# Patient Record
Sex: MALE | Race: WHITE | Employment: OTHER | URBAN - METROPOLITAN AREA
[De-identification: names, ages, dates, MRNs, and addresses within clinical notes are randomized per-mention and may not be internally consistent; named-entity substitution may affect disease eponyms.]

---

## 2017-01-11 ENCOUNTER — TELEPHONE (OUTPATIENT)
Dept: INTERNAL MEDICINE CLINIC | Facility: CLINIC | Age: 72
End: 2017-01-11

## 2017-01-11 NOTE — TELEPHONE ENCOUNTER
ASTHMA CONTROL TEST /ASTHMA ACTION PLAN REVIEWED AND COMPLETED OVER THE PHONE. EXPRESSED UNDERSTANDING OF CARE.

## 2017-04-04 PROBLEM — F41.9 ANXIETY: Status: ACTIVE | Noted: 2017-04-04

## 2017-06-23 ENCOUNTER — LAB ENCOUNTER (OUTPATIENT)
Dept: LAB | Facility: HOSPITAL | Age: 72
End: 2017-06-23
Attending: ORTHOPAEDIC SURGERY
Payer: MEDICARE

## 2017-06-23 ENCOUNTER — HOSPITAL ENCOUNTER (OUTPATIENT)
Dept: GENERAL RADIOLOGY | Facility: HOSPITAL | Age: 72
Discharge: HOME | End: 2017-06-23
Attending: ORTHOPAEDIC SURGERY
Payer: MEDICARE

## 2017-06-23 DIAGNOSIS — Z96.612 HISTORY OF TOTAL REPLACEMENT OF LEFT SHOULDER JOINT: ICD-10-CM

## 2017-06-23 DIAGNOSIS — Z96.612 HISTORY OF LEFT SHOULDER REPLACEMENT: ICD-10-CM

## 2017-06-23 DIAGNOSIS — G89.29 CHRONIC LEFT SHOULDER PAIN: ICD-10-CM

## 2017-06-23 DIAGNOSIS — M25.512 CHRONIC LEFT SHOULDER PAIN: ICD-10-CM

## 2017-06-23 PROCEDURE — 85025 COMPLETE CBC W/AUTO DIFF WBC: CPT

## 2017-06-23 PROCEDURE — 85652 RBC SED RATE AUTOMATED: CPT

## 2017-06-23 PROCEDURE — 87070 CULTURE OTHR SPECIMN AEROBIC: CPT | Performed by: ORTHOPAEDIC SURGERY

## 2017-06-23 PROCEDURE — 77002 NEEDLE LOCALIZATION BY XRAY: CPT | Performed by: ORTHOPAEDIC SURGERY

## 2017-06-23 PROCEDURE — 86141 C-REACTIVE PROTEIN HS: CPT

## 2017-06-23 PROCEDURE — 87205 SMEAR GRAM STAIN: CPT | Performed by: ORTHOPAEDIC SURGERY

## 2017-06-23 PROCEDURE — 20610 DRAIN/INJ JOINT/BURSA W/O US: CPT | Performed by: ORTHOPAEDIC SURGERY

## 2017-06-23 PROCEDURE — 89050 BODY FLUID CELL COUNT: CPT | Performed by: ORTHOPAEDIC SURGERY

## 2017-06-23 PROCEDURE — 89060 EXAM SYNOVIAL FLUID CRYSTALS: CPT | Performed by: ORTHOPAEDIC SURGERY

## 2017-06-23 PROCEDURE — 89051 BODY FLUID CELL COUNT: CPT | Performed by: ORTHOPAEDIC SURGERY

## 2017-06-23 PROCEDURE — 87075 CULTR BACTERIA EXCEPT BLOOD: CPT | Performed by: ORTHOPAEDIC SURGERY

## 2017-06-23 PROCEDURE — 36415 COLL VENOUS BLD VENIPUNCTURE: CPT

## 2017-06-23 NOTE — PROCEDURES
PROCEDURE: XR ASPIR/INJ MAJOR JOINT W/FLUORO LT(ORF=40780/44251)    COMPARISON: ANKUR , HELEN SHOULDER, (1 VIEW), LEFT (CPT=73020), 7/21/2016, 13:43.     INDICATIONS: 76-year-old male presents with a history of rotator cuff disease and reverse shoulder arthro

## 2017-06-28 PROBLEM — T84.59XA INFECTION OF PROSTHETIC SHOULDER JOINT, INITIAL ENCOUNTER (HCC): Status: ACTIVE | Noted: 2017-06-28

## 2017-06-28 PROBLEM — Z96.619: Status: ACTIVE | Noted: 2017-06-28

## 2017-06-28 PROBLEM — Z96.619 INFECTION OF PROSTHETIC SHOULDER JOINT, INITIAL ENCOUNTER (HCC): Status: ACTIVE | Noted: 2017-06-28

## 2017-06-28 PROBLEM — T84.59XA: Status: ACTIVE | Noted: 2017-06-28

## 2017-06-28 PROBLEM — T84.019A: Status: ACTIVE | Noted: 2017-06-28

## 2017-07-03 DIAGNOSIS — F41.9 ANXIETY: ICD-10-CM

## 2017-07-03 RX ORDER — ALPRAZOLAM 0.25 MG/1
TABLET ORAL
Qty: 90 TABLET | Refills: 0 | Status: SHIPPED
Start: 2017-07-03 | End: 2017-10-02

## 2017-07-17 RX ORDER — GARLIC EXTRACT 500 MG
1 CAPSULE ORAL DAILY
COMMUNITY
End: 2018-01-29

## 2017-07-21 ENCOUNTER — APPOINTMENT (OUTPATIENT)
Dept: GENERAL RADIOLOGY | Facility: HOSPITAL | Age: 72
DRG: 496 | End: 2017-07-21
Attending: ORTHOPAEDIC SURGERY
Payer: MEDICARE

## 2017-07-21 ENCOUNTER — ANESTHESIA (OUTPATIENT)
Dept: SURGERY | Facility: HOSPITAL | Age: 72
DRG: 496 | End: 2017-07-21
Payer: MEDICARE

## 2017-07-21 ENCOUNTER — SURGERY (OUTPATIENT)
Age: 72
End: 2017-07-21

## 2017-07-21 ENCOUNTER — ANESTHESIA EVENT (OUTPATIENT)
Dept: SURGERY | Facility: HOSPITAL | Age: 72
DRG: 496 | End: 2017-07-21
Payer: MEDICARE

## 2017-07-21 ENCOUNTER — HOSPITAL ENCOUNTER (INPATIENT)
Facility: HOSPITAL | Age: 72
LOS: 3 days | Discharge: HOME | DRG: 496 | End: 2017-07-24
Attending: ORTHOPAEDIC SURGERY | Admitting: ORTHOPAEDIC SURGERY
Payer: MEDICARE

## 2017-07-21 PROBLEM — T84.7XXA INFECTED ORTHOPEDIC IMPLANT (HCC): Status: ACTIVE | Noted: 2017-07-21

## 2017-07-21 PROBLEM — T84.7XXA INFECTED ORTHOPEDIC IMPLANT: Status: ACTIVE | Noted: 2017-07-21

## 2017-07-21 LAB
ANION GAP SERPL CALC-SCNC: 7 MMOL/L (ref 0–18)
BUN SERPL-MCNC: 17 MG/DL (ref 8–20)
BUN/CREAT SERPL: 15.7 (ref 10–20)
CALCIUM SERPL-MCNC: 8.6 MG/DL (ref 8.5–10.5)
CHLORIDE SERPL-SCNC: 104 MMOL/L (ref 95–110)
CO2 SERPL-SCNC: 25 MMOL/L (ref 22–32)
CREAT SERPL-MCNC: 1.08 MG/DL (ref 0.5–1.5)
GLUCOSE SERPL-MCNC: 199 MG/DL (ref 70–99)
MRSA DNA SPEC QL NAA+PROBE: NEGATIVE
OSMOLALITY UR CALC.SUM OF ELEC: 289 MOSM/KG (ref 275–295)
POTASSIUM SERPL-SCNC: 4 MMOL/L (ref 3.3–5.1)
SODIUM SERPL-SCNC: 136 MMOL/L (ref 136–144)

## 2017-07-21 PROCEDURE — 87176 TISSUE HOMOGENIZATION CULTR: CPT | Performed by: ORTHOPAEDIC SURGERY

## 2017-07-21 PROCEDURE — 87206 SMEAR FLUORESCENT/ACID STAI: CPT | Performed by: ORTHOPAEDIC SURGERY

## 2017-07-21 PROCEDURE — 0RHK08Z INSERTION OF SPACER INTO LEFT SHOULDER JOINT, OPEN APPROACH: ICD-10-PCS | Performed by: ORTHOPAEDIC SURGERY

## 2017-07-21 PROCEDURE — 99152 MOD SED SAME PHYS/QHP 5/>YRS: CPT | Performed by: ORTHOPAEDIC SURGERY

## 2017-07-21 PROCEDURE — 88300 SURGICAL PATH GROSS: CPT | Performed by: ORTHOPAEDIC SURGERY

## 2017-07-21 PROCEDURE — 87102 FUNGUS ISOLATION CULTURE: CPT | Performed by: ORTHOPAEDIC SURGERY

## 2017-07-21 PROCEDURE — 64415 NJX AA&/STRD BRCH PLXS IMG: CPT | Performed by: ORTHOPAEDIC SURGERY

## 2017-07-21 PROCEDURE — 0X930ZZ DRAINAGE OF LEFT SHOULDER REGION, OPEN APPROACH: ICD-10-PCS | Performed by: ORTHOPAEDIC SURGERY

## 2017-07-21 PROCEDURE — 73030 X-RAY EXAM OF SHOULDER: CPT | Performed by: ORTHOPAEDIC SURGERY

## 2017-07-21 PROCEDURE — 80048 BASIC METABOLIC PNL TOTAL CA: CPT | Performed by: ORTHOPAEDIC SURGERY

## 2017-07-21 PROCEDURE — 3E0T3BZ INTRODUCTION OF ANESTHETIC AGENT INTO PERIPHERAL NERVES AND PLEXI, PERCUTANEOUS APPROACH: ICD-10-PCS | Performed by: ANESTHESIOLOGY

## 2017-07-21 PROCEDURE — BP191ZZ FLUOROSCOPY OF LEFT SHOULDER USING LOW OSMOLAR CONTRAST: ICD-10-PCS | Performed by: ORTHOPAEDIC SURGERY

## 2017-07-21 PROCEDURE — 87070 CULTURE OTHR SPECIMN AEROBIC: CPT | Performed by: ORTHOPAEDIC SURGERY

## 2017-07-21 PROCEDURE — 36415 COLL VENOUS BLD VENIPUNCTURE: CPT | Performed by: ORTHOPAEDIC SURGERY

## 2017-07-21 PROCEDURE — 87116 MYCOBACTERIA CULTURE: CPT | Performed by: ORTHOPAEDIC SURGERY

## 2017-07-21 PROCEDURE — 76942 ECHO GUIDE FOR BIOPSY: CPT | Performed by: ORTHOPAEDIC SURGERY

## 2017-07-21 PROCEDURE — 87641 MR-STAPH DNA AMP PROBE: CPT | Performed by: ORTHOPAEDIC SURGERY

## 2017-07-21 PROCEDURE — 87075 CULTR BACTERIA EXCEPT BLOOD: CPT | Performed by: ORTHOPAEDIC SURGERY

## 2017-07-21 PROCEDURE — 87076 CULTURE ANAEROBE IDENT EACH: CPT | Performed by: ORTHOPAEDIC SURGERY

## 2017-07-21 PROCEDURE — 76001 XR C-ARM FLUORO >1 HOUR  (CPT=76001): CPT | Performed by: ORTHOPAEDIC SURGERY

## 2017-07-21 PROCEDURE — 0RPJ0JZ REMOVAL OF SYNTHETIC SUBSTITUTE FROM RIGHT SHOULDER JOINT, OPEN APPROACH: ICD-10-PCS | Performed by: ORTHOPAEDIC SURGERY

## 2017-07-21 PROCEDURE — 87205 SMEAR GRAM STAIN: CPT | Performed by: ORTHOPAEDIC SURGERY

## 2017-07-21 PROCEDURE — 73020 X-RAY EXAM OF SHOULDER: CPT | Performed by: ORTHOPAEDIC SURGERY

## 2017-07-21 DEVICE — CEMENT BONE ZIM PALICOS R +G: Type: IMPLANTABLE DEVICE | Status: FUNCTIONAL

## 2017-07-21 RX ORDER — HYDROMORPHONE HYDROCHLORIDE 1 MG/ML
0.2 INJECTION, SOLUTION INTRAMUSCULAR; INTRAVENOUS; SUBCUTANEOUS EVERY 5 MIN PRN
Status: DISCONTINUED | OUTPATIENT
Start: 2017-07-21 | End: 2017-07-21 | Stop reason: HOSPADM

## 2017-07-21 RX ORDER — HYDROMORPHONE HYDROCHLORIDE 1 MG/ML
0.4 INJECTION, SOLUTION INTRAMUSCULAR; INTRAVENOUS; SUBCUTANEOUS EVERY 30 MIN PRN
Status: DISCONTINUED | OUTPATIENT
Start: 2017-07-21 | End: 2017-07-24

## 2017-07-21 RX ORDER — HYDROCODONE BITARTRATE AND ACETAMINOPHEN 5; 325 MG/1; MG/1
1 TABLET ORAL AS NEEDED
Status: DISCONTINUED | OUTPATIENT
Start: 2017-07-21 | End: 2017-07-21 | Stop reason: HOSPADM

## 2017-07-21 RX ORDER — ONDANSETRON 2 MG/ML
4 INJECTION INTRAMUSCULAR; INTRAVENOUS EVERY 6 HOURS PRN
Status: DISCONTINUED | OUTPATIENT
Start: 2017-07-21 | End: 2017-07-24

## 2017-07-21 RX ORDER — HYDROCODONE BITARTRATE AND ACETAMINOPHEN 10; 325 MG/1; MG/1
1-2 TABLET ORAL EVERY 6 HOURS PRN
Qty: 60 TABLET | Refills: 0 | Status: SHIPPED | OUTPATIENT
Start: 2017-07-21 | End: 2017-08-02 | Stop reason: ALTCHOICE

## 2017-07-21 RX ORDER — ONDANSETRON 2 MG/ML
4 INJECTION INTRAMUSCULAR; INTRAVENOUS ONCE AS NEEDED
Status: DISCONTINUED | OUTPATIENT
Start: 2017-07-21 | End: 2017-07-21 | Stop reason: HOSPADM

## 2017-07-21 RX ORDER — DIPHENHYDRAMINE HYDROCHLORIDE 50 MG/ML
12.5 INJECTION INTRAMUSCULAR; INTRAVENOUS EVERY 4 HOURS PRN
Status: DISCONTINUED | OUTPATIENT
Start: 2017-07-21 | End: 2017-07-24

## 2017-07-21 RX ORDER — SODIUM CHLORIDE, SODIUM LACTATE, POTASSIUM CHLORIDE, CALCIUM CHLORIDE 600; 310; 30; 20 MG/100ML; MG/100ML; MG/100ML; MG/100ML
INJECTION, SOLUTION INTRAVENOUS CONTINUOUS
Status: DISCONTINUED | OUTPATIENT
Start: 2017-07-21 | End: 2017-07-22 | Stop reason: HOSPADM

## 2017-07-21 RX ORDER — SODIUM CHLORIDE, SODIUM LACTATE, POTASSIUM CHLORIDE, CALCIUM CHLORIDE 600; 310; 30; 20 MG/100ML; MG/100ML; MG/100ML; MG/100ML
INJECTION, SOLUTION INTRAVENOUS CONTINUOUS
Status: DISCONTINUED | OUTPATIENT
Start: 2017-07-21 | End: 2017-07-24

## 2017-07-21 RX ORDER — ENOXAPARIN SODIUM 100 MG/ML
40 INJECTION SUBCUTANEOUS DAILY
Status: DISCONTINUED | OUTPATIENT
Start: 2017-07-22 | End: 2017-07-24

## 2017-07-21 RX ORDER — CEFAZOLIN SODIUM 1 G/3ML
INJECTION, POWDER, FOR SOLUTION INTRAMUSCULAR; INTRAVENOUS AS NEEDED
Status: DISCONTINUED | OUTPATIENT
Start: 2017-07-21 | End: 2017-07-21 | Stop reason: SURG

## 2017-07-21 RX ORDER — OXYCODONE HYDROCHLORIDE 5 MG/1
10 TABLET ORAL EVERY 4 HOURS PRN
Status: DISPENSED | OUTPATIENT
Start: 2017-07-21 | End: 2017-07-22

## 2017-07-21 RX ORDER — ROCURONIUM BROMIDE 10 MG/ML
INJECTION, SOLUTION INTRAVENOUS AS NEEDED
Status: DISCONTINUED | OUTPATIENT
Start: 2017-07-21 | End: 2017-07-21 | Stop reason: SURG

## 2017-07-21 RX ORDER — POLYETHYLENE GLYCOL 3350 17 G/17G
POWDER, FOR SOLUTION ORAL
Status: DISCONTINUED | OUTPATIENT
Start: 2017-07-21 | End: 2017-07-21

## 2017-07-21 RX ORDER — BISACODYL 10 MG
10 SUPPOSITORY, RECTAL RECTAL
Status: DISCONTINUED | OUTPATIENT
Start: 2017-07-21 | End: 2017-07-24

## 2017-07-21 RX ORDER — LIDOCAINE HYDROCHLORIDE 10 MG/ML
INJECTION, SOLUTION EPIDURAL; INFILTRATION; INTRACAUDAL; PERINEURAL AS NEEDED
Status: DISCONTINUED | OUTPATIENT
Start: 2017-07-21 | End: 2017-07-21 | Stop reason: SURG

## 2017-07-21 RX ORDER — MORPHINE SULFATE 30 MG/1
15 TABLET ORAL EVERY 4 HOURS PRN
Status: ACTIVE | OUTPATIENT
Start: 2017-07-21 | End: 2017-07-22

## 2017-07-21 RX ORDER — METOCLOPRAMIDE HYDROCHLORIDE 5 MG/ML
10 INJECTION INTRAMUSCULAR; INTRAVENOUS EVERY 6 HOURS PRN
Status: ACTIVE | OUTPATIENT
Start: 2017-07-21 | End: 2017-07-23

## 2017-07-21 RX ORDER — DOCUSATE SODIUM 100 MG/1
100 CAPSULE, LIQUID FILLED ORAL 2 TIMES DAILY
Status: DISCONTINUED | OUTPATIENT
Start: 2017-07-21 | End: 2017-07-21

## 2017-07-21 RX ORDER — OXYCODONE HYDROCHLORIDE 5 MG/1
5 TABLET ORAL EVERY 4 HOURS PRN
Status: DISPENSED | OUTPATIENT
Start: 2017-07-21 | End: 2017-07-22

## 2017-07-21 RX ORDER — ACETAMINOPHEN 500 MG
1000 TABLET ORAL EVERY 8 HOURS
Status: COMPLETED | OUTPATIENT
Start: 2017-07-21 | End: 2017-07-22

## 2017-07-21 RX ORDER — HYDROCODONE BITARTRATE AND ACETAMINOPHEN 5; 325 MG/1; MG/1
2 TABLET ORAL AS NEEDED
Status: DISCONTINUED | OUTPATIENT
Start: 2017-07-21 | End: 2017-07-21 | Stop reason: HOSPADM

## 2017-07-21 RX ORDER — HYDROMORPHONE HYDROCHLORIDE 1 MG/ML
0.4 INJECTION, SOLUTION INTRAMUSCULAR; INTRAVENOUS; SUBCUTANEOUS EVERY 5 MIN PRN
Status: DISCONTINUED | OUTPATIENT
Start: 2017-07-21 | End: 2017-07-21 | Stop reason: HOSPADM

## 2017-07-21 RX ORDER — POLYETHYLENE GLYCOL 3350 17 G/17G
17 POWDER, FOR SOLUTION ORAL DAILY PRN
Status: DISCONTINUED | OUTPATIENT
Start: 2017-07-21 | End: 2017-07-23

## 2017-07-21 RX ORDER — MORPHINE SULFATE 4 MG/ML
4 INJECTION, SOLUTION INTRAMUSCULAR; INTRAVENOUS EVERY 10 MIN PRN
Status: DISCONTINUED | OUTPATIENT
Start: 2017-07-21 | End: 2017-07-21 | Stop reason: HOSPADM

## 2017-07-21 RX ORDER — ONDANSETRON 2 MG/ML
4 INJECTION INTRAMUSCULAR; INTRAVENOUS EVERY 4 HOURS PRN
Status: DISCONTINUED | OUTPATIENT
Start: 2017-07-21 | End: 2017-07-24

## 2017-07-21 RX ORDER — HYDROMORPHONE HYDROCHLORIDE 1 MG/ML
0.2 INJECTION, SOLUTION INTRAMUSCULAR; INTRAVENOUS; SUBCUTANEOUS EVERY 5 MIN PRN
Status: DISCONTINUED | OUTPATIENT
Start: 2017-07-21 | End: 2017-07-21

## 2017-07-21 RX ORDER — HYDROCODONE BITARTRATE AND ACETAMINOPHEN 10; 325 MG/1; MG/1
2 TABLET ORAL EVERY 4 HOURS PRN
Status: DISCONTINUED | OUTPATIENT
Start: 2017-07-21 | End: 2017-07-24

## 2017-07-21 RX ORDER — NALBUPHINE HCL 10 MG/ML
2.5 AMPUL (ML) INJECTION EVERY 4 HOURS PRN
Status: DISCONTINUED | OUTPATIENT
Start: 2017-07-21 | End: 2017-07-24

## 2017-07-21 RX ORDER — ACETAMINOPHEN 325 MG/1
650 TABLET ORAL ONCE
Status: COMPLETED | OUTPATIENT
Start: 2017-07-21 | End: 2017-07-21

## 2017-07-21 RX ORDER — HYDROMORPHONE HYDROCHLORIDE 1 MG/ML
0.6 INJECTION, SOLUTION INTRAMUSCULAR; INTRAVENOUS; SUBCUTANEOUS EVERY 5 MIN PRN
Status: DISCONTINUED | OUTPATIENT
Start: 2017-07-21 | End: 2017-07-21 | Stop reason: HOSPADM

## 2017-07-21 RX ORDER — MIDAZOLAM HYDROCHLORIDE 1 MG/ML
INJECTION INTRAMUSCULAR; INTRAVENOUS AS NEEDED
Status: DISCONTINUED | OUTPATIENT
Start: 2017-07-21 | End: 2017-07-21 | Stop reason: SURG

## 2017-07-21 RX ORDER — MORPHINE SULFATE 2 MG/ML
2 INJECTION, SOLUTION INTRAMUSCULAR; INTRAVENOUS EVERY 2 HOUR PRN
Status: ACTIVE | OUTPATIENT
Start: 2017-07-21 | End: 2017-07-22

## 2017-07-21 RX ORDER — HYDROMORPHONE HYDROCHLORIDE 1 MG/ML
0.4 INJECTION, SOLUTION INTRAMUSCULAR; INTRAVENOUS; SUBCUTANEOUS EVERY 5 MIN PRN
Status: DISCONTINUED | OUTPATIENT
Start: 2017-07-21 | End: 2017-07-21

## 2017-07-21 RX ORDER — ONDANSETRON 2 MG/ML
INJECTION INTRAMUSCULAR; INTRAVENOUS AS NEEDED
Status: DISCONTINUED | OUTPATIENT
Start: 2017-07-21 | End: 2017-07-21 | Stop reason: SURG

## 2017-07-21 RX ORDER — NALOXONE HYDROCHLORIDE 0.4 MG/ML
0.08 INJECTION, SOLUTION INTRAMUSCULAR; INTRAVENOUS; SUBCUTANEOUS
Status: DISCONTINUED | OUTPATIENT
Start: 2017-07-21 | End: 2017-07-24

## 2017-07-21 RX ORDER — METOCLOPRAMIDE 10 MG/1
10 TABLET ORAL ONCE
Status: DISCONTINUED | OUTPATIENT
Start: 2017-07-21 | End: 2017-07-21 | Stop reason: HOSPADM

## 2017-07-21 RX ORDER — HALOPERIDOL 5 MG/ML
0.25 INJECTION INTRAMUSCULAR ONCE AS NEEDED
Status: DISCONTINUED | OUTPATIENT
Start: 2017-07-21 | End: 2017-07-21 | Stop reason: HOSPADM

## 2017-07-21 RX ORDER — ALFUZOSIN HYDROCHLORIDE 10 MG/1
10 TABLET, EXTENDED RELEASE ORAL NIGHTLY
Status: DISCONTINUED | OUTPATIENT
Start: 2017-07-21 | End: 2017-07-24

## 2017-07-21 RX ORDER — DIPHENHYDRAMINE HYDROCHLORIDE 50 MG/ML
25 INJECTION INTRAMUSCULAR; INTRAVENOUS ONCE AS NEEDED
Status: ACTIVE | OUTPATIENT
Start: 2017-07-21 | End: 2017-07-21

## 2017-07-21 RX ORDER — SODIUM CHLORIDE 0.9 % (FLUSH) 0.9 %
10 SYRINGE (ML) INJECTION AS NEEDED
Status: DISCONTINUED | OUTPATIENT
Start: 2017-07-21 | End: 2017-07-24

## 2017-07-21 RX ORDER — SODIUM PHOSPHATE, DIBASIC AND SODIUM PHOSPHATE, MONOBASIC 7; 19 G/133ML; G/133ML
1 ENEMA RECTAL ONCE AS NEEDED
Status: DISCONTINUED | OUTPATIENT
Start: 2017-07-21 | End: 2017-07-24

## 2017-07-21 RX ORDER — PHENYLEPHRINE HCL 10 MG/ML
VIAL (ML) INJECTION AS NEEDED
Status: DISCONTINUED | OUTPATIENT
Start: 2017-07-21 | End: 2017-07-21 | Stop reason: SURG

## 2017-07-21 RX ORDER — MORPHINE SULFATE 4 MG/ML
6 INJECTION, SOLUTION INTRAMUSCULAR; INTRAVENOUS EVERY 10 MIN PRN
Status: DISCONTINUED | OUTPATIENT
Start: 2017-07-21 | End: 2017-07-21 | Stop reason: HOSPADM

## 2017-07-21 RX ORDER — FAMOTIDINE 20 MG/1
20 TABLET ORAL ONCE
Status: DISCONTINUED | OUTPATIENT
Start: 2017-07-21 | End: 2017-07-21 | Stop reason: HOSPADM

## 2017-07-21 RX ORDER — MORPHINE SULFATE 4 MG/ML
6 INJECTION, SOLUTION INTRAMUSCULAR; INTRAVENOUS EVERY 2 HOUR PRN
Status: ACTIVE | OUTPATIENT
Start: 2017-07-21 | End: 2017-07-22

## 2017-07-21 RX ORDER — ROPIVACAINE HYDROCHLORIDE 5 MG/ML
INJECTION, SOLUTION EPIDURAL; INFILTRATION; PERINEURAL AS NEEDED
Status: DISCONTINUED | OUTPATIENT
Start: 2017-07-21 | End: 2017-07-21 | Stop reason: SURG

## 2017-07-21 RX ORDER — NALOXONE HYDROCHLORIDE 0.4 MG/ML
80 INJECTION, SOLUTION INTRAMUSCULAR; INTRAVENOUS; SUBCUTANEOUS AS NEEDED
Status: DISCONTINUED | OUTPATIENT
Start: 2017-07-21 | End: 2017-07-21 | Stop reason: HOSPADM

## 2017-07-21 RX ORDER — LEVOTHYROXINE SODIUM 0.07 MG/1
75 TABLET ORAL
Status: DISCONTINUED | OUTPATIENT
Start: 2017-07-22 | End: 2017-07-24

## 2017-07-21 RX ORDER — ONDANSETRON 2 MG/ML
4 INJECTION INTRAMUSCULAR; INTRAVENOUS ONCE AS NEEDED
Status: DISCONTINUED | OUTPATIENT
Start: 2017-07-21 | End: 2017-07-21

## 2017-07-21 RX ORDER — HYDROCODONE BITARTRATE AND ACETAMINOPHEN 10; 325 MG/1; MG/1
1 TABLET ORAL EVERY 4 HOURS PRN
Status: DISCONTINUED | OUTPATIENT
Start: 2017-07-21 | End: 2017-07-24

## 2017-07-21 RX ORDER — SENNOSIDES 8.6 MG
17.2 TABLET ORAL NIGHTLY
Status: DISCONTINUED | OUTPATIENT
Start: 2017-07-21 | End: 2017-07-24

## 2017-07-21 RX ORDER — SENNA PLUS 8.6 MG/1
2 TABLET ORAL DAILY
Qty: 60 TABLET | Refills: 0 | Status: SHIPPED | OUTPATIENT
Start: 2017-07-21 | End: 2017-08-02 | Stop reason: ALTCHOICE

## 2017-07-21 RX ORDER — MORPHINE SULFATE 2 MG/ML
2 INJECTION, SOLUTION INTRAMUSCULAR; INTRAVENOUS EVERY 10 MIN PRN
Status: DISCONTINUED | OUTPATIENT
Start: 2017-07-21 | End: 2017-07-21 | Stop reason: HOSPADM

## 2017-07-21 RX ORDER — SODIUM CHLORIDE, SODIUM LACTATE, POTASSIUM CHLORIDE, CALCIUM CHLORIDE 600; 310; 30; 20 MG/100ML; MG/100ML; MG/100ML; MG/100ML
INJECTION, SOLUTION INTRAVENOUS CONTINUOUS
Status: DISCONTINUED | OUTPATIENT
Start: 2017-07-21 | End: 2017-07-21

## 2017-07-21 RX ORDER — EPHEDRINE SULFATE 50 MG/ML
INJECTION, SOLUTION INTRAVENOUS AS NEEDED
Status: DISCONTINUED | OUTPATIENT
Start: 2017-07-21 | End: 2017-07-21 | Stop reason: SURG

## 2017-07-21 RX ORDER — DEXAMETHASONE SODIUM PHOSPHATE 4 MG/ML
VIAL (ML) INJECTION AS NEEDED
Status: DISCONTINUED | OUTPATIENT
Start: 2017-07-21 | End: 2017-07-21 | Stop reason: SURG

## 2017-07-21 RX ORDER — HALOPERIDOL 5 MG/ML
0.25 INJECTION INTRAMUSCULAR ONCE AS NEEDED
Status: DISCONTINUED | OUTPATIENT
Start: 2017-07-21 | End: 2017-07-21

## 2017-07-21 RX ORDER — MORPHINE SULFATE 4 MG/ML
4 INJECTION, SOLUTION INTRAMUSCULAR; INTRAVENOUS EVERY 2 HOUR PRN
Status: ACTIVE | OUTPATIENT
Start: 2017-07-21 | End: 2017-07-22

## 2017-07-21 RX ORDER — HYDROMORPHONE HYDROCHLORIDE 1 MG/ML
0.6 INJECTION, SOLUTION INTRAMUSCULAR; INTRAVENOUS; SUBCUTANEOUS EVERY 5 MIN PRN
Status: DISCONTINUED | OUTPATIENT
Start: 2017-07-21 | End: 2017-07-21

## 2017-07-21 RX ORDER — DOCUSATE SODIUM 100 MG/1
100 CAPSULE, LIQUID FILLED ORAL 2 TIMES DAILY
Status: DISCONTINUED | OUTPATIENT
Start: 2017-07-21 | End: 2017-07-24

## 2017-07-21 RX ORDER — MORPHINE SULFATE 4 MG/ML
6 INJECTION, SOLUTION INTRAMUSCULAR; INTRAVENOUS EVERY 10 MIN PRN
Status: DISCONTINUED | OUTPATIENT
Start: 2017-07-21 | End: 2017-07-21

## 2017-07-21 RX ORDER — MORPHINE SULFATE 4 MG/ML
4 INJECTION, SOLUTION INTRAMUSCULAR; INTRAVENOUS EVERY 10 MIN PRN
Status: DISCONTINUED | OUTPATIENT
Start: 2017-07-21 | End: 2017-07-21

## 2017-07-21 RX ORDER — MORPHINE SULFATE 2 MG/ML
2 INJECTION, SOLUTION INTRAMUSCULAR; INTRAVENOUS EVERY 10 MIN PRN
Status: DISCONTINUED | OUTPATIENT
Start: 2017-07-21 | End: 2017-07-21

## 2017-07-21 RX ORDER — FLUTICASONE PROPIONATE 50 MCG
2 SPRAY, SUSPENSION (ML) NASAL 2 TIMES DAILY
Status: DISCONTINUED | OUTPATIENT
Start: 2017-07-21 | End: 2017-07-24

## 2017-07-21 RX ORDER — NALOXONE HYDROCHLORIDE 0.4 MG/ML
80 INJECTION, SOLUTION INTRAMUSCULAR; INTRAVENOUS; SUBCUTANEOUS AS NEEDED
Status: DISCONTINUED | OUTPATIENT
Start: 2017-07-21 | End: 2017-07-21

## 2017-07-21 RX ADMIN — MIDAZOLAM HYDROCHLORIDE 2 MG: 1 INJECTION INTRAMUSCULAR; INTRAVENOUS at 14:41:00

## 2017-07-21 RX ADMIN — ONDANSETRON 4 MG: 2 INJECTION INTRAMUSCULAR; INTRAVENOUS at 18:05:00

## 2017-07-21 RX ADMIN — SODIUM CHLORIDE, SODIUM LACTATE, POTASSIUM CHLORIDE, CALCIUM CHLORIDE: 600; 310; 30; 20 INJECTION, SOLUTION INTRAVENOUS at 17:50:00

## 2017-07-21 RX ADMIN — EPHEDRINE SULFATE 10 MG: 50 INJECTION, SOLUTION INTRAVENOUS at 18:20:00

## 2017-07-21 RX ADMIN — ROPIVACAINE HYDROCHLORIDE 30 ML: 5 INJECTION, SOLUTION EPIDURAL; INFILTRATION; PERINEURAL at 14:46:00

## 2017-07-21 RX ADMIN — CEFAZOLIN SODIUM 1 G: 1 INJECTION, POWDER, FOR SOLUTION INTRAMUSCULAR; INTRAVENOUS at 18:36:00

## 2017-07-21 RX ADMIN — PHENYLEPHRINE HCL 50 MCG: 10 MG/ML VIAL (ML) INJECTION at 18:35:00

## 2017-07-21 RX ADMIN — SODIUM CHLORIDE, SODIUM LACTATE, POTASSIUM CHLORIDE, CALCIUM CHLORIDE: 600; 310; 30; 20 INJECTION, SOLUTION INTRAVENOUS at 18:35:00

## 2017-07-21 RX ADMIN — LIDOCAINE HYDROCHLORIDE 2 ML: 10 INJECTION, SOLUTION EPIDURAL; INFILTRATION; INTRACAUDAL; PERINEURAL at 14:42:00

## 2017-07-21 RX ADMIN — ROCURONIUM BROMIDE 40 MG: 10 INJECTION, SOLUTION INTRAVENOUS at 17:48:00

## 2017-07-21 RX ADMIN — DEXAMETHASONE SODIUM PHOSPHATE 4 MG: 4 MG/ML VIAL (ML) INJECTION at 18:05:00

## 2017-07-21 RX ADMIN — SODIUM CHLORIDE, SODIUM LACTATE, POTASSIUM CHLORIDE, CALCIUM CHLORIDE: 600; 310; 30; 20 INJECTION, SOLUTION INTRAVENOUS at 19:55:00

## 2017-07-21 NOTE — H&P
Sixto Chou  [birth year]  66year old   male  Bernard Jauregui MD     HPI:   Patient presents with:  Shoulder Pain: left shoulder        The patient is right-handed.   Date of injury/ onset of symptoms: He fell 12/2015 and sustained an irreparable rota daily.   Disp:  Rfl:        HISTORY:        Past Medical History:   Diagnosis Date   • Arthritis 2010     Not real bad yet.    • Asthma     • Benign prostatic hypertrophy 10/22/2010   • Hemorrhoids, internal 2/29/2012   • Hypothyroidism       Levothyroxine Packs/day: 1.50      Years: 30.00        Types: Cigars     Quit date: 3/1/1998  Smokeless tobacco: Never Used                      Comment: CIGAR, PIPE  OCCAS FOR MANY YEARS  Alcohol use: Yes           8.4 oz/week     Gla diagnosis)  Infection of prosthetic shoulder joint, initial encounter (hcc)     The risks, indications, benefits, and procedures of both operative and non-operative treatment were discussed with the patient.     The patient desired surgery.   Surgery recomm

## 2017-07-21 NOTE — ANESTHESIA PREPROCEDURE EVALUATION
Anesthesia PreOp Note    HPI:     Zhang Gonzalez is a 66year old male who presents for preoperative consultation requested by: Jace Valdez MD    Date of Surgery: 7/21/2017    Procedure(s):  SHOULDER TOTAL  Indication: Failed prosthesis left shoul UNLISTED Right      Comment: knee  9/12/16: BACK SURGERY      Comment: L3-L5 TLIF   No date: CARPAL TUNNEL RELEASE Bilateral  August 2015: CATARACT Bilateral      Comment: IOL'S  Feb. '12: COLONOSCOPY  2/29/2012: COLONOSCOPY WITH BIOPSY      Comment: isabel Inhale 2 puffs into the lungs every 6 (six) hours as needed for Wheezing.  Disp:  Rfl:  More than a month at Unknown time       Current Facility-Administered Medications Ordered in Epic:  lactated ringers infusion  Intravenous Continuous ISRAEL Liu Signs: Body mass index is 27.79 kg/m². height is 1.854 m (6' 1\") and weight is 95.5 kg (210 lb 10 oz). His oral temperature is 97.9 °F (36.6 °C). His blood pressure is 136/74 and his pulse is 80. His respiration is 18 and oxygen saturation is 99%.     0

## 2017-07-21 NOTE — ANESTHESIA PROCEDURE NOTES
Peripheral Block    Anesthesiologist:  Dyana Drummond  Patient Location:  PACU  Start Time:  7/21/2017 2:40 PM  End Time:  7/21/2017 2:48 PM  Site Identification: ultrasound guided, real time ultrasound guided, nerve stimulator, surface landmarks and IMAG

## 2017-07-22 LAB
ERYTHROCYTE [DISTWIDTH] IN BLOOD BY AUTOMATED COUNT: 15.7 % (ref 11–15)
HCT VFR BLD AUTO: 32.8 % (ref 41–52)
HGB BLD-MCNC: 11 G/DL (ref 13.5–17.5)
MCH RBC QN AUTO: 30.4 PG (ref 27–32)
MCHC RBC AUTO-ENTMCNC: 33.4 G/DL (ref 32–37)
MCV RBC AUTO: 91.1 FL (ref 80–100)
PLATELET # BLD AUTO: 260 K/UL (ref 140–400)
PMV BLD AUTO: 7.8 FL (ref 7.4–10.3)
RBC # BLD AUTO: 3.6 M/UL (ref 4.5–5.9)
WBC # BLD AUTO: 8.9 K/UL (ref 4–11)

## 2017-07-22 PROCEDURE — 97165 OT EVAL LOW COMPLEX 30 MIN: CPT

## 2017-07-22 PROCEDURE — 97535 SELF CARE MNGMENT TRAINING: CPT

## 2017-07-22 PROCEDURE — 85027 COMPLETE CBC AUTOMATED: CPT | Performed by: ORTHOPAEDIC SURGERY

## 2017-07-22 PROCEDURE — 97162 PT EVAL MOD COMPLEX 30 MIN: CPT

## 2017-07-22 PROCEDURE — 83036 HEMOGLOBIN GLYCOSYLATED A1C: CPT | Performed by: HOSPITALIST

## 2017-07-22 NOTE — PLAN OF CARE
DISCHARGE PLANNING    • Discharge to home or other facility with appropriate resources Progressing    Unsure about dc plan. Pt will probably need long term abx and picc line. Awaiting ID consult.      GASTROINTESTINAL - ADULT    • Maintains or returns to ba

## 2017-07-22 NOTE — PHYSICAL THERAPY NOTE
PHYSICAL THERAPY QUICK EVALUATION - INPATIENT    Room Number: 433/433-A  Evaluation Date: 7/22/2017  Presenting Problem: L shoulder I&D w/ removal of L shoulder prosthesis & placement of antibiotic spacer  Physician Order: PT Eval and Treat    Problem List 7/21/16, Sinus                surgery Feb. '14,  2/14/2014: SINUS SURGERY    No date: TONSILLECTOMY    HOME SITUATION  Type of Home: House   Home Layout: Two level; Able to live on main level  Stairs to Enter : 2     Stairs to Bedroom:  (7 + 7 = 14 if he ch transfers, gait, patient education    Patient End of Session: Up in chair;Needs met;Call light within reach;RN aware of session/findings; All patient questions and concerns addressed    ASSESSMENT   Consulted w/ RN prior to seeing pt for PT/OT co-eval. Pt i

## 2017-07-22 NOTE — PROGRESS NOTES
Mometasone Furoate AERO 1 puff bid  is Non-Formulary Medication &  Auto-Substituted to Fluticasone furoate 100mcg 1 puff daily Per P&T PROTOCOL

## 2017-07-22 NOTE — OPERATIVE REPORT
[facility]    PATIENT'S NAME: Syed Ela   ATTENDING PHYSICIAN: Richard Crowley MD   OPERATING PHYSICIAN: Richard Crowley MD   PATIENT ACCOUNT#:   [id number]    LOCATION:  [address] [place] [address] #:   [id number]       [facility] were answered. He is in agreement treatment plan. PROCEDURE:  On 07/21/2017 the patient was seen and evaluated preoperatively. His left shoulder was identified as the correct operative site. My initials were placed.   He was transferred to the operati a rongeur curette and then thoroughly irrigated. There was gross purulence found under the prosthesis which was also cultured as well as in the glenoid which was sent for deep cultures. The surrounding soft tissues were debrided with a rongeur.   The woun

## 2017-07-22 NOTE — ANESTHESIA POSTPROCEDURE EVALUATION
Patient: Owen Hinson    Procedure Summary     Date:  07/21/17 Room / Location:  [address] [place] [address] MAIN OR 05 / 300 [facility] MAIN OR    Anesthesia Start:  6703 Anesthesia Stop:      Procedure:  SHOULDER TOTAL (Left Shoulder) Diagnosis:  (Failed prosthesis left shoulder)

## 2017-07-22 NOTE — BRIEF OP NOTE
Pre-Operative Diagnosis: Failed prosthesis left shoulder     Post-Operative Diagnosis: Failed prosthesis left shoulder     Procedure Performed:   Procedure(s):  Left shoulder removal of prosthesis and placement of antibiotic spacer    Surgeon(s) and Rol

## 2017-07-22 NOTE — DISCHARGE PLANNING
SELVIN met with the pt. At bedside. The pt. Lives with his wife in a 2 story home with the bedrooms on the 2nd floor. The pt. Reports being independent prior to admission with adls, ambulation and driving. The pt. Has 2 children in the area. The pt.  Is jonnathan

## 2017-07-22 NOTE — OCCUPATIONAL THERAPY NOTE
OCCUPATIONAL THERAPY EVALUATION - INPATIENT      Room Number: 433/433-A  Evaluation Date: 7/22/2017  Type of Evaluation: Initial       Physician Order: IP Consult to Occupational Therapy  Reason for Therapy: ADL/IADL Dysfunction and Discharge Planning    O OT to address the above deficits, maximizing patient's ability to return to prior level of function.       OT Discharge Recommendations: Home with home health PT/OT (Home assessment for safety and maintaining LUE NWB)       PLAN  OT Treatment Plan: ADL froilan Comment: LT ELBOW - ulnar nerve release  No date: OTHER Left      Comment: shoulder replacement  No date: OTHER SURGICAL HISTORY      Comment: Shoulder Joint Replacement 7/21/16, Sinus                surgery Feb. '14,  2/14/2014: SINUS SURGERY    No date: bedpan or urinal? : None  -   Putting on and taking off regular upper body clothing?: A Little  -   Taking care of personal grooming such as brushing teeth?: None  -   Eating meals?: None    AM-PAC Score:  Score: 21  Approx Degree of Impairment: 32.79%  St

## 2017-07-22 NOTE — PROGRESS NOTES
Ortho Note     S: complains of some mild L shoulder pain     O: AVSS  LUE- dressing clean/dry/intact; compartments soft; NVI distally     A/P: s/p L shoulder implant removal and antibiotic spacer placement POD#1  1. Patient doing well.  NWB L upper extremit

## 2017-07-22 NOTE — ANESTHESIA POST-OP FOLLOW-UP NOTE
[facility]   Acute Pain Rounds Note  2017    Patient name: Zhang Gonzalez 66year old male  : 10/10/1945  MRN: [id number]    Diagnosis: No diagnosis found.     S/P: TOTAL SHOULDER REVSION /SPACER    Pain modality: Interscalene blo

## 2017-07-22 NOTE — ADDENDUM NOTE
Addendum  created 07/21/17 2030 by Jennifer Elias MD    Anesthesia Review and Sign - Ready for Procedure

## 2017-07-22 NOTE — PROGRESS NOTES
tamsulosin HCl (FLOMAX) cap 0.4 mg daily is Non-Formulary Medication &  Auto-Substituted to Alfuzosin 10mg nightly Per P&T PROTOCOL

## 2017-07-22 NOTE — H&P
[facility] Hospitalist H&P     CC: infected left shoulder      PCP: Faraz Schofield MD      Assessment and Plan     Monalisa Flores is a 66year old male with PMH sig for asthma, hypothyroidism, and prior rotator cuff tear (left) due to a fall s/p TSA by Dr Rivas [employer]  12 point systems reviewed, please see HPI for pertinent positives, otherwise negative    History     PMH  Past Medical History:   Diagnosis Date   • Arthritis 2010    Not real bad yet.    • Asthma    • Back problem    • Benign prostatic hypertrophy sennosides (SENOKOT) 8.6 MG Oral Tab Take 2 tablets by mouth daily. Disp: 60 tablet Rfl: 0   Acidophilus/Pectin Oral Cap Take 1 capsule by mouth daily. Disp:  Rfl:    Polyethylene Glycol 3350 (MIRALAX OR) Take by mouth.  Disp:  Rfl:    ALPRAZolam 0.25 MG or crackles  CV: Heart with regular rate and rhythm, No murmurs, rubs, gallops  Abd: Abdomen soft, nontender, nondistended, no organomegaly, bowel sounds present  MSK: no clubbing, no cyanosis.   No Lower extremity edema  Skin: no rashes or lesions, well pe appearance of the articular surfaces of the acromion process and distal clavicle. Prominent marginal spurring is also noted at this level. The findings indicate moderate-severe degenerative changes of the acromioclavicular joint.  There are no acute fractu SHOULDER, (1 VIEW), LEFT (CPT=73020), 7/21/2016, 13:43. INDICATIONS:  50-year-old male presents with a history of rotator cuff disease and reverse shoulder arthroplasty.  Patient currently complains of left shoulder pain with clinical concern for hardware

## 2017-07-22 NOTE — PLAN OF CARE
GASTROINTESTINAL - ADULT    • Maintains or returns to baseline bowel function Progressing    She Sanchez is taking stool softeners. GENITOURINARY - ADULT    • Absence of urinary retention Progressing    Voiding well.     Impaired Functional Mobility    • Achie

## 2017-07-23 LAB
ERYTHROCYTE [DISTWIDTH] IN BLOOD BY AUTOMATED COUNT: 16.6 % (ref 11–15)
HBA1C MFR BLD: 6.1 % (ref 4–6)
HCT VFR BLD AUTO: 29.8 % (ref 41–52)
HGB BLD-MCNC: 10 G/DL (ref 13.5–17.5)
MCH RBC QN AUTO: 30.3 PG (ref 27–32)
MCHC RBC AUTO-ENTMCNC: 33.5 G/DL (ref 32–37)
MCV RBC AUTO: 90.4 FL (ref 80–100)
PLATELET # BLD AUTO: 254 K/UL (ref 140–400)
PMV BLD AUTO: 7.1 FL (ref 7.4–10.3)
RBC # BLD AUTO: 3.3 M/UL (ref 4.5–5.9)
VANCOMYCIN TROUGH SERPL-MCNC: 11.2 MCG/ML (ref 10–20)
WBC # BLD AUTO: 8.1 K/UL (ref 4–11)

## 2017-07-23 PROCEDURE — 02HV33Z INSERTION OF INFUSION DEVICE INTO SUPERIOR VENA CAVA, PERCUTANEOUS APPROACH: ICD-10-PCS | Performed by: INTERNAL MEDICINE

## 2017-07-23 PROCEDURE — 76937 US GUIDE VASCULAR ACCESS: CPT

## 2017-07-23 PROCEDURE — 80202 ASSAY OF VANCOMYCIN: CPT | Performed by: ORTHOPAEDIC SURGERY

## 2017-07-23 PROCEDURE — 85027 COMPLETE CBC AUTOMATED: CPT | Performed by: ORTHOPAEDIC SURGERY

## 2017-07-23 PROCEDURE — 36569 INSJ PICC 5 YR+ W/O IMAGING: CPT

## 2017-07-23 RX ORDER — POLYETHYLENE GLYCOL 3350 17 G/17G
34 POWDER, FOR SOLUTION ORAL DAILY PRN
Status: DISCONTINUED | OUTPATIENT
Start: 2017-07-23 | End: 2017-07-24

## 2017-07-23 RX ORDER — SODIUM CHLORIDE 0.9 % (FLUSH) 0.9 %
10 SYRINGE (ML) INJECTION AS NEEDED
Status: DISCONTINUED | OUTPATIENT
Start: 2017-07-23 | End: 2017-07-24

## 2017-07-23 RX ORDER — 0.9 % SODIUM CHLORIDE 0.9 %
VIAL (ML) INJECTION
Status: COMPLETED
Start: 2017-07-23 | End: 2017-07-23

## 2017-07-23 RX ORDER — LIDOCAINE HYDROCHLORIDE 10 MG/ML
0.5 INJECTION, SOLUTION INFILTRATION; PERINEURAL ONCE AS NEEDED
Status: ACTIVE | OUTPATIENT
Start: 2017-07-23 | End: 2017-07-23

## 2017-07-23 NOTE — PROGRESS NOTES
[facility]    Progress Note    Debbie Lujan Patient Status:  Inpatient    10/10/1945 MRN [id number]   Location [facility] 4W/SW/SE Attending Radha Bone MD   Hosp Day # 2 PCP Joselyn Osborn MD     SUBJECTIVE:  Interval H

## 2017-07-23 NOTE — PROGRESS NOTES
[facility] Hospitalist Progress Note     PCP: Molly Cheadle, MD    CC: Follow up       Assessment/Plan:     Lauren Schafer is a 66year old male with PMH sig for asthma, hypothyroidism, and prior rotator cuff tear (left) due to a fall s/p TSA by Dr Tan Zhang in kg)  03/14/17 1244 : 212 lb (96.2 kg)  02/07/17 1126 : 212 lb (96.2 kg)      Exam  Gen: No acute distress, alert and oriented x3  Neck Supple, no JVD  Left shoulder sling  Pulm: Lungs clear, normal respiratory effort, No wheezing or crackles  CV: Heart wit Complete (min 2 Views), Left (cpt=73030)    Result Date: 7/22/2017  CONCLUSION:  1. Successful placement of antibiotic spacer into the left shoulder joint. Xr Shoulder, (1 View), Left (cpt=73020)    Result Date: 7/22/2017  CONCLUSION:  1.  Removal o

## 2017-07-23 NOTE — PROGRESS NOTES
[facility] dosing service    Follow-up Pharmacokinetic Consult for Vancomycin Dosing     Nathan Goins is a 66year old male admitted on 7/21/17 who is being treated for osteomyelitis of shoulder.    Patient is on day 3 of Vancomycin 1.5 gm IV Q 12 ho changes, toxicity and efficacy.     Jacquelyn Thrasher, PharmD  7/23/2017  3:30 PM  [address] Extension: [phone]

## 2017-07-23 NOTE — PLAN OF CARE
DISCHARGE PLANNING    • Discharge to home or other facility with appropriate resources Progressing        GASTROINTESTINAL - ADULT    • Maintains or returns to baseline bowel function Progressing        Impaired Functional Mobility    • Achieve highest/saf

## 2017-07-23 NOTE — CONSULTS
[facility] AND [facility] Infectious Disease Consult    Radha Rodriguezreasto Patient Status:  Inpatient    10/10/1945 MRN [id number]   Location [facility] 4W/SW/SE Attending Anastacio Elizabeth MD   Hosp Day # 1 PCP Caridad Greenwood MD     Reason for [facility] for other and unspecified genitourinary condition 1/17/2012   • Seborrheic keratosis 10/22/2010   • Shoulder joint pain 10/22/2010   • Sinusitis    • Special screening for malignant neoplasm of prostate 1/17/2012   • Unspecified disorder of thyroid      Pa PRN  •  morphINE sulfate (PF) 2 MG/ML injection 2 mg, 2 mg, Intravenous, Q2H PRN **OR** morphINE sulfate (PF) 4 MG/ML injection 4 mg, 4 mg, Intravenous, Q2H PRN **OR** morphINE sulfate (PF) 4 MG/ML injection 6 mg, 6 mg, Intravenous, Q2H PRN  •  Normal Maksim Kearns 500 mL IVPB, 15 mg/kg, Intravenous, Q12H  •  Fluticasone Propionate (FLONASE) 50 MCG/ACT nasal spray 2 spray, 2 spray, Each Nare, BID  •  Levothyroxine Sodium (SYNTHROID, LEVOTHROID) tab 75 mcg, 75 mcg, Oral, Before breakfast  •  Fluticasone Furoate (ARNUI 07/22/17 0500 - - - - 15 -   07/22/17 0300 - - - - 12 -   07/22/17 0100 - - - - 14 -   07/21/17 2201 123/66 (!) 97.5 °F (36.4 °C) Oral 67 17 97 %   07/21/17 2134 142/64 - - 76 17 97 %   07/21/17 2124 132/65 - - 72 14 97 %   07/21/17 2114 (!) 114/102 97. 6 Anxiety     Prosthetic joint implant failure, initial encounter ([address]     Infection of prosthetic shoulder joint, initial encounter ([address]     Infected orthopedic implant ([address]    1.   Left shoulder Eileen prosthetic Joint infection: with loosening and failure

## 2017-07-23 NOTE — PROGRESS NOTES
[facility] AND [facility] Infectious Disease Progress Note    Radha Huynh Patient Status:  Inpatient    10/10/1945 MRN [id number]   Location [facility] 4W/SW/SE Attending Anastacio Elizabeth MD   Hosp Day # 2 PCP Caridad Greenwood MD     Subjective: Oral, Q4H PRN **OR** HYDROcodone-acetaminophen (NORCO)  MG per tab 2 tablet, 2 tablet, Oral, Q4H PRN  •  HYDROmorphone HCl PF (DILAUDID) 1 MG/ML injection 0.4 mg, 0.4 mg, Intravenous, Q30 Min PRN  •  HYDROmorphone 0.2mg/mL in NS 30 mL (DILAUDID) PCA deficit. Labs:    Lab Results  Component Value Date   WBC 8.1 07/23/2017   HGB 10.0 07/23/2017   HCT 29.8 07/23/2017    07/23/2017       Radiology:  Reviewed     Assessment/Plan:    1.    Left shoulder Eileen prosthetic Joint infection: with looseni

## 2017-07-24 VITALS
SYSTOLIC BLOOD PRESSURE: 133 MMHG | TEMPERATURE: 98 F | OXYGEN SATURATION: 100 % | BODY MASS INDEX: 27.92 KG/M2 | WEIGHT: 210.63 LBS | HEIGHT: 73 IN | RESPIRATION RATE: 16 BRPM | HEART RATE: 86 BPM | DIASTOLIC BLOOD PRESSURE: 66 MMHG

## 2017-07-24 LAB
ANION GAP SERPL CALC-SCNC: 4 MMOL/L (ref 0–18)
BUN SERPL-MCNC: 13 MG/DL (ref 8–20)
BUN/CREAT SERPL: 13.3 (ref 10–20)
CALCIUM SERPL-MCNC: 8.4 MG/DL (ref 8.5–10.5)
CHLORIDE SERPL-SCNC: 106 MMOL/L (ref 95–110)
CO2 SERPL-SCNC: 28 MMOL/L (ref 22–32)
CREAT SERPL-MCNC: 0.98 MG/DL (ref 0.5–1.5)
ERYTHROCYTE [DISTWIDTH] IN BLOOD BY AUTOMATED COUNT: 16.3 % (ref 11–15)
GLUCOSE SERPL-MCNC: 140 MG/DL (ref 70–99)
HCT VFR BLD AUTO: 27.9 % (ref 41–52)
HGB BLD-MCNC: 9.3 G/DL (ref 13.5–17.5)
MCH RBC QN AUTO: 29.9 PG (ref 27–32)
MCHC RBC AUTO-ENTMCNC: 33.4 G/DL (ref 32–37)
MCV RBC AUTO: 89.6 FL (ref 80–100)
OSMOLALITY UR CALC.SUM OF ELEC: 288 MOSM/KG (ref 275–295)
PLATELET # BLD AUTO: 253 K/UL (ref 140–400)
PMV BLD AUTO: 7.6 FL (ref 7.4–10.3)
POTASSIUM SERPL-SCNC: 3.8 MMOL/L (ref 3.3–5.1)
RBC # BLD AUTO: 3.11 M/UL (ref 4.5–5.9)
SODIUM SERPL-SCNC: 138 MMOL/L (ref 136–144)
WBC # BLD AUTO: 7.3 K/UL (ref 4–11)

## 2017-07-24 PROCEDURE — 36592 COLLECT BLOOD FROM PICC: CPT

## 2017-07-24 PROCEDURE — 80048 BASIC METABOLIC PNL TOTAL CA: CPT | Performed by: ORTHOPAEDIC SURGERY

## 2017-07-24 PROCEDURE — 85027 COMPLETE CBC AUTOMATED: CPT | Performed by: ORTHOPAEDIC SURGERY

## 2017-07-24 PROCEDURE — A4216 STERILE WATER/SALINE, 10 ML: HCPCS | Performed by: INTERNAL MEDICINE

## 2017-07-24 NOTE — PROGRESS NOTES
[facility] AND [facility] Infectious Disease  Progress Note    Owen Hinson Patient Status:  Inpatient    10/10/1945 MRN [id number]   Location [facility] 4W/SW/SE Attending Rebecca Anderson MD   Hosp Day # 3 PCP Citlali Murphy MD     Subjective his infection    3. Disposition - stable for d/c from ID. Rx on chart for 6 weeks of IV cubicin 6mg/kg through 9/1/17 - will check weekly labs well. D/w patient at bedside - he will need to f/u without ID APNs in 2-3 weeks or sooner if needed.   All ques

## 2017-07-24 NOTE — PLAN OF CARE
GASTROINTESTINAL - ADULT    • Maintains or returns to baseline bowel function Progressing        Joel Ramirez is passing gas, has not had a BM post op. Miralax given. Ambulation and fluids encouraged.     HEMATOLOGIC - ADULT    • Maintains hematologic stability Pr

## 2017-07-24 NOTE — DISCHARGE SUMMARY
[facility] has scheduled the Patient for tomorrow (7/25) at 10:00 a.m. This Writer met with the Patient at bedside in regards to his appointment. The Patient requested to go to [facility] because it is closer.  This Writer informed the Patient that [facility] ID does

## 2017-07-24 NOTE — PROGRESS NOTES
6/30/2017  Angel Luis Drop  10/10/1945  66year old   male      HPI:       The patient complains of pain located left shoulder. The pain is decreased. The patient denies numbness and tingling.   The patient has his PICC line      EXAM:   /61 (BP L American >60 >=60   GFR, -American >60 >=60       ASSESSMENT AND PLAN:   S/p removal reverse TSA, placement Abx space left shoulder  NWB LUE, sling, PT/OT  DVT prophylaxis -- lovenox in house  Abx per ID -- cubicin, PICC, follow Cx NTD  Pain control

## 2017-07-25 NOTE — DISCHARGE SUMMARY
[facility] Hospitalist Discharge Summary   Patient ID:  Todd Mario  [id number]  46 year old  10/10/1945    Admit date: 7/21/2017  Discharge date: 7/24/2017  Primary Care Physician: Carlos Aden MD   Attending Physician: No att. providers found   Consults: CTAB/L  CARDIO: Regular, no murmur  ABD: soft, NT, ND  : no gupta  EXTREMITIES: no edema, no calf tenderness    Operative Procedures: Procedure(s) (LRB):  SHOULDER TOTAL (Left)  Radiology:   Xr Shoulder, Complete (min 2 Views), Left (cpt=73030)    Result Information     Karla Bell MD. Schedule an appointment as soon as possible for a visit in 2 weeks.     Specialty:  SURGERY, ORTHOPEDIC  Why:  For suture removal, For x-rays  Contact information:  [address]  [phone]

## 2017-07-26 ENCOUNTER — PATIENT OUTREACH (OUTPATIENT)
Dept: CASE MANAGEMENT | Age: 72
End: 2017-07-26

## 2017-07-26 ENCOUNTER — TELEPHONE (OUTPATIENT)
Dept: INTERNAL MEDICINE CLINIC | Facility: CLINIC | Age: 72
End: 2017-07-26

## 2017-07-26 DIAGNOSIS — T84.59XA INFECTION OF PROSTHETIC SHOULDER JOINT, INITIAL ENCOUNTER (HCC): ICD-10-CM

## 2017-07-26 DIAGNOSIS — Z96.619 INFECTION OF PROSTHETIC SHOULDER JOINT, INITIAL ENCOUNTER (HCC): ICD-10-CM

## 2017-07-26 NOTE — TELEPHONE ENCOUNTER
Patient was discharged from [facility] on 7/24/17 and was recommended to have a TCM HFU appointment within 14 days of discharge.  [facility] attempted to schedule a TCM HFU appointment, patient does not feel that it is necessary to follow up with Dr. Alexi jeffery

## 2017-07-26 NOTE — PROGRESS NOTES
Initial Post Discharge Follow Up   Discharge Date: 7/24/17  Contact Date: 7/26/2017    Consent Verification:  Assessment Completed With: Patient  HIPAA Verified? Yes    Discharge Dx:    Infected left shoulder ortho implant, S/P removal of prothesis and daily. Disp:  Rfl:    Polyethylene Glycol 3350 (MIRALAX OR) Take by mouth.  Disp:  Rfl:    ALPRAZolam 0.25 MG Oral Tab TAKE ONE TABLET BY MOUTH EVERY DAY AS NEEDED Disp: 90 tablet Rfl: 0   tamsulosin HCl 0.4 MG Oral Cap Take 1 capsule (0.4 mg total) by mout services? yes    If no, do you need help with this?    no    If yes, have you started these services?  yes        Needs post D/C:   Now that you are home, are there any needs or concerns you need addressed before your next visit with your PCP?  (DME, med 2017 10:00 AM CDT NON PHYSICIAN with [facility]AIR 5 [facility] ([place] [facility]    Aug 07, 2017  9:30 AM CDT NON PHYSICIAN with [facility] INF[facility]ION CHAIR [address] ([place] at [address] [place] [address] N.[address] )    Aug 08, 2017  9 INFUSION CHAIR [address] ([place] [facility] [facility] [address] )    Aug 22, 2017  9:00 AM CDT NON PHYSICIAN with [facility] INFUSION CHAIR 7 [facility] INFUSION ([place] at [address] [facility] [address] )    Aug 23, 2017  9:00 AM CDT NON PHYSICIAN wit [address] [facility]  [address] [facility] [address]  [facility], [address] at 1

## 2017-07-27 NOTE — TELEPHONE ENCOUNTER
Spoke with wife pt is currently in the shower and she will have him call office back. Pt declines appt at this time. He is following up surgeon.

## 2017-08-30 PROBLEM — T84.59XD: Status: ACTIVE | Noted: 2017-08-30

## 2017-08-30 PROBLEM — Z96.619: Status: ACTIVE | Noted: 2017-08-30

## 2017-09-25 PROBLEM — Z47.89 AFTERCARE FOLLOWING SURGERY OF THE MUSCULOSKELETAL SYSTEM: Status: ACTIVE | Noted: 2017-09-25

## 2017-09-30 DIAGNOSIS — F41.9 ANXIETY: ICD-10-CM

## 2017-10-02 PROCEDURE — 87206 SMEAR FLUORESCENT/ACID STAI: CPT | Performed by: ORTHOPAEDIC SURGERY

## 2017-10-02 PROCEDURE — 87116 MYCOBACTERIA CULTURE: CPT | Performed by: ORTHOPAEDIC SURGERY

## 2017-10-02 PROCEDURE — 87075 CULTR BACTERIA EXCEPT BLOOD: CPT | Performed by: ORTHOPAEDIC SURGERY

## 2017-10-02 PROCEDURE — 87205 SMEAR GRAM STAIN: CPT | Performed by: ORTHOPAEDIC SURGERY

## 2017-10-02 PROCEDURE — 87102 FUNGUS ISOLATION CULTURE: CPT | Performed by: ORTHOPAEDIC SURGERY

## 2017-10-02 PROCEDURE — 87070 CULTURE OTHR SPECIMN AEROBIC: CPT | Performed by: ORTHOPAEDIC SURGERY

## 2017-10-02 PROCEDURE — 87176 TISSUE HOMOGENIZATION CULTR: CPT | Performed by: ORTHOPAEDIC SURGERY

## 2017-10-02 RX ORDER — ALPRAZOLAM 0.25 MG/1
TABLET ORAL
Qty: 90 TABLET | Refills: 0 | Status: SHIPPED
Start: 2017-10-02 | End: 2018-01-02

## 2017-10-06 PROBLEM — M25.512 LEFT SHOULDER PAIN: Status: ACTIVE | Noted: 2017-10-06

## 2017-10-19 RX ORDER — ACETAMINOPHEN 325 MG/1
650 TABLET ORAL ONCE
Status: CANCELLED | OUTPATIENT
Start: 2017-10-19 | End: 2017-10-19

## 2017-10-20 ENCOUNTER — APPOINTMENT (OUTPATIENT)
Dept: LAB | Facility: HOSPITAL | Age: 72
End: 2017-10-20
Attending: ORTHOPAEDIC SURGERY
Payer: MEDICARE

## 2017-10-20 DIAGNOSIS — Z01.818 PREOP TESTING: ICD-10-CM

## 2017-10-20 PROCEDURE — 87641 MR-STAPH DNA AMP PROBE: CPT

## 2017-10-25 ENCOUNTER — TELEPHONE (OUTPATIENT)
Dept: INTERNAL MEDICINE CLINIC | Facility: CLINIC | Age: 72
End: 2017-10-25

## 2017-10-25 DIAGNOSIS — R97.20 ELEVATED PSA: ICD-10-CM

## 2017-10-25 DIAGNOSIS — E03.4 HYPOTHYROIDISM DUE TO ACQUIRED ATROPHY OF THYROID: ICD-10-CM

## 2017-10-25 DIAGNOSIS — Z12.5 SCREENING FOR MALIGNANT NEOPLASM OF PROSTATE: Primary | ICD-10-CM

## 2017-10-25 DIAGNOSIS — Z79.899 ENCOUNTER FOR LONG-TERM (CURRENT) USE OF MEDICATIONS: ICD-10-CM

## 2017-10-25 NOTE — TELEPHONE ENCOUNTER
Pt has had various lab tests done over the past few months and he wants to know what other tests Dr Ron Boyd will want done before his physical on 11/20? He wants to have a PSA test added. Please call to let him know.

## 2017-11-02 DIAGNOSIS — E03.4 HYPOTHYROIDISM DUE TO ACQUIRED ATROPHY OF THYROID: ICD-10-CM

## 2017-11-02 RX ORDER — LEVOTHYROXINE SODIUM 0.07 MG/1
75 TABLET ORAL
Qty: 90 TABLET | Refills: 0 | Status: SHIPPED | OUTPATIENT
Start: 2017-11-02 | End: 2017-11-10

## 2017-11-02 NOTE — TELEPHONE ENCOUNTER
Rx sent to retail electronically. Pt does have labs pending to complete before appointment this month.

## 2017-11-09 ENCOUNTER — APPOINTMENT (OUTPATIENT)
Dept: LAB | Age: 72
End: 2017-11-09
Attending: INTERNAL MEDICINE
Payer: MEDICARE

## 2017-11-09 DIAGNOSIS — E03.4 HYPOTHYROIDISM DUE TO ACQUIRED ATROPHY OF THYROID: ICD-10-CM

## 2017-11-09 DIAGNOSIS — R97.20 ELEVATED PSA: ICD-10-CM

## 2017-11-09 DIAGNOSIS — Z79.899 ENCOUNTER FOR LONG-TERM (CURRENT) USE OF MEDICATIONS: ICD-10-CM

## 2017-11-09 DIAGNOSIS — Z12.5 SCREENING FOR MALIGNANT NEOPLASM OF PROSTATE: ICD-10-CM

## 2017-11-09 PROCEDURE — 84443 ASSAY THYROID STIM HORMONE: CPT

## 2017-11-09 PROCEDURE — 80061 LIPID PANEL: CPT

## 2017-11-09 PROCEDURE — 36415 COLL VENOUS BLD VENIPUNCTURE: CPT

## 2017-11-10 ENCOUNTER — TELEPHONE (OUTPATIENT)
Dept: INTERNAL MEDICINE CLINIC | Facility: CLINIC | Age: 72
End: 2017-11-10

## 2017-11-10 DIAGNOSIS — E03.4 HYPOTHYROIDISM DUE TO ACQUIRED ATROPHY OF THYROID: Primary | ICD-10-CM

## 2017-11-10 RX ORDER — LEVOTHYROXINE SODIUM 0.1 MG/1
100 TABLET ORAL DAILY
Qty: 30 TABLET | Refills: 1 | Status: SHIPPED | OUTPATIENT
Start: 2017-11-10 | End: 2018-01-02

## 2017-11-17 ENCOUNTER — ANESTHESIA EVENT (OUTPATIENT)
Dept: SURGERY | Facility: HOSPITAL | Age: 72
DRG: 483 | End: 2017-11-17
Payer: MEDICARE

## 2017-11-17 ENCOUNTER — ANESTHESIA (OUTPATIENT)
Dept: SURGERY | Facility: HOSPITAL | Age: 72
DRG: 483 | End: 2017-11-17
Payer: MEDICARE

## 2017-11-17 ENCOUNTER — APPOINTMENT (OUTPATIENT)
Dept: GENERAL RADIOLOGY | Facility: HOSPITAL | Age: 72
DRG: 483 | End: 2017-11-17
Attending: ORTHOPAEDIC SURGERY
Payer: MEDICARE

## 2017-11-17 ENCOUNTER — HOSPITAL ENCOUNTER (INPATIENT)
Facility: HOSPITAL | Age: 72
LOS: 1 days | Discharge: HOME | DRG: 483 | End: 2017-11-18
Attending: ORTHOPAEDIC SURGERY | Admitting: ORTHOPAEDIC SURGERY
Payer: MEDICARE

## 2017-11-17 ENCOUNTER — SURGERY (OUTPATIENT)
Age: 72
End: 2017-11-17

## 2017-11-17 DIAGNOSIS — Z01.818 PREOP TESTING: Primary | ICD-10-CM

## 2017-11-17 DIAGNOSIS — T84.59XD INFECTION OF PROSTHETIC SHOULDER JOINT, SUBSEQUENT ENCOUNTER: ICD-10-CM

## 2017-11-17 DIAGNOSIS — Z96.619 INFECTION OF PROSTHETIC SHOULDER JOINT, SUBSEQUENT ENCOUNTER: ICD-10-CM

## 2017-11-17 PROCEDURE — 64415 NJX AA&/STRD BRCH PLXS IMG: CPT | Performed by: ORTHOPAEDIC SURGERY

## 2017-11-17 PROCEDURE — 97116 GAIT TRAINING THERAPY: CPT

## 2017-11-17 PROCEDURE — 97161 PT EVAL LOW COMPLEX 20 MIN: CPT

## 2017-11-17 PROCEDURE — 76942 ECHO GUIDE FOR BIOPSY: CPT | Performed by: ORTHOPAEDIC SURGERY

## 2017-11-17 PROCEDURE — 0RPK08Z REMOVAL OF SPACER FROM LEFT SHOULDER JOINT, OPEN APPROACH: ICD-10-PCS | Performed by: ORTHOPAEDIC SURGERY

## 2017-11-17 PROCEDURE — 0RRK00Z REPLACEMENT OF LEFT SHOULDER JOINT WITH REVERSE BALL AND SOCKET SYNTHETIC SUBSTITUTE, OPEN APPROACH: ICD-10-PCS | Performed by: ORTHOPAEDIC SURGERY

## 2017-11-17 PROCEDURE — 94640 AIRWAY INHALATION TREATMENT: CPT

## 2017-11-17 PROCEDURE — 88300 SURGICAL PATH GROSS: CPT | Performed by: ORTHOPAEDIC SURGERY

## 2017-11-17 PROCEDURE — 73030 X-RAY EXAM OF SHOULDER: CPT | Performed by: ORTHOPAEDIC SURGERY

## 2017-11-17 PROCEDURE — 3E0T3BZ INTRODUCTION OF ANESTHETIC AGENT INTO PERIPHERAL NERVES AND PLEXI, PERCUTANEOUS APPROACH: ICD-10-PCS | Performed by: ANESTHESIOLOGY

## 2017-11-17 PROCEDURE — 99152 MOD SED SAME PHYS/QHP 5/>YRS: CPT | Performed by: ORTHOPAEDIC SURGERY

## 2017-11-17 DEVICE — IMPLANTABLE DEVICE
Type: IMPLANTABLE DEVICE | Site: SHOULDER | Status: FUNCTIONAL
Brand: FLEX SHOULDER SYSTEM

## 2017-11-17 RX ORDER — HALOPERIDOL 5 MG/ML
0.25 INJECTION INTRAMUSCULAR ONCE AS NEEDED
Status: DISCONTINUED | OUTPATIENT
Start: 2017-11-17 | End: 2017-11-17 | Stop reason: HOSPADM

## 2017-11-17 RX ORDER — OXYCODONE HYDROCHLORIDE 5 MG/1
10 TABLET ORAL EVERY 4 HOURS PRN
Status: ACTIVE | OUTPATIENT
Start: 2017-11-17 | End: 2017-11-18

## 2017-11-17 RX ORDER — LIDOCAINE HYDROCHLORIDE 10 MG/ML
INJECTION, SOLUTION EPIDURAL; INFILTRATION; INTRACAUDAL; PERINEURAL AS NEEDED
Status: DISCONTINUED | OUTPATIENT
Start: 2017-11-17 | End: 2017-11-17 | Stop reason: SURG

## 2017-11-17 RX ORDER — HYDROCODONE BITARTRATE AND ACETAMINOPHEN 10; 325 MG/1; MG/1
2 TABLET ORAL EVERY 6 HOURS PRN
Status: DISCONTINUED | OUTPATIENT
Start: 2017-11-17 | End: 2017-11-18

## 2017-11-17 RX ORDER — SENNA PLUS 8.6 MG/1
2 TABLET ORAL DAILY
Qty: 60 TABLET | Refills: 0 | Status: SHIPPED | OUTPATIENT
Start: 2017-11-17 | End: 2017-11-30 | Stop reason: ALTCHOICE

## 2017-11-17 RX ORDER — OXYCODONE HCL 10 MG/1
10 TABLET, FILM COATED, EXTENDED RELEASE ORAL EVERY 12 HOURS
Status: DISCONTINUED | OUTPATIENT
Start: 2017-11-17 | End: 2017-11-18

## 2017-11-17 RX ORDER — FLUTICASONE PROPIONATE 50 MCG
2 SPRAY, SUSPENSION (ML) NASAL 2 TIMES DAILY
Status: DISCONTINUED | OUTPATIENT
Start: 2017-11-18 | End: 2017-11-18

## 2017-11-17 RX ORDER — METOPROLOL TARTRATE 5 MG/5ML
2.5 INJECTION INTRAVENOUS ONCE
Status: DISCONTINUED | OUTPATIENT
Start: 2017-11-17 | End: 2017-11-17 | Stop reason: ALTCHOICE

## 2017-11-17 RX ORDER — ONDANSETRON 2 MG/ML
INJECTION INTRAMUSCULAR; INTRAVENOUS AS NEEDED
Status: DISCONTINUED | OUTPATIENT
Start: 2017-11-17 | End: 2017-11-17 | Stop reason: SURG

## 2017-11-17 RX ORDER — GLYCOPYRROLATE 0.2 MG/ML
INJECTION INTRAMUSCULAR; INTRAVENOUS AS NEEDED
Status: DISCONTINUED | OUTPATIENT
Start: 2017-11-17 | End: 2017-11-17 | Stop reason: SURG

## 2017-11-17 RX ORDER — ALFUZOSIN HYDROCHLORIDE 10 MG/1
10 TABLET, EXTENDED RELEASE ORAL NIGHTLY
Status: DISCONTINUED | OUTPATIENT
Start: 2017-11-17 | End: 2017-11-18

## 2017-11-17 RX ORDER — ROPIVACAINE HYDROCHLORIDE 5 MG/ML
INJECTION, SOLUTION EPIDURAL; INFILTRATION; PERINEURAL AS NEEDED
Status: DISCONTINUED | OUTPATIENT
Start: 2017-11-17 | End: 2017-11-17 | Stop reason: SURG

## 2017-11-17 RX ORDER — SODIUM CHLORIDE, SODIUM LACTATE, POTASSIUM CHLORIDE, CALCIUM CHLORIDE 600; 310; 30; 20 MG/100ML; MG/100ML; MG/100ML; MG/100ML
INJECTION, SOLUTION INTRAVENOUS CONTINUOUS
Status: DISCONTINUED | OUTPATIENT
Start: 2017-11-17 | End: 2017-11-17 | Stop reason: ALTCHOICE

## 2017-11-17 RX ORDER — ONDANSETRON 2 MG/ML
4 INJECTION INTRAMUSCULAR; INTRAVENOUS AS NEEDED
Status: DISCONTINUED | OUTPATIENT
Start: 2017-11-17 | End: 2017-11-17 | Stop reason: ALTCHOICE

## 2017-11-17 RX ORDER — MORPHINE SULFATE 2 MG/ML
2 INJECTION, SOLUTION INTRAMUSCULAR; INTRAVENOUS EVERY 10 MIN PRN
Status: DISCONTINUED | OUTPATIENT
Start: 2017-11-17 | End: 2017-11-17 | Stop reason: HOSPADM

## 2017-11-17 RX ORDER — NEOSTIGMINE METHYLSULFATE 0.5 MG/ML
INJECTION INTRAVENOUS AS NEEDED
Status: DISCONTINUED | OUTPATIENT
Start: 2017-11-17 | End: 2017-11-17 | Stop reason: SURG

## 2017-11-17 RX ORDER — ONDANSETRON 2 MG/ML
4 INJECTION INTRAMUSCULAR; INTRAVENOUS ONCE AS NEEDED
Status: COMPLETED | OUTPATIENT
Start: 2017-11-17 | End: 2017-11-17

## 2017-11-17 RX ORDER — HYDROMORPHONE HYDROCHLORIDE 1 MG/ML
0.6 INJECTION, SOLUTION INTRAMUSCULAR; INTRAVENOUS; SUBCUTANEOUS EVERY 5 MIN PRN
Status: DISCONTINUED | OUTPATIENT
Start: 2017-11-17 | End: 2017-11-17 | Stop reason: HOSPADM

## 2017-11-17 RX ORDER — SODIUM CHLORIDE 0.9 % (FLUSH) 0.9 %
10 SYRINGE (ML) INJECTION AS NEEDED
Status: DISCONTINUED | OUTPATIENT
Start: 2017-11-17 | End: 2017-11-18

## 2017-11-17 RX ORDER — SENNOSIDES 8.6 MG
17.2 TABLET ORAL NIGHTLY
Status: DISCONTINUED | OUTPATIENT
Start: 2017-11-17 | End: 2017-11-18

## 2017-11-17 RX ORDER — ONDANSETRON 2 MG/ML
4 INJECTION INTRAMUSCULAR; INTRAVENOUS EVERY 4 HOURS PRN
Status: DISCONTINUED | OUTPATIENT
Start: 2017-11-17 | End: 2017-11-18

## 2017-11-17 RX ORDER — HYDROMORPHONE HYDROCHLORIDE 1 MG/ML
0.2 INJECTION, SOLUTION INTRAMUSCULAR; INTRAVENOUS; SUBCUTANEOUS EVERY 5 MIN PRN
Status: DISCONTINUED | OUTPATIENT
Start: 2017-11-17 | End: 2017-11-17 | Stop reason: HOSPADM

## 2017-11-17 RX ORDER — DOCUSATE SODIUM 100 MG/1
100 CAPSULE, LIQUID FILLED ORAL DAILY
Status: DISCONTINUED | OUTPATIENT
Start: 2017-11-18 | End: 2017-11-17

## 2017-11-17 RX ORDER — HYDROCODONE BITARTRATE AND ACETAMINOPHEN 10; 325 MG/1; MG/1
1-2 TABLET ORAL EVERY 6 HOURS PRN
Qty: 60 TABLET | Refills: 0 | Status: SHIPPED | OUTPATIENT
Start: 2017-11-17 | End: 2017-11-30 | Stop reason: ALTCHOICE

## 2017-11-17 RX ORDER — ACETAMINOPHEN 500 MG
1000 TABLET ORAL ONCE
Status: COMPLETED | OUTPATIENT
Start: 2017-11-17 | End: 2017-11-17

## 2017-11-17 RX ORDER — ACETAMINOPHEN 500 MG
1000 TABLET ORAL EVERY 8 HOURS
Status: DISPENSED | OUTPATIENT
Start: 2017-11-17 | End: 2017-11-18

## 2017-11-17 RX ORDER — DOCUSATE SODIUM 100 MG/1
100 CAPSULE, LIQUID FILLED ORAL 2 TIMES DAILY
Status: DISCONTINUED | OUTPATIENT
Start: 2017-11-17 | End: 2017-11-18

## 2017-11-17 RX ORDER — PHENYLEPHRINE HCL 10 MG/ML
VIAL (ML) INJECTION AS NEEDED
Status: DISCONTINUED | OUTPATIENT
Start: 2017-11-17 | End: 2017-11-17 | Stop reason: SURG

## 2017-11-17 RX ORDER — OXYCODONE HYDROCHLORIDE 5 MG/1
5 TABLET ORAL EVERY 4 HOURS PRN
Status: DISPENSED | OUTPATIENT
Start: 2017-11-17 | End: 2017-11-18

## 2017-11-17 RX ORDER — METOCLOPRAMIDE HYDROCHLORIDE 5 MG/ML
10 INJECTION INTRAMUSCULAR; INTRAVENOUS AS NEEDED
Status: DISCONTINUED | OUTPATIENT
Start: 2017-11-17 | End: 2017-11-17 | Stop reason: ALTCHOICE

## 2017-11-17 RX ORDER — BISACODYL 10 MG
10 SUPPOSITORY, RECTAL RECTAL
Status: DISCONTINUED | OUTPATIENT
Start: 2017-11-17 | End: 2017-11-18

## 2017-11-17 RX ORDER — CLINDAMYCIN PHOSPHATE 900 MG/50ML
900 INJECTION INTRAVENOUS EVERY 8 HOURS
Status: COMPLETED | OUTPATIENT
Start: 2017-11-17 | End: 2017-11-18

## 2017-11-17 RX ORDER — HYDROCODONE BITARTRATE AND ACETAMINOPHEN 10; 325 MG/1; MG/1
1 TABLET ORAL EVERY 6 HOURS PRN
Status: DISCONTINUED | OUTPATIENT
Start: 2017-11-17 | End: 2017-11-18

## 2017-11-17 RX ORDER — HYDROMORPHONE HYDROCHLORIDE 1 MG/ML
0.5 INJECTION, SOLUTION INTRAMUSCULAR; INTRAVENOUS; SUBCUTANEOUS EVERY 2 HOUR PRN
Status: DISCONTINUED | OUTPATIENT
Start: 2017-11-17 | End: 2017-11-18

## 2017-11-17 RX ORDER — HYDROCODONE BITARTRATE AND ACETAMINOPHEN 5; 325 MG/1; MG/1
2 TABLET ORAL AS NEEDED
Status: DISCONTINUED | OUTPATIENT
Start: 2017-11-17 | End: 2017-11-17 | Stop reason: ALTCHOICE

## 2017-11-17 RX ORDER — DEXAMETHASONE SODIUM PHOSPHATE 10 MG/ML
INJECTION, SOLUTION INTRAMUSCULAR; INTRAVENOUS AS NEEDED
Status: DISCONTINUED | OUTPATIENT
Start: 2017-11-17 | End: 2017-11-17 | Stop reason: SURG

## 2017-11-17 RX ORDER — MORPHINE SULFATE 10 MG/ML
6 INJECTION, SOLUTION INTRAMUSCULAR; INTRAVENOUS EVERY 10 MIN PRN
Status: DISCONTINUED | OUTPATIENT
Start: 2017-11-17 | End: 2017-11-17 | Stop reason: HOSPADM

## 2017-11-17 RX ORDER — METOCLOPRAMIDE HYDROCHLORIDE 5 MG/ML
10 INJECTION INTRAMUSCULAR; INTRAVENOUS EVERY 6 HOURS PRN
Status: DISCONTINUED | OUTPATIENT
Start: 2017-11-17 | End: 2017-11-18

## 2017-11-17 RX ORDER — METOCLOPRAMIDE 10 MG/1
10 TABLET ORAL ONCE
Status: DISCONTINUED | OUTPATIENT
Start: 2017-11-17 | End: 2017-11-17 | Stop reason: HOSPADM

## 2017-11-17 RX ORDER — ROCURONIUM BROMIDE 10 MG/ML
INJECTION, SOLUTION INTRAVENOUS AS NEEDED
Status: DISCONTINUED | OUTPATIENT
Start: 2017-11-17 | End: 2017-11-17 | Stop reason: SURG

## 2017-11-17 RX ORDER — MORPHINE SULFATE 2 MG/ML
2 INJECTION, SOLUTION INTRAMUSCULAR; INTRAVENOUS EVERY 2 HOUR PRN
Status: ACTIVE | OUTPATIENT
Start: 2017-11-17 | End: 2017-11-18

## 2017-11-17 RX ORDER — HYDROMORPHONE HYDROCHLORIDE 1 MG/ML
0.4 INJECTION, SOLUTION INTRAMUSCULAR; INTRAVENOUS; SUBCUTANEOUS EVERY 5 MIN PRN
Status: DISCONTINUED | OUTPATIENT
Start: 2017-11-17 | End: 2017-11-17 | Stop reason: HOSPADM

## 2017-11-17 RX ORDER — SODIUM PHOSPHATE, DIBASIC AND SODIUM PHOSPHATE, MONOBASIC 7; 19 G/133ML; G/133ML
1 ENEMA RECTAL ONCE AS NEEDED
Status: DISCONTINUED | OUTPATIENT
Start: 2017-11-17 | End: 2017-11-18

## 2017-11-17 RX ORDER — POLYETHYLENE GLYCOL 3350 17 G/17G
17 POWDER, FOR SOLUTION ORAL DAILY PRN
Status: DISCONTINUED | OUTPATIENT
Start: 2017-11-17 | End: 2017-11-17

## 2017-11-17 RX ORDER — HYDROCODONE BITARTRATE AND ACETAMINOPHEN 5; 325 MG/1; MG/1
1 TABLET ORAL AS NEEDED
Status: DISCONTINUED | OUTPATIENT
Start: 2017-11-17 | End: 2017-11-17 | Stop reason: ALTCHOICE

## 2017-11-17 RX ORDER — HYDROMORPHONE HYDROCHLORIDE 1 MG/ML
0.4 INJECTION, SOLUTION INTRAMUSCULAR; INTRAVENOUS; SUBCUTANEOUS EVERY 5 MIN PRN
Status: DISCONTINUED | OUTPATIENT
Start: 2017-11-17 | End: 2017-11-17 | Stop reason: ALTCHOICE

## 2017-11-17 RX ORDER — LEVOTHYROXINE SODIUM 0.1 MG/1
100 TABLET ORAL
Status: DISCONTINUED | OUTPATIENT
Start: 2017-11-18 | End: 2017-11-18

## 2017-11-17 RX ORDER — MORPHINE SULFATE 4 MG/ML
4 INJECTION, SOLUTION INTRAMUSCULAR; INTRAVENOUS EVERY 10 MIN PRN
Status: DISCONTINUED | OUTPATIENT
Start: 2017-11-17 | End: 2017-11-17 | Stop reason: HOSPADM

## 2017-11-17 RX ORDER — HYDROCODONE BITARTRATE AND ACETAMINOPHEN 5; 325 MG/1; MG/1
2 TABLET ORAL AS NEEDED
Status: DISCONTINUED | OUTPATIENT
Start: 2017-11-17 | End: 2017-11-17 | Stop reason: HOSPADM

## 2017-11-17 RX ORDER — FINASTERIDE 5 MG/1
5 TABLET, FILM COATED ORAL NIGHTLY
Status: DISCONTINUED | OUTPATIENT
Start: 2017-11-17 | End: 2017-11-18

## 2017-11-17 RX ORDER — HYDROCODONE BITARTRATE AND ACETAMINOPHEN 5; 325 MG/1; MG/1
1 TABLET ORAL AS NEEDED
Status: DISCONTINUED | OUTPATIENT
Start: 2017-11-17 | End: 2017-11-17 | Stop reason: HOSPADM

## 2017-11-17 RX ORDER — POLYETHYLENE GLYCOL 3350 17 G/17G
17 POWDER, FOR SOLUTION ORAL 2 TIMES DAILY PRN
Status: DISCONTINUED | OUTPATIENT
Start: 2017-11-17 | End: 2017-11-18

## 2017-11-17 RX ORDER — ALBUTEROL SULFATE 90 UG/1
2 AEROSOL, METERED RESPIRATORY (INHALATION) EVERY 6 HOURS PRN
Status: DISCONTINUED | OUTPATIENT
Start: 2017-11-17 | End: 2017-11-18

## 2017-11-17 RX ORDER — NALOXONE HYDROCHLORIDE 0.4 MG/ML
80 INJECTION, SOLUTION INTRAMUSCULAR; INTRAVENOUS; SUBCUTANEOUS AS NEEDED
Status: DISCONTINUED | OUTPATIENT
Start: 2017-11-17 | End: 2017-11-17 | Stop reason: ALTCHOICE

## 2017-11-17 RX ORDER — DIPHENHYDRAMINE HYDROCHLORIDE 50 MG/ML
25 INJECTION INTRAMUSCULAR; INTRAVENOUS ONCE AS NEEDED
Status: ACTIVE | OUTPATIENT
Start: 2017-11-17 | End: 2017-11-17

## 2017-11-17 RX ORDER — MORPHINE SULFATE 15 MG/1
15 TABLET ORAL EVERY 4 HOURS PRN
Status: ACTIVE | OUTPATIENT
Start: 2017-11-17 | End: 2017-11-18

## 2017-11-17 RX ORDER — MIDAZOLAM HYDROCHLORIDE 1 MG/ML
INJECTION INTRAMUSCULAR; INTRAVENOUS AS NEEDED
Status: DISCONTINUED | OUTPATIENT
Start: 2017-11-17 | End: 2017-11-17 | Stop reason: SURG

## 2017-11-17 RX ORDER — NALOXONE HYDROCHLORIDE 0.4 MG/ML
80 INJECTION, SOLUTION INTRAMUSCULAR; INTRAVENOUS; SUBCUTANEOUS AS NEEDED
Status: DISCONTINUED | OUTPATIENT
Start: 2017-11-17 | End: 2017-11-17 | Stop reason: HOSPADM

## 2017-11-17 RX ORDER — ENOXAPARIN SODIUM 100 MG/ML
40 INJECTION SUBCUTANEOUS DAILY
Status: DISCONTINUED | OUTPATIENT
Start: 2017-11-17 | End: 2017-11-18

## 2017-11-17 RX ORDER — MORPHINE SULFATE 4 MG/ML
4 INJECTION, SOLUTION INTRAMUSCULAR; INTRAVENOUS EVERY 2 HOUR PRN
Status: ACTIVE | OUTPATIENT
Start: 2017-11-17 | End: 2017-11-18

## 2017-11-17 RX ORDER — SODIUM CHLORIDE, SODIUM LACTATE, POTASSIUM CHLORIDE, CALCIUM CHLORIDE 600; 310; 30; 20 MG/100ML; MG/100ML; MG/100ML; MG/100ML
INJECTION, SOLUTION INTRAVENOUS CONTINUOUS
Status: DISCONTINUED | OUTPATIENT
Start: 2017-11-17 | End: 2017-11-18

## 2017-11-17 RX ORDER — MORPHINE SULFATE 4 MG/ML
6 INJECTION, SOLUTION INTRAMUSCULAR; INTRAVENOUS EVERY 2 HOUR PRN
Status: ACTIVE | OUTPATIENT
Start: 2017-11-17 | End: 2017-11-18

## 2017-11-17 RX ORDER — FAMOTIDINE 20 MG/1
20 TABLET ORAL ONCE
Status: DISCONTINUED | OUTPATIENT
Start: 2017-11-17 | End: 2017-11-17 | Stop reason: HOSPADM

## 2017-11-17 RX ADMIN — PHENYLEPHRINE HCL 100 MCG: 10 MG/ML VIAL (ML) INJECTION at 10:10:00

## 2017-11-17 RX ADMIN — PHENYLEPHRINE HCL 100 MCG: 10 MG/ML VIAL (ML) INJECTION at 09:48:00

## 2017-11-17 RX ADMIN — PHENYLEPHRINE HCL 100 MCG: 10 MG/ML VIAL (ML) INJECTION at 10:00:00

## 2017-11-17 RX ADMIN — PHENYLEPHRINE HCL 100 MCG: 10 MG/ML VIAL (ML) INJECTION at 09:52:00

## 2017-11-17 RX ADMIN — ROCURONIUM BROMIDE 10 MG: 10 INJECTION, SOLUTION INTRAVENOUS at 09:07:00

## 2017-11-17 RX ADMIN — PHENYLEPHRINE HCL 100 MCG: 10 MG/ML VIAL (ML) INJECTION at 09:44:00

## 2017-11-17 RX ADMIN — MIDAZOLAM HYDROCHLORIDE 2 MG: 1 INJECTION INTRAMUSCULAR; INTRAVENOUS at 07:55:00

## 2017-11-17 RX ADMIN — SODIUM CHLORIDE, SODIUM LACTATE, POTASSIUM CHLORIDE, CALCIUM CHLORIDE: 600; 310; 30; 20 INJECTION, SOLUTION INTRAVENOUS at 12:06:00

## 2017-11-17 RX ADMIN — GLYCOPYRROLATE 0.4 MG: 0.2 INJECTION INTRAMUSCULAR; INTRAVENOUS at 11:38:00

## 2017-11-17 RX ADMIN — ROPIVACAINE HYDROCHLORIDE 30 ML: 5 INJECTION, SOLUTION EPIDURAL; INFILTRATION; PERINEURAL at 07:59:00

## 2017-11-17 RX ADMIN — LIDOCAINE HYDROCHLORIDE 50 MG: 10 INJECTION, SOLUTION EPIDURAL; INFILTRATION; INTRACAUDAL; PERINEURAL at 09:07:00

## 2017-11-17 RX ADMIN — GLYCOPYRROLATE 0.2 MG: 0.2 INJECTION INTRAMUSCULAR; INTRAVENOUS at 09:04:00

## 2017-11-17 RX ADMIN — NEOSTIGMINE METHYLSULFATE 3 MG: 0.5 INJECTION INTRAVENOUS at 11:38:00

## 2017-11-17 RX ADMIN — DEXAMETHASONE SODIUM PHOSPHATE 4 MG: 10 INJECTION, SOLUTION INTRAMUSCULAR; INTRAVENOUS at 07:59:00

## 2017-11-17 RX ADMIN — ONDANSETRON 4 MG: 2 INJECTION INTRAMUSCULAR; INTRAVENOUS at 11:29:00

## 2017-11-17 RX ADMIN — PHENYLEPHRINE HCL 100 MCG: 10 MG/ML VIAL (ML) INJECTION at 09:39:00

## 2017-11-17 RX ADMIN — ROCURONIUM BROMIDE 10 MG: 10 INJECTION, SOLUTION INTRAVENOUS at 11:06:00

## 2017-11-17 RX ADMIN — SODIUM CHLORIDE, SODIUM LACTATE, POTASSIUM CHLORIDE, CALCIUM CHLORIDE: 600; 310; 30; 20 INJECTION, SOLUTION INTRAVENOUS at 09:04:00

## 2017-11-17 RX ADMIN — ROCURONIUM BROMIDE 20 MG: 10 INJECTION, SOLUTION INTRAVENOUS at 10:59:00

## 2017-11-17 RX ADMIN — PHENYLEPHRINE HCL 100 MCG: 10 MG/ML VIAL (ML) INJECTION at 09:55:00

## 2017-11-17 RX ADMIN — PHENYLEPHRINE HCL 100 MCG: 10 MG/ML VIAL (ML) INJECTION at 10:15:00

## 2017-11-17 RX ADMIN — LIDOCAINE HYDROCHLORIDE 3 ML: 10 INJECTION, SOLUTION EPIDURAL; INFILTRATION; INTRACAUDAL; PERINEURAL at 07:55:00

## 2017-11-17 RX ADMIN — PHENYLEPHRINE HCL 100 MCG: 10 MG/ML VIAL (ML) INJECTION at 10:05:00

## 2017-11-17 NOTE — H&P
Liu Corpus  [birth year]  67year old   male  Ana Townsend MD     HPI:   Patient presents with:  Shoulder Pain: left shoulder        The patient complains of pain located left shoulder. The pain is mild. The patient denies numbness and tingling. mouth daily. Disp:  Rfl:        HISTORY:        Past Medical History:   Diagnosis Date   • Arthritis 2010     Not real bad yet.    • Asthma     • Back problem     • Benign prostatic hypertrophy 10/22/2010   • BPH (benign prostatic hyperplasia)     • Hemor Cancer Mother     • Cancer Mother         breast cancer   • Heart Disorder Mother         cardiac issues related to aging   • Heart Disorder Father         partially blocked carotid arteries   • Hypertension Father     • Cancer Father     • Lipids Father   discussed with the patient.     The patient desired surgery. Surgery recommended was left shoulder removal of the antibiotic spacer with revision to reverse total shoulder arthroplasty with autograft or allograft.   Risks include bleeding, infection, neuro

## 2017-11-17 NOTE — BRIEF OP NOTE
Pre-Operative Diagnosis: Infected shoulder prosthesis      Post-Operative Diagnosis: Infected shoulder prosthesis      Procedure Performed:   Procedure(s):  Left shoulder revision to reverse total shoulder arthroplasty with autograft and removal of ceme

## 2017-11-17 NOTE — H&P
[facility] Hospitalist Team  History and Physical     ASSESSMENT / PLAN:   66 yo male with asthma, hypothyroidism, BPH, insomnia, Left shoulder infection of prosthesis s/p removal and abx spacer on 7/21 with completion cubicin on 9/1/17 who presents for shoulder S/P Left Shoulder Revision to Reverse Total Shoulder Arthroplasty with removal of cement spacer    Limited hs as pt minhggy in PACU. Complaints of pain all over. He states his fingers hurt, he is starting to be able to move his fingers.  Complaints of dry mo prostate 1/17/2012   • Unspecified disorder of thyroid         PSH  Past Surgical History:  10/22/2010: APPENDECTOMY  No date: APPENDECTOMY  No date: ARTHROSCOPY OF JOINT UNLISTED Right      Comment: knee  9/12/16: BACK SURGERY      Comment: L3-L5 TLIF   N Disp: 90 tablet Rfl: 3   Acidophilus/Pectin Oral Cap Take 1 capsule by mouth daily. Disp:  Rfl:    Polyethylene Glycol 3350 (MIRALAX OR) Take by mouth daily. Disp:  Rfl:    tamsulosin HCl 0.4 MG Oral Cap Take 1 capsule (0.4 mg total) by mouth daily.  Denney Primrose the the HPI. DIAGNOSTIC DATA:   CBC/Chem  No results for input(s): WBC, HGB, MCV, PLT, BAND, INR in the last 72 hours.     Invalid input(s): LYM#, MONO#, BASOS#, EOSIN#    No results for input(s): NA, K, CL, CO2, BUN, CREATSERUM, GLU, CA, CAION, MG, PH negative. completed cubicin on 9/1  -IV/PO prn pain meds  -monitor for acute blood loss anemia, preop hemoglobin per outpatient chart review 13.6 on 9/2017  -drain  -Bowel reg, antiemetics  -DVT Prophy- Lovenox  -PT/OT/SW  -as per surgery      Hypothyroidis

## 2017-11-17 NOTE — ANESTHESIA PROCEDURE NOTES
Peripheral Block    Anesthesiologist:  Macie Bain  Performed by:   Anesthesiologist  Patient Location:  PACU  Start Time:  11/17/2017 7:55 AM  End Time:  11/17/2017 8:05 AM  Site Identification: ultrasound guided, real time ultrasound guided, nerve

## 2017-11-17 NOTE — PHYSICAL THERAPY NOTE
PHYSICAL THERAPY EVALUATION - INPATIENT     Room Number: 428/428-A  Evaluation Date: 11/17/2017  Type of Evaluation: Initial  Physician Order: PT Eval and Treat    Presenting Problem: Prosthetic implant failure s/p left revision reverse total shoulder implant failure, initial encounter [facility])  Active Problems:    Infection of prosthetic shoulder joint, subsequent encounter    Preop testing      Past Medical History  Past Medical History:   Diagnosis Date   • Arthritis 2010    Not real bad yet.    • Asthma Left      Comment: Shoulder Revision to Reverse Total Shoulder                Arthroplasty with Removal of Cement Spacer  2/14/2014: SINUS SURGERY    No date: SPINE SURGERY PROCEDURE UNLISTED  No date: TONSILLECTOMY    HOME SITUATION  Type of Home: House from a chair with arms (e.g., wheelchair, bedside commode, etc.): A Little   -   Moving from lying on back to sitting on the side of the bed?: None   How much help from another person does the patient currently need. ..   -   Moving to and from a bed to a c

## 2017-11-17 NOTE — ANESTHESIA POSTPROCEDURE EVALUATION
Patient: Sixto Chou    Procedure Summary     Date:  11/17/17 Room / Location:  [facility] OR 05 / [facility] OR    Anesthesia Start:  6885 Anesthesia Stop:  2364    Procedure:  SHOULDER TOTAL REVISION (Left Shoulder) Diagnosis:  (Infected shoulder prost

## 2017-11-17 NOTE — ANESTHESIA PREPROCEDURE EVALUATION
Anesthesia PreOp Note    HPI:     Amee Pichardo is a 67year old male who presents for preoperative consultation requested by: Boom Peter MD    Date of Surgery: 11/17/2017    Procedure(s):  SHOULDER TOTAL REVISION  Indication: Infected shoulder Screening for endocrine/metabolic/immunity disorders 1/78/5030    metabolic disorders   • Screening for lipoid disorders 1/17/2012   • Screening for other and unspecified genitourinary condition 1/17/2012   • Seborrheic keratosis 10/22/2010   • Shoulder adela Unknown time   Polyethylene Glycol 3350 (MIRALAX OR) Take by mouth daily. Disp:  Rfl:  11/16/2017 at 0900   tamsulosin HCl 0.4 MG Oral Cap Take 1 capsule (0.4 mg total) by mouth daily.  (Patient taking differently: Take 0.4 mg by mouth nightly.  ) Disp: 9 partially blocked carotid arteries   • Hypertension Father    • Cancer Father    • Lipids Father    • Hypertension Brother        Social History  Social History   Marital status:   Spouse name: N/A    Years of education: N/A  Number of children: N/A distance: >3 FB  Neck ROM: full  Dental - normal exam     Pulmonary - negative ROS and normal exam   (+) asthma,   Cardiovascular - negative ROS and normal exam  Exercise tolerance: good    Neuro/Psych - negative ROS     GI/Hepatic/Renal - negative ROS

## 2017-11-17 NOTE — CM/SW NOTE
SELVIN met with the pt. And his wife At bedside. The pt. Lives with his wife in a 2 story home with the bedrooms on the 2nd floor. The pt. Reports being independent prior to admission with adls, ambulation and driving. The pt.  Has children living in the are

## 2017-11-18 VITALS
HEIGHT: 73 IN | TEMPERATURE: 99 F | DIASTOLIC BLOOD PRESSURE: 67 MMHG | OXYGEN SATURATION: 95 % | WEIGHT: 213 LBS | RESPIRATION RATE: 18 BRPM | HEART RATE: 97 BPM | BODY MASS INDEX: 28.23 KG/M2 | SYSTOLIC BLOOD PRESSURE: 129 MMHG

## 2017-11-18 PROCEDURE — 80048 BASIC METABOLIC PNL TOTAL CA: CPT | Performed by: ORTHOPAEDIC SURGERY

## 2017-11-18 PROCEDURE — 97110 THERAPEUTIC EXERCISES: CPT

## 2017-11-18 PROCEDURE — 85027 COMPLETE CBC AUTOMATED: CPT | Performed by: ORTHOPAEDIC SURGERY

## 2017-11-18 PROCEDURE — 97116 GAIT TRAINING THERAPY: CPT

## 2017-11-18 PROCEDURE — 97166 OT EVAL MOD COMPLEX 45 MIN: CPT

## 2017-11-18 NOTE — OCCUPATIONAL THERAPY NOTE
OCCUPATIONAL THERAPY QUICK EVALUATION - INPATIENT    Room Number: 428/428-A  Evaluation Date: 11/18/2017     Type of Evaluation: Initial  Presenting Problem: L reverse TSA    Physician Order: IP Consult to Occupational Therapy  Reason for Therapy:  ADL/IAD release  No date: OTHER Left      Comment: shoulder replacement  07/2017: OTHER Left      Comment: left shoulder reverse removal due to infection               and placement of Antibiotic spacer  No date: OTHER SURGICAL HISTORY      Comment: Shoulder Joint body clothing?: A Little   -   Bathing (including washing, rinsing, drying)?: A Little  -   Toileting, which includes using toilet, bedpan or urinal? : None  -   Putting on and taking off regular upper body clothing?: A Lot  -   Taking care of personal minh Occupational Therapy services. Please re-order if a new functional limitation presents during this admission.     Patient was able to achieve the following goals:  Patient able to toilet transfer: safely and independently  Patient able to dress lower extre

## 2017-11-18 NOTE — OPERATIVE REPORT
[facility]    PATIENT'S NAME: Hiral Babra   ATTENDING PHYSICIAN: Johnny Mcgee MD   OPERATING PHYSICIAN: Johnny Mcgee MD   PATIENT ACCOUNT#:   [id number]    LOCATION:  [address] [place] [address] Court #:   [id number]       [address] neurovascular injury, chronic pain, chronic disability, failure of the procedure, stiffness, instability, potential need for additional surgery, as well as anesthetic complications including death.   The patient consented to the procedure, all of his questi offset posterior wedge revision Perform reverse base plate, which was placed by advancing a central 6.5 mm screw.   Once the base plate was implanted, additional locking screws were placed superiorly and inferiorly and compression screws anteriorly and post mL, complications were none, and condition of the patient was admitted for postoperative medical management, postoperative pain control, postoperative DVT prophylaxis, and postoperative antibiotic therapy. All of his questions were answered.   He is in agr

## 2017-11-18 NOTE — CHRONIC PAIN
[facility]  Anesthesiology Pain Management Progress Note      Patient name: Jet Arciniega 67year old male  : 10/10/1945  MRN: [id number]    Diagnosis: [unfilled]    Reason for Consult: s/p total left shoulder    Current hospital day: [facility]

## 2017-11-18 NOTE — DISCHARGE SUMMARY
General Medicine Discharge Summary     Patient ID:  Lauren Schafer  67year old  10/10/1945    Admit date: 11/17/2017    Discharge date and time: 11/18/17    Attending Physician: Renee Alvarez MD     Primary Care Physician: Lonnie Vides MD     Reas continue uraxatrol, finasteride  - s/p straight cath overnight  - high PVR this AM, now 0 postvoid residual. Stable for d/c home without gupta     Hypothyroidism  -recently had synthroid increased to 100 daily on 11/9 for TSH 7, plans for repeat TSH 6 week NEEDED  What changed:  · how much to take  · how to take this  · when to take this  · reasons to take this  · additional instructions     finasteride 5 MG Tabs  Commonly known as:  PROSCAR  Take 1 tablet (5 mg total) by mouth daily.   What changed:  when to MD [place] In 1 week. Specialty:  UROLOGY  Contact information:  Uriel Gold [address] [place] [address] instructions:       Other Discharge Instructions:       Rommel Holt MD      Reverse Total Shoulder A ? BATHING:  It is safe to take a shower 2 days after surgery after you remove the large bandage. To bathe, remove the sling and support your arm by your side. To wash under your armpit, lean over and let the arm fall away from your body.   DO NOT rais

## 2017-11-18 NOTE — PHYSICAL THERAPY NOTE
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 ..   -   Moving to and from a bed to a chair (including a wheelchair)?: None   -   Need to walk in hospital room?: None   -   Climbing 3-5 steps with a railing?: None    AM-PAC

## 2017-11-18 NOTE — PLAN OF CARE
DISCHARGE PLANNING    • Discharge to home or other facility with appropriate resources Progressing    Plan to d/c home when cleared.      PAIN - ADULT    • Verbalizes/displays adequate comfort level or patient's stated pain goal Progressing    Pain well man

## 2017-11-20 ENCOUNTER — TELEPHONE (OUTPATIENT)
Dept: INTERNAL MEDICINE CLINIC | Facility: CLINIC | Age: 72
End: 2017-11-20

## 2017-11-20 ENCOUNTER — PATIENT OUTREACH (OUTPATIENT)
Dept: CASE MANAGEMENT | Age: 72
End: 2017-11-20

## 2017-11-20 DIAGNOSIS — Z96.619 INFECTION OF PROSTHETIC SHOULDER JOINT, SUBSEQUENT ENCOUNTER: ICD-10-CM

## 2017-11-20 DIAGNOSIS — T84.59XD INFECTION OF PROSTHETIC SHOULDER JOINT, SUBSEQUENT ENCOUNTER: ICD-10-CM

## 2017-11-20 NOTE — PROGRESS NOTES
Initial Post Discharge Follow Up   Discharge Date: 11/18/17  Contact Date: 11/20/2017    Consent Verification:  Assessment Completed With: Patient  HIPAA Verified? Yes    Discharge Dx:    Infected left shoulder prothesis, S/P left shoulder revision to r 30 tablet Rfl: 1   ALPRAZolam 0.25 MG Oral Tab TAKE ONE TABLET BY MOUTH EVERY DAY AS NEEDED (Patient taking differently: Take 0.25 mg by mouth nightly as needed for Sleep.  TAKE ONE TABLET BY MOUTH EVERY DAY AS NEEDED ) Disp: 90 tablet Rfl: 0   CALCIUM OR T anything? yes  • Are there any reasons that keep you from taking your medication as prescribed? No  Are you having any concerns with constipation? No, Patient reports he is taking colace and Mirilax    Referrals/orders at D/C:  Home Health ordered at D/C? VELIA Choudhury at [address], [address] [facility] [phone]          PCP TCM/HFU appointment: scheduled at D/C within 7-14 days yes     NCM Reviewed/scheduled/rescheduled PCP TCM/HFU appointment with pt:  JESSICA con

## 2017-11-20 NOTE — TELEPHONE ENCOUNTER
Per Dr. Evita Ramírez, the pt can be seen in the office for the hospital FU on 11/30/2017 but an annual well visit will need to be rescheduled. This was explained to the pt. The AWV appointment was kept on 11/30/17.      [facility] FU was scheduled for 11/27/17

## 2017-11-20 NOTE — TELEPHONE ENCOUNTER
Patient discharged from [facility] on 11/18/17 and recommended to have a TCM HFU appointment by 12/2/17.  NCM confirmed patient has a AWV scheduled on 11/30/17, NCM attempted to schedule TCM HFU appointment, patient states he will see Dr. Mounika Carrero on 11/

## 2017-11-27 ENCOUNTER — OFFICE VISIT (OUTPATIENT)
Dept: INTERNAL MEDICINE CLINIC | Facility: CLINIC | Age: 72
End: 2017-11-27

## 2017-11-27 VITALS
DIASTOLIC BLOOD PRESSURE: 72 MMHG | RESPIRATION RATE: 16 BRPM | WEIGHT: 213 LBS | BODY MASS INDEX: 28.23 KG/M2 | HEART RATE: 76 BPM | OXYGEN SATURATION: 98 % | TEMPERATURE: 98 F | SYSTOLIC BLOOD PRESSURE: 116 MMHG | HEIGHT: 73 IN

## 2017-11-27 DIAGNOSIS — Z98.890 S/P SHOULDER SURGERY: Primary | ICD-10-CM

## 2017-11-27 PROCEDURE — 99495 TRANSJ CARE MGMT MOD F2F 14D: CPT | Performed by: STUDENT IN AN ORGANIZED HEALTH CARE EDUCATION/TRAINING PROGRAM

## 2017-11-27 NOTE — PROGRESS NOTES
HPI:    Angel Luis Enciso is a 67year old male here today for hospital follow up.    He was discharged from Inpatient hospital, [facility]  to Home   Admission Date: 11/17/17   Discharge Date: 11/18/17  Hospital Discharge Diagnosis: No Value exist Visit:  sennosides (SENOKOT) 8.6 MG Oral Tab Take 2 tablets (17.2 mg total) by mouth daily. Levothyroxine Sodium 100 MCG Oral Tab Take 1 tablet (100 mcg total) by mouth daily.    ALPRAZolam 0.25 MG Oral Tab TAKE ONE TABLET BY MOUTH EVERY DAY AS NEEDED (Pa endocrine/metabolic/immunity disorders (1/17/2012); Screening for lipoid disorders (1/17/2012); Screening for other and unspecified genitourinary condition (1/17/2012); Seborrheic keratosis (10/22/2010); Shoulder joint pain (10/22/2010);  Sinusitis; Special denies thyroid history  ALL/ASTHMA: denies hx of allergy or asthma    PHYSICAL EXAM:   No LMP for male patient. Estimated body mass index is 28.1 kg/m² as calculated from the following:    Height as of this encounter: 73\".     Weight as of this encounter: Complexity of Data to Be Reviewed: moderate  · Risk of Significant Complications, Morbidity, and/or Mortality: moderate    Overall Risk:   moderate    Patient seen within 14 days from date of discharge.      Jenaro Navarro MD, 11/27/2017

## 2017-11-29 PROBLEM — Z47.1 AFTERCARE FOLLOWING LEFT SHOULDER JOINT REPLACEMENT SURGERY: Status: ACTIVE | Noted: 2017-11-29

## 2017-11-29 PROBLEM — Z96.612 AFTERCARE FOLLOWING LEFT SHOULDER JOINT REPLACEMENT SURGERY: Status: ACTIVE | Noted: 2017-11-29

## 2017-11-30 ENCOUNTER — OFFICE VISIT (OUTPATIENT)
Dept: INTERNAL MEDICINE CLINIC | Facility: CLINIC | Age: 72
End: 2017-11-30

## 2017-11-30 VITALS
OXYGEN SATURATION: 99 % | WEIGHT: 218.5 LBS | BODY MASS INDEX: 28.96 KG/M2 | SYSTOLIC BLOOD PRESSURE: 110 MMHG | DIASTOLIC BLOOD PRESSURE: 74 MMHG | HEIGHT: 73 IN | HEART RATE: 63 BPM | TEMPERATURE: 98 F | RESPIRATION RATE: 16 BRPM

## 2017-11-30 DIAGNOSIS — J45.30 MILD PERSISTENT ASTHMA WITHOUT COMPLICATION: ICD-10-CM

## 2017-11-30 DIAGNOSIS — R97.20 ELEVATED PSA: ICD-10-CM

## 2017-11-30 DIAGNOSIS — F41.9 ANXIETY: ICD-10-CM

## 2017-11-30 DIAGNOSIS — Z13.31 DEPRESSION SCREENING: ICD-10-CM

## 2017-11-30 DIAGNOSIS — Z23 NEED FOR INFLUENZA VACCINATION: ICD-10-CM

## 2017-11-30 DIAGNOSIS — J32.0 CHRONIC MAXILLARY SINUSITIS: ICD-10-CM

## 2017-11-30 DIAGNOSIS — Z00.00 ENCOUNTER FOR ANNUAL HEALTH EXAMINATION: ICD-10-CM

## 2017-11-30 DIAGNOSIS — Z13.6 SCREENING FOR CARDIOVASCULAR CONDITION: ICD-10-CM

## 2017-11-30 DIAGNOSIS — N40.0 BENIGN PROSTATIC HYPERPLASIA WITHOUT LOWER URINARY TRACT SYMPTOMS: ICD-10-CM

## 2017-11-30 DIAGNOSIS — E03.4 HYPOTHYROIDISM DUE TO ACQUIRED ATROPHY OF THYROID: ICD-10-CM

## 2017-11-30 DIAGNOSIS — Z00.00 PHYSICAL EXAM, ANNUAL: Primary | ICD-10-CM

## 2017-11-30 DIAGNOSIS — Z96.619 INFECTION OF PROSTHETIC SHOULDER JOINT, INITIAL ENCOUNTER (HCC): ICD-10-CM

## 2017-11-30 DIAGNOSIS — Z13.39 SCREENING FOR ALCOHOL PROBLEM: ICD-10-CM

## 2017-11-30 DIAGNOSIS — T84.59XA INFECTION OF PROSTHETIC SHOULDER JOINT, INITIAL ENCOUNTER (HCC): ICD-10-CM

## 2017-11-30 PROBLEM — T84.019A: Status: RESOLVED | Noted: 2017-06-28 | Resolved: 2017-11-30

## 2017-11-30 PROBLEM — Z96.612 AFTERCARE FOLLOWING LEFT SHOULDER JOINT REPLACEMENT SURGERY: Status: RESOLVED | Noted: 2017-11-29 | Resolved: 2017-11-30

## 2017-11-30 PROBLEM — Z47.89 AFTERCARE FOLLOWING SURGERY OF THE MUSCULOSKELETAL SYSTEM: Status: RESOLVED | Noted: 2017-09-25 | Resolved: 2017-11-30

## 2017-11-30 PROBLEM — T84.59XD: Status: RESOLVED | Noted: 2017-08-30 | Resolved: 2017-11-30

## 2017-11-30 PROBLEM — Z01.818 PREOP TESTING: Status: RESOLVED | Noted: 2017-11-17 | Resolved: 2017-11-30

## 2017-11-30 PROBLEM — T84.7XXA INFECTED ORTHOPEDIC IMPLANT (HCC): Status: RESOLVED | Noted: 2017-07-21 | Resolved: 2017-11-30

## 2017-11-30 PROBLEM — T84.7XXA INFECTED ORTHOPEDIC IMPLANT: Status: RESOLVED | Noted: 2017-07-21 | Resolved: 2017-11-30

## 2017-11-30 PROBLEM — M25.512 LEFT SHOULDER PAIN: Status: RESOLVED | Noted: 2017-10-06 | Resolved: 2017-11-30

## 2017-11-30 PROBLEM — Z47.1 AFTERCARE FOLLOWING LEFT SHOULDER JOINT REPLACEMENT SURGERY: Status: RESOLVED | Noted: 2017-11-29 | Resolved: 2017-11-30

## 2017-11-30 PROCEDURE — G0444 DEPRESSION SCREEN ANNUAL: HCPCS | Performed by: INTERNAL MEDICINE

## 2017-11-30 PROCEDURE — G0439 PPPS, SUBSEQ VISIT: HCPCS | Performed by: INTERNAL MEDICINE

## 2017-11-30 PROCEDURE — 90653 IIV ADJUVANT VACCINE IM: CPT | Performed by: INTERNAL MEDICINE

## 2017-11-30 PROCEDURE — G0008 ADMIN INFLUENZA VIRUS VAC: HCPCS | Performed by: INTERNAL MEDICINE

## 2017-11-30 PROCEDURE — G0446 INTENS BEHAVE THER CARDIO DX: HCPCS | Performed by: INTERNAL MEDICINE

## 2017-11-30 RX ORDER — CLINDAMYCIN HYDROCHLORIDE 300 MG/1
CAPSULE ORAL
Refills: 0 | COMMUNITY
Start: 2017-11-29 | End: 2018-01-16

## 2017-11-30 NOTE — PATIENT INSTRUCTIONS
[facility] SCREENING SCHEDULE   Tests on this list are recommended by your physician but may not be covered, or covered at this frequency, by your insurer. Please check with your insurance carrier before scheduling to verify coverage.     PREVENT Limited to patients who meet one of the following criteria:   • Men who are 73-68 years old and have smoked more than 100 cigarettes in their lifetime   • Anyone with a family history    Colorectal Cancer Screening Covered up to Age 76     Colonoscopy Scre Medium/high risk factors:   End-stage renal disease   Hemophiliacs who received Factor VIII or IX concentrates   Clients of institutions for the mentally retarded   Persons who live in the same house as a HepB virus carrier   Homosexual men   Illicit injec

## 2017-11-30 NOTE — PROGRESS NOTES
HPI:   Nika Coleman is a 67year old male who presents for a Medicare Subsequent Annual Wellness visit (Pt already had Initial Annual Wellness). HPI:  Doing well  Recovering from left shoulder surgery which was complicated with prothesis infection. [facility])     Anxiety     Infection of prosthetic shoulder joint, initial encounter (HCC)/SX DATE 7-21-17, CPT [id number], W/FLORI, GLOBAL EXP 10-19-17     Benign prostatic hyperplasia without lower urinary tract symptoms    Wt Readings from Last 3 Encounters: Take 0.4 mg by mouth nightly.  )   Fluticasone Propionate 50 MCG/ACT Nasal Suspension 2 sprays by Each Nare route 2 (two) times daily. docusate sodium 100 MG Oral Cap Take 100 mg by mouth daily.    Albuterol Sulfate  (90 BASE) MCG/ACT Inhalation Ae brother and father; Lipids in his father. SOCIAL HISTORY:   He  reports that he quit smoking about 19 years ago. His smoking use included Cigars. He has a 45.00 pack-year smoking history.  He has never used smokeless tobacco. He reports that he drinks abo no organomegaly   Genitalia: deferred   Rectal: deferred   Extremities: Extremities normal, atraumatic, no cyanosis or edema   Pulses: 2+ and symmetric   Skin: Skin color, texture, turgor normal, no rashes or lesions   Lymph nodes: Cervical, supraclavicula responded well to IV abx. Defer to ortho     Benign prostatic hyperplasia without lower urinary tract symptoms- stable. Cont current med therapy    The patient indicates understanding of these issues and agrees to the plan.   Reinforced healthy diet, lifest Diabetics, FHx Glaucoma, AA>50, > 65 No flowsheet data found.     Prostate Cancer Screening      PSA  Annually PSA due on 11/09/2019  Update Health Maintenance if applicable     Immunizations (Update Immunization Activity if applicable)     Influenz

## 2017-12-26 ENCOUNTER — APPOINTMENT (OUTPATIENT)
Dept: LAB | Age: 72
End: 2017-12-26
Attending: INTERNAL MEDICINE
Payer: MEDICARE

## 2017-12-26 DIAGNOSIS — E03.4 HYPOTHYROIDISM DUE TO ACQUIRED ATROPHY OF THYROID: ICD-10-CM

## 2017-12-26 PROCEDURE — 36415 COLL VENOUS BLD VENIPUNCTURE: CPT

## 2017-12-26 PROCEDURE — 84443 ASSAY THYROID STIM HORMONE: CPT

## 2018-01-02 DIAGNOSIS — F41.9 ANXIETY: ICD-10-CM

## 2018-01-02 DIAGNOSIS — E03.4 HYPOTHYROIDISM DUE TO ACQUIRED ATROPHY OF THYROID: ICD-10-CM

## 2018-01-02 RX ORDER — ALPRAZOLAM 0.25 MG/1
TABLET ORAL
Qty: 90 TABLET | Refills: 3 | Status: SHIPPED
Start: 2018-01-02 | End: 2018-09-26

## 2018-01-02 RX ORDER — LEVOTHYROXINE SODIUM 0.1 MG/1
100 TABLET ORAL DAILY
Qty: 90 TABLET | Refills: 3 | Status: SHIPPED
Start: 2018-01-02 | End: 2018-11-30 | Stop reason: ALTCHOICE

## 2018-01-02 NOTE — TELEPHONE ENCOUNTER
From: Lauren Schafer  Sent: 2018 10:21 AM CST  Subject: Medication Renewal Request    Nils FRYESneha Hernadezon Alesha would like a refill of the following medications:     ALPRAZolam 0.25 MG Oral Tab Molly Cheadle, MD]   Patient Comment: Rx has also  on t

## 2018-02-14 PROCEDURE — 36415 COLL VENOUS BLD VENIPUNCTURE: CPT | Performed by: OTHER

## 2018-02-14 PROCEDURE — 86334 IMMUNOFIX E-PHORESIS SERUM: CPT | Performed by: OTHER

## 2018-02-14 PROCEDURE — 83883 ASSAY NEPHELOMETRY NOT SPEC: CPT | Performed by: OTHER

## 2018-02-14 PROCEDURE — 82607 VITAMIN B-12: CPT | Performed by: OTHER

## 2018-02-14 PROCEDURE — 84165 PROTEIN E-PHORESIS SERUM: CPT | Performed by: OTHER

## 2018-03-05 PROCEDURE — 82784 ASSAY IGA/IGD/IGG/IGM EACH: CPT | Performed by: INTERNAL MEDICINE

## 2018-03-05 PROCEDURE — 85810 BLOOD VISCOSITY EXAMINATION: CPT | Performed by: INTERNAL MEDICINE

## 2018-05-14 PROBLEM — G60.9 IDIOPATHIC PERIPHERAL NEUROPATHY: Status: ACTIVE | Noted: 2018-05-14

## 2018-05-14 PROCEDURE — 86334 IMMUNOFIX E-PHORESIS SERUM: CPT | Performed by: INTERNAL MEDICINE

## 2018-05-14 PROCEDURE — 83883 ASSAY NEPHELOMETRY NOT SPEC: CPT | Performed by: INTERNAL MEDICINE

## 2018-05-14 PROCEDURE — 82784 ASSAY IGA/IGD/IGG/IGM EACH: CPT | Performed by: INTERNAL MEDICINE

## 2018-05-14 PROCEDURE — 84165 PROTEIN E-PHORESIS SERUM: CPT | Performed by: INTERNAL MEDICINE

## 2018-05-26 ENCOUNTER — APPOINTMENT (OUTPATIENT)
Dept: GENERAL RADIOLOGY | Age: 73
End: 2018-05-26
Attending: PHYSICIAN ASSISTANT
Payer: MEDICARE

## 2018-05-26 ENCOUNTER — HOSPITAL ENCOUNTER (OUTPATIENT)
Age: 73
Discharge: HOME | End: 2018-05-26
Payer: MEDICARE

## 2018-05-26 VITALS
OXYGEN SATURATION: 98 % | HEART RATE: 60 BPM | DIASTOLIC BLOOD PRESSURE: 70 MMHG | RESPIRATION RATE: 20 BRPM | TEMPERATURE: 99 F | SYSTOLIC BLOOD PRESSURE: 136 MMHG

## 2018-05-26 DIAGNOSIS — J01.90 ACUTE SINUSITIS, RECURRENCE NOT SPECIFIED, UNSPECIFIED LOCATION: Primary | ICD-10-CM

## 2018-05-26 DIAGNOSIS — J40 WHEEZY BRONCHITIS: ICD-10-CM

## 2018-05-26 PROCEDURE — 71046 X-RAY EXAM CHEST 2 VIEWS: CPT | Performed by: PHYSICIAN ASSISTANT

## 2018-05-26 PROCEDURE — 94640 AIRWAY INHALATION TREATMENT: CPT

## 2018-05-26 PROCEDURE — 99204 OFFICE O/P NEW MOD 45 MIN: CPT

## 2018-05-26 PROCEDURE — 99214 OFFICE O/P EST MOD 30 MIN: CPT

## 2018-05-26 RX ORDER — PREDNISONE 20 MG/1
40 TABLET ORAL DAILY
Qty: 8 TABLET | Refills: 0 | Status: SHIPPED | OUTPATIENT
Start: 2018-05-26 | End: 2018-05-30

## 2018-05-26 RX ORDER — DOXYCYCLINE HYCLATE 100 MG/1
100 CAPSULE ORAL 2 TIMES DAILY
Qty: 20 CAPSULE | Refills: 0 | Status: SHIPPED | OUTPATIENT
Start: 2018-05-26 | End: 2018-06-05

## 2018-05-26 RX ORDER — IPRATROPIUM BROMIDE AND ALBUTEROL SULFATE 2.5; .5 MG/3ML; MG/3ML
3 SOLUTION RESPIRATORY (INHALATION) ONCE
Status: COMPLETED | OUTPATIENT
Start: 2018-05-26 | End: 2018-05-26

## 2018-05-26 RX ORDER — ALBUTEROL SULFATE 90 UG/1
2 AEROSOL, METERED RESPIRATORY (INHALATION) EVERY 4 HOURS PRN
Qty: 1 INHALER | Refills: 0 | Status: SHIPPED | OUTPATIENT
Start: 2018-05-26 | End: 2018-06-25

## 2018-05-26 RX ORDER — PREDNISONE 20 MG/1
60 TABLET ORAL ONCE
Status: COMPLETED | OUTPATIENT
Start: 2018-05-26 | End: 2018-05-26

## 2018-05-26 NOTE — ED NOTES
Pt educated regarding proper use of MDI with spacer. Spacer provided with MDI information. Pt verbalized understanding of instruction.

## 2018-05-26 NOTE — ED PROVIDER NOTES
Patient Seen in: [facility]    History   Patient presents with:  Cough/URI    Stated Complaint: COUGH/COLD X 4-5 DAYS    HPI    16-year-old male here with complaint of a wheezy cough in tandem tandem with sinus pain and pressure removal due to infection               and placement of Antibiotic spacer  No date: OTHER SURGICAL HISTORY      Comment: Shoulder Joint Replacement 7/21/16, Sinus                surgery Feb. '14,  11/17/2017: OTHER SURGICAL HISTORY Left      Comment: Any membrane, external ear and ear canal normal.   Nose: Mucosal edema and rhinorrhea present. Right sinus exhibits maxillary sinus tenderness. Left sinus exhibits maxillary sinus tenderness.    Mouth/Throat: Uvula is midline, oropharynx is clear and moist and course of prednisone as prescribed. Using inhaler as needed. Take the antibiotics as prescribed. Push fluids.   Follow-up with her primary care physician and/or ENT      The patient is in good condition thru out treatment today and remains so upon discha

## 2018-05-26 NOTE — ED INITIAL ASSESSMENT (HPI)
Pt c/o 5 days of nasal congestion and bronchial cough. States sees ENT and Pulmonologist for chronic sinus and bronchitis.

## 2018-06-04 PROBLEM — D47.2 MGUS (MONOCLONAL GAMMOPATHY OF UNKNOWN SIGNIFICANCE): Status: ACTIVE | Noted: 2018-06-04

## 2018-09-26 DIAGNOSIS — F41.9 ANXIETY: ICD-10-CM

## 2018-09-26 RX ORDER — ALPRAZOLAM 0.25 MG/1
TABLET ORAL
Qty: 90 TABLET | Refills: 0 | Status: SHIPPED
Start: 2018-09-26 | End: 2018-11-30 | Stop reason: ALTCHOICE

## 2018-10-10 ENCOUNTER — NURSE ONLY (OUTPATIENT)
Dept: INTERNAL MEDICINE CLINIC | Facility: CLINIC | Age: 73
End: 2018-10-10
Payer: MEDICARE

## 2018-10-10 DIAGNOSIS — Z23 FLU VACCINE NEED: Primary | ICD-10-CM

## 2018-10-10 PROCEDURE — 90653 IIV ADJUVANT VACCINE IM: CPT | Performed by: INTERNAL MEDICINE

## 2018-10-10 PROCEDURE — G0008 ADMIN INFLUENZA VIRUS VAC: HCPCS | Performed by: INTERNAL MEDICINE

## 2018-11-07 ENCOUNTER — TELEPHONE (OUTPATIENT)
Dept: INTERNAL MEDICINE CLINIC | Facility: CLINIC | Age: 73
End: 2018-11-07

## 2018-11-07 DIAGNOSIS — R97.20 ELEVATED PSA: ICD-10-CM

## 2018-11-07 DIAGNOSIS — Z12.5 ENCOUNTER FOR SCREENING FOR MALIGNANT NEOPLASM OF PROSTATE: ICD-10-CM

## 2018-11-07 DIAGNOSIS — E03.4 HYPOTHYROIDISM DUE TO ACQUIRED ATROPHY OF THYROID: ICD-10-CM

## 2018-11-07 DIAGNOSIS — Z79.899 OTHER LONG TERM (CURRENT) DRUG THERAPY: ICD-10-CM

## 2018-11-07 DIAGNOSIS — N40.0 BENIGN PROSTATIC HYPERPLASIA WITHOUT LOWER URINARY TRACT SYMPTOMS: Primary | ICD-10-CM

## 2018-11-07 DIAGNOSIS — D47.2 MGUS (MONOCLONAL GAMMOPATHY OF UNKNOWN SIGNIFICANCE): ICD-10-CM

## 2018-11-07 NOTE — TELEPHONE ENCOUNTER
Patient has AWV on 11/30/18 - please enter yearly labs, thyroid lab, and PSA to [address] [facility] lab at the [facility] location, DT#[phone].

## 2018-11-30 ENCOUNTER — LAB ENCOUNTER (OUTPATIENT)
Dept: LAB | Age: 73
End: 2018-11-30
Attending: INTERNAL MEDICINE
Payer: MEDICARE

## 2018-11-30 ENCOUNTER — OFFICE VISIT (OUTPATIENT)
Dept: INTERNAL MEDICINE CLINIC | Facility: CLINIC | Age: 73
End: 2018-11-30
Payer: MEDICARE

## 2018-11-30 VITALS
DIASTOLIC BLOOD PRESSURE: 78 MMHG | WEIGHT: 222 LBS | TEMPERATURE: 98 F | HEART RATE: 84 BPM | BODY MASS INDEX: 29.42 KG/M2 | OXYGEN SATURATION: 97 % | HEIGHT: 73 IN | RESPIRATION RATE: 18 BRPM | SYSTOLIC BLOOD PRESSURE: 128 MMHG

## 2018-11-30 DIAGNOSIS — F41.9 ANXIETY: ICD-10-CM

## 2018-11-30 DIAGNOSIS — N40.0 BENIGN PROSTATIC HYPERPLASIA WITHOUT LOWER URINARY TRACT SYMPTOMS: ICD-10-CM

## 2018-11-30 DIAGNOSIS — R79.89 ABNORMAL CBC: ICD-10-CM

## 2018-11-30 DIAGNOSIS — J45.30 MILD PERSISTENT ASTHMA WITHOUT COMPLICATION: ICD-10-CM

## 2018-11-30 DIAGNOSIS — G60.9 IDIOPATHIC PERIPHERAL NEUROPATHY: ICD-10-CM

## 2018-11-30 DIAGNOSIS — Z00.00 ENCOUNTER FOR ANNUAL HEALTH EXAMINATION: ICD-10-CM

## 2018-11-30 DIAGNOSIS — E03.4 HYPOTHYROIDISM DUE TO ACQUIRED ATROPHY OF THYROID: ICD-10-CM

## 2018-11-30 DIAGNOSIS — J32.4 CHRONIC PANSINUSITIS: ICD-10-CM

## 2018-11-30 DIAGNOSIS — D47.2 MGUS (MONOCLONAL GAMMOPATHY OF UNKNOWN SIGNIFICANCE): ICD-10-CM

## 2018-11-30 DIAGNOSIS — R97.20 ELEVATED PSA: ICD-10-CM

## 2018-11-30 DIAGNOSIS — Z00.00 ANNUAL PHYSICAL EXAM: Primary | ICD-10-CM

## 2018-11-30 PROBLEM — T84.59XA: Status: RESOLVED | Noted: 2017-06-28 | Resolved: 2018-11-30

## 2018-11-30 PROBLEM — Z96.619: Status: RESOLVED | Noted: 2017-06-28 | Resolved: 2018-11-30

## 2018-11-30 PROBLEM — Z96.619 INFECTION OF PROSTHETIC SHOULDER JOINT, INITIAL ENCOUNTER (HCC): Status: RESOLVED | Noted: 2017-06-28 | Resolved: 2018-11-30

## 2018-11-30 PROBLEM — T84.59XA INFECTION OF PROSTHETIC SHOULDER JOINT, INITIAL ENCOUNTER (HCC): Status: RESOLVED | Noted: 2017-06-28 | Resolved: 2018-11-30

## 2018-11-30 PROCEDURE — 86334 IMMUNOFIX E-PHORESIS SERUM: CPT

## 2018-11-30 PROCEDURE — 84165 PROTEIN E-PHORESIS SERUM: CPT

## 2018-11-30 PROCEDURE — 83883 ASSAY NEPHELOMETRY NOT SPEC: CPT

## 2018-11-30 PROCEDURE — 80053 COMPREHEN METABOLIC PANEL: CPT

## 2018-11-30 PROCEDURE — 36415 COLL VENOUS BLD VENIPUNCTURE: CPT

## 2018-11-30 PROCEDURE — G0439 PPPS, SUBSEQ VISIT: HCPCS | Performed by: INTERNAL MEDICINE

## 2018-11-30 PROCEDURE — 82784 ASSAY IGA/IGD/IGG/IGM EACH: CPT

## 2018-11-30 PROCEDURE — 85025 COMPLETE CBC W/AUTO DIFF WBC: CPT

## 2018-11-30 RX ORDER — LEVOTHYROXINE SODIUM 0.1 MG/1
100 TABLET ORAL DAILY
Qty: 90 TABLET | Refills: 3 | Status: SHIPPED | OUTPATIENT
Start: 2018-11-30 | End: 2019-11-25

## 2018-11-30 RX ORDER — TRAZODONE HYDROCHLORIDE 50 MG/1
50 TABLET ORAL NIGHTLY
Qty: 90 TABLET | Refills: 1 | Status: SHIPPED | OUTPATIENT
Start: 2018-11-30 | End: 2019-06-04

## 2018-11-30 NOTE — PROGRESS NOTES
HPI:   Abdi Marshall is a 75year old male who presents for a Medicare Subsequent Annual Wellness visit (Pt already had Initial Annual Wellness).     HPI:  Here for AWV  Normal state of health  Denies cp or sob    PAST MEDICAL, SOCIAL, FAMILY HISTORIES Cigars        Quit date: 3/1/1998        Years since quittin.7      Smokeless tobacco: Never Used         CAGE Alcohol screening   Jet Arciniega was screened for Alcohol abuse and had a score of 0 so is at low risk.      Patient Care Team: Patient TAKE ONE TABLET BY MOUTH DAILY AS NEEDED   tamsulosin HCl 0.4 MG Oral Cap Take 1 capsule (0.4 mg total) by mouth daily. finasteride 5 MG Oral Tab Take 1 tablet (5 mg total) by mouth daily.    magnesium oxide 250 MG Oral Tab Take 200 mg by mouth 2 (two) ti '12); hemorrhoidectomy (~ 2005); other surgical history; spine surgery procedure unlisted; other (Left, 07/2017); other surgical history (Left, 11/17/2017); back surgery (9/12/16); SHOULDER TOTAL REVISION (Left, 11/17/2017);  ARTHROSCOPY SHOULDER (Left, 10/ Screening Method:  Finger Rub  Finger Rub Result:  Pass               Visual Acuity  Right Eye Visual Acuity: Corrected Right Eye Chart Acuity: 20/20   Left Eye Visual Acuity: Corrected Left Eye Chart Acuity: 20/20   Both Eyes Visual Acuity: Corrected Conner 03/09/2015, 11/05/2015   • Pneumovax 03/09/2005   • Pneumovax 23 10/04/2016   • Td, Preserv Free 12/07/2015   • Zoster Vaccine Live (Zostavax) 03/09/2014        ASSESSMENT AND OTHER RELEVANT CHRONIC CONDITIONS:   Angel Luis Enciso is a 75year old male who describe your current health state?: (P) Good  How do you maintain positive mental well-being?: (P) Social Interaction;Puzzles;Games; Visiting Friends; Visiting Family    This section provided for quick review of chart, separate sheet to patient  PREVENTATIV who received Factor VIII or IX concentrates   Clients of institutions for the mentally retarded   Persons who live in the same house as a HepB virus carrier   Homosexual men   Illicit injectable drug abusers     Tetanus Toxoid  Only covered with a cut with magnesium oxide 250 MG Oral Tab Take 200 mg by mouth 2 (two) times daily. Disp:  Rfl:    Levothyroxine Sodium 100 MCG Oral Tab Take 1 tablet (100 mcg total) by mouth daily. Disp: 90 tablet Rfl: 3   CALCIUM OR Take by mouth.  Disp:  Rfl:    Omega-3 Fatty A

## 2018-11-30 NOTE — PATIENT INSTRUCTIONS
Prevention Guidelines, Men Ages 72 and Older  Screening tests and vaccines are an important part of managing your health. A screening test is done to find possible disorders or diseases in people who don't have any symptoms.  The goal is to find a disease Hepatitis C Men at increased risk for infection – talk with your healthcare provider At routine exams   High cholesterol or triglycerides All men in this age group At least every 5 years   HIV Men at increased risk for infection – talk with your healthcare Pneumococcal conjugate vaccine (PCV13) and pneumococcal polysaccharide vaccine (PPSV23) All men in this age group 1 dose of each vaccine   Tetanus/diphtheria/  pertussis (Td/Tdap) booster All men in this age group Td every 10 years, or Tdap if you will hav • Overweight (BMI ³25 but <30)   • Family history of diabetes   • Age 72 years or older   • History of gestational diabetes or birth of baby weighing more than 9 pounds     Covered at least every 3 years,         Glucose (mg/dL)   Date Value   11/16/2018 1 Covered annually for Diabetics, people with Glaucoma family history,   Americans over age 48   Americans over age 72 No flowsheet data found.  OK to schedule if you are in this risk group, make sure you have a referral   Prostate Cancer Scree Recommended Websites for Advanced Directives    [employer]no. org/publications/Documents/personal_dec. pdf  An information packet, including necessary form from the [employer] website. http://www. idph.state. [place]/public/books/advin

## 2019-01-14 ENCOUNTER — OFFICE VISIT (OUTPATIENT)
Dept: NEUROLOGY | Facility: CLINIC | Age: 74
End: 2019-01-14
Payer: MEDICARE

## 2019-01-14 VITALS
BODY MASS INDEX: 30.22 KG/M2 | WEIGHT: 228 LBS | SYSTOLIC BLOOD PRESSURE: 124 MMHG | RESPIRATION RATE: 16 BRPM | HEIGHT: 73 IN | HEART RATE: 74 BPM | DIASTOLIC BLOOD PRESSURE: 70 MMHG

## 2019-01-14 DIAGNOSIS — M21.371 FOOT DROP, BILATERAL: ICD-10-CM

## 2019-01-14 DIAGNOSIS — G60.9 IDIOPATHIC PERIPHERAL NEUROPATHY: Primary | ICD-10-CM

## 2019-01-14 DIAGNOSIS — D47.2 MGUS (MONOCLONAL GAMMOPATHY OF UNKNOWN SIGNIFICANCE): ICD-10-CM

## 2019-01-14 DIAGNOSIS — M21.372 FOOT DROP, BILATERAL: ICD-10-CM

## 2019-01-14 PROCEDURE — 99204 OFFICE O/P NEW MOD 45 MIN: CPT | Performed by: OTHER

## 2019-01-14 RX ORDER — GABAPENTIN 300 MG/1
300 CAPSULE ORAL 3 TIMES DAILY
COMMUNITY
End: 2019-03-06

## 2019-01-14 NOTE — PATIENT INSTRUCTIONS
Refill policies:    • Allow 2-3 business days for refills; controlled substances may take longer.   • Contact your pharmacy at least 5 days prior to running out of medication and have them send an electronic request or submit request through the “request re entire amount billed. Precertification and Prior Authorizations: If your physician has recommended that you have a procedure or additional testing performed.   [facility]) will contact your insurance carrier to obtain pre-certi

## 2019-01-14 NOTE — H&P
[facility] New Patient / Consult Visit    Marychuy Chowdary is a 75year old male. Referring MD: Guillaume Odell    Patient presents with:  Neuropathy:  Second opinion.  C/O of pain in the legs, balance issues,drop f • Asthma    • Back problem    • Benign prostatic hypertrophy 10/22/2010   • BPH (benign prostatic hyperplasia)    • Hemorrhoids, internal 2/29/2012   • Hypothyroidism     Levothyroxine   • Insomnia 10/22/2010   • Laboratory examination ordered as part of 7/21/16, Sinus surgery Feb. '14,   • OTHER SURGICAL HISTORY Left 11/17/2017    Shoulder Revision to Reverse Total Shoulder Arthroplasty with Removal of Cement Spacer   • SHOULDER TOTAL Left 7/21/2017    Performed by Ashanti Curry MD at [address] [place] [address]   • mouth daily. Disp: 90 capsule Rfl: 3   finasteride 5 MG Oral Tab Take 1 tablet (5 mg total) by mouth daily. Disp: 90 tablet Rfl: 3   magnesium oxide 250 MG Oral Tab Take 200 mg by mouth 2 (two) times daily. Disp:  Rfl:    CALCIUM OR Take by mouth.  Disp:  R equally round and reactive to light with direct and consensual responses, normal accomodation   Visual acuity: Normal              Visual fields: Normal  Oculomotor/Trochlear/Abducens:    Eye Movements: EOMI without nystagmus  Trigeminal:   Facial sensatio impaired proprioception bilaterally, and positive Romberg sign, as well as markedly unsteady gait, with bilateral steppage gait noted, right worse than left. Overall, this is likely related to a progression of his underlying chronic neuropathy.   In niraj

## 2019-01-29 ENCOUNTER — PROCEDURE VISIT (OUTPATIENT)
Dept: NEUROLOGY | Facility: CLINIC | Age: 74
End: 2019-01-29
Payer: MEDICARE

## 2019-01-29 DIAGNOSIS — G60.9 IDIOPATHIC PERIPHERAL NEUROPATHY: Primary | ICD-10-CM

## 2019-01-29 PROCEDURE — 95886 MUSC TEST DONE W/N TEST COMP: CPT | Performed by: OTHER

## 2019-01-29 PROCEDURE — 95910 NRV CNDJ TEST 7-8 STUDIES: CPT | Performed by: OTHER

## 2019-02-05 NOTE — PROCEDURES
[facility]  [address] [facility] [place], [address] [facility]  Ph: [phone]  FAX: [phone]        Full Name: Mitchel Rosario Gender: Female  Patient ID: [id number] YOB: 1945      Visit Date: 1/29/2019 11:05 ms mV % ms mVms  cm ms m/s   L Median - APB      Wrist APB 2.97 8.9 100 6.41 28.9 Wrist - APB 7        Elbow APB 9.32 6.2 70.1 7.55 26.4 Elbow - Wrist 26 6.35 41   L Ulnar - ADM      Wrist ADM 2.71 9.2 100 5.63 25.3 Wrist - ADM 7        B. Elbow ADM 7.66 2. Left median sensory response was abnormal due to milldy prolonged peak latency, with severely reduced amplitude, and mildly slowed conduction velocity.    3. Left ulnar sensory response was abnormal due to mildly prolonged peak latency, with reduced ampl 2. Motor unit potentials demonstrated increased amplitude and duration in all muscle tested in the left lower extremity with reduced recruitment.   This was demonstrated to a greater extent in distal musculature with nearly discrete recruitment in the left

## 2019-02-20 ENCOUNTER — TELEPHONE (OUTPATIENT)
Dept: NEUROLOGY | Facility: CLINIC | Age: 74
End: 2019-02-20

## 2019-02-20 DIAGNOSIS — M21.371 FOOT DROP, BILATERAL: ICD-10-CM

## 2019-02-20 DIAGNOSIS — D47.2 MGUS (MONOCLONAL GAMMOPATHY OF UNKNOWN SIGNIFICANCE): ICD-10-CM

## 2019-02-20 DIAGNOSIS — M21.372 FOOT DROP, BILATERAL: ICD-10-CM

## 2019-02-20 DIAGNOSIS — G60.9 IDIOPATHIC PERIPHERAL NEUROPATHY: Primary | ICD-10-CM

## 2019-02-20 NOTE — TELEPHONE ENCOUNTER
Called patient and discussed results of testing - EMG with some atypical findings - possible conduction block at non-entrapment sites, some demyelinating features - ? releated to MGUS vs variant of CIDP - recommend CSF studies and serologic studies      Pl

## 2019-02-20 NOTE — TELEPHONE ENCOUNTER
Called patient and informed him how to schedule XR Lumbar puncture. Patient gave verbal read back of phone number for central scheduling. Also informed patient he could have requested labs drawn at any [facility] lab facility.     Patient denies any further ne

## 2019-02-22 ENCOUNTER — APPOINTMENT (OUTPATIENT)
Dept: LAB | Facility: HOSPITAL | Age: 74
End: 2019-02-22
Attending: Other
Payer: MEDICARE

## 2019-02-22 ENCOUNTER — HOSPITAL ENCOUNTER (OUTPATIENT)
Dept: GENERAL RADIOLOGY | Facility: HOSPITAL | Age: 74
Discharge: HOME | End: 2019-02-22
Attending: Other
Payer: MEDICARE

## 2019-02-22 VITALS
DIASTOLIC BLOOD PRESSURE: 73 MMHG | OXYGEN SATURATION: 100 % | WEIGHT: 220 LBS | TEMPERATURE: 99 F | HEART RATE: 60 BPM | SYSTOLIC BLOOD PRESSURE: 131 MMHG | HEIGHT: 73 IN | RESPIRATION RATE: 16 BRPM | BODY MASS INDEX: 29.16 KG/M2

## 2019-02-22 DIAGNOSIS — G60.9 IDIOPATHIC PERIPHERAL NEUROPATHY: ICD-10-CM

## 2019-02-22 DIAGNOSIS — M21.372 FOOT DROP, BILATERAL: ICD-10-CM

## 2019-02-22 DIAGNOSIS — D47.2 MGUS (MONOCLONAL GAMMOPATHY OF UNKNOWN SIGNIFICANCE): ICD-10-CM

## 2019-02-22 DIAGNOSIS — G60.9 IDIOPATHIC PERIPHERAL NEUROPATHY: Primary | ICD-10-CM

## 2019-02-22 DIAGNOSIS — M21.371 FOOT DROP, BILATERAL: ICD-10-CM

## 2019-02-22 LAB
CLARITY CSF: CLEAR
COLOR CSF: COLORLESS
COUNT PERFORMED ON TUBE: 3
GLUCOSE CSF-MCNC: 56 MG/DL (ref 40–70)
INR BLD: 0.96 (ref 0.9–1.1)
PLATELET # BLD AUTO: 231 10(3)UL (ref 150–450)
PROT PATTERN CSF ELPH-IMP: 34.9 MG/DL (ref 15–45)
PSA SERPL DL<=0.01 NG/ML-MCNC: 13.2 SECONDS (ref 12.4–14.7)
RBC CSF: 2 /MM3
TOTAL VOLUME CSF: 12 ML
WBC # FLD MANUAL: 0 /MM3 (ref 0–5)

## 2019-02-22 PROCEDURE — 36415 COLL VENOUS BLD VENIPUNCTURE: CPT

## 2019-02-22 PROCEDURE — 88108 CYTOPATH CONCENTRATE TECH: CPT

## 2019-02-22 PROCEDURE — 89050 BODY FLUID CELL COUNT: CPT

## 2019-02-22 PROCEDURE — 88185 FLOWCYTOMETRY/TC ADD-ON: CPT | Performed by: OTHER

## 2019-02-22 PROCEDURE — 82042 OTHER SOURCE ALBUMIN QUAN EA: CPT

## 2019-02-22 PROCEDURE — 77003 FLUOROGUIDE FOR SPINE INJECT: CPT | Performed by: OTHER

## 2019-02-22 PROCEDURE — 88184 FLOWCYTOMETRY/ TC 1 MARKER: CPT | Performed by: OTHER

## 2019-02-22 PROCEDURE — 83916 OLIGOCLONAL BANDS: CPT

## 2019-02-22 PROCEDURE — 85049 AUTOMATED PLATELET COUNT: CPT | Performed by: RADIOLOGY

## 2019-02-22 PROCEDURE — 84165 PROTEIN E-PHORESIS SERUM: CPT

## 2019-02-22 PROCEDURE — 82784 ASSAY IGA/IGD/IGG/IGM EACH: CPT

## 2019-02-22 PROCEDURE — 86334 IMMUNOFIX E-PHORESIS SERUM: CPT

## 2019-02-22 PROCEDURE — 83883 ASSAY NEPHELOMETRY NOT SPEC: CPT

## 2019-02-22 PROCEDURE — 84157 ASSAY OF PROTEIN OTHER: CPT

## 2019-02-22 PROCEDURE — 85610 PROTHROMBIN TIME: CPT

## 2019-02-22 PROCEDURE — 82945 GLUCOSE OTHER FLUID: CPT

## 2019-02-22 PROCEDURE — 62270 DX LMBR SPI PNXR: CPT | Performed by: OTHER

## 2019-02-22 NOTE — IMAGING NOTE
Pre-procedure report given to [facility] in [facility]. Vitals provided and informed her that pt drove himself. Dr. Bhavna Woodward made aware that pt drove himself but has a neighbor that can pick him up if not feeling well afterwards.

## 2019-02-22 NOTE — PROCEDURES
[facility]  Procedure Note    Raysa Samirachrissie Patient Status:  Outpatient    10/10/1945 MRN [id number]   Location  [address] Attending Michelle Jerez MD   Hosp Day # 0 PCP Karmen Ulrich MD     FLUOROSCOPY IMAGES OBTAINED: 2

## 2019-02-23 LAB
ALBUMIN, CSF: 18 MG/DL
CSF IGG/ALBUMIN RATIO: 0.12 RATIO
IMMUNOGLOBULIN G CSF: 2.1 MG/DL

## 2019-02-24 LAB
CSF BAND OLIGOCLONAL: NEGATIVE
CSF OLIGOCLONAL BANDS NUMBER: 0 BANDS

## 2019-02-27 LAB
CD19 CELLS NFR SPEC: 2 %
CD20 CELLS NFR SPEC: <1 %
CD45 CELLS NFR SPEC: 100 %
CELL SURF KAPPA/LAMBDA RATIO: 1
CELL SURF LAMBDA LIGHT CHAIN: 2 %
CELL SURFACE KAPPA LIGHT CHAIN: 2 %
NON GYNE INTERPRETATION: NEGATIVE

## 2019-02-28 LAB
ALBUMIN SERPL-MCNC: 4.25 G/DL (ref 3.1–4.5)
ALBUMIN/GLOB SERPL: 1.54 {RATIO}
ALPHA1 GLOB SERPL ELPH-MCNC: 0.2 G/DL (ref 0.1–0.3)
ALPHA2 GLOB SERPL ELPH-MCNC: 0.81 G/DL (ref 0.6–1)
B-GLOBULIN SERPL ELPH-MCNC: 0.62 G/DL (ref 0.7–1.2)
GAMMA GLOB SERPL ELPH-MCNC: 1.12 G/DL (ref 0.6–1.6)
KAPPA FREE LIGHT CHAIN: 2.5 MG/DL (ref 0.33–1.94)
KAPPA/LAMBDA FLC RATIO: 1.72 (ref 0.26–1.65)
LAMBDA FREE LIGHT CHAIN: 1.45 MG/DL (ref 0.57–2.63)
M-SPIKE 1: 0.39 G/DL (ref ?–0)
MAI PROTEIN SERPL-MCNC: 7 G/DL (ref 6.4–8.2)

## 2019-03-04 DIAGNOSIS — J33.9 NASAL POLYPOSIS: ICD-10-CM

## 2019-03-04 DIAGNOSIS — J32.4 CHRONIC PANSINUSITIS: ICD-10-CM

## 2019-03-04 RX ORDER — FLUTICASONE PROPIONATE 50 MCG
2 SPRAY, SUSPENSION (ML) NASAL 2 TIMES DAILY
Qty: 3 BOTTLE | Refills: 1 | Status: SHIPPED | OUTPATIENT
Start: 2019-03-04 | End: 2019-08-14

## 2019-03-06 ENCOUNTER — OFFICE VISIT (OUTPATIENT)
Dept: NEUROLOGY | Facility: CLINIC | Age: 74
End: 2019-03-06
Payer: MEDICARE

## 2019-03-06 ENCOUNTER — APPOINTMENT (OUTPATIENT)
Dept: LAB | Age: 74
End: 2019-03-06
Attending: Other
Payer: MEDICARE

## 2019-03-06 VITALS
HEIGHT: 73 IN | RESPIRATION RATE: 16 BRPM | HEART RATE: 84 BPM | WEIGHT: 228 LBS | BODY MASS INDEX: 30.22 KG/M2 | DIASTOLIC BLOOD PRESSURE: 74 MMHG | SYSTOLIC BLOOD PRESSURE: 126 MMHG

## 2019-03-06 DIAGNOSIS — M21.372 FOOT DROP, BILATERAL: ICD-10-CM

## 2019-03-06 DIAGNOSIS — M21.371 FOOT DROP, BILATERAL: ICD-10-CM

## 2019-03-06 DIAGNOSIS — D47.2 MGUS (MONOCLONAL GAMMOPATHY OF UNKNOWN SIGNIFICANCE): ICD-10-CM

## 2019-03-06 DIAGNOSIS — G60.9 IDIOPATHIC PERIPHERAL NEUROPATHY: Primary | ICD-10-CM

## 2019-03-06 DIAGNOSIS — G60.9 IDIOPATHIC PERIPHERAL NEUROPATHY: ICD-10-CM

## 2019-03-06 PROCEDURE — 99215 OFFICE O/P EST HI 40 MIN: CPT | Performed by: OTHER

## 2019-03-06 PROCEDURE — 83520 IMMUNOASSAY QUANT NOS NONAB: CPT

## 2019-03-06 PROCEDURE — 36415 COLL VENOUS BLD VENIPUNCTURE: CPT

## 2019-03-06 RX ORDER — GABAPENTIN 300 MG/1
300 CAPSULE ORAL 3 TIMES DAILY
Qty: 270 CAPSULE | Refills: 1 | Status: SHIPPED | OUTPATIENT
Start: 2019-03-06 | End: 2019-08-14

## 2019-03-06 NOTE — PROGRESS NOTES
[facility] Progress Note    HPI  Patient presents with:   Other: patient is here to review XR lumbar puncture results      As per my initial H&P from 1/14/19:   \" Alexa Boyd is a 75year old, who presents for evaluation of drop f Patient states he has not had any new issues since last visit, but has noted some twitching in left eye - no speaking /swallowing issues. Patient is here to discuss results of testing. He follows with Dr. Sridhar Conde (hematology) for MGUS.           Past Me • NASAL SEPTOPLASTY BILAT SINUS ENDOSCOPY ETHM MAX Bilateral 1/28/2014    Performed by Pamella Swanson MD at [facility] MAIN OR   • ORTHOPEDIC SURG (PBP)      LT ELBOW - ulnar nerve release   • OTHER Left     shoulder replacement   • OTHER Left 07/2017    left should Comment: moderate social [id number] UP UNTIL 3 WEEKS AGO      Drug use: No    Other Topics      Concerns:        Caffeine Concern: Yes          1 cup a day        Exercise: Yes          2 x week with       No Known Allergies      Current Out Estimated body mass index is 30.08 kg/m² as calculated from the following:    Height as of this encounter: 73\". Weight as of this encounter: 228 lb.     Gen: well developed, well nourished, no acute distress  HEENT: normocephalic  Heart; normal M1/C8, r 0.26 - 1.65 1.72 (H)   Case Report       Medical Cytology Report                           Case: [id number] . . . FINAL DIAGNOSIS       This result contains rich text formatting which cannot be displayed here.    Final Diagnosis Comment       This resu Elbow Dig II 2.86 3.75 3.5 8.9 Elbow - Wrist  0.05    L Ulnar - Digit V (Antidromic)      Wrist Dig V 2.60 3.28 5.0 4.7 Wrist - Dig V 12  46   L Radial - Anatomical snuff box (Forearm)      Forearm Wrist 1.77 2.50 6.9 5.0 Forearm - Wrist 10  56      2 W L. Tibialis anterior Deep peroneal (Fibular) L4-L5 3+ 3+ 3+ None None 2+ 2+ 1+ Reduced   L. Peroneus longus Perineal L5-S1 N None None None None 2+ 2+ N Reduced   L. Gastrocnemius (Medial head) Tibial S1-S2 N None None None None 2+ 2+ N Discrete   L.  Exten 1. Increased insertional activity, consisting of fibrillation potentials and positive sharp waves, was noted in the left TA muscle.   2. Motor unit potentials demonstrated increased amplitude and duration in all muscle tested in the left lower extremity wit Yariel Gallagher is a 75year old year old male with past medical history significant for thyroid disorder, as well as neuropathy, as well as lumbar radiculopathy, lumbar spinal stenosis status post lumbar spinal surgery, who originally presented 1/2019, At this time, will await additional workup and discuss with hematology regarding utility of further investigation from hematology standpoint and await workup for ganglioside Ab panel; if there is evidence for elevated ganglioside Ab, may benefit from tr

## 2019-03-06 NOTE — PATIENT INSTRUCTIONS
Refill policies:    • Allow 2-3 business days for refills; controlled substances may take longer.   • Contact your pharmacy at least 5 days prior to running out of medication and have them send an electronic request or submit request through the [facility] been approved by your insurer. Depending on your insurance carrier, approval may take 3-10 days. It is highly recommended patients contact their insurance carrier directly to determine coverage.   If test is done without insurance authorization, patient ma

## 2019-03-08 LAB
ASIALO-GM1 ANTIBODIES, IGG/IGM: 7 IV
GD1A ANTIBODIES, IGG/IGM: 10 IV
GD1B ANTIBODIES, IGG/IGM: 8 IV
GM1 ANTIBODIES, IGG/IGM: 8 IV
GM2 ANTIBODIES, IGG/IGM: 8 IV
GQ1B ANTIBODIES, IGG/IGM: 7 IV

## 2019-04-08 ENCOUNTER — PATIENT OUTREACH (OUTPATIENT)
Dept: INTERNAL MEDICINE CLINIC | Facility: CLINIC | Age: 74
End: 2019-04-08

## 2019-04-11 ENCOUNTER — TELEPHONE (OUTPATIENT)
Dept: NEUROLOGY | Facility: CLINIC | Age: 74
End: 2019-04-11

## 2019-04-11 NOTE — TELEPHONE ENCOUNTER
----- Message from Dajuan Zuniga MD sent at 4/10/2019  1:56 PM CDT -----  Results noted, ganglioside Ab panel negative ; let patient know

## 2019-05-08 NOTE — TELEPHONE ENCOUNTER
Pt has questions about the nerve test.  What is the steps after the test?  What are the treatment alternatives down the road. Call pt. Pt will be home until 4:00 today.

## 2019-05-14 ENCOUNTER — PATIENT OUTREACH (OUTPATIENT)
Dept: INTERNAL MEDICINE CLINIC | Facility: CLINIC | Age: 74
End: 2019-05-14

## 2019-05-14 NOTE — PROGRESS NOTES
Spoke with patient about need for Asthma follow up, pt states he has other health concerns and he is seeing specialist for that.  Pt states he does not want to nor feel the need to come in for asthma because he feels its controlled and would like to focus o

## 2019-05-20 NOTE — TELEPHONE ENCOUNTER
Called patient; discussed concerns; has known MGUS and abnormal EMG but workup so far negative - recommend nerve biopsy to evaluate for inflammatory / demyelinating changes - agrees to do with neurosurgery and then will discuss when this is done    Informe

## 2019-06-04 DIAGNOSIS — F41.9 ANXIETY: ICD-10-CM

## 2019-06-05 RX ORDER — TRAZODONE HYDROCHLORIDE 50 MG/1
50 TABLET ORAL NIGHTLY PRN
Qty: 30 TABLET | Refills: 0 | Status: SHIPPED | OUTPATIENT
Start: 2019-06-05 | End: 2019-06-14

## 2019-06-10 PROCEDURE — 86334 IMMUNOFIX E-PHORESIS SERUM: CPT | Performed by: PHYSICIAN ASSISTANT

## 2019-06-10 PROCEDURE — 83883 ASSAY NEPHELOMETRY NOT SPEC: CPT | Performed by: PHYSICIAN ASSISTANT

## 2019-06-10 PROCEDURE — 82784 ASSAY IGA/IGD/IGG/IGM EACH: CPT | Performed by: PHYSICIAN ASSISTANT

## 2019-06-10 PROCEDURE — 84165 PROTEIN E-PHORESIS SERUM: CPT | Performed by: PHYSICIAN ASSISTANT

## 2019-06-10 PROCEDURE — 85810 BLOOD VISCOSITY EXAMINATION: CPT | Performed by: PHYSICIAN ASSISTANT

## 2019-06-12 ENCOUNTER — TELEPHONE (OUTPATIENT)
Dept: SURGERY | Facility: CLINIC | Age: 74
End: 2019-06-12

## 2019-06-12 ENCOUNTER — OFFICE VISIT (OUTPATIENT)
Dept: SURGERY | Facility: CLINIC | Age: 74
End: 2019-06-12
Payer: MEDICARE

## 2019-06-12 VITALS
DIASTOLIC BLOOD PRESSURE: 70 MMHG | WEIGHT: 216 LBS | HEIGHT: 73 IN | BODY MASS INDEX: 28.63 KG/M2 | HEART RATE: 78 BPM | SYSTOLIC BLOOD PRESSURE: 122 MMHG

## 2019-06-12 DIAGNOSIS — Z01.818 PRE-OP TESTING: Primary | ICD-10-CM

## 2019-06-12 DIAGNOSIS — G60.9 IDIOPATHIC PERIPHERAL NEUROPATHY: ICD-10-CM

## 2019-06-12 DIAGNOSIS — M21.372 FOOT DROP, BILATERAL: Primary | ICD-10-CM

## 2019-06-12 DIAGNOSIS — M21.371 FOOT DROP, BILATERAL: Primary | ICD-10-CM

## 2019-06-12 DIAGNOSIS — M79.604 PAIN IN BOTH LOWER EXTREMITIES: ICD-10-CM

## 2019-06-12 DIAGNOSIS — M79.605 PAIN IN BOTH LOWER EXTREMITIES: ICD-10-CM

## 2019-06-12 DIAGNOSIS — M21.371 FOOT DROP, BILATERAL: ICD-10-CM

## 2019-06-12 DIAGNOSIS — M21.372 FOOT DROP, BILATERAL: ICD-10-CM

## 2019-06-12 PROCEDURE — 99203 OFFICE O/P NEW LOW 30 MIN: CPT | Performed by: NEUROLOGICAL SURGERY

## 2019-06-12 RX ORDER — MAGNESIUM GLUCONATE 30 MG(550)
1 TABLET ORAL DAILY
COMMUNITY
Start: 2019-05-01 | End: 2020-07-16

## 2019-06-12 NOTE — H&P
Neurosurgery Clinic Visit  2019    Monalisa Flores PCP:  Faraz Schofield MD    10/10/1945 MRN [id number]       CC:  Neuropathy, Foot Drop    HPI:    Caryn Murillo is a very pleasant 75year old male who presents with chronic foot issues over the years.   He status:       Spouse name: Not on file      Number of children: Not on file      Years of education: Not on file      Highest education level: Not on file    Tobacco Use      Smoking status: Former Smoker        Packs/day: 1.50        Years: 30.00 N   Thoracic Spinous   N   Lumbar Spinous   N   Cervical Paraspinals N N    Upper Trapezius N N    Middle Trapezius N N    Thoracic Paraspinals N N    Lumbar Paraspinals N N    PSIS N N    SIJ N N    Trochanteric Bursa N N    Upper extremity strength:

## 2019-06-12 NOTE — TELEPHONE ENCOUNTER
You are scheduled for a Right sural nerve biopsy on 6/25/19 with Dr. Anny Mckenzie.     · Stop all over the counter non-steroidal anti-inflammatories,  Vitamin E, fish/krill oil at least 7-10 days prior to surgery  · You will need to have pre-operative lab work

## 2019-06-12 NOTE — PATIENT INSTRUCTIONS
Refill policies:    • Allow 2-3 business days for refills; controlled substances may take longer.   • Contact your pharmacy at least 5 days prior to running out of medication and have them send an electronic request or submit request through the “request re been approved by your insurer. Depending on your insurance carrier, approval may take 3-10 days. It is highly recommended patients contact their insurance carrier directly to determine coverage.   If test is done without insurance authorization, patient ma Rema Shelton.     · Stop all over the counter non-steroidal anti-inflammatories,  Vitamin E, fish/krill oil at least 7-10 days prior to surgery  · You will need to have pre-operative lab work , orders have been placed  · Nothing to eat or drink after 11:00pm the

## 2019-06-14 ENCOUNTER — OFFICE VISIT (OUTPATIENT)
Dept: INTERNAL MEDICINE CLINIC | Facility: CLINIC | Age: 74
End: 2019-06-14
Payer: MEDICARE

## 2019-06-14 ENCOUNTER — APPOINTMENT (OUTPATIENT)
Dept: LAB | Age: 74
End: 2019-06-14
Attending: NEUROLOGICAL SURGERY
Payer: MEDICARE

## 2019-06-14 VITALS
TEMPERATURE: 98 F | WEIGHT: 219.5 LBS | BODY MASS INDEX: 29.09 KG/M2 | HEIGHT: 73 IN | RESPIRATION RATE: 16 BRPM | HEART RATE: 57 BPM | DIASTOLIC BLOOD PRESSURE: 78 MMHG | SYSTOLIC BLOOD PRESSURE: 116 MMHG

## 2019-06-14 DIAGNOSIS — M21.371 FOOT DROP, BILATERAL: ICD-10-CM

## 2019-06-14 DIAGNOSIS — M79.604 PAIN IN BOTH LOWER EXTREMITIES: ICD-10-CM

## 2019-06-14 DIAGNOSIS — Z01.818 PRE-OP TESTING: ICD-10-CM

## 2019-06-14 DIAGNOSIS — Z01.810 PREOP CARDIOVASCULAR EXAM: Primary | ICD-10-CM

## 2019-06-14 DIAGNOSIS — G60.9 IDIOPATHIC PERIPHERAL NEUROPATHY: ICD-10-CM

## 2019-06-14 DIAGNOSIS — M21.371 BILATERAL FOOT-DROP: ICD-10-CM

## 2019-06-14 DIAGNOSIS — M79.605 PAIN IN BOTH LOWER EXTREMITIES: ICD-10-CM

## 2019-06-14 DIAGNOSIS — M21.372 BILATERAL FOOT-DROP: ICD-10-CM

## 2019-06-14 DIAGNOSIS — M21.372 FOOT DROP, BILATERAL: ICD-10-CM

## 2019-06-14 DIAGNOSIS — F41.9 ANXIETY: ICD-10-CM

## 2019-06-14 PROCEDURE — 36415 COLL VENOUS BLD VENIPUNCTURE: CPT

## 2019-06-14 PROCEDURE — 99214 OFFICE O/P EST MOD 30 MIN: CPT | Performed by: INTERNAL MEDICINE

## 2019-06-14 PROCEDURE — 85610 PROTHROMBIN TIME: CPT

## 2019-06-14 PROCEDURE — 85730 THROMBOPLASTIN TIME PARTIAL: CPT

## 2019-06-14 RX ORDER — POLYETHYLENE GLYCOL 3350 17 G/17G
17 POWDER, FOR SOLUTION ORAL DAILY PRN
COMMUNITY

## 2019-06-14 RX ORDER — TRAZODONE HYDROCHLORIDE 50 MG/1
50 TABLET ORAL NIGHTLY PRN
Qty: 90 TABLET | Refills: 3 | Status: SHIPPED | OUTPATIENT
Start: 2019-06-14 | End: 2020-09-08

## 2019-06-14 NOTE — PATIENT INSTRUCTIONS
Foot Drop  Foot drop (drop foot) is a disorder that makes it hard to impossible to lift the foot at the ankle. There may also be pain, weakness and numbness in the foot.  Because it is hard to lift the foot, you may tend to drag your foot and toes when wa · Weakness or loss of feeling in the foot or leg may interfere with your ability to safely drive a car. Discuss this concern with your healthcare provider before you resume driving.   Follow-up care  Follow up with your healthcare provider or as advised by

## 2019-06-17 NOTE — TELEPHONE ENCOUNTER
Per PCP- on 6/14/19: \"Nils Padilla is a 75year old malewith a past medical history of MGUS, anxiety, hypothyroidism BPH,  neuropathy, and asthma who presents for cardiac risk assessment and a pre-operative physical exam. Patient is to have ri

## 2019-06-17 NOTE — H&P
ED [facility], [address] [facility]    History and Physical    Charito Mabry Patient Status:  No patient class for patient encounter    10/10/1945 MRN [id number]   Location [address] [facility], [address] [facility] Attending No att.  pr total) by mouth daily. Disp: 90 tablet Rfl: 3   tamsulosin HCl 0.4 MG Oral Cap Take 1 capsule (0.4 mg total) by mouth daily. Disp: 90 capsule Rfl: 3   finasteride 5 MG Oral Tab Take 1 tablet (5 mg total) by mouth daily.  Disp: 90 tablet Rfl: 3   magnesium o OF JOINT UNLISTED Right     knee   • ARTHROSCOPY SHOULDER Left 10/2/2017    Performed by Edward Griffith MD at [address] [facility]  Bilateral    • CATARACT Bilateral August 2015    IOL'S   • COLONOSCOPY  Feb. '12   • COLONOS 1.50        Years: 30.00        Pack years: 39        Types: Cigars        Quit date: 3/1/1998        Years since quittin.3      Smokeless tobacco: Never Used    Alcohol use: Yes      Comment: 1-2 drinks/day    Drug use: No     Occ: yes. : yes. patient does not have a history of acute coronary syndrome.  HIs estimated perioperative risk for major adverse cardiac event is low based on combined clinical and surgical risks.  He is therefore cleared with low cardiac risk.  He will not require further BILAT SINUS ENDOSCOPY ETHM MAX Bilateral 1/28/2014    Performed by Bjorn Boss MD at [facility] MAIN OR   • ORTHOPEDIC SURG (PBP)      LT ELBOW - ulnar nerve release   • OTHER Left     shoulder replacement   • OTHER Left 07/2017    left shoulder reverse removal d Results   Component Value Date    WBC 7.44 06/10/2019    HGB 12.8 (L) 06/10/2019    HCT 37.9 06/10/2019     06/10/2019    CREATSERUM 1.14 06/10/2019    BUN 19.0 06/10/2019     06/10/2019    K 4.47 06/10/2019     06/10/2019    CO2 31.1 (H

## 2019-06-21 ENCOUNTER — ANESTHESIA EVENT (OUTPATIENT)
Dept: SURGERY | Facility: HOSPITAL | Age: 74
End: 2019-06-21
Payer: MEDICARE

## 2019-06-25 ENCOUNTER — ANESTHESIA (OUTPATIENT)
Dept: SURGERY | Facility: HOSPITAL | Age: 74
End: 2019-06-25
Payer: MEDICARE

## 2019-06-25 ENCOUNTER — HOSPITAL ENCOUNTER (OUTPATIENT)
Facility: HOSPITAL | Age: 74
Setting detail: HOSPITAL OUTPATIENT SURGERY
Discharge: HOME | End: 2019-06-25
Attending: NEUROLOGICAL SURGERY | Admitting: NEUROLOGICAL SURGERY
Payer: MEDICARE

## 2019-06-25 VITALS
OXYGEN SATURATION: 98 % | WEIGHT: 214.94 LBS | RESPIRATION RATE: 16 BRPM | TEMPERATURE: 97 F | HEIGHT: 73 IN | HEART RATE: 51 BPM | DIASTOLIC BLOOD PRESSURE: 62 MMHG | SYSTOLIC BLOOD PRESSURE: 117 MMHG | BODY MASS INDEX: 28.49 KG/M2

## 2019-06-25 DIAGNOSIS — G60.9 IDIOPATHIC PERIPHERAL NEUROPATHY: ICD-10-CM

## 2019-06-25 PROCEDURE — 01BH0ZX EXCISION OF PERONEAL NERVE, OPEN APPROACH, DIAGNOSTIC: ICD-10-PCS | Performed by: NEUROLOGICAL SURGERY

## 2019-06-25 PROCEDURE — 88313 SPECIAL STAINS GROUP 2: CPT | Performed by: NEUROLOGICAL SURGERY

## 2019-06-25 PROCEDURE — 88305 TISSUE EXAM BY PATHOLOGIST: CPT | Performed by: NEUROLOGICAL SURGERY

## 2019-06-25 RX ORDER — CEFAZOLIN SODIUM/WATER 2 G/20 ML
2 SYRINGE (ML) INTRAVENOUS ONCE
Status: COMPLETED | OUTPATIENT
Start: 2019-06-25 | End: 2019-06-25

## 2019-06-25 RX ORDER — BUPIVACAINE HYDROCHLORIDE AND EPINEPHRINE 2.5; 5 MG/ML; UG/ML
INJECTION, SOLUTION EPIDURAL; INFILTRATION; INTRACAUDAL; PERINEURAL AS NEEDED
Status: DISCONTINUED | OUTPATIENT
Start: 2019-06-25 | End: 2019-06-25 | Stop reason: HOSPADM

## 2019-06-25 RX ORDER — HYDROCODONE BITARTRATE AND ACETAMINOPHEN 5; 325 MG/1; MG/1
1 TABLET ORAL EVERY 8 HOURS PRN
Qty: 10 TABLET | Refills: 0 | Status: SHIPPED | OUTPATIENT
Start: 2019-06-25 | End: 2019-07-26

## 2019-06-25 RX ORDER — MEPERIDINE HYDROCHLORIDE 25 MG/ML
12.5 INJECTION INTRAMUSCULAR; INTRAVENOUS; SUBCUTANEOUS AS NEEDED
Status: DISCONTINUED | OUTPATIENT
Start: 2019-06-25 | End: 2019-06-25

## 2019-06-25 RX ORDER — HYDROCODONE BITARTRATE AND ACETAMINOPHEN 5; 325 MG/1; MG/1
1 TABLET ORAL AS NEEDED
Status: DISCONTINUED | OUTPATIENT
Start: 2019-06-25 | End: 2019-06-25

## 2019-06-25 RX ORDER — ONDANSETRON 2 MG/ML
4 INJECTION INTRAMUSCULAR; INTRAVENOUS AS NEEDED
Status: DISCONTINUED | OUTPATIENT
Start: 2019-06-25 | End: 2019-06-25

## 2019-06-25 RX ORDER — HYDROMORPHONE HYDROCHLORIDE 1 MG/ML
0.4 INJECTION, SOLUTION INTRAMUSCULAR; INTRAVENOUS; SUBCUTANEOUS EVERY 5 MIN PRN
Status: DISCONTINUED | OUTPATIENT
Start: 2019-06-25 | End: 2019-06-25

## 2019-06-25 RX ORDER — SODIUM CHLORIDE, SODIUM LACTATE, POTASSIUM CHLORIDE, CALCIUM CHLORIDE 600; 310; 30; 20 MG/100ML; MG/100ML; MG/100ML; MG/100ML
INJECTION, SOLUTION INTRAVENOUS CONTINUOUS
Status: DISCONTINUED | OUTPATIENT
Start: 2019-06-25 | End: 2019-06-25

## 2019-06-25 RX ORDER — ACETAMINOPHEN 500 MG
1000 TABLET ORAL ONCE
COMMUNITY
End: 2019-07-26

## 2019-06-25 RX ORDER — DIPHENHYDRAMINE HYDROCHLORIDE 50 MG/ML
12.5 INJECTION INTRAMUSCULAR; INTRAVENOUS AS NEEDED
Status: DISCONTINUED | OUTPATIENT
Start: 2019-06-25 | End: 2019-06-25

## 2019-06-25 RX ORDER — HYDROCODONE BITARTRATE AND ACETAMINOPHEN 5; 325 MG/1; MG/1
2 TABLET ORAL AS NEEDED
Status: DISCONTINUED | OUTPATIENT
Start: 2019-06-25 | End: 2019-06-25

## 2019-06-25 RX ORDER — ACETAMINOPHEN 500 MG
1000 TABLET ORAL ONCE
Status: DISCONTINUED | OUTPATIENT
Start: 2019-06-25 | End: 2019-06-25 | Stop reason: HOSPADM

## 2019-06-25 RX ORDER — BACITRACIN 50000 [USP'U]/1
INJECTION, POWDER, LYOPHILIZED, FOR SOLUTION INTRAMUSCULAR AS NEEDED
Status: DISCONTINUED | OUTPATIENT
Start: 2019-06-25 | End: 2019-06-25 | Stop reason: HOSPADM

## 2019-06-25 RX ORDER — MIDAZOLAM HYDROCHLORIDE 1 MG/ML
1 INJECTION INTRAMUSCULAR; INTRAVENOUS EVERY 5 MIN PRN
Status: DISCONTINUED | OUTPATIENT
Start: 2019-06-25 | End: 2019-06-25

## 2019-06-25 RX ORDER — NALOXONE HYDROCHLORIDE 0.4 MG/ML
80 INJECTION, SOLUTION INTRAMUSCULAR; INTRAVENOUS; SUBCUTANEOUS AS NEEDED
Status: DISCONTINUED | OUTPATIENT
Start: 2019-06-25 | End: 2019-06-25

## 2019-06-25 NOTE — ANESTHESIA PREPROCEDURE EVALUATION
PRE-OP EVALUATION    Patient Name: Nathan Goins    Pre-op Diagnosis: Idiopathic peripheral neuropathy [G60.9]    Procedure(s):  right sural nerve biopsy    Surgeon(s) and Role:     Levy Rg MD - Primary    Pre-op vitals reviewed.   Temp: 98.3 ° tablet Rfl: 3   magnesium oxide 250 MG Oral Tab Take 200 mg by mouth daily. Disp:  Rfl:    CALCIUM OR Take by mouth daily. Disp:  Rfl:    Omega-3 Fatty Acids (FISH OIL OR) Take by mouth daily.    Disp:  Rfl:    Albuterol Sulfate  (90 BASE) MCG/AC placement of Antibiotic spacer   • OTHER SURGICAL HISTORY      Shoulder Joint Replacement 7/21/16, Sinus surgery Feb. '14,   • OTHER SURGICAL HISTORY Left 11/17/2017    Shoulder Revision to Reverse Total Shoulder Arthroplasty with Removal of Cement Spacer

## 2019-06-25 NOTE — BRIEF OP NOTE
Pre-Operative Diagnosis: Idiopathic peripheral neuropathy [G60.9]     Post-Operative Diagnosis: Idiopathic peripheral neuropathy [G60.9]      Procedure Performed:   Procedure(s):  right sural nerve biopsy    Surgeon(s) and Role:     MD Kristina Sanchez P

## 2019-06-25 NOTE — ANESTHESIA POSTPROCEDURE EVALUATION
[address] [facility] Patient Status:  Hospital Outpatient Surgery   Age/Gender 75year old male MRN [id number]   [facility] SURGERY Attending Agus Dominguez MD   [address] Day # 0 PCP Guillaume Odell MD       Anesthesia Post-op Note

## 2019-06-25 NOTE — OPERATIVE REPORT
[facility]    OPERATIVE REPORT    Patient:  Zhang Gonzalez;  YOB: 1945     [facility]:  [id number]; Medical Record Number:  [id number]    Admission Date:  6/25/2019 Operation Date:  6/25/2019    . ..........................     Operating Phys strength and sensation were noted to be stable, the patient was moved back to the transportation cart and then to the recovery room.     Sakshi Holly MD  Neurological Surgeon  [facility]  [address] [facility], [address] [place] [address] Fran Britton[address]

## 2019-06-25 NOTE — H&P
Neurosurgery Pre-OP H&P  2019    Radha Huynh PCP:  Caridad Greenwood MD    10/10/1945 MRN [id number]       CC:  Neuropathy, Foot Drop     HPI:     Selina Day is a very pleasant 75year old male who presents with chronic foot issues over the years.   He History      Marital status:       Spouse name: Not on file      Number of children: Not on file      Years of education: Not on file      Highest education level: Not on file    Tobacco Use      Smoking status: Former Smoker        Packs/day: 1.50 extremity strength:        Deltoid Biceps Triceps Wrist Extension  Finger Abduction Finger Extension Thumb Opposition   Right 5 5 5 5 5 5 4+ 5   Left 5 5 5 5 5 5 4+ 5   Lower extremity strength:     Iliopsoas Quad Hamstring D-Flexion EHL P-Flexion Ever

## 2019-07-05 ENCOUNTER — OFFICE VISIT (OUTPATIENT)
Dept: SURGERY | Facility: CLINIC | Age: 74
End: 2019-07-05
Payer: MEDICARE

## 2019-07-05 VITALS — SYSTOLIC BLOOD PRESSURE: 120 MMHG | DIASTOLIC BLOOD PRESSURE: 62 MMHG | HEART RATE: 64 BPM

## 2019-07-05 DIAGNOSIS — G60.9 IDIOPATHIC PERIPHERAL NEUROPATHY: Primary | ICD-10-CM

## 2019-07-05 PROCEDURE — 99024 POSTOP FOLLOW-UP VISIT: CPT | Performed by: PHYSICIAN ASSISTANT

## 2019-07-05 NOTE — PROGRESS NOTES
Pt is here for follow up  right sural nerve biopsy 6/25/19     Pt states that he has been doing good since post op.

## 2019-07-05 NOTE — PROGRESS NOTES
Neurosurgery Clinic Visit  2019    Luis Zepeda PCP:  Tamar Yoo MD    [birth year] MRN [id number]       CC:  S/P R Sural nerve bx 19    HPI:    Alexandra Rolon is here for 2 week post op. He is doing well.   He has some swelling around the incisio Screening for other and unspecified genitourinary condition 1/17/2012   • Seborrheic keratosis 10/22/2010   • Shoulder joint pain 10/22/2010   • Sinusitis     • Special screening for malignant neoplasm of prostate 1/17/2012   • Unspecified disorder of thyr well-healed, mild erythema around the running suture. No edema, or discharge. Mild-moderate tenderness superior aspect of incision. A/P:     1. Idiopathic peripheral neuropathy    2.       S/P R Sural Nerve Bx    Sutures prepped with alcohol and remov

## 2019-07-10 ENCOUNTER — TELEPHONE (OUTPATIENT)
Dept: NEUROLOGY | Facility: CLINIC | Age: 74
End: 2019-07-10

## 2019-07-12 NOTE — TELEPHONE ENCOUNTER
She Sanchez and I had discussed a possible bone marrow biopsy pending nerve biopsy. Can you please ask him to schedule a bone marrow biopsy or at least a follow up with me to discuss?

## 2019-07-12 NOTE — TELEPHONE ENCOUNTER
Called patient and discussed results of pathology from sural nerve biopsy - no specific inflammatory pathology noted and consistent with axonal loss - unclear if this would respond to IVIG and recommend discuss further with hematology regarding MGUS - will

## 2019-07-15 NOTE — TELEPHONE ENCOUNTER
Contacted pt on preferred line, advised of MD's note. Pt requested to discuss with MD further. Pt is scheduled with MD to discuss further.    Future Appointments   Date Time Provider Monet Johnson   7/26/2019  1:45 PM MD RADHA Palmer ONC Daxa Ware

## 2019-07-26 ENCOUNTER — APPOINTMENT (OUTPATIENT)
Dept: LAB | Age: 74
End: 2019-07-26
Attending: INTERNAL MEDICINE
Payer: MEDICARE

## 2019-07-26 DIAGNOSIS — D47.2 MGUS (MONOCLONAL GAMMOPATHY OF UNKNOWN SIGNIFICANCE): ICD-10-CM

## 2019-07-26 PROCEDURE — 88185 FLOWCYTOMETRY/TC ADD-ON: CPT | Performed by: INTERNAL MEDICINE

## 2019-07-26 PROCEDURE — 88341 IMHCHEM/IMCYTCHM EA ADD ANTB: CPT

## 2019-07-26 PROCEDURE — 83883 ASSAY NEPHELOMETRY NOT SPEC: CPT | Performed by: INTERNAL MEDICINE

## 2019-07-26 PROCEDURE — 88237 TISSUE CULTURE BONE MARROW: CPT

## 2019-07-26 PROCEDURE — 84165 PROTEIN E-PHORESIS SERUM: CPT | Performed by: INTERNAL MEDICINE

## 2019-07-26 PROCEDURE — 88313 SPECIAL STAINS GROUP 2: CPT

## 2019-07-26 PROCEDURE — 88305 TISSUE EXAM BY PATHOLOGIST: CPT

## 2019-07-26 PROCEDURE — 88184 FLOWCYTOMETRY/ TC 1 MARKER: CPT | Performed by: INTERNAL MEDICINE

## 2019-07-26 PROCEDURE — 86334 IMMUNOFIX E-PHORESIS SERUM: CPT | Performed by: INTERNAL MEDICINE

## 2019-07-26 PROCEDURE — 88365 INSITU HYBRIDIZATION (FISH): CPT

## 2019-07-26 PROCEDURE — 85097 BONE MARROW INTERPRETATION: CPT

## 2019-07-26 PROCEDURE — 82784 ASSAY IGA/IGD/IGG/IGM EACH: CPT | Performed by: INTERNAL MEDICINE

## 2019-07-26 PROCEDURE — 88264 CHROMOSOME ANALYSIS 20-25: CPT

## 2019-07-26 PROCEDURE — 88342 IMHCHEM/IMCYTCHM 1ST ANTB: CPT

## 2019-08-14 DIAGNOSIS — G60.9 IDIOPATHIC PERIPHERAL NEUROPATHY: ICD-10-CM

## 2019-08-14 DIAGNOSIS — J32.4 CHRONIC PANSINUSITIS: ICD-10-CM

## 2019-08-14 DIAGNOSIS — J33.9 NASAL POLYPOSIS: ICD-10-CM

## 2019-08-14 RX ORDER — FLUTICASONE PROPIONATE 50 MCG
2 SPRAY, SUSPENSION (ML) NASAL 2 TIMES DAILY PRN
Qty: 3 BOTTLE | Refills: 1 | Status: SHIPPED | OUTPATIENT
Start: 2019-08-14 | End: 2020-03-09

## 2019-08-14 RX ORDER — GABAPENTIN 300 MG/1
300 CAPSULE ORAL 3 TIMES DAILY
Qty: 270 CAPSULE | Refills: 0 | Status: SHIPPED | OUTPATIENT
Start: 2019-08-14 | End: 2019-09-23

## 2019-08-14 NOTE — TELEPHONE ENCOUNTER
Medication: Gabapentin 300 mg    Date of last refill: 03/06/2019 with 1addt refill  [place]     Last office visit: 3/6/2019  Due back to clinic per last office note:  No appts noted  Date next office visit scheduled: mediated pathology - patient was advised to have lumbar puncture which did not demonstrate any albuminocytologic dissociation as might be expected in CIDP or other immune mediated neuropathy but ganglioside Ab panel has not been done.   Patient was recommen

## 2019-08-14 NOTE — TELEPHONE ENCOUNTER
Fax received from [address] [place] [address] for a refill on Flonase Allergy relief qty 32. Please see fax in triage.

## 2019-09-13 PROBLEM — D47.2 NEUROPATHY ASSOCIATED WITH MGUS (HCC): Status: ACTIVE | Noted: 2019-09-13

## 2019-09-13 PROBLEM — G63 NEUROPATHY ASSOCIATED WITH MGUS (HCC): Status: ACTIVE | Noted: 2019-09-13

## 2019-09-23 ENCOUNTER — OFFICE VISIT (OUTPATIENT)
Dept: NEUROLOGY | Facility: CLINIC | Age: 74
End: 2019-09-23
Payer: MEDICARE

## 2019-09-23 VITALS
HEIGHT: 73 IN | HEART RATE: 74 BPM | BODY MASS INDEX: 28.89 KG/M2 | DIASTOLIC BLOOD PRESSURE: 72 MMHG | RESPIRATION RATE: 16 BRPM | WEIGHT: 218 LBS | SYSTOLIC BLOOD PRESSURE: 130 MMHG

## 2019-09-23 DIAGNOSIS — M21.372 FOOT DROP, BILATERAL: ICD-10-CM

## 2019-09-23 DIAGNOSIS — G60.9 IDIOPATHIC PERIPHERAL NEUROPATHY: ICD-10-CM

## 2019-09-23 DIAGNOSIS — M21.371 FOOT DROP, BILATERAL: ICD-10-CM

## 2019-09-23 DIAGNOSIS — D47.2 MGUS (MONOCLONAL GAMMOPATHY OF UNKNOWN SIGNIFICANCE): Primary | ICD-10-CM

## 2019-09-23 PROCEDURE — 83516 IMMUNOASSAY NONANTIBODY: CPT | Performed by: OTHER

## 2019-09-23 PROCEDURE — 99214 OFFICE O/P EST MOD 30 MIN: CPT | Performed by: OTHER

## 2019-09-23 RX ORDER — GABAPENTIN 300 MG/1
300 CAPSULE ORAL 3 TIMES DAILY
Qty: 270 CAPSULE | Refills: 3 | Status: SHIPPED | OUTPATIENT
Start: 2019-09-23 | End: 2020-12-28

## 2019-09-23 RX ORDER — MAGNESIUM 200 MG
TABLET ORAL
COMMUNITY
End: 2020-07-16

## 2019-09-23 NOTE — PROGRESS NOTES
[facility] Progress Note    HPI  Patient presents with:  Neuropathy: Follow up      As per my initial H&P from 1/14/19:   \" Lauren Gilmar is a 75year old, who presents for evaluation of drop foot and neuropathy.        He has been In terms of neuropathy, he has continued to have foot drop in R foot more than L foot. He states he trips frequently but does not fall.         He notes sometimes his hands in 2nd digit have contraction / flexion in his finger - denies neck pain or sly • INTRAOPERATIVE NEURO MONITORING N/A 9/12/2016    Performed by Linus Luke MD at [facility] MAIN OR   • LUMBAR LAMINECTOMY POST LAT INTERBODY FUS 2 LEVEL N/A 9/12/2016    Performed by Linus Luke MD at [facility] MAIN OR   • NASAL SEPTOPLASTY BILAT SINUS ENDOSCOP Smoking status: Former Smoker        Packs/day: 1.50        Years: 30.00        Pack years: 45        Types: Cigars        Quit date: 3/1/1998        Years since quittin.5      Smokeless tobacco: Never Used    Substance and Sexual Activity      Al •  Albuterol Sulfate  (90 BASE) MCG/ACT Inhalation Aero Soln, Inhale 2 puffs into the lungs every 6 (six) hours as needed for Wheezing., Disp: , Rfl:   •  Multiple Vitamin (MULTI-VITAMIN OR), Take 1 tablet by mouth daily.   , Disp: , Rfl:     Review Latest Ref Rng & Units 2/22/2019   Total Protein      6.4 - 8.2 g/dL 7.0   ALBUMIN, SERUM      3.10 - 4.50 g/dL 4.25   ALPHA-1 GLOBULIN      0.10 - 0.30 g/dL 0.20   ALPHA-2 GLOBULIN      0.60 - 1.00 g/dL 0.81   Beta Globulin      0.70 - 1.20 g/dL 0.62 INDICATION FOR FLOW       Flow cytometry performed at [facility] is interpreted at [facility] by: Joey Clark MD. . . . FLOW CYTOMETRY RESULTS       INTERPRETATION: .  . .   PT      12.4 - 14.7 seconds 13.2   INR      0.90 - 1.10 0.96   Plate Nerve / Sites Muscle Latency Amplitude Amp % Duration Area Segments Distance Lat Diff Velocity     ms mV % ms mVms  cm ms m/s   L Median - APB      Wrist APB 2.97 8.9 100 6.41 28.9 Wrist - APB 7        Elbow APB 9.32 6.2 70.1 7.55 26.4 Elbow - Wrist 26 6. 3 Summary:    Nerve conduction studies:   1. Left sural sensory response was absent. 2. Left median sensory response was abnormal due to milldy prolonged peak latency, with severely reduced amplitude, and mildly slowed conduction velocity.    3. Left ulnar s 2. Motor unit potentials demonstrated increased amplitude and duration in all muscle tested in the left lower extremity with reduced recruitment.   This was demonstrated to a greater extent in distal musculature with nearly discrete recruitment in the left Overall, neurologic examination is relatively stable. He continues to have evidence for bilateral foot drop, right worse than left. EMG was done and demonstrated findings suggestive of an underlying primary demyelinating neuropathy.   Thus far, all testin 30 total minutes spent with patient >50% of visit was spent in counseling and coordination of care, including discussion of prognosis, natural history of disease, diagnostic and therapeutic options as noted above; patient allowed to ask questions and all q

## 2019-10-04 ENCOUNTER — TELEPHONE (OUTPATIENT)
Dept: NEUROLOGY | Facility: CLINIC | Age: 74
End: 2019-10-04

## 2019-10-04 NOTE — TELEPHONE ENCOUNTER
LM for pt that the blood work was negative and he can move forward with the IVIG with hemotology. Pt advised to call with any questions.   Informed the patient we close at 1:00pm today and will return Monday morning at 8:00am.

## 2019-10-04 NOTE — TELEPHONE ENCOUNTER
----- Message from Jason Lloyd MD sent at 10/4/2019  9:24 AM CDT -----  Results noted, anti Mag Ab was negative - proceed with IVIG from hematology

## 2019-10-23 PROBLEM — G60.3 IDIOPATHIC PROGRESSIVE POLYNEUROPATHY: Status: ACTIVE | Noted: 2019-10-23

## 2019-10-30 ENCOUNTER — TELEPHONE (OUTPATIENT)
Dept: INTERNAL MEDICINE CLINIC | Facility: CLINIC | Age: 74
End: 2019-10-30

## 2019-10-30 DIAGNOSIS — R97.20 ELEVATED PSA: ICD-10-CM

## 2019-10-30 DIAGNOSIS — E03.4 HYPOTHYROIDISM DUE TO ACQUIRED ATROPHY OF THYROID: ICD-10-CM

## 2019-10-30 DIAGNOSIS — N40.0 BENIGN PROSTATIC HYPERPLASIA WITHOUT LOWER URINARY TRACT SYMPTOMS: ICD-10-CM

## 2019-10-30 DIAGNOSIS — Z79.899 ENCOUNTER FOR LONG-TERM (CURRENT) USE OF MEDICATIONS: Primary | ICD-10-CM

## 2019-10-30 DIAGNOSIS — D47.2 MGUS (MONOCLONAL GAMMOPATHY OF UNKNOWN SIGNIFICANCE): ICD-10-CM

## 2019-10-30 DIAGNOSIS — Z12.5 ENCOUNTER FOR SCREENING FOR MALIGNANT NEOPLASM OF PROSTATE: ICD-10-CM

## 2019-10-30 DIAGNOSIS — R31.9 HEMATURIA, UNSPECIFIED TYPE: ICD-10-CM

## 2019-10-30 NOTE — TELEPHONE ENCOUNTER
Patient has AWV on 12/23/19 - please place orders for annual labs, plus PSA and anything else needed per his medical history, per pt request.

## 2019-11-25 DIAGNOSIS — E03.4 HYPOTHYROIDISM DUE TO ACQUIRED ATROPHY OF THYROID: ICD-10-CM

## 2019-11-25 RX ORDER — LEVOTHYROXINE SODIUM 0.1 MG/1
TABLET ORAL
Qty: 90 TABLET | Refills: 0 | Status: SHIPPED | OUTPATIENT
Start: 2019-11-25 | End: 2019-12-23

## 2019-12-03 ENCOUNTER — APPOINTMENT (OUTPATIENT)
Dept: LAB | Age: 74
End: 2019-12-03
Attending: INTERNAL MEDICINE
Payer: MEDICARE

## 2019-12-03 ENCOUNTER — NURSE ONLY (OUTPATIENT)
Dept: INTERNAL MEDICINE CLINIC | Facility: CLINIC | Age: 74
End: 2019-12-03
Payer: MEDICARE

## 2019-12-03 DIAGNOSIS — R97.20 ELEVATED PSA: ICD-10-CM

## 2019-12-03 DIAGNOSIS — E03.4 HYPOTHYROIDISM DUE TO ACQUIRED ATROPHY OF THYROID: ICD-10-CM

## 2019-12-03 DIAGNOSIS — D47.2 MGUS (MONOCLONAL GAMMOPATHY OF UNKNOWN SIGNIFICANCE): ICD-10-CM

## 2019-12-03 DIAGNOSIS — Z12.5 ENCOUNTER FOR SCREENING FOR MALIGNANT NEOPLASM OF PROSTATE: ICD-10-CM

## 2019-12-03 DIAGNOSIS — R31.9 HEMATURIA, UNSPECIFIED TYPE: ICD-10-CM

## 2019-12-03 DIAGNOSIS — Z23 NEED FOR INFLUENZA VACCINATION: Primary | ICD-10-CM

## 2019-12-03 DIAGNOSIS — Z79.899 ENCOUNTER FOR LONG-TERM (CURRENT) USE OF MEDICATIONS: ICD-10-CM

## 2019-12-03 PROCEDURE — 80061 LIPID PANEL: CPT

## 2019-12-03 PROCEDURE — 90662 IIV NO PRSV INCREASED AG IM: CPT | Performed by: INTERNAL MEDICINE

## 2019-12-03 PROCEDURE — 84443 ASSAY THYROID STIM HORMONE: CPT

## 2019-12-03 PROCEDURE — 80053 COMPREHEN METABOLIC PANEL: CPT

## 2019-12-03 PROCEDURE — 36415 COLL VENOUS BLD VENIPUNCTURE: CPT

## 2019-12-03 PROCEDURE — G0008 ADMIN INFLUENZA VIRUS VAC: HCPCS | Performed by: INTERNAL MEDICINE

## 2019-12-23 ENCOUNTER — OFFICE VISIT (OUTPATIENT)
Dept: INTERNAL MEDICINE CLINIC | Facility: CLINIC | Age: 74
End: 2019-12-23
Payer: MEDICARE

## 2019-12-23 VITALS
HEIGHT: 73 IN | HEART RATE: 100 BPM | RESPIRATION RATE: 16 BRPM | OXYGEN SATURATION: 97 % | TEMPERATURE: 98 F | WEIGHT: 222 LBS | SYSTOLIC BLOOD PRESSURE: 144 MMHG | BODY MASS INDEX: 29.42 KG/M2 | DIASTOLIC BLOOD PRESSURE: 82 MMHG

## 2019-12-23 DIAGNOSIS — E03.4 HYPOTHYROIDISM DUE TO ACQUIRED ATROPHY OF THYROID: ICD-10-CM

## 2019-12-23 DIAGNOSIS — F41.9 ANXIETY: ICD-10-CM

## 2019-12-23 DIAGNOSIS — D47.2 MGUS (MONOCLONAL GAMMOPATHY OF UNKNOWN SIGNIFICANCE): ICD-10-CM

## 2019-12-23 DIAGNOSIS — L27.0 ALLERGIC DRUG RASH: ICD-10-CM

## 2019-12-23 DIAGNOSIS — D47.2 NEUROPATHY ASSOCIATED WITH MGUS (HCC): ICD-10-CM

## 2019-12-23 DIAGNOSIS — G63 NEUROPATHY ASSOCIATED WITH MGUS (HCC): ICD-10-CM

## 2019-12-23 DIAGNOSIS — G60.9 IDIOPATHIC PERIPHERAL NEUROPATHY: ICD-10-CM

## 2019-12-23 DIAGNOSIS — G60.3 IDIOPATHIC PROGRESSIVE POLYNEUROPATHY: ICD-10-CM

## 2019-12-23 DIAGNOSIS — Z00.00 ANNUAL PHYSICAL EXAM: Primary | ICD-10-CM

## 2019-12-23 DIAGNOSIS — J45.30 MILD PERSISTENT ASTHMA WITHOUT COMPLICATION: ICD-10-CM

## 2019-12-23 DIAGNOSIS — Z00.00 ENCOUNTER FOR ANNUAL HEALTH EXAMINATION: ICD-10-CM

## 2019-12-23 DIAGNOSIS — R97.20 ELEVATED PSA: ICD-10-CM

## 2019-12-23 DIAGNOSIS — N40.0 BENIGN PROSTATIC HYPERPLASIA WITHOUT LOWER URINARY TRACT SYMPTOMS: ICD-10-CM

## 2019-12-23 PROCEDURE — G0439 PPPS, SUBSEQ VISIT: HCPCS | Performed by: INTERNAL MEDICINE

## 2019-12-23 RX ORDER — ALPRAZOLAM 0.25 MG/1
0.25 TABLET ORAL NIGHTLY PRN
Qty: 90 TABLET | Refills: 0 | Status: SHIPPED | OUTPATIENT
Start: 2019-12-23 | End: 2021-04-05

## 2019-12-23 RX ORDER — LEVOTHYROXINE SODIUM 0.1 MG/1
100 TABLET ORAL
Qty: 90 TABLET | Refills: 3 | Status: SHIPPED | OUTPATIENT
Start: 2019-12-23 | End: 2021-03-01

## 2019-12-23 NOTE — PROGRESS NOTES
HPI:   Radha Huynh is a 75year old male who presents for a Medicare Subsequent Annual Wellness visit (Pt already had Initial Annual Wellness). HPI:  Here for AWV  Has been getting IV IG infusions with oncology in hopes to help neuropathy.  No much Health Care on file in [facility] [address]. He smoked tobacco in the past but quit greater than 12 months ago.   Social History    Tobacco Use      Smoking status: Former Smoker        Packs/day: 1.50        Years: 30.00        Pack years: 45        Types: Cigars Allergies. CURRENT MEDICATIONS:   NON FORMULARY, IV Immunoglobulin  ALPRAZolam 0.25 MG Oral Tab, Take 1 tablet (0.25 mg total) by mouth nightly as needed. Mometasone Furoate 100 MCG/ACT Inhalation Aerosol, Inhale 1 puff into the lungs daily.   Levothyro (1/17/2012), Screening for other and unspecified genitourinary condition (1/17/2012), Seborrheic keratosis (10/22/2010), Shoulder joint pain (10/22/2010), Sinusitis, Special screening for malignant neoplasm of prostate (1/17/2012), and Unspecified disorder BMI 29.29 kg/m²   Estimated body mass index is 29.29 kg/m² as calculated from the following:    Height as of this encounter: 73\". Weight as of this encounter: 222 lb (100.7 kg).     Medicare Hearing Assessment  (Required for AWV/SWV)    Hearing Screenin Older PRSV Free (72243) 09/26/2014, 12/03/2019   • FLUAD High Dose 65 yr and older (03938) 11/30/2017, 10/10/2018   • FLUZONE 3 Yrs+ Quad Prsv Free 0.5 ml (72792) 10/04/2016   • Fluzone Vaccine Medicare () 11/04/2015   • Influenza 12/10/2009, 09/13/20 associated with MGUS ([facility] [address]- stable. Defer to neuro and oncology     Idiopathic progressive polyneuropathy-stable.  Defer to neuro and oncology     Drug reaction rash- defer treatment options to oncology  The patient indicates understanding of these issues a Screening      Ophthalmology Visit Annually: Diabetics, FHx Glaucoma, AA>50, > 65 No flowsheet data found.     Prostate Cancer Screening      PSA  Annually PSA due on 12/03/2021  Update Health Maintenance if applicable     Immunizations (Update Immu male.    HPI    Review of Systems         Current Outpatient Medications   Medication Sig Dispense Refill   • NON FORMULARY IV Immunoglobulin     • ALPRAZolam 0.25 MG Oral Tab Take 1 tablet (0.25 mg total) by mouth nightly as needed.  90 tablet 0   • Mometa symptoms  Idiopathic peripheral neuropathy  Mgus (monoclonal gammopathy of unknown significance)  Neuropathy associated with mgus (hcc)  Idiopathic progressive polyneuropathy  Encounter for annual health examination  Anxiety  Allergic drug rash    No order

## 2019-12-23 NOTE — PATIENT INSTRUCTIONS
Nils Joseph's SCREENING SCHEDULE   Tests on this list are recommended by your physician but may not be covered, or covered at this frequency, by your insurer. Please check with your insurance carrier before scheduling to verify coverage.     PREVENT screening (once between ages 73-68)  No results found for this or any previous visit.  Limited to patients who meet one of the following criteria:   • Men who are 73-68 years old and have smoked more than 100 cigarettes in their lifetime   • Anyone with a f PNEUMOCOCCAL IMM (PNEUMOVAX)    Please get once after your 65th birthday    Hepatitis B for Moderate/High Risk No orders found for this or any previous visit.  Medium/high risk factors:   End-stage renal disease   Hemophiliacs who received Factor VIII or IX

## 2020-03-09 DIAGNOSIS — J32.4 CHRONIC PANSINUSITIS: ICD-10-CM

## 2020-03-09 DIAGNOSIS — J33.9 NASAL POLYPOSIS: ICD-10-CM

## 2020-03-09 RX ORDER — FLUTICASONE PROPIONATE 50 MCG
SPRAY, SUSPENSION (ML) NASAL
Qty: 48 G | Refills: 1 | Status: SHIPPED | OUTPATIENT
Start: 2020-03-09 | End: 2020-12-26

## 2020-06-24 ENCOUNTER — TELEPHONE (OUTPATIENT)
Dept: NEUROLOGY | Facility: CLINIC | Age: 75
End: 2020-06-24

## 2020-07-16 ENCOUNTER — OFFICE VISIT (OUTPATIENT)
Dept: NEUROLOGY | Facility: CLINIC | Age: 75
End: 2020-07-16
Payer: MEDICARE

## 2020-07-16 VITALS
HEART RATE: 72 BPM | HEIGHT: 73 IN | RESPIRATION RATE: 16 BRPM | DIASTOLIC BLOOD PRESSURE: 60 MMHG | TEMPERATURE: 99 F | WEIGHT: 224 LBS | BODY MASS INDEX: 29.69 KG/M2 | SYSTOLIC BLOOD PRESSURE: 138 MMHG

## 2020-07-16 DIAGNOSIS — M21.372 FOOT DROP, BILATERAL: ICD-10-CM

## 2020-07-16 DIAGNOSIS — M21.371 FOOT DROP, BILATERAL: ICD-10-CM

## 2020-07-16 DIAGNOSIS — G60.9 IDIOPATHIC PERIPHERAL NEUROPATHY: ICD-10-CM

## 2020-07-16 DIAGNOSIS — D47.2 MGUS (MONOCLONAL GAMMOPATHY OF UNKNOWN SIGNIFICANCE): Primary | ICD-10-CM

## 2020-07-16 PROCEDURE — 99214 OFFICE O/P EST MOD 30 MIN: CPT | Performed by: OTHER

## 2020-07-16 NOTE — PROGRESS NOTES
[facility] Progress Note    HPI  Patient presents with:  Neuropathy: Follow up      As per my initial H&P from 1/14/19:   \" Rajat Segura is a 75year old, who presents for evaluation of drop foot and neuropathy.        He has been He remains on gabapentin 300 mg tid - takes in morning and in the evening and night time and notes he has some spasms at night, cramps, shooting pains or \"adan pain,\" in feet and lower leg.   He also has some lower back pain and in the past 2 sanjay knee   • ARTHROSCOPY SHOULDER Left 10/2/2017    Performed by Amee Simmons MD at [facility]   • BIOPSY  07/26/2019    Bone Marrow   • BIOPSY  06/25/2019    sural nerve    • CARPAL TUNNEL RELEASE Bilateral    • CATARACT Bilateral August 20 arteries   • Hypertension Father    • Cancer Father         ?  cancer?    • Lipids Father    • Hypertension Brother      Social History    Socioeconomic History      Marital status:       Spouse name: Not on file      Number of children: Not on file Powd Pack, Take 17 g by mouth daily as needed. , Disp: , Rfl:   •  CALCIUM OR, Take by mouth daily.   , Disp: , Rfl:   •  Albuterol Sulfate  (90 BASE) MCG/ACT Inhalation Aero Soln, Inhale 2 puffs into the lungs every 6 (six) hours as needed for [employer] R worse than L; unable to walk on heels, toes or tandem    Labs:    New since last visit:    Component      Latest Ref Rng & Units 6/22/2020 12/27/2019   Total Protein      6.0 - 8.5 g/dL 6.2 8.0   Albumin      2.9 - 4.4 g/dL 3.4 3.1   Alpha-1-Globulin Case: [id number] . . . FINAL DIAGNOSIS       This result contains rich text formatting which cannot be displayed here. Final Diagnosis Comment       This result contains rich text formatting which cannot be displayed here.    Gross Description       This in axonal density within or between fascicles. The remaining myelin sheaths are appropriate in thickness and the diameter of the accompanying axons. No distinct populations of thinly myelinated axons are seen. \"    Comment: The peripheral nerve biopsy sh Ulnar - ADM 34.2 2.5 31.7       EMG         EMG Summary Table     Spontaneous MUAP Recruitment   Muscle Nerve Roots IA Fib PSW Fasc H.F. Amp Dur. PPP Pattern   L. Deltoid Axillary C5-C6 N None None None None N N N N   L.  Biceps brachii Musculocutaneous C5- amplitude and mildly slowed conduction velocity; F wave was markedly prolonged.   7. Left median motor response was mildly abnormal; on distal stimulation, amplitude and distal motor latency were normal; however, conduction velocity was markedly reduced and significantly prolonged distal motor latencies and partial conduction block at a non-traditional entrapment sites in the left upper extremity may suggest an acquired primary demyelinating neuropathy; clinical correlation, serologic and CSF studies are buddy bilateral  Plan:  As noted above     (G60.9) Idiopathic peripheral neuropathy  Plan: as noted above    Return in about 1 year (around 7/16/2021).       30 total minutes spent with patient >50% of visit was spent in counseling and coordination of care, inclu

## 2020-09-10 ENCOUNTER — TELEPHONE (OUTPATIENT)
Dept: INTERNAL MEDICINE CLINIC | Facility: CLINIC | Age: 75
End: 2020-09-10

## 2020-09-10 ENCOUNTER — NURSE ONLY (OUTPATIENT)
Dept: INTERNAL MEDICINE CLINIC | Facility: CLINIC | Age: 75
End: 2020-09-10
Payer: MEDICARE

## 2020-09-10 DIAGNOSIS — M25.511 RIGHT SHOULDER PAIN, UNSPECIFIED CHRONICITY: Primary | ICD-10-CM

## 2020-09-10 DIAGNOSIS — Z23 NEED FOR INFLUENZA VACCINATION: Primary | ICD-10-CM

## 2020-09-10 RX ORDER — METHYLPREDNISOLONE 4 MG/1
TABLET ORAL
Qty: 1 KIT | Refills: 0 | Status: SHIPPED | OUTPATIENT
Start: 2020-09-10 | End: 2020-11-19

## 2020-09-10 NOTE — TELEPHONE ENCOUNTER
Pt. Was in office for Flu shot and informed me of right shoulder pain radiating to hands x 3 months. No relief with OTC pain medications. Patient has been to massage therapy 3x.      Per Dr. Deandre Holley:  Physical Therapy  Medrol dose nilay       Spoke with patient

## 2020-09-11 ENCOUNTER — TELEPHONE (OUTPATIENT)
Dept: INTERNAL MEDICINE CLINIC | Facility: CLINIC | Age: 75
End: 2020-09-11

## 2020-09-11 NOTE — TELEPHONE ENCOUNTER
Dr Bibi Heredia, chiropractor, called to say he had a consult with the patient for his R shoulder and neck pain, and he is recommending x-rays be ordered and that we refer him for a Neuro consult.   Can call him back if any further questions at #[phone]

## 2020-09-14 ENCOUNTER — HOSPITAL ENCOUNTER (OUTPATIENT)
Dept: GENERAL RADIOLOGY | Age: 75
Discharge: HOME | End: 2020-09-14
Attending: INTERNAL MEDICINE
Payer: MEDICARE

## 2020-09-14 DIAGNOSIS — M25.511 RIGHT SHOULDER PAIN, UNSPECIFIED CHRONICITY: ICD-10-CM

## 2020-09-14 PROCEDURE — 70360 X-RAY EXAM OF NECK: CPT | Performed by: INTERNAL MEDICINE

## 2020-09-14 PROCEDURE — 73030 X-RAY EXAM OF SHOULDER: CPT | Performed by: INTERNAL MEDICINE

## 2020-09-15 ENCOUNTER — TELEPHONE (OUTPATIENT)
Dept: INTERNAL MEDICINE CLINIC | Facility: CLINIC | Age: 75
End: 2020-09-15

## 2020-09-15 DIAGNOSIS — M25.511 RIGHT SHOULDER PAIN, UNSPECIFIED CHRONICITY: Primary | ICD-10-CM

## 2020-09-15 NOTE — TELEPHONE ENCOUNTER
LMOVM awaiting call back     Dr Jatinder Shelby [address], [address] [place] [address]  [place], [address]  [phone]

## 2020-09-15 NOTE — TELEPHONE ENCOUNTER
----- Message from Sonia Sneed MD sent at 9/14/2020  5:14 PM CDT -----  No soft tissue abnormalities.   Degenerative changes in the spine

## 2020-09-15 NOTE — TELEPHONE ENCOUNTER
----- Message from Amie Avila MD sent at 9/14/2020  5:14 PM CDT -----  No fracture or dislocation. No aggressive process. Moderate AC joint and mild glenohumeral DJD.    Refer to Dr. Marilin Dowd (ortho) for eval and possible cortisone injection if sxs are not

## 2020-09-16 NOTE — TELEPHONE ENCOUNTER
Results and recommendations d/w pt he expressed understanding. He has an appt schedule with Dr. Gosia Grant next week. He will contact his office to see if he can also address the shoulder pain and cortisone injection. If not he will f/u with Dr. Melquiades Burdick.   Madhuri Murphy

## 2020-10-06 PROBLEM — M54.12 CERVICAL RADICULOPATHY: Status: ACTIVE | Noted: 2020-10-06

## 2020-10-06 PROBLEM — M48.02 CERVICAL STENOSIS OF SPINAL CANAL: Status: ACTIVE | Noted: 2020-10-06

## 2020-11-19 ENCOUNTER — TELEMEDICINE (OUTPATIENT)
Dept: INTERNAL MEDICINE CLINIC | Facility: CLINIC | Age: 75
End: 2020-11-19
Payer: MEDICARE

## 2020-11-19 DIAGNOSIS — B02.9 HERPES ZOSTER WITHOUT COMPLICATION: Primary | ICD-10-CM

## 2020-11-19 PROCEDURE — 99442 PHONE E/M BY PHYS 11-20 MIN: CPT | Performed by: INTERNAL MEDICINE

## 2020-11-19 RX ORDER — FAMCICLOVIR 500 MG/1
500 TABLET, FILM COATED ORAL 3 TIMES DAILY
Qty: 21 TABLET | Refills: 0 | Status: SHIPPED | OUTPATIENT
Start: 2020-11-19 | End: 2020-11-30 | Stop reason: ALTCHOICE

## 2020-11-19 NOTE — PROGRESS NOTES
HPI:    Patient ID: Debbie Lujan is a 67year old male.   Telehealth outside of [facility] [address] Verbal Consent   I conducted a telehealth visit with [facility] today, 11/19/20, which was completed using two-way, real-time interactive audio a review of systems was completed.     Pertinent positives and negatives noted in the HPI.          Current Outpatient Medications   Medication Sig Dispense Refill   • Famciclovir (FAMVIR) 500 MG Oral Tab Take 1 tablet (500 mg total) by mouth 3 (three) times Referrals:  None       #[id number]

## 2020-12-26 DIAGNOSIS — F51.04 PSYCHOPHYSIOLOGICAL INSOMNIA: ICD-10-CM

## 2020-12-26 DIAGNOSIS — G60.9 IDIOPATHIC PERIPHERAL NEUROPATHY: ICD-10-CM

## 2020-12-26 DIAGNOSIS — J33.9 NASAL POLYPOSIS: ICD-10-CM

## 2020-12-26 DIAGNOSIS — J32.4 CHRONIC PANSINUSITIS: ICD-10-CM

## 2020-12-26 RX ORDER — FLUTICASONE PROPIONATE 50 MCG
SPRAY, SUSPENSION (ML) NASAL
Qty: 48 G | Refills: 0 | Status: SHIPPED | OUTPATIENT
Start: 2020-12-26 | End: 2021-07-26

## 2020-12-26 RX ORDER — TRAZODONE HYDROCHLORIDE 100 MG/1
100 TABLET ORAL NIGHTLY
Qty: 90 TABLET | Refills: 0 | Status: SHIPPED | OUTPATIENT
Start: 2020-12-26 | End: 2021-04-05

## 2020-12-28 NOTE — TELEPHONE ENCOUNTER
Medication: Gabapentin 300 mg    Date of last refill: 09/23/2019 with 3 addt refills # 270  [place]     Last office visit: 07/16/2020  Due back to clinic per last office note:  RTN in 1 year  Date next office visit sched well as walking up and down stairs, or going to the bathroom at night.     (D47.2) MGUS (monoclonal gammopathy of unknown significance)  (primary encounter diagnosis)  Plan: as noted above      (M21.371,  M21.372) Foot drop, bilateral  Plan:  As noted abov

## 2020-12-30 RX ORDER — GABAPENTIN 300 MG/1
300 CAPSULE ORAL 3 TIMES DAILY
Qty: 270 CAPSULE | Refills: 0 | Status: SHIPPED | OUTPATIENT
Start: 2020-12-30 | End: 2021-03-30

## 2021-01-09 ENCOUNTER — LAB ENCOUNTER (OUTPATIENT)
Dept: LAB | Age: 76
End: 2021-01-09
Attending: INTERNAL MEDICINE
Payer: MEDICARE

## 2021-01-09 DIAGNOSIS — J45.30 MILD PERSISTENT ASTHMA WITHOUT COMPLICATION: ICD-10-CM

## 2021-01-09 DIAGNOSIS — Z12.5 ENCOUNTER FOR SCREENING FOR MALIGNANT NEOPLASM OF PROSTATE: ICD-10-CM

## 2021-01-09 DIAGNOSIS — R97.20 ELEVATED PSA: ICD-10-CM

## 2021-01-09 DIAGNOSIS — D47.2 MGUS (MONOCLONAL GAMMOPATHY OF UNKNOWN SIGNIFICANCE): ICD-10-CM

## 2021-01-09 DIAGNOSIS — Z79.899 ENCOUNTER FOR LONG-TERM (CURRENT) USE OF MEDICATIONS: ICD-10-CM

## 2021-01-09 DIAGNOSIS — N40.0 BENIGN PROSTATIC HYPERPLASIA WITHOUT LOWER URINARY TRACT SYMPTOMS: ICD-10-CM

## 2021-01-09 DIAGNOSIS — E03.4 HYPOTHYROIDISM DUE TO ACQUIRED ATROPHY OF THYROID: ICD-10-CM

## 2021-01-09 LAB
ALBUMIN SERPL-MCNC: 3.4 G/DL (ref 3.4–5)
ALBUMIN/GLOB SERPL: 1 {RATIO} (ref 1–2)
ALP LIVER SERPL-CCNC: 88 U/L
ALT SERPL-CCNC: 20 U/L
ANION GAP SERPL CALC-SCNC: 4 MMOL/L (ref 0–18)
AST SERPL-CCNC: 22 U/L (ref 15–37)
BASOPHILS # BLD AUTO: 0.04 X10(3) UL (ref 0–0.2)
BASOPHILS NFR BLD AUTO: 0.8 %
BILIRUB SERPL-MCNC: 0.3 MG/DL (ref 0.1–2)
BILIRUB UR QL STRIP.AUTO: NEGATIVE
BUN BLD-MCNC: 18 MG/DL (ref 7–18)
BUN/CREAT SERPL: 13.4 (ref 10–20)
CALCIUM BLD-MCNC: 9.4 MG/DL (ref 8.5–10.1)
CHLORIDE SERPL-SCNC: 107 MMOL/L (ref 98–112)
CHOLEST SMN-MCNC: 205 MG/DL (ref ?–200)
CLARITY UR REFRACT.AUTO: CLEAR
CO2 SERPL-SCNC: 27 MMOL/L (ref 21–32)
COLOR UR AUTO: YELLOW
COMPLEXED PSA SERPL-MCNC: 1.35 NG/ML (ref ?–4)
CREAT BLD-MCNC: 1.34 MG/DL
DEPRECATED RDW RBC AUTO: 48.6 FL (ref 35.1–46.3)
EOSINOPHIL # BLD AUTO: 0.16 X10(3) UL (ref 0–0.7)
EOSINOPHIL NFR BLD AUTO: 3.1 %
ERYTHROCYTE [DISTWIDTH] IN BLOOD BY AUTOMATED COUNT: 13.8 % (ref 11–15)
GLOBULIN PLAS-MCNC: 3.3 G/DL (ref 2.8–4.4)
GLUCOSE BLD-MCNC: 97 MG/DL (ref 70–99)
GLUCOSE UR STRIP.AUTO-MCNC: NEGATIVE MG/DL
HCT VFR BLD AUTO: 41.7 %
HDLC SERPL-MCNC: 101 MG/DL (ref 40–59)
HGB BLD-MCNC: 14 G/DL
IMM GRANULOCYTES # BLD AUTO: 0.01 X10(3) UL (ref 0–1)
IMM GRANULOCYTES NFR BLD: 0.2 %
KETONES UR STRIP.AUTO-MCNC: NEGATIVE MG/DL
LDLC SERPL CALC-MCNC: 93 MG/DL (ref ?–100)
LEUKOCYTE ESTERASE UR QL STRIP.AUTO: NEGATIVE
LYMPHOCYTES # BLD AUTO: 1.32 X10(3) UL (ref 1–4)
LYMPHOCYTES NFR BLD AUTO: 26 %
M PROTEIN MFR SERPL ELPH: 6.7 G/DL (ref 6.4–8.2)
MCH RBC QN AUTO: 32 PG (ref 26–34)
MCHC RBC AUTO-ENTMCNC: 33.6 G/DL (ref 31–37)
MCV RBC AUTO: 95.2 FL
MONOCYTES # BLD AUTO: 0.56 X10(3) UL (ref 0.1–1)
MONOCYTES NFR BLD AUTO: 11 %
NEUTROPHILS # BLD AUTO: 2.99 X10 (3) UL (ref 1.5–7.7)
NEUTROPHILS # BLD AUTO: 2.99 X10(3) UL (ref 1.5–7.7)
NEUTROPHILS NFR BLD AUTO: 58.9 %
NITRITE UR QL STRIP.AUTO: NEGATIVE
NONHDLC SERPL-MCNC: 104 MG/DL (ref ?–130)
OSMOLALITY SERPL CALC.SUM OF ELEC: 288 MOSM/KG (ref 275–295)
PATIENT FASTING Y/N/NP: YES
PATIENT FASTING Y/N/NP: YES
PH UR STRIP.AUTO: 6 [PH] (ref 4.5–8)
PLATELET # BLD AUTO: 232 10(3)UL (ref 150–450)
POTASSIUM SERPL-SCNC: 4.2 MMOL/L (ref 3.5–5.1)
PROT UR STRIP.AUTO-MCNC: NEGATIVE MG/DL
RBC # BLD AUTO: 4.38 X10(6)UL
RBC UR QL AUTO: NEGATIVE
SODIUM SERPL-SCNC: 138 MMOL/L (ref 136–145)
SP GR UR STRIP.AUTO: 1.02 (ref 1–1.03)
TRIGL SERPL-MCNC: 55 MG/DL (ref 30–149)
TSI SER-ACNC: 1.84 MIU/ML (ref 0.36–3.74)
UROBILINOGEN UR STRIP.AUTO-MCNC: <2 MG/DL
VLDLC SERPL CALC-MCNC: 11 MG/DL (ref 0–30)
WBC # BLD AUTO: 5.1 X10(3) UL (ref 4–11)

## 2021-01-09 PROCEDURE — 80061 LIPID PANEL: CPT

## 2021-01-09 PROCEDURE — 86334 IMMUNOFIX E-PHORESIS SERUM: CPT

## 2021-01-09 PROCEDURE — 81003 URINALYSIS AUTO W/O SCOPE: CPT

## 2021-01-09 PROCEDURE — 84165 PROTEIN E-PHORESIS SERUM: CPT

## 2021-01-09 PROCEDURE — 36415 COLL VENOUS BLD VENIPUNCTURE: CPT

## 2021-01-09 PROCEDURE — 84443 ASSAY THYROID STIM HORMONE: CPT

## 2021-01-09 PROCEDURE — 80053 COMPREHEN METABOLIC PANEL: CPT

## 2021-01-09 PROCEDURE — 83883 ASSAY NEPHELOMETRY NOT SPEC: CPT

## 2021-01-09 PROCEDURE — 85025 COMPLETE CBC W/AUTO DIFF WBC: CPT

## 2021-01-13 LAB
ALBUMIN SERPL ELPH-MCNC: 4.17 G/DL (ref 3.75–5.21)
ALBUMIN/GLOB SERPL: 1.38 {RATIO} (ref 1–2)
ALPHA1 GLOB SERPL ELPH-MCNC: 0.35 G/DL (ref 0.19–0.46)
ALPHA2 GLOB SERPL ELPH-MCNC: 0.75 G/DL (ref 0.48–1.05)
B-GLOBULIN SERPL ELPH-MCNC: 0.74 G/DL (ref 0.68–1.23)
GAMMA GLOB SERPL ELPH-MCNC: 1.19 G/DL (ref 0.62–1.7)
KAPPA LC FREE SER-MCNC: 2.45 MG/DL (ref 0.33–1.94)
KAPPA LC FREE/LAMBDA FREE SER NEPH: 1.53 {RATIO} (ref 0.26–1.65)
LAMBDA LC FREE SERPL-MCNC: 1.6 MG/DL (ref 0.57–2.63)
M PROTEIN MFR SERPL ELPH: 7.2 G/DL (ref 6.4–8.2)
M-SPIKE 1: 0.36 G/DL (ref ?–0)

## 2021-01-19 ENCOUNTER — OFFICE VISIT (OUTPATIENT)
Dept: INTERNAL MEDICINE CLINIC | Facility: CLINIC | Age: 76
End: 2021-01-19
Payer: MEDICARE

## 2021-01-19 VITALS
DIASTOLIC BLOOD PRESSURE: 70 MMHG | BODY MASS INDEX: 29.8 KG/M2 | TEMPERATURE: 97 F | HEIGHT: 72 IN | RESPIRATION RATE: 16 BRPM | OXYGEN SATURATION: 97 % | HEART RATE: 82 BPM | WEIGHT: 220 LBS | SYSTOLIC BLOOD PRESSURE: 138 MMHG

## 2021-01-19 DIAGNOSIS — M48.02 CERVICAL STENOSIS OF SPINAL CANAL: ICD-10-CM

## 2021-01-19 DIAGNOSIS — N40.0 BENIGN PROSTATIC HYPERPLASIA WITHOUT LOWER URINARY TRACT SYMPTOMS: ICD-10-CM

## 2021-01-19 DIAGNOSIS — I77.9 VERTEBRAL ARTERY DISEASE (HCC): ICD-10-CM

## 2021-01-19 DIAGNOSIS — D47.2 MGUS (MONOCLONAL GAMMOPATHY OF UNKNOWN SIGNIFICANCE): ICD-10-CM

## 2021-01-19 DIAGNOSIS — G60.9 IDIOPATHIC PERIPHERAL NEUROPATHY: ICD-10-CM

## 2021-01-19 DIAGNOSIS — E03.4 HYPOTHYROIDISM DUE TO ACQUIRED ATROPHY OF THYROID: ICD-10-CM

## 2021-01-19 DIAGNOSIS — D47.2 NEUROPATHY ASSOCIATED WITH MGUS (HCC): ICD-10-CM

## 2021-01-19 DIAGNOSIS — G63 NEUROPATHY ASSOCIATED WITH MGUS (HCC): ICD-10-CM

## 2021-01-19 DIAGNOSIS — Z00.00 ENCOUNTER FOR ANNUAL HEALTH EXAMINATION: ICD-10-CM

## 2021-01-19 DIAGNOSIS — Z00.00 ANNUAL PHYSICAL EXAM: Primary | ICD-10-CM

## 2021-01-19 DIAGNOSIS — J45.30 MILD PERSISTENT ASTHMA WITHOUT COMPLICATION: ICD-10-CM

## 2021-01-19 PROBLEM — G60.3 IDIOPATHIC PROGRESSIVE POLYNEUROPATHY: Status: RESOLVED | Noted: 2019-10-23 | Resolved: 2021-01-19

## 2021-01-19 PROBLEM — M54.12 CERVICAL RADICULOPATHY: Status: RESOLVED | Noted: 2020-10-06 | Resolved: 2021-01-19

## 2021-01-19 PROCEDURE — G0439 PPPS, SUBSEQ VISIT: HCPCS | Performed by: INTERNAL MEDICINE

## 2021-01-19 NOTE — PROGRESS NOTES
HPI:   Abdi [facility] is a 67year old male who presents for a Medicare Subsequent Annual Wellness visit (Pt already had Initial Annual Wellness). HPI:  Here for AWV  Neuropathy sxs are stable. Sees neuro.  Somewhat controlled with gabapentin  Has had has a Power of  for [employer] on file [facility] He smoked tobacco in the past but quit greater than 12 months ago.   Social History    Tobacco Use      Smoking status: Former Smoker        Packs/day: 1.50        Years: 30.00        Pack years: 4 allergic to immune globulin. CURRENT MEDICATIONS:     •  GABAPENTIN 300 MG Oral Cap, Take 1 capsule (300 mg total) by mouth 3 (three) times daily.     •  FLUTICASONE PROPIONATE 50 MCG/ACT Nasal Suspension, use 2 sprayS in each nostril two times a day AS past surgical history that includes colonoscopy with biopsy (2/29/2012); appendectomy; orthopedic surg (pbp); tonsillectomy; cataract (Bilateral, August 2015); arthroscopy of joint unlisted (Right); carpal tunnel release (Bilateral); other (Left); sinus tijerina for AWV/SWV)    Hearing Screening    Screening Method: Finger Rub  Finger Rub Result: Pass                Visual Acuity                           General Appearance:  Alert, cooperative, no distress, appears stated age   Head:  Normocephalic, without obvio • Zoster Vaccine Live (Zostavax) 03/09/2014   Pended Date(s) Pended   • FLU VAC High Dose 65 YRS & Older PRSV Free (08234) 09/10/2020        ASSESSMENT AND OTHER RELEVANT CHRONIC CONDITIONS:   Nika Coleman is a 67year old male who presents for a Me you describe your current health state?: Fair  How do you maintain positive mental well-being?: Social Interaction;Puzzles;Games; Visiting Friends; Visiting Family    This section provided for quick review of chart, separate sheet to patient  PREVENTATIVE SE birthday    Hepatitis B for Moderate/High Risk No vaccine history found Medium/high risk factors:   End-stage renal disease   Hemophiliacs who received Factor VIII or IX concentrates   Clients of institutions for the mentally retarded   Persons who live in

## 2021-01-19 NOTE — PATIENT INSTRUCTIONS
BPH (Enlarged Prostate)   The prostate is a gland at the base of the bladder. As some men get older, the prostate may get bigger. This problem is called benign prostatic hyperplasia (BPH). BPH puts pressure on the urethra.  This is the tube that carries u BPH does not increase the risk of prostate cancer. But because prostate cancer is a common cancer in men, screening is sometimes advised. This may help find the cancer in its early stages when treatment is most effective.  Factors that can increase the risk No flowsheet data found.     Fasting Blood Sugar (FSB)   Patient must be diagnosed with one of the following:   • Hypertension   • Dyslipidemia   • Obesity (BMI ³30 kg/m2)   • Previous elevated impaired FBS or GTT   … or any two of the following:   • Overw Covered every 10 years- more often if abnormal There are no preventive care reminders to display for this patient. Update Health Maintenance if applicable    Flex Sigmoidoscopy Screen  Covered every 5 years No results found for this or any previous visit. Hepatitis B for Moderate/High Risk No orders found for this or any previous visit.  Medium/high risk factors:   End-stage renal disease   Hemophiliacs who received Factor VIII or IX concentrates   Clients of institutions for the mentally retarded   Persons

## 2021-01-28 RX ORDER — GABAPENTIN 300 MG/1
300 CAPSULE ORAL 3 TIMES DAILY
COMMUNITY

## 2021-01-28 RX ORDER — TRAZODONE HYDROCHLORIDE 50 MG/1
50 TABLET ORAL NIGHTLY
COMMUNITY

## 2021-01-28 RX ORDER — ALPRAZOLAM 0.25 MG/1
0.25 TABLET ORAL NIGHTLY PRN
COMMUNITY

## 2021-01-28 RX ORDER — LEVOTHYROXINE SODIUM 0.1 MG/1
100 TABLET ORAL DAILY
COMMUNITY

## 2021-01-28 RX ORDER — FLUTICASONE PROPIONATE 50 MCG
SPRAY, SUSPENSION (ML) NASAL
COMMUNITY

## 2021-01-28 RX ORDER — TAMSULOSIN HYDROCHLORIDE 0.4 MG/1
CAPSULE ORAL
COMMUNITY

## 2021-01-29 ENCOUNTER — OFFICE VISIT (OUTPATIENT)
Dept: CARDIOLOGY | Age: 76
End: 2021-01-29

## 2021-01-29 VITALS
WEIGHT: 219 LBS | HEART RATE: 83 BPM | DIASTOLIC BLOOD PRESSURE: 70 MMHG | HEIGHT: 73 IN | BODY MASS INDEX: 29.03 KG/M2 | SYSTOLIC BLOOD PRESSURE: 130 MMHG

## 2021-01-29 DIAGNOSIS — R94.31 ABNORMAL ECG: ICD-10-CM

## 2021-01-29 DIAGNOSIS — R94.31 ABNORMAL ELECTROCARDIOGRAM (ECG) (EKG): ICD-10-CM

## 2021-01-29 DIAGNOSIS — I65.29 STENOSIS OF CAROTID ARTERY, UNSPECIFIED LATERALITY: ICD-10-CM

## 2021-01-29 DIAGNOSIS — I65.01 VERTEBRAL ARTERY OCCLUSION, RIGHT: Primary | ICD-10-CM

## 2021-01-29 PROCEDURE — 99203 OFFICE O/P NEW LOW 30 MIN: CPT | Performed by: INTERNAL MEDICINE

## 2021-01-29 PROCEDURE — 93000 ELECTROCARDIOGRAM COMPLETE: CPT | Performed by: INTERNAL MEDICINE

## 2021-01-29 RX ORDER — ATORVASTATIN CALCIUM 10 MG/1
10 TABLET, FILM COATED ORAL DAILY
Qty: 90 TABLET | Refills: 3 | Status: SHIPPED | OUTPATIENT
Start: 2021-01-29

## 2021-01-29 RX ORDER — FINASTERIDE 5 MG/1
TABLET, FILM COATED ORAL
COMMUNITY
Start: 2020-11-12

## 2021-01-29 ASSESSMENT — ENCOUNTER SYMPTOMS
LIGHT-HEADEDNESS: 1
SUSPICIOUS LESIONS: 0
HEMATOCHEZIA: 0
WEIGHT LOSS: 0
COUGH: 0
BRUISES/BLEEDS EASILY: 0
HEMOPTYSIS: 0
CHILLS: 0
ALLERGIC/IMMUNOLOGIC COMMENTS: NO NEW FOOD ALLERGIES
FEVER: 0
WEIGHT GAIN: 0

## 2021-01-29 ASSESSMENT — PATIENT HEALTH QUESTIONNAIRE - PHQ9
CLINICAL INTERPRETATION OF PHQ2 SCORE: NO FURTHER SCREENING NEEDED
SUM OF ALL RESPONSES TO PHQ9 QUESTIONS 1 AND 2: 0
2. FEELING DOWN, DEPRESSED OR HOPELESS: NOT AT ALL
1. LITTLE INTEREST OR PLEASURE IN DOING THINGS: NOT AT ALL
CLINICAL INTERPRETATION OF PHQ9 SCORE: NO FURTHER SCREENING NEEDED
SUM OF ALL RESPONSES TO PHQ9 QUESTIONS 1 AND 2: 0

## 2021-02-03 ENCOUNTER — HOSPITAL ENCOUNTER (OUTPATIENT)
Dept: CARDIOLOGY CLINIC | Facility: HOSPITAL | Age: 76
Discharge: HOME | End: 2021-02-03
Attending: INTERNAL MEDICINE
Payer: MEDICARE

## 2021-02-03 ENCOUNTER — HOSPITAL ENCOUNTER (OUTPATIENT)
Dept: CARDIOLOGY CLINIC | Facility: HOSPITAL | Age: 76
End: 2021-02-03
Attending: INTERNAL MEDICINE
Payer: MEDICARE

## 2021-02-03 ENCOUNTER — HOSPITAL ENCOUNTER (OUTPATIENT)
Dept: CV DIAGNOSTICS | Facility: HOSPITAL | Age: 76
Discharge: HOME | End: 2021-02-03
Attending: INTERNAL MEDICINE
Payer: MEDICARE

## 2021-02-03 DIAGNOSIS — I65.29 STENOSIS OF CAROTID ARTERY, UNSPECIFIED LATERALITY: ICD-10-CM

## 2021-02-03 DIAGNOSIS — R94.31 ABNORMAL ELECTROCARDIOGRAM: ICD-10-CM

## 2021-02-03 PROCEDURE — 93880 EXTRACRANIAL BILAT STUDY: CPT | Performed by: INTERNAL MEDICINE

## 2021-02-03 PROCEDURE — 93306 TTE W/DOPPLER COMPLETE: CPT | Performed by: INTERNAL MEDICINE

## 2021-02-08 ENCOUNTER — MED REC SCAN ONLY (OUTPATIENT)
Dept: INTERNAL MEDICINE CLINIC | Facility: CLINIC | Age: 76
End: 2021-02-08

## 2021-02-12 ENCOUNTER — TELEPHONE (OUTPATIENT)
Dept: CARDIOLOGY | Age: 76
End: 2021-02-12

## 2021-03-01 DIAGNOSIS — E03.4 HYPOTHYROIDISM DUE TO ACQUIRED ATROPHY OF THYROID: ICD-10-CM

## 2021-03-01 RX ORDER — LEVOTHYROXINE SODIUM 0.1 MG/1
100 TABLET ORAL
Qty: 90 TABLET | Refills: 3 | Status: SHIPPED | OUTPATIENT
Start: 2021-03-01 | End: 2022-03-23

## 2021-03-02 ENCOUNTER — OFFICE VISIT (OUTPATIENT)
Dept: CARDIOLOGY | Age: 76
End: 2021-03-02

## 2021-03-02 VITALS
DIASTOLIC BLOOD PRESSURE: 60 MMHG | WEIGHT: 220 LBS | BODY MASS INDEX: 29.16 KG/M2 | HEART RATE: 53 BPM | HEIGHT: 73 IN | SYSTOLIC BLOOD PRESSURE: 118 MMHG

## 2021-03-02 DIAGNOSIS — I65.01 VERTEBRAL ARTERY OCCLUSION, RIGHT: ICD-10-CM

## 2021-03-02 DIAGNOSIS — I65.29 STENOSIS OF CAROTID ARTERY, UNSPECIFIED LATERALITY: Primary | ICD-10-CM

## 2021-03-02 PROCEDURE — 99213 OFFICE O/P EST LOW 20 MIN: CPT | Performed by: NURSE PRACTITIONER

## 2021-03-02 ASSESSMENT — PATIENT HEALTH QUESTIONNAIRE - PHQ9
SUM OF ALL RESPONSES TO PHQ9 QUESTIONS 1 AND 2: 0
CLINICAL INTERPRETATION OF PHQ9 SCORE: NO FURTHER SCREENING NEEDED
SUM OF ALL RESPONSES TO PHQ9 QUESTIONS 1 AND 2: 0
CLINICAL INTERPRETATION OF PHQ2 SCORE: NO FURTHER SCREENING NEEDED
1. LITTLE INTEREST OR PLEASURE IN DOING THINGS: NOT AT ALL
2. FEELING DOWN, DEPRESSED OR HOPELESS: NOT AT ALL

## 2021-03-02 ASSESSMENT — ENCOUNTER SYMPTOMS
NIGHT SWEATS: 0
NEAR-SYNCOPE: 0
BLOATING: 0
ABDOMINAL PAIN: 0
ORTHOPNEA: 0
FEVER: 0
SYNCOPE: 0
SHORTNESS OF BREATH: 0
COUGH: 0

## 2021-03-05 DIAGNOSIS — Z23 NEED FOR VACCINATION: ICD-10-CM

## 2021-03-30 DIAGNOSIS — G60.9 IDIOPATHIC PERIPHERAL NEUROPATHY: ICD-10-CM

## 2021-03-30 RX ORDER — GABAPENTIN 300 MG/1
300 CAPSULE ORAL 3 TIMES DAILY
Qty: 270 CAPSULE | Refills: 0 | OUTPATIENT
Start: 2021-03-30

## 2021-03-30 NOTE — TELEPHONE ENCOUNTER
Medication: Gabapentin    Date of last refill: 12/30/2020 (#270/0)  [place]    Last office visit: 7/16/2020  Due back to clinic per last office note:  RTC in one year  Date next office visit schedu

## 2021-03-31 RX ORDER — GABAPENTIN 300 MG/1
300 CAPSULE ORAL 3 TIMES DAILY
Qty: 270 CAPSULE | Refills: 1 | Status: SHIPPED | OUTPATIENT
Start: 2021-03-31 | End: 2021-09-20

## 2021-04-05 DIAGNOSIS — F41.9 ANXIETY: ICD-10-CM

## 2021-04-05 DIAGNOSIS — F51.04 PSYCHOPHYSIOLOGICAL INSOMNIA: ICD-10-CM

## 2021-04-05 RX ORDER — ALPRAZOLAM 0.25 MG/1
0.25 TABLET ORAL NIGHTLY PRN
Qty: 90 TABLET | Refills: 0 | Status: SHIPPED | OUTPATIENT
Start: 2021-04-05

## 2021-04-05 RX ORDER — TRAZODONE HYDROCHLORIDE 100 MG/1
TABLET ORAL
Qty: 90 TABLET | Refills: 0 | Status: SHIPPED | OUTPATIENT
Start: 2021-04-05 | End: 2021-07-26

## 2021-04-05 NOTE — TELEPHONE ENCOUNTER
Trazodone  Last time medication was refilled 12/26/20  Quantity and # of refills 90 tab w/ 0 refill    Alprazolam  Last time medication was refilled 12/23/19  Quantity and # of refills 90 tab w/ 0 refills    Last OV 1/19/21  Next OV physical due 1/19/2022

## 2021-06-17 ENCOUNTER — LAB ENCOUNTER (OUTPATIENT)
Dept: LAB | Age: 76
End: 2021-06-17
Attending: INTERNAL MEDICINE
Payer: MEDICARE

## 2021-06-17 DIAGNOSIS — I65.29 CAROTID ARTERY STENOSIS: Primary | ICD-10-CM

## 2021-06-17 DIAGNOSIS — I65.01 OCCLUSION AND STENOSIS OF RIGHT VERTEBRAL ARTERY: ICD-10-CM

## 2021-06-17 PROCEDURE — 80061 LIPID PANEL: CPT

## 2021-06-17 PROCEDURE — 36415 COLL VENOUS BLD VENIPUNCTURE: CPT

## 2021-06-18 ENCOUNTER — E-ADVICE (OUTPATIENT)
Dept: CARDIOLOGY | Age: 76
End: 2021-06-18

## 2021-07-23 ENCOUNTER — LAB ENCOUNTER (OUTPATIENT)
Dept: LAB | Age: 76
End: 2021-07-23
Attending: INTERNAL MEDICINE
Payer: MEDICARE

## 2021-07-23 DIAGNOSIS — D47.2 MONOCLONAL PARAPROTEINEMIA: Primary | ICD-10-CM

## 2021-07-23 DIAGNOSIS — D47.2 MGUS (MONOCLONAL GAMMOPATHY OF UNKNOWN SIGNIFICANCE): ICD-10-CM

## 2021-07-23 LAB
ALBUMIN SERPL-MCNC: 3.4 G/DL (ref 3.4–5)
ALBUMIN/GLOB SERPL: 0.9 {RATIO} (ref 1–2)
ALP LIVER SERPL-CCNC: 75 U/L
ALT SERPL-CCNC: 26 U/L
ANION GAP SERPL CALC-SCNC: 3 MMOL/L (ref 0–18)
AST SERPL-CCNC: 18 U/L (ref 15–37)
BASOPHILS # BLD AUTO: 0.04 X10(3) UL (ref 0–0.2)
BASOPHILS NFR BLD AUTO: 0.8 %
BILIRUB SERPL-MCNC: 0.2 MG/DL (ref 0.1–2)
BUN BLD-MCNC: 21 MG/DL (ref 7–18)
BUN/CREAT SERPL: 17.9 (ref 10–20)
CALCIUM BLD-MCNC: 9.1 MG/DL (ref 8.5–10.1)
CHLORIDE SERPL-SCNC: 108 MMOL/L (ref 98–112)
CO2 SERPL-SCNC: 28 MMOL/L (ref 21–32)
CREAT BLD-MCNC: 1.17 MG/DL
DEPRECATED RDW RBC AUTO: 45.9 FL (ref 35.1–46.3)
EOSINOPHIL # BLD AUTO: 0.23 X10(3) UL (ref 0–0.7)
EOSINOPHIL NFR BLD AUTO: 4.7 %
ERYTHROCYTE [DISTWIDTH] IN BLOOD BY AUTOMATED COUNT: 13.5 % (ref 11–15)
GLOBULIN PLAS-MCNC: 3.7 G/DL (ref 2.8–4.4)
GLUCOSE BLD-MCNC: 95 MG/DL (ref 70–99)
HCT VFR BLD AUTO: 36.9 %
HGB BLD-MCNC: 12.3 G/DL
IMM GRANULOCYTES # BLD AUTO: 0.01 X10(3) UL (ref 0–1)
IMM GRANULOCYTES NFR BLD: 0.2 %
LYMPHOCYTES # BLD AUTO: 1.29 X10(3) UL (ref 1–4)
LYMPHOCYTES NFR BLD AUTO: 26.1 %
M PROTEIN MFR SERPL ELPH: 7.1 G/DL (ref 6.4–8.2)
MCH RBC QN AUTO: 31.4 PG (ref 26–34)
MCHC RBC AUTO-ENTMCNC: 33.3 G/DL (ref 31–37)
MCV RBC AUTO: 94.1 FL
MONOCYTES # BLD AUTO: 0.48 X10(3) UL (ref 0.1–1)
MONOCYTES NFR BLD AUTO: 9.7 %
NEUTROPHILS # BLD AUTO: 2.89 X10 (3) UL (ref 1.5–7.7)
NEUTROPHILS # BLD AUTO: 2.89 X10(3) UL (ref 1.5–7.7)
NEUTROPHILS NFR BLD AUTO: 58.5 %
OSMOLALITY SERPL CALC.SUM OF ELEC: 291 MOSM/KG (ref 275–295)
PATIENT FASTING Y/N/NP: YES
PLATELET # BLD AUTO: 243 10(3)UL (ref 150–450)
POTASSIUM SERPL-SCNC: 4.2 MMOL/L (ref 3.5–5.1)
RBC # BLD AUTO: 3.92 X10(6)UL
SODIUM SERPL-SCNC: 139 MMOL/L (ref 136–145)
WBC # BLD AUTO: 4.9 X10(3) UL (ref 4–11)

## 2021-07-23 PROCEDURE — 80053 COMPREHEN METABOLIC PANEL: CPT

## 2021-07-23 PROCEDURE — 86334 IMMUNOFIX E-PHORESIS SERUM: CPT

## 2021-07-23 PROCEDURE — 83883 ASSAY NEPHELOMETRY NOT SPEC: CPT

## 2021-07-23 PROCEDURE — 85025 COMPLETE CBC W/AUTO DIFF WBC: CPT

## 2021-07-23 PROCEDURE — 36415 COLL VENOUS BLD VENIPUNCTURE: CPT

## 2021-07-23 PROCEDURE — 84165 PROTEIN E-PHORESIS SERUM: CPT

## 2021-07-26 DIAGNOSIS — J32.4 CHRONIC PANSINUSITIS: ICD-10-CM

## 2021-07-26 DIAGNOSIS — J33.9 NASAL POLYPOSIS: ICD-10-CM

## 2021-07-26 DIAGNOSIS — F51.04 PSYCHOPHYSIOLOGICAL INSOMNIA: ICD-10-CM

## 2021-07-26 LAB
ALBUMIN SERPL ELPH-MCNC: 3.74 G/DL (ref 3.75–5.21)
ALBUMIN/GLOB SERPL: 1.26 {RATIO} (ref 1–2)
ALPHA1 GLOB SERPL ELPH-MCNC: 0.36 G/DL (ref 0.19–0.46)
ALPHA2 GLOB SERPL ELPH-MCNC: 0.73 G/DL (ref 0.48–1.05)
B-GLOBULIN SERPL ELPH-MCNC: 0.67 G/DL (ref 0.68–1.23)
GAMMA GLOB SERPL ELPH-MCNC: 1.2 G/DL (ref 0.62–1.7)
KAPPA LC FREE SER-MCNC: 2.54 MG/DL (ref 0.33–1.94)
KAPPA LC FREE/LAMBDA FREE SER NEPH: 2.03 {RATIO} (ref 0.26–1.65)
LAMBDA LC FREE SERPL-MCNC: 1.25 MG/DL (ref 0.57–2.63)
M PROTEIN MFR SERPL ELPH: 6.7 G/DL (ref 6.4–8.2)
M-SPIKE 1: 0.36 G/DL (ref ?–0)

## 2021-07-26 RX ORDER — FLUTICASONE PROPIONATE 50 MCG
SPRAY, SUSPENSION (ML) NASAL
Qty: 48 G | Refills: 0 | Status: SHIPPED | OUTPATIENT
Start: 2021-07-26 | End: 2022-01-17

## 2021-07-26 RX ORDER — TRAZODONE HYDROCHLORIDE 100 MG/1
TABLET ORAL
Qty: 90 TABLET | Refills: 0 | Status: SHIPPED | OUTPATIENT
Start: 2021-07-26 | End: 2021-11-09

## 2021-07-27 ENCOUNTER — TELEPHONE (OUTPATIENT)
Dept: INTERNAL MEDICINE CLINIC | Facility: CLINIC | Age: 76
End: 2021-07-27

## 2021-07-27 DIAGNOSIS — D47.2 MGUS (MONOCLONAL GAMMOPATHY OF UNKNOWN SIGNIFICANCE): Primary | ICD-10-CM

## 2021-07-27 NOTE — TELEPHONE ENCOUNTER
----- Message from Sonia Sneed MD sent at 7/26/2021  4:57 PM CDT -----  Refer PT to DR Lee (oncology) for eval of monoclonal spike

## 2021-07-27 NOTE — TELEPHONE ENCOUNTER
Pt has know MGUS. Per Dr. Robert Memory continue f/u with Dr. Reynaldo Madison onc/hem. LMOVM TCOB. Spoke with pt this test was ordered by Dr. Reynaldo Madison and pt is unsure how it got ordered under Dr. Maria Del Rosario Alexandre name. Pt has appt with Dr. Reynaldo Madison on Friday.

## 2021-09-08 ENCOUNTER — APPOINTMENT (OUTPATIENT)
Dept: CARDIOLOGY | Age: 76
End: 2021-09-08

## 2021-09-15 DIAGNOSIS — G60.9 IDIOPATHIC PERIPHERAL NEUROPATHY: ICD-10-CM

## 2021-09-16 NOTE — TELEPHONE ENCOUNTER
This is Dr. Dayo Spears patient. Patient hasn't been seen in over 1 year. Spoke with patient and he is agreeable to scheduling f/u, but needs to call back later this morning when he has his calendar available.   Upon return call, please schedule follow up continue gabapentin at 300 mg 3 times daily.       Patient was additionally advised of fall precautions, and the need to avoid throw rugs, loose cords, and to exercise caution when getting in and out of the shower, as well as walking up and down stairs, or

## 2021-09-20 RX ORDER — GABAPENTIN 300 MG/1
300 CAPSULE ORAL 3 TIMES DAILY
Qty: 270 CAPSULE | Refills: 0 | Status: SHIPPED | OUTPATIENT
Start: 2021-09-20 | End: 2021-12-27

## 2021-09-20 NOTE — TELEPHONE ENCOUNTER
Pt frustrated he did not get gabapentin refill since received request 9/15/21. Pt made appt on 10/13/21. Pt needs refill completed today.     Gabapentin refill 90 day    Send to: [facility]

## 2021-10-13 ENCOUNTER — OFFICE VISIT (OUTPATIENT)
Dept: NEUROLOGY | Facility: CLINIC | Age: 76
End: 2021-10-13
Payer: MEDICARE

## 2021-10-13 VITALS
HEART RATE: 73 BPM | DIASTOLIC BLOOD PRESSURE: 62 MMHG | WEIGHT: 212 LBS | BODY MASS INDEX: 28.1 KG/M2 | SYSTOLIC BLOOD PRESSURE: 117 MMHG | HEIGHT: 73 IN | RESPIRATION RATE: 18 BRPM

## 2021-10-13 DIAGNOSIS — M21.372 FOOT DROP, BILATERAL: ICD-10-CM

## 2021-10-13 DIAGNOSIS — M21.371 FOOT DROP, BILATERAL: ICD-10-CM

## 2021-10-13 DIAGNOSIS — D47.2 MGUS (MONOCLONAL GAMMOPATHY OF UNKNOWN SIGNIFICANCE): ICD-10-CM

## 2021-10-13 DIAGNOSIS — G60.9 IDIOPATHIC PERIPHERAL NEUROPATHY: Primary | ICD-10-CM

## 2021-10-13 PROCEDURE — 99213 OFFICE O/P EST LOW 20 MIN: CPT | Performed by: OTHER

## 2021-10-13 NOTE — PROGRESS NOTES
[facility] Progress Note    HPI  Patient presents with:  Neuropathy: f/u      As per my initial H&P from 1/14/19:   \" Lauren Gilmar is a 75year old, who presents for evaluation of drop foot and neuropathy.        He has been seen b MGUS, which is stable. He remains on gabapentin 300 mg tid and notes only occasionally has shooting pains in night in both feet but more often the right side. He continues to have foot drop in R foot more than L foot.   He states he trips frequently b • HEMORRHOIDECTOMY  ~ 2005   • ORTHOPEDIC SURG (PBP)      LT ELBOW - ulnar nerve release   • OTHER Left     shoulder replacement   • OTHER Left 07/2017    left shoulder reverse removal due to infection and placement of Antibiotic spacer   • OTHER  06/25/ by mouth daily. , Disp: 90 tablet, Rfl: 3  •  atorvastatin 10 MG Oral Tab, Take 10 mg by mouth daily. , Disp: , Rfl:   •  Ganciclovir 0.15 % Ophthalmic Gel, 1 drop 5 (five) times daily. , Disp: , Rfl:   •  FLUTICASONE PROPIONATE 50 MCG/ACT Nasal Suspension, U conversational    CN: PERRL, EOMI with no nystagmus, VFF, smile symmetric sensation intact, tongue and palate midline, SCM intact, otherwise, CN 2-12 intact   Motor: Strength: 2/5 R foot DF, 4+/5 R foot PF; 3/5 L foot DF, 5/5 left foot PF; otherwise, 5/5 t ANION GAP      0 - 18 mmol/L    BUN      7 - 18 mg/dL    CREATININE      0.70 - 1.30 mg/dL    BUN/CREAT Ratio      10.0 - 20.0    CALCIUM      8.5 - 10.1 mg/dL    CALCULATED OSMOLALITY      275 - 295 mOsm/kg    eGFR NON-AFR.  AMERICAN      >=60    eGFR AF 0.20 x10(3) uL 0.04   Immature Granulocyte Absolute      0.00 - 1.00 x10(3) uL 0.01   Neutrophils %      % 58.5   Lymphocytes %      % 26.1   Monocytes %      % 9.7   Eosinophils %      % 4.7   Basophils %      % 0.8   Immature Granulocyte %      % 0.2   G Neutrophils Absolute      1.50 - 7.70 x10(3) uL    Lymphocytes Absolute      1.00 - 4.00 x10(3) uL    Monocytes Absolute      0.10 - 1.00 x10(3) uL    Eosinophils Absolute      0.00 - 0.70 x10(3) uL    Basophils Absolute      0.00 - 0.20 x10(3) uL    Im 8.5 g/dL 6.2 8.0   Albumin      2.9 - 4.4 g/dL 3.4 3.1   Alpha-1-Globulin      0.0 - 0.4 g/dL 0.2 0.3   Alpha-2-Globulin      0.4 - 1.0 g/dL 0.6 0.8   Beta Globulin      0.7 - 1.3 g/dL 0.7 0.9   Gamma Globulin      0.4 - 1.8 g/dL 1.2 2.9 (H)   M-Jaden formatting which cannot be displayed here. Gross Description       This result contains rich text formatting which cannot be displayed here. CLINICAL INFORMATION       This result contains rich text formatting which cannot be displayed here.    Non Gyne myelinated axons are seen. \"    Comment: The peripheral nerve biopsy shows axonal loss as described in detail above. Beyond that there are not changes that would suggest a specific underlying etiology.   In particular there is no evidence of any inflammato None None None None N N N N   L. Biceps brachii Musculocutaneous C5-C6 N None None None None 1+ 1+ N Reduced   L. Triceps brachii Radial C6-C8 N None None None None N N N N   L.  Extensor digitorum communis Radial C7-C8 N None None None None N N N N   L. Fl were normal; however, conduction velocity was markedly reduced and proximal stimulation demonstrated significant reduction in amplitude, consistent with partial conduction block; F wave was prolonged.   8. Left ulnar motor response was normal; F wave was pr neuropathy; clinical correlation, serologic and CSF studies are recommended.               Impression/ Plan:  Sixto Chou is a 67year old year old male with past medical history significant for thyroid disorder, as well as neuropathy, as well as lumb drop, bilateral  Plan: as noted above    No follow-ups on file.     Nhi Umana MD, Neurology  [facility] [place] [address]  Pager [phone]  10/13/2021

## 2021-11-09 DIAGNOSIS — F51.04 PSYCHOPHYSIOLOGICAL INSOMNIA: ICD-10-CM

## 2021-11-09 RX ORDER — TRAZODONE HYDROCHLORIDE 100 MG/1
TABLET ORAL
Qty: 90 TABLET | Refills: 0 | Status: SHIPPED | OUTPATIENT
Start: 2021-11-09

## 2021-11-09 NOTE — TELEPHONE ENCOUNTER
Last time medication was refilled 7/26/21  Quantity and number of refills 90 w/ 0  Last OV 1/19/21  Next OV 1/2022

## 2021-11-22 DIAGNOSIS — J45.30 MILD PERSISTENT ASTHMA WITHOUT COMPLICATION: Primary | ICD-10-CM

## 2021-11-22 NOTE — TELEPHONE ENCOUNTER
Refill Req    [facility], [place]  Ph: [phone]  Fx: [phone]    Mometasone Furoate Inhalation Aerosol 100 MCG/Act    Qty: 13 No change

## 2021-12-10 ENCOUNTER — TELEPHONE (OUTPATIENT)
Dept: INTERNAL MEDICINE CLINIC | Facility: CLINIC | Age: 76
End: 2021-12-10

## 2021-12-10 DIAGNOSIS — N40.0 BENIGN PROSTATIC HYPERPLASIA WITHOUT LOWER URINARY TRACT SYMPTOMS: ICD-10-CM

## 2021-12-10 DIAGNOSIS — Z12.5 ENCOUNTER FOR PROSTATE CANCER SCREENING: Primary | ICD-10-CM

## 2021-12-10 DIAGNOSIS — D47.2 MGUS (MONOCLONAL GAMMOPATHY OF UNKNOWN SIGNIFICANCE): ICD-10-CM

## 2021-12-10 DIAGNOSIS — E03.4 HYPOTHYROIDISM DUE TO ACQUIRED ATROPHY OF THYROID: ICD-10-CM

## 2021-12-10 NOTE — TELEPHONE ENCOUNTER
Patient requesting annual lab orders be placed at [facility] for [address] in January.

## 2021-12-15 ENCOUNTER — NURSE ONLY (OUTPATIENT)
Dept: INTERNAL MEDICINE CLINIC | Facility: CLINIC | Age: 76
End: 2021-12-15
Payer: MEDICARE

## 2021-12-15 ENCOUNTER — TELEPHONE (OUTPATIENT)
Dept: INTERNAL MEDICINE CLINIC | Facility: CLINIC | Age: 76
End: 2021-12-15

## 2021-12-15 DIAGNOSIS — J45.30 MILD PERSISTENT ASTHMA WITHOUT COMPLICATION: Primary | ICD-10-CM

## 2021-12-15 DIAGNOSIS — Z23 NEEDS FLU SHOT: Primary | ICD-10-CM

## 2021-12-15 PROCEDURE — 90662 IIV NO PRSV INCREASED AG IM: CPT | Performed by: INTERNAL MEDICINE

## 2021-12-15 PROCEDURE — G0008 ADMIN INFLUENZA VIRUS VAC: HCPCS | Performed by: INTERNAL MEDICINE

## 2021-12-15 NOTE — TELEPHONE ENCOUNTER
During nurse visit today, pt stated he is out of the Mometasone Furoate inhalation aerosol and they are currently out of stock everywhere and not being shipped. Pt wants to know if  recommends an alternative or if he can stop using it.  Pt does not u

## 2021-12-16 NOTE — TELEPHONE ENCOUNTER
Per Dr. Jaqueline Polanco contact pharmacy regarding placing patient on an equivalent. Per [facility] recommend Beclotmethasone/QVAR 40mcg BID. Notified patient of change in medication. Verbalized understanding. Med rx sent to preferred pharmacy.

## 2021-12-26 DIAGNOSIS — G60.9 IDIOPATHIC PERIPHERAL NEUROPATHY: ICD-10-CM

## 2021-12-27 RX ORDER — GABAPENTIN 300 MG/1
CAPSULE ORAL
Qty: 270 CAPSULE | Refills: 1 | Status: SHIPPED | OUTPATIENT
Start: 2021-12-27

## 2021-12-27 NOTE — TELEPHONE ENCOUNTER
Medication: Gabapentin 300 mg     Date of last refill:09/20/21  [place]      Last office visit:10/13/21  Due back to clinic per last office note:   Date next office visit scheduled:    Future Appointments   Date Time Pro will discuss case with colleagues who specialize in neuromuscular medicine as to utility of repeat EMG and /or other therapeutic options.      (G60.9) Idiopathic peripheral neuropathy  (primary encounter diagnosis)  Plan: as noted above      (D47.2) MGUS (

## 2022-01-08 ENCOUNTER — MED REC SCAN ONLY (OUTPATIENT)
Dept: INTERNAL MEDICINE CLINIC | Facility: CLINIC | Age: 77
End: 2022-01-08

## 2022-01-17 DIAGNOSIS — J33.9 NASAL POLYPOSIS: ICD-10-CM

## 2022-01-17 DIAGNOSIS — J32.4 CHRONIC PANSINUSITIS: ICD-10-CM

## 2022-01-17 RX ORDER — FLUTICASONE PROPIONATE 50 MCG
2 SPRAY, SUSPENSION (ML) NASAL 2 TIMES DAILY PRN
Qty: 48 G | Refills: 0 | Status: SHIPPED | OUTPATIENT
Start: 2022-01-17

## 2022-01-20 ENCOUNTER — OFFICE VISIT (OUTPATIENT)
Dept: INTERNAL MEDICINE CLINIC | Facility: CLINIC | Age: 77
End: 2022-01-20
Payer: MEDICARE

## 2022-01-20 VITALS
RESPIRATION RATE: 12 BRPM | OXYGEN SATURATION: 98 % | DIASTOLIC BLOOD PRESSURE: 76 MMHG | BODY MASS INDEX: 28.8 KG/M2 | TEMPERATURE: 98 F | HEART RATE: 78 BPM | SYSTOLIC BLOOD PRESSURE: 136 MMHG | WEIGHT: 215 LBS | HEIGHT: 72.5 IN

## 2022-01-20 DIAGNOSIS — Z00.00 ENCOUNTER FOR ANNUAL HEALTH EXAMINATION: ICD-10-CM

## 2022-01-20 DIAGNOSIS — D47.2 NEUROPATHY ASSOCIATED WITH MGUS (HCC): ICD-10-CM

## 2022-01-20 DIAGNOSIS — J45.30 MILD PERSISTENT ASTHMA WITHOUT COMPLICATION: ICD-10-CM

## 2022-01-20 DIAGNOSIS — N40.0 BENIGN PROSTATIC HYPERPLASIA WITHOUT LOWER URINARY TRACT SYMPTOMS: ICD-10-CM

## 2022-01-20 DIAGNOSIS — G63 NEUROPATHY ASSOCIATED WITH MGUS (HCC): ICD-10-CM

## 2022-01-20 DIAGNOSIS — G60.9 IDIOPATHIC PERIPHERAL NEUROPATHY: ICD-10-CM

## 2022-01-20 DIAGNOSIS — Z00.00 ANNUAL PHYSICAL EXAM: Primary | ICD-10-CM

## 2022-01-20 DIAGNOSIS — D47.2 MGUS (MONOCLONAL GAMMOPATHY OF UNKNOWN SIGNIFICANCE): ICD-10-CM

## 2022-01-20 DIAGNOSIS — I77.9 VERTEBRAL ARTERY DISEASE (HCC): ICD-10-CM

## 2022-01-20 DIAGNOSIS — F33.0 MAJOR DEPRESSIVE DISORDER, RECURRENT, MILD (HCC): ICD-10-CM

## 2022-01-20 DIAGNOSIS — E03.4 HYPOTHYROIDISM DUE TO ACQUIRED ATROPHY OF THYROID: ICD-10-CM

## 2022-01-20 DIAGNOSIS — I65.23 BILATERAL CAROTID ARTERY STENOSIS: ICD-10-CM

## 2022-01-20 PROBLEM — M48.02 CERVICAL STENOSIS OF SPINAL CANAL: Status: RESOLVED | Noted: 2020-10-06 | Resolved: 2022-01-20

## 2022-01-20 PROBLEM — I65.01 VERTEBRAL ARTERY OCCLUSION, RIGHT: Status: ACTIVE | Noted: 2021-03-02

## 2022-01-20 PROBLEM — I65.01 VERTEBRAL ARTERY OCCLUSION, RIGHT: Status: RESOLVED | Noted: 2021-03-02 | Resolved: 2022-01-20

## 2022-01-20 PROBLEM — I65.29 STENOSIS OF CAROTID ARTERY: Status: ACTIVE | Noted: 2021-03-02

## 2022-01-20 PROCEDURE — G0439 PPPS, SUBSEQ VISIT: HCPCS | Performed by: INTERNAL MEDICINE

## 2022-01-20 NOTE — PROGRESS NOTES
Subjective:   Davide Branch is a 67year old male who presents for a Medicare Subsequent Annual Wellness visit (Pt already had Initial Annual Wellness).    HPI:    Here for AWV  Normal state of health    PAST MEDICAL, SOCIAL, FAMILY HISTORIES REVIEWED W artery    Allergies:  He is allergic to immune globulin. Current Medications:  fluticasone propionate 50 MCG/ACT Nasal Suspension, 2 sprays by Each Nare route 2 (two) times daily as needed for Rhinitis.   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   Surgical History:  He  has a past surgical history that includes colonoscopy with biopsy (2/29/2012); appendectomy; orthopedic surg (pbp); tonsillectomy; cataract (Bilateral, August 20 and negatives noted in the HPI. Objective:   Physical Exam  General: No acute distress. Alert and oriented x 3. HEENT: Normocephalic atraumatic. Moist mucous membranes. EOM-I. PERRLA. Anicteric. Neck: No lymphadenopathy.    Respiratory: Clear to ausc significance)-stable sxs on current gabapentin dose. defer to oncology     Neuropathy associated with MGUS ([address]- stable sxs. Sees neurology. Defer med titration  to neuro.  [facility] eval [facility]     Cervical stenosis of spinal canal- defer to treatment op diagnosed with pre-diabetes Lab Results   Component Value Date    GLU 95 07/23/2021        Cardiovascular Disease Screening    Lipid Panel  Cholesterol  Lipoprotein (HDL)  Triglycerides Covered every 5 years for all Medicare beneficiaries without apparent TDaP Not covered by Medicare Part B -  No recommendations at this time    Zoster Not covered by Medicare Part B; may be covered with your pharmacy  prescription benefits 03/09/2014  No recommendations at this time       Annual Monitoring of Persistent Medi

## 2022-01-20 NOTE — PATIENT INSTRUCTIONS
Nils Joseph's SCREENING SCHEDULE   Tests on this list are recommended by your physician but may not be covered, or covered at this frequency, by your insurer. Please check with your insurance carrier before scheduling to verify coverage.    PREVEN 12/15/2021  No recommendations at this time    Pneumococcal Each vaccine ([id number] & [id number]) covered once after 65 Prevnar 13: 11/04/2015    [id number]: 10/04/2016     No recommendations at this time    Hepatitis B One screening covered for patients

## 2022-02-03 ENCOUNTER — MED REC SCAN ONLY (OUTPATIENT)
Dept: INTERNAL MEDICINE CLINIC | Facility: CLINIC | Age: 77
End: 2022-02-03

## 2022-03-04 RX ORDER — TRAZODONE HYDROCHLORIDE 100 MG/1
TABLET ORAL
Qty: 90 TABLET | Refills: 3 | Status: SHIPPED | OUTPATIENT
Start: 2022-03-04

## 2022-03-23 RX ORDER — LEVOTHYROXINE SODIUM 0.1 MG/1
100 TABLET ORAL
Qty: 30 TABLET | Refills: 0 | Status: SHIPPED | OUTPATIENT
Start: 2022-03-23

## 2022-03-23 NOTE — TELEPHONE ENCOUNTER
Last time medication was refilled 3/1/21  Quantity and # of refills 90 tab w/ 3 refills  Last OV 1/20/22  Next OV physical due 1/2023

## 2022-03-23 NOTE — TELEPHONE ENCOUNTER
Pharmacy called in on behalf of patient for refill :  LEVOTHYROXINE SODIUM 100 MCG Oral Tab 90 tablet     To be sent to:  [address], [address] [place] [address] [address], [phone]

## 2022-04-22 ENCOUNTER — LAB ENCOUNTER (OUTPATIENT)
Dept: LAB | Age: 77
End: 2022-04-22
Attending: INTERNAL MEDICINE
Payer: MEDICARE

## 2022-04-22 DIAGNOSIS — Z01.818 PRE-OP TESTING: ICD-10-CM

## 2022-04-23 LAB — SARS-COV-2 RNA RESP QL NAA+PROBE: NOT DETECTED

## 2022-04-25 PROBLEM — D12.4 BENIGN NEOPLASM OF DESCENDING COLON: Status: ACTIVE | Noted: 2022-04-25

## 2022-04-25 PROBLEM — Z12.11 SPECIAL SCREENING FOR MALIGNANT NEOPLASM OF COLON: Status: ACTIVE | Noted: 2022-04-25

## 2022-04-25 PROBLEM — D12.2 BENIGN NEOPLASM OF ASCENDING COLON: Status: ACTIVE | Noted: 2022-04-25

## 2022-04-25 PROBLEM — D12.3 BENIGN NEOPLASM OF TRANSVERSE COLON: Status: ACTIVE | Noted: 2022-04-25

## 2022-05-13 ENCOUNTER — TELEPHONE (OUTPATIENT)
Dept: INTERNAL MEDICINE CLINIC | Facility: CLINIC | Age: 77
End: 2022-05-13

## 2022-05-13 RX ORDER — FLUTICASONE PROPIONATE 50 MCG
1 SPRAY, SUSPENSION (ML) NASAL 2 TIMES DAILY PRN
Qty: 48 G | Refills: 1 | Status: SHIPPED | OUTPATIENT
Start: 2022-05-13

## 2022-05-13 NOTE — TELEPHONE ENCOUNTER
Patient called in requesting to speak to [facility] regarding request for change of directions of:  fluticasone propionate 50 MCG/ACT Nasal Suspension    Please call back to discuss.

## 2022-05-13 NOTE — TELEPHONE ENCOUNTER
Spoke with pt needs directions changed on Flonase as it currently states 2 sprays each nostril twice daily PRN. Insurance does not like those directions. Pt informed typical dosage is 1 spray each each nostril twice daily. He would like rx sent with those directions. Pt did report that his ENT did recommend the previous dose. rx sent.

## 2022-06-28 DIAGNOSIS — G60.9 IDIOPATHIC PERIPHERAL NEUROPATHY: ICD-10-CM

## 2022-06-29 RX ORDER — GABAPENTIN 300 MG/1
CAPSULE ORAL
Qty: 270 CAPSULE | Refills: 1 | Status: SHIPPED | OUTPATIENT
Start: 2022-06-29

## 2022-07-03 ENCOUNTER — HOSPITAL ENCOUNTER (OUTPATIENT)
Age: 77
Discharge: HOME | End: 2022-07-03
Payer: MEDICARE

## 2022-07-03 VITALS
WEIGHT: 205 LBS | HEART RATE: 50 BPM | TEMPERATURE: 98 F | BODY MASS INDEX: 27.17 KG/M2 | OXYGEN SATURATION: 98 % | DIASTOLIC BLOOD PRESSURE: 73 MMHG | SYSTOLIC BLOOD PRESSURE: 135 MMHG | RESPIRATION RATE: 18 BRPM | HEIGHT: 73 IN

## 2022-07-03 DIAGNOSIS — L03.112 CELLULITIS OF LEFT AXILLA: ICD-10-CM

## 2022-07-03 DIAGNOSIS — L73.2 HIDRADENITIS AXILLARIS: Primary | ICD-10-CM

## 2022-07-03 PROCEDURE — 87077 CULTURE AEROBIC IDENTIFY: CPT | Performed by: PHYSICIAN ASSISTANT

## 2022-07-03 PROCEDURE — 99215 OFFICE O/P EST HI 40 MIN: CPT

## 2022-07-03 PROCEDURE — 10061 I&D ABSCESS COMP/MULTIPLE: CPT

## 2022-07-03 PROCEDURE — 87070 CULTURE OTHR SPECIMN AEROBIC: CPT | Performed by: PHYSICIAN ASSISTANT

## 2022-07-03 PROCEDURE — 99214 OFFICE O/P EST MOD 30 MIN: CPT

## 2022-07-03 PROCEDURE — 87205 SMEAR GRAM STAIN: CPT | Performed by: PHYSICIAN ASSISTANT

## 2022-07-03 PROCEDURE — 96365 THER/PROPH/DIAG IV INF INIT: CPT

## 2022-07-03 PROCEDURE — 87186 SC STD MICRODIL/AGAR DIL: CPT | Performed by: PHYSICIAN ASSISTANT

## 2022-07-03 RX ORDER — CEPHALEXIN 500 MG/1
500 CAPSULE ORAL 4 TIMES DAILY
Qty: 40 CAPSULE | Refills: 0 | Status: SHIPPED | OUTPATIENT
Start: 2022-07-03 | End: 2022-07-13

## 2022-07-03 NOTE — ED INITIAL ASSESSMENT (HPI)
Pt here w/ swelling under left axilla. Pt states it had onset 10 months ago, oncologist had an [address] done of it at that time and showed a fluid collection.  It improved over time except 2-3 days ago, swelling increased again and pain severe

## 2022-07-05 ENCOUNTER — HOSPITAL ENCOUNTER (OUTPATIENT)
Age: 77
Discharge: HOME | End: 2022-07-05
Payer: MEDICARE

## 2022-07-05 VITALS
OXYGEN SATURATION: 97 % | TEMPERATURE: 97 F | HEART RATE: 66 BPM | BODY MASS INDEX: 27.17 KG/M2 | SYSTOLIC BLOOD PRESSURE: 113 MMHG | HEIGHT: 73 IN | DIASTOLIC BLOOD PRESSURE: 66 MMHG | RESPIRATION RATE: 16 BRPM | WEIGHT: 205 LBS

## 2022-07-05 DIAGNOSIS — Z51.89 WOUND CHECK, ABSCESS: Primary | ICD-10-CM

## 2022-07-05 PROCEDURE — 99211 OFF/OP EST MAY X REQ PHY/QHP: CPT

## 2022-07-05 NOTE — ED INITIAL ASSESSMENT (HPI)
Pt aox4. Pt to IC for wound check to left axilla. Pt was seen on Sunday and had I&D to left axilla for abscess. Pt denies fever. Pt states left axilla has been draining. Pt arrived with bandage in place.

## 2022-07-26 ENCOUNTER — OFFICE VISIT (OUTPATIENT)
Facility: LOCATION | Age: 77
End: 2022-07-26
Payer: MEDICARE

## 2022-07-26 VITALS
HEIGHT: 73 IN | WEIGHT: 205 LBS | BODY MASS INDEX: 27.17 KG/M2 | SYSTOLIC BLOOD PRESSURE: 128 MMHG | DIASTOLIC BLOOD PRESSURE: 76 MMHG | HEART RATE: 52 BPM

## 2022-07-26 DIAGNOSIS — L02.411 CUTANEOUS ABSCESS OF RIGHT AXILLA: Primary | ICD-10-CM

## 2022-07-26 PROCEDURE — 99203 OFFICE O/P NEW LOW 30 MIN: CPT | Performed by: SURGERY

## 2022-07-26 PROCEDURE — 17250 CHEM CAUT OF GRANLTJ TISSUE: CPT | Performed by: SURGERY

## 2022-08-08 ENCOUNTER — OFFICE VISIT (OUTPATIENT)
Facility: LOCATION | Age: 77
End: 2022-08-08

## 2022-08-08 VITALS — HEART RATE: 82 BPM | TEMPERATURE: 98 F

## 2022-08-08 DIAGNOSIS — Z98.890 POST-OPERATIVE STATE: Primary | ICD-10-CM

## 2022-08-08 RX ORDER — CLINDAMYCIN HYDROCHLORIDE 300 MG/1
300 CAPSULE ORAL 3 TIMES DAILY
Qty: 30 CAPSULE | Refills: 0 | Status: SHIPPED | OUTPATIENT
Start: 2022-08-08 | End: 2022-08-18

## 2022-08-09 ENCOUNTER — OFFICE VISIT (OUTPATIENT)
Facility: LOCATION | Age: 77
End: 2022-08-09
Payer: MEDICARE

## 2022-08-09 VITALS — TEMPERATURE: 98 F | HEART RATE: 88 BPM

## 2022-08-09 DIAGNOSIS — Z98.890 POST-OPERATIVE STATE: ICD-10-CM

## 2022-08-09 DIAGNOSIS — L02.419 ABSCESS OF AXILLARY REGION: Primary | ICD-10-CM

## 2022-08-09 PROCEDURE — 99212 OFFICE O/P EST SF 10 MIN: CPT | Performed by: SURGERY

## 2022-08-17 ENCOUNTER — OFFICE VISIT (OUTPATIENT)
Facility: LOCATION | Age: 77
End: 2022-08-17

## 2022-08-17 VITALS — HEART RATE: 69 BPM | TEMPERATURE: 98 F

## 2022-08-17 DIAGNOSIS — L02.419 ABSCESS OF AXILLARY REGION: Primary | ICD-10-CM

## 2022-08-17 PROCEDURE — 99024 POSTOP FOLLOW-UP VISIT: CPT | Performed by: PHYSICIAN ASSISTANT

## 2022-08-17 RX ORDER — SULFAMETHOXAZOLE AND TRIMETHOPRIM 800; 160 MG/1; MG/1
1 TABLET ORAL 2 TIMES DAILY
Qty: 20 TABLET | Refills: 0 | Status: SHIPPED | OUTPATIENT
Start: 2022-08-17 | End: 2022-08-27

## 2022-08-25 ENCOUNTER — OFFICE VISIT (OUTPATIENT)
Facility: LOCATION | Age: 77
End: 2022-08-25
Payer: MEDICARE

## 2022-08-25 VITALS — HEART RATE: 80 BPM | TEMPERATURE: 98 F

## 2022-08-25 DIAGNOSIS — L02.419 ABSCESS OF AXILLARY REGION: Primary | ICD-10-CM

## 2022-08-30 RX ORDER — ERYTHROMYCIN 5 MG/G
1 OINTMENT OPHTHALMIC NIGHTLY
COMMUNITY

## 2022-08-31 ENCOUNTER — LAB ENCOUNTER (OUTPATIENT)
Dept: LAB | Facility: HOSPITAL | Age: 77
End: 2022-08-31
Attending: SURGERY
Payer: MEDICARE

## 2022-08-31 DIAGNOSIS — Z20.822 ENCOUNTER FOR PREOPERATIVE SCREENING LABORATORY TESTING FOR COVID-19 VIRUS: ICD-10-CM

## 2022-08-31 DIAGNOSIS — Z01.812 ENCOUNTER FOR PREOPERATIVE SCREENING LABORATORY TESTING FOR COVID-19 VIRUS: ICD-10-CM

## 2022-09-01 LAB — SARS-COV-2 RNA RESP QL NAA+PROBE: NOT DETECTED

## 2022-09-02 ENCOUNTER — HOSPITAL ENCOUNTER (OUTPATIENT)
Facility: HOSPITAL | Age: 77
Setting detail: HOSPITAL OUTPATIENT SURGERY
Discharge: HOME | End: 2022-09-02
Attending: SURGERY | Admitting: SURGERY
Payer: MEDICARE

## 2022-09-02 ENCOUNTER — ANESTHESIA (OUTPATIENT)
Dept: SURGERY | Facility: HOSPITAL | Age: 77
End: 2022-09-02
Payer: MEDICARE

## 2022-09-02 ENCOUNTER — ANESTHESIA EVENT (OUTPATIENT)
Dept: SURGERY | Facility: HOSPITAL | Age: 77
End: 2022-09-02
Payer: MEDICARE

## 2022-09-02 VITALS
DIASTOLIC BLOOD PRESSURE: 78 MMHG | SYSTOLIC BLOOD PRESSURE: 146 MMHG | OXYGEN SATURATION: 100 % | HEART RATE: 44 BPM | WEIGHT: 198 LBS | TEMPERATURE: 97 F | HEIGHT: 73 IN | BODY MASS INDEX: 26.24 KG/M2 | RESPIRATION RATE: 16 BRPM

## 2022-09-02 DIAGNOSIS — L02.419 ABSCESS OF AXILLARY REGION: ICD-10-CM

## 2022-09-02 DIAGNOSIS — Z01.812 ENCOUNTER FOR PREOPERATIVE SCREENING LABORATORY TESTING FOR COVID-19 VIRUS: Primary | ICD-10-CM

## 2022-09-02 DIAGNOSIS — Z20.822 ENCOUNTER FOR PREOPERATIVE SCREENING LABORATORY TESTING FOR COVID-19 VIRUS: Primary | ICD-10-CM

## 2022-09-02 PROCEDURE — 0JBF0ZX EXCISION OF LEFT UPPER ARM SUBCUTANEOUS TISSUE AND FASCIA, OPEN APPROACH, DIAGNOSTIC: ICD-10-PCS | Performed by: SURGERY

## 2022-09-02 PROCEDURE — 88305 TISSUE EXAM BY PATHOLOGIST: CPT | Performed by: SURGERY

## 2022-09-02 RX ORDER — OXYCODONE HYDROCHLORIDE 5 MG/1
5 TABLET ORAL EVERY 6 HOURS PRN
Qty: 20 TABLET | Refills: 0 | Status: SHIPPED | OUTPATIENT
Start: 2022-09-02

## 2022-09-02 RX ORDER — BUPIVACAINE HYDROCHLORIDE AND EPINEPHRINE 5; 5 MG/ML; UG/ML
INJECTION, SOLUTION EPIDURAL; INTRACAUDAL; PERINEURAL AS NEEDED
Status: DISCONTINUED | OUTPATIENT
Start: 2022-09-02 | End: 2022-09-02 | Stop reason: HOSPADM

## 2022-09-02 RX ORDER — ONDANSETRON 2 MG/ML
4 INJECTION INTRAMUSCULAR; INTRAVENOUS EVERY 6 HOURS PRN
Status: DISCONTINUED | OUTPATIENT
Start: 2022-09-02 | End: 2022-09-02

## 2022-09-02 RX ORDER — LABETALOL HYDROCHLORIDE 5 MG/ML
5 INJECTION, SOLUTION INTRAVENOUS EVERY 5 MIN PRN
Status: DISCONTINUED | OUTPATIENT
Start: 2022-09-02 | End: 2022-09-02

## 2022-09-02 RX ORDER — LIDOCAINE HYDROCHLORIDE 10 MG/ML
INJECTION, SOLUTION EPIDURAL; INFILTRATION; INTRACAUDAL; PERINEURAL AS NEEDED
Status: DISCONTINUED | OUTPATIENT
Start: 2022-09-02 | End: 2022-09-02 | Stop reason: SURG

## 2022-09-02 RX ORDER — ACETAMINOPHEN 500 MG
1000 TABLET ORAL ONCE
Status: DISCONTINUED | OUTPATIENT
Start: 2022-09-02 | End: 2022-09-02 | Stop reason: HOSPADM

## 2022-09-02 RX ORDER — HYDROMORPHONE HYDROCHLORIDE 1 MG/ML
0.3 INJECTION, SOLUTION INTRAMUSCULAR; INTRAVENOUS; SUBCUTANEOUS EVERY 5 MIN PRN
Status: DISCONTINUED | OUTPATIENT
Start: 2022-09-02 | End: 2022-09-02

## 2022-09-02 RX ORDER — ACETAMINOPHEN 500 MG
1000 TABLET ORAL ONCE AS NEEDED
Status: DISCONTINUED | OUTPATIENT
Start: 2022-09-02 | End: 2022-09-02

## 2022-09-02 RX ORDER — HEPARIN SODIUM 5000 [USP'U]/ML
INJECTION, SOLUTION INTRAVENOUS; SUBCUTANEOUS
Status: COMPLETED
Start: 2022-09-02 | End: 2022-09-02

## 2022-09-02 RX ORDER — CEFAZOLIN SODIUM/WATER 2 G/20 ML
2 SYRINGE (ML) INTRAVENOUS ONCE
Status: COMPLETED | OUTPATIENT
Start: 2022-09-02 | End: 2022-09-02

## 2022-09-02 RX ORDER — HYDROCODONE BITARTRATE AND ACETAMINOPHEN 5; 325 MG/1; MG/1
2 TABLET ORAL ONCE AS NEEDED
Status: DISCONTINUED | OUTPATIENT
Start: 2022-09-02 | End: 2022-09-02

## 2022-09-02 RX ORDER — HYDROMORPHONE HYDROCHLORIDE 1 MG/ML
0.2 INJECTION, SOLUTION INTRAMUSCULAR; INTRAVENOUS; SUBCUTANEOUS EVERY 5 MIN PRN
Status: DISCONTINUED | OUTPATIENT
Start: 2022-09-02 | End: 2022-09-02

## 2022-09-02 RX ORDER — NALOXONE HYDROCHLORIDE 0.4 MG/ML
80 INJECTION, SOLUTION INTRAMUSCULAR; INTRAVENOUS; SUBCUTANEOUS AS NEEDED
Status: DISCONTINUED | OUTPATIENT
Start: 2022-09-02 | End: 2022-09-02

## 2022-09-02 RX ORDER — SENNA AND DOCUSATE SODIUM 50; 8.6 MG/1; MG/1
1 TABLET, FILM COATED ORAL DAILY
Qty: 30 TABLET | Refills: 0 | Status: SHIPPED | OUTPATIENT
Start: 2022-09-02

## 2022-09-02 RX ORDER — HEPARIN SODIUM 5000 [USP'U]/ML
5000 INJECTION, SOLUTION INTRAVENOUS; SUBCUTANEOUS ONCE
Status: COMPLETED | OUTPATIENT
Start: 2022-09-02 | End: 2022-09-02

## 2022-09-02 RX ORDER — HYDROMORPHONE HYDROCHLORIDE 1 MG/ML
0.4 INJECTION, SOLUTION INTRAMUSCULAR; INTRAVENOUS; SUBCUTANEOUS EVERY 5 MIN PRN
Status: DISCONTINUED | OUTPATIENT
Start: 2022-09-02 | End: 2022-09-02

## 2022-09-02 RX ORDER — HYDROCODONE BITARTRATE AND ACETAMINOPHEN 5; 325 MG/1; MG/1
1 TABLET ORAL ONCE AS NEEDED
Status: DISCONTINUED | OUTPATIENT
Start: 2022-09-02 | End: 2022-09-02

## 2022-09-02 RX ORDER — KETOROLAC TROMETHAMINE 30 MG/ML
INJECTION, SOLUTION INTRAMUSCULAR; INTRAVENOUS AS NEEDED
Status: DISCONTINUED | OUTPATIENT
Start: 2022-09-02 | End: 2022-09-02 | Stop reason: SURG

## 2022-09-02 RX ORDER — LIDOCAINE HYDROCHLORIDE 10 MG/ML
INJECTION, SOLUTION INFILTRATION; PERINEURAL AS NEEDED
Status: DISCONTINUED | OUTPATIENT
Start: 2022-09-02 | End: 2022-09-02 | Stop reason: HOSPADM

## 2022-09-02 RX ORDER — SODIUM CHLORIDE, SODIUM LACTATE, POTASSIUM CHLORIDE, CALCIUM CHLORIDE 600; 310; 30; 20 MG/100ML; MG/100ML; MG/100ML; MG/100ML
INJECTION, SOLUTION INTRAVENOUS CONTINUOUS
Status: DISCONTINUED | OUTPATIENT
Start: 2022-09-02 | End: 2022-09-02

## 2022-09-02 RX ORDER — CEFAZOLIN SODIUM/WATER 2 G/20 ML
SYRINGE (ML) INTRAVENOUS
Status: DISCONTINUED
Start: 2022-09-02 | End: 2022-09-02

## 2022-09-02 RX ORDER — METOCLOPRAMIDE HYDROCHLORIDE 5 MG/ML
10 INJECTION INTRAMUSCULAR; INTRAVENOUS EVERY 8 HOURS PRN
Status: DISCONTINUED | OUTPATIENT
Start: 2022-09-02 | End: 2022-09-02

## 2022-09-02 RX ADMIN — SODIUM CHLORIDE, SODIUM LACTATE, POTASSIUM CHLORIDE, CALCIUM CHLORIDE: 600; 310; 30; 20 INJECTION, SOLUTION INTRAVENOUS at 18:34:00

## 2022-09-02 RX ADMIN — CEFAZOLIN SODIUM/WATER 2 G: 2 G/20 ML SYRINGE (ML) INTRAVENOUS at 18:43:00

## 2022-09-02 RX ADMIN — LIDOCAINE HYDROCHLORIDE 50 MG: 10 INJECTION, SOLUTION EPIDURAL; INFILTRATION; INTRACAUDAL; PERINEURAL at 18:41:00

## 2022-09-02 RX ADMIN — KETOROLAC TROMETHAMINE 15 MG: 30 INJECTION, SOLUTION INTRAMUSCULAR; INTRAVENOUS at 19:11:00

## 2022-09-02 NOTE — INTERVAL H&P NOTE
Pre-op Diagnosis: Abscess of axillary region [L02.419]    The above referenced H&P was reviewed by Lalo Starr MD on 9/2/2022, the patient was examined and no significant changes have occurred in the patient's condition since the H&P was performed. I discussed with the patient and/or legal representative the potential benefits, risks and side effects of this procedure; the likelihood of the patient achieving goals; and potential problems that might occur during recuperation. I discussed reasonable alternatives to the procedure, including risks, benefits and side effects related to the alternatives and risks related to not receiving this procedure. We will proceed with procedure as planned.

## 2022-09-03 NOTE — OPERATIVE REPORT
[facility]    PATIENT'S NAME: Divine Bolden   ATTENDING PHYSICIAN: Harris Chambers M.D. OPERATING PHYSICIAN: Harris Chambers M.D. PATIENT ACCOUNT#:   [de-identified]    LOCATION:  [place] [address]  MEDICAL RECORD #:   [id number]       YOB: 1945  ADMISSION DATE:       09/02/2022      OPERATION DATE:  09/02/2022    OPERATIVE REPORT    PREOPERATIVE DIAGNOSIS:  Abscess and abscess cavity of the left axilla. POSTOPERATIVE DIAGNOSIS:  Abscess and abscess cavity of the left axilla. PROCEDURE:  Excision of left axilla abscess cavity with layered closure (5 cm length). ASSISTANT:  Nurys Hammond PA-C. ANESTHESIA:  General endotracheal anesthesia     ANESTHESIOLOGIST:  Ayan Bryan. TAMMY Ventura     BLOOD LOSS:  Less than 5 mL. COMPLICATIONS:  None apparent. SPECIMEN:  Abscess cavity and surrounding tissue. DISPOSITION:  The patient was awakened from anesthesia and brought to recovery in stable condition. He tolerated the procedure without apparent complication. The sponge and instrument counts were correct at the end the procedure. Please note, preprocedure antibiotic and DVT were administered per protocol, and a time-out was performed prior to initiating the procedure, identifying the correct patient, procedure, surgeon, and site of surgery. The patient indicated the site of surgery in the preoperative holding area, which I marked and with which patient concurred. INDICATIONS:  Please refer to preprocedure history and physical.  Briefly, the patient is a 65-year-old male with a suboptimally drained abscess cavity of the left axilla. There is chronic sinus and hypertrophic granulation tissue that is failing to heal with nonoperative measures. Therefore, surgical excision is recommended. Risks, benefits, and alternatives were explained to the patient in detail. Please refer to the preprocedure history and physical for further information.     FINDINGS: Abscess cavity and surrounding tissue. OPERATIVE TECHNIQUE:  The patient was brought to the operating room. After induction of general endotracheal anesthesia, he was placed in the supine position and the left axilla is prepped and draped in usual sterile fashion. Local anesthetic was injected generously around the axillary skin, after which an elliptical incision was created using a scalpel to incise the skin in full thickness. Electrocautery was then used to achieve hemostasis and to circumferentially dissect and separate the skin and subcutaneous tissue inclusive of the abscess cavity from the surrounding tissues. The surgical specimen was removed from the operative field, submitted to Pathology for further evaluation. The wound was now cleansed and irrigated. Hemostasis achieved with electrocautery. The wound was then reapproximated in layers using 3-0 Vicryl to reapproximate the subcutaneous tissues, and subcuticular 4-0 Monocryl to reapproximate the skin. Further local anesthetic was injected. Sterile dressings were applied. The patient was awakened from anesthesia and brought to recovery in stable condition. He tolerated the procedure without apparent complication. Needle, sponge, and instrument counts were correct at the end the procedure and operative findings were discussed with the patient's family immediately upon conclusion of surgery.     Dictated By Gary Mustafa M.D.  d: 09/02/2022 19:14:50  t: 09/02/2022 23:05:55  [facility] [id number]  /

## 2022-09-03 NOTE — BRIEF OP NOTE
Pre-Operative Diagnosis: Abscess of axillary region [L02.419]     Post-Operative Diagnosis: Same      Procedure Performed:   EXCISION OF LEFT AXILLA  ABSCESS WITH LAYERED CLOSURE    Surgeon(s) and Role:     * Joaquin Vigil MD - Primary    Assistant(s):  PA: Chantel Hayes PA-C     Surgical Findings: Abscess cavity with draining sinus. Specimen: abscess cavity and surrounding tissue.       Estimated Blood Loss: 5 ml  Dictation Number:  [id number]    Mona Ruggiero MD  9/2/2022  7:11 PM

## 2022-09-03 NOTE — ANESTHESIA POSTPROCEDURE EVALUATION
[address] Patient Status:  Hospital Outpatient Surgery   Age/Gender 67year old male MRN [id number]   [facility] SURGERY Attending Tavo Coleman MD   Hosp Day # 0 PCP Festus Campo MD       Anesthesia Post-op Note    EXCISION OF LEFT AXILLA  ABSCESS WITH LAYERED CLOSURE    Procedure Summary     Date: 09/02/22 Room / Location: [address] [place] [address] MAIN OR 05 / [facility] OR    Anesthesia Start: 3256 Anesthesia Stop: 1940    Procedure: EXCISION OF LEFT AXILLA  ABSCESS WITH LAYERED CLOSURE (Left ) Diagnosis:       Abscess of axillary region      (Abscess of axillary region [L02.419])    Surgeons: Tavo Coleman MD Anesthesiologist: Ritika Loera MD    Anesthesia Type: MAC ASA Status: 2          Anesthesia Type: MAC    Vitals Value Taken Time   /64 09/02/22 1941   Temp 97.7 09/02/22 1941   Pulse 45 09/02/22 1941   Resp 17 09/02/22 1941   SpO2 100% 09/02/22 1941       Patient Location: Same Day Surgery    Anesthesia Type: MAC    Airway Patency: patent    Postop Pain Control: adequate    Mental Status: mildly sedated but able to meaningfully participate in the post-anesthesia evaluation    Nausea/Vomiting: none    Cardiopulmonary/Hydration status: stable euvolemic    Complications: no apparent anesthesia related complications    Postop vital signs: stable    Dental Exam: Unchanged from Preop    Patient to be discharged home when criteria met.

## 2022-09-07 ENCOUNTER — OFFICE VISIT (OUTPATIENT)
Facility: LOCATION | Age: 77
End: 2022-09-07

## 2022-09-07 VITALS — TEMPERATURE: 97 F | HEART RATE: 74 BPM

## 2022-09-07 DIAGNOSIS — Z98.890 POSTOPERATIVE STATE: Primary | ICD-10-CM

## 2022-09-07 PROCEDURE — 99024 POSTOP FOLLOW-UP VISIT: CPT | Performed by: SURGERY

## 2022-09-16 ENCOUNTER — OFFICE VISIT (OUTPATIENT)
Facility: LOCATION | Age: 77
End: 2022-09-16

## 2022-09-16 DIAGNOSIS — Z48.89 ENCOUNTER FOR POSTOPERATIVE WOUND CHECK: ICD-10-CM

## 2022-09-16 DIAGNOSIS — Z98.890 POST-OPERATIVE STATE: Primary | ICD-10-CM

## 2022-09-16 PROCEDURE — 99024 POSTOP FOLLOW-UP VISIT: CPT | Performed by: PHYSICIAN ASSISTANT

## 2022-09-20 ENCOUNTER — OFFICE VISIT (OUTPATIENT)
Facility: LOCATION | Age: 77
End: 2022-09-20

## 2022-09-20 VITALS — HEART RATE: 57 BPM | TEMPERATURE: 97 F

## 2022-09-20 DIAGNOSIS — L02.419 ABSCESS OF AXILLARY REGION: Primary | ICD-10-CM

## 2022-10-04 ENCOUNTER — OFFICE VISIT (OUTPATIENT)
Facility: LOCATION | Age: 77
End: 2022-10-04
Payer: MEDICARE

## 2022-10-04 VITALS — HEART RATE: 56 BPM | TEMPERATURE: 98 F

## 2022-10-04 DIAGNOSIS — L91.8 HYPERTROPHIC GRANULATION TISSUE: Primary | ICD-10-CM

## 2022-10-04 PROCEDURE — 17250 CHEM CAUT OF GRANLTJ TISSUE: CPT | Performed by: SURGERY

## 2022-10-04 PROCEDURE — 99024 POSTOP FOLLOW-UP VISIT: CPT | Performed by: SURGERY

## 2022-10-13 ENCOUNTER — OFFICE VISIT (OUTPATIENT)
Facility: LOCATION | Age: 77
End: 2022-10-13

## 2022-10-13 VITALS — TEMPERATURE: 97 F | HEART RATE: 55 BPM

## 2022-10-13 DIAGNOSIS — Z98.890 POST-OPERATIVE STATE: ICD-10-CM

## 2022-10-13 DIAGNOSIS — L92.3 FOREIGN BODY GRANULOMA OF SKIN: ICD-10-CM

## 2022-10-13 DIAGNOSIS — L91.8 HYPERTROPHIC GRANULATION TISSUE: Primary | ICD-10-CM

## 2022-10-13 PROCEDURE — 99024 POSTOP FOLLOW-UP VISIT: CPT | Performed by: SURGERY

## 2022-10-27 ENCOUNTER — OFFICE VISIT (OUTPATIENT)
Facility: LOCATION | Age: 77
End: 2022-10-27

## 2022-10-27 VITALS — HEART RATE: 96 BPM | TEMPERATURE: 96 F

## 2022-10-27 DIAGNOSIS — L91.8 HYPERTROPHIC GRANULATION TISSUE: Primary | ICD-10-CM

## 2022-10-27 PROCEDURE — 99024 POSTOP FOLLOW-UP VISIT: CPT | Performed by: SURGERY

## 2022-11-01 ENCOUNTER — OFFICE VISIT (OUTPATIENT)
Dept: NEUROLOGY | Facility: CLINIC | Age: 77
End: 2022-11-01
Payer: MEDICARE

## 2022-11-01 VITALS
WEIGHT: 210 LBS | SYSTOLIC BLOOD PRESSURE: 134 MMHG | BODY MASS INDEX: 27.83 KG/M2 | HEART RATE: 60 BPM | HEIGHT: 73 IN | RESPIRATION RATE: 16 BRPM | DIASTOLIC BLOOD PRESSURE: 62 MMHG

## 2022-11-01 DIAGNOSIS — G60.9 IDIOPATHIC PERIPHERAL NEUROPATHY: ICD-10-CM

## 2022-11-01 DIAGNOSIS — D47.2 MGUS (MONOCLONAL GAMMOPATHY OF UNKNOWN SIGNIFICANCE): Primary | ICD-10-CM

## 2022-11-01 PROCEDURE — 99213 OFFICE O/P EST LOW 20 MIN: CPT | Performed by: OTHER

## 2022-11-01 RX ORDER — TRAZODONE HYDROCHLORIDE 100 MG/1
100 TABLET ORAL NIGHTLY PRN
COMMUNITY

## 2022-11-01 RX ORDER — GABAPENTIN 300 MG/1
300 CAPSULE ORAL 3 TIMES DAILY
Qty: 270 CAPSULE | Refills: 3 | Status: SHIPPED | OUTPATIENT
Start: 2022-11-01

## 2022-11-15 ENCOUNTER — OFFICE VISIT (OUTPATIENT)
Facility: LOCATION | Age: 77
End: 2022-11-15

## 2022-11-15 VITALS — HEART RATE: 74 BPM | TEMPERATURE: 98 F

## 2022-11-15 DIAGNOSIS — L91.8 HYPERTROPHIC GRANULATION TISSUE: Primary | ICD-10-CM

## 2022-11-15 PROCEDURE — 99024 POSTOP FOLLOW-UP VISIT: CPT | Performed by: SURGERY

## 2022-11-22 ENCOUNTER — TELEPHONE (OUTPATIENT)
Dept: INTERNAL MEDICINE CLINIC | Facility: CLINIC | Age: 77
End: 2022-11-22

## 2022-11-22 DIAGNOSIS — E03.4 HYPOTHYROIDISM DUE TO ACQUIRED ATROPHY OF THYROID: ICD-10-CM

## 2022-11-22 DIAGNOSIS — Z12.5 ENCOUNTER FOR PROSTATE CANCER SCREENING: ICD-10-CM

## 2022-11-22 DIAGNOSIS — N40.0 BENIGN PROSTATIC HYPERPLASIA WITHOUT LOWER URINARY TRACT SYMPTOMS: ICD-10-CM

## 2022-11-22 DIAGNOSIS — I65.23 BILATERAL CAROTID ARTERY STENOSIS: ICD-10-CM

## 2022-11-22 DIAGNOSIS — Z79.899 ENCOUNTER FOR LONG-TERM (CURRENT) USE OF MEDICATIONS: ICD-10-CM

## 2022-11-22 DIAGNOSIS — D47.2 MGUS (MONOCLONAL GAMMOPATHY OF UNKNOWN SIGNIFICANCE): Primary | ICD-10-CM

## 2022-11-22 NOTE — TELEPHONE ENCOUNTER
Pt would like labs ordered before his appointment     [facility]   Date Time Provider Monet Mckeoni   11/29/2022  8:30 AM Martinez Humphrey MD [facility]   1/26/2023  9:30 AM Freddy Lubin MD [facility] [address]   2/23/2023 10:55 AM DO ORAL Yates [facility] [place]

## 2022-11-29 ENCOUNTER — OFFICE VISIT (OUTPATIENT)
Facility: LOCATION | Age: 77
End: 2022-11-29

## 2022-11-29 DIAGNOSIS — L91.8 HYPERTROPHIC GRANULATION TISSUE: Primary | ICD-10-CM

## 2022-11-29 PROCEDURE — 99024 POSTOP FOLLOW-UP VISIT: CPT | Performed by: SURGERY

## 2022-12-06 ENCOUNTER — OFFICE VISIT (OUTPATIENT)
Facility: LOCATION | Age: 77
End: 2022-12-06
Payer: MEDICARE

## 2022-12-06 VITALS — HEART RATE: 57 BPM | TEMPERATURE: 97 F

## 2022-12-06 DIAGNOSIS — Z51.89 ENCOUNTER FOR WOUND RE-CHECK: ICD-10-CM

## 2022-12-06 DIAGNOSIS — L91.8 HYPERTROPHIC GRANULATION TISSUE: Primary | ICD-10-CM

## 2022-12-06 PROCEDURE — 99212 OFFICE O/P EST SF 10 MIN: CPT | Performed by: SURGERY

## 2022-12-06 PROCEDURE — 11402 EXC TR-EXT B9+MARG 1.1-2 CM: CPT | Performed by: SURGERY

## 2022-12-06 NOTE — PATIENT INSTRUCTIONS
Home Care Instructions  Minor Surgery  Dr Laretta Spurling    MEDICATIONS  For post-operative pain control the mediations are usually over the counter preparations such as Advil and Tylenol For severe pain the patient may overlap Advil with Tylenol The patient can do this by taking two Tylenol, then three hours later taking two Advil, then three hours later taking Tylenol again. Please ask your surgeon before resuming blood thinners such as aspirin, plavix or coumadin. All other home medications may be resumed as scheduled. Generally aspirin is avoided for ten days. DIET  The patient may resume a general diet immediately. There should be no alcohol consumption in the immediate recover time period. If the patient was sedated for the procedure the first meal should be light. WOUND CARE  The top dressing may be removed the day after surgery. This includes the gauze, tape and band-aids if they are present. Do not remove the steri-strips or butterfly tapes that are white and adherent to the skin. The steri-strips will eventually peel up at the ends and at this point they may be removed. This is usually seven to ten days after surgery. The patient may shower the day after surgery. There is no need to cover the incisions and all top gauze type dressing should be removed prior to showering. Soap can get on the wounds but do not scrub over the wounds. No hair dye or chemicals of any kind should get in the wounds. Avoid tub baths for two weeks. Most wounds will be closed with dissolving suture underneath the skin. These sutures will dissolve on their own. The Doctor or nurse will remove any visible sutures, or staples, in the office. ACTIVITY  The patient may ride in a car but should not drive the car for at least overnight if sedation was used. If no sedation was used the patient may drive immediately. The patient may return to work the next day.  Avoid any activity that could lead to the wound getting elbowed or bumped. Avoid bending, pushing, pulling and lifting anything heavier than 25 pounds for two weeks. Patients should seek further activity limits at the time of their appointment. No extreme sports for two weeks    APPOINTMENT  Please call our office today for an appointment within five to ten days of discharge Any fever greater than 100.5, chills, nausea, vomiting, or severe diarrhea please call our office. If the wound turns red, hot, swollen, becomes increasingly painful, or drains pus call us immediately at [phone]. For life threatening emergencies call 911. For non-emergent care please call our office after 9:30 a.m. Monday through Saturday. The number listed above is our office number, our phone automatically switches to out answering service if we are not there. Please do not use the emergency room for nonurgent care.       Thank you for entrusting me with your care

## 2022-12-13 ENCOUNTER — OFFICE VISIT (OUTPATIENT)
Facility: LOCATION | Age: 77
End: 2022-12-13

## 2022-12-13 VITALS — TEMPERATURE: 97 F | HEART RATE: 51 BPM

## 2022-12-13 DIAGNOSIS — L03.112 CELLULITIS OF LEFT AXILLA: ICD-10-CM

## 2022-12-13 DIAGNOSIS — Z09 FOLLOW-UP EXAM: ICD-10-CM

## 2022-12-13 DIAGNOSIS — L91.8 HYPERTROPHIC GRANULATION TISSUE: Primary | ICD-10-CM

## 2022-12-13 PROCEDURE — 99024 POSTOP FOLLOW-UP VISIT: CPT | Performed by: PHYSICIAN ASSISTANT

## 2022-12-13 RX ORDER — CEPHALEXIN 500 MG/1
500 CAPSULE ORAL 2 TIMES DAILY
Qty: 20 CAPSULE | Refills: 0 | Status: SHIPPED | OUTPATIENT
Start: 2022-12-13 | End: 2022-12-23

## 2022-12-20 ENCOUNTER — OFFICE VISIT (OUTPATIENT)
Facility: LOCATION | Age: 77
End: 2022-12-20

## 2022-12-20 VITALS — TEMPERATURE: 99 F | HEART RATE: 99 BPM

## 2022-12-20 DIAGNOSIS — Z51.89 ENCOUNTER FOR WOUND RE-CHECK: ICD-10-CM

## 2022-12-20 DIAGNOSIS — L91.8 HYPERTROPHIC GRANULATION TISSUE: Primary | ICD-10-CM

## 2022-12-20 DIAGNOSIS — S41.102D OPEN WOUND OF LEFT AXILLARY REGION WITHOUT COMPLICATION, SUBSEQUENT ENCOUNTER: ICD-10-CM

## 2022-12-20 PROCEDURE — 99024 POSTOP FOLLOW-UP VISIT: CPT | Performed by: PHYSICIAN ASSISTANT

## 2022-12-20 RX ORDER — CEPHALEXIN 500 MG/1
500 CAPSULE ORAL 2 TIMES DAILY
Qty: 14 CAPSULE | Refills: 0 | Status: SHIPPED | OUTPATIENT
Start: 2022-12-20 | End: 2022-12-27

## 2022-12-26 ENCOUNTER — LAB ENCOUNTER (OUTPATIENT)
Dept: LAB | Facility: HOSPITAL | Age: 77
End: 2022-12-26
Attending: INTERNAL MEDICINE
Payer: MEDICARE

## 2022-12-26 DIAGNOSIS — Z12.5 ENCOUNTER FOR PROSTATE CANCER SCREENING: ICD-10-CM

## 2022-12-26 DIAGNOSIS — I65.23 BILATERAL CAROTID ARTERY STENOSIS: ICD-10-CM

## 2022-12-26 DIAGNOSIS — N40.0 BENIGN PROSTATIC HYPERPLASIA WITHOUT LOWER URINARY TRACT SYMPTOMS: ICD-10-CM

## 2022-12-26 DIAGNOSIS — D47.2 MGUS (MONOCLONAL GAMMOPATHY OF UNKNOWN SIGNIFICANCE): ICD-10-CM

## 2022-12-26 DIAGNOSIS — E03.4 HYPOTHYROIDISM DUE TO ACQUIRED ATROPHY OF THYROID: ICD-10-CM

## 2022-12-26 DIAGNOSIS — Z79.899 ENCOUNTER FOR LONG-TERM (CURRENT) USE OF MEDICATIONS: ICD-10-CM

## 2022-12-26 LAB
ALBUMIN SERPL-MCNC: 3.3 G/DL (ref 3.4–5)
ALBUMIN/GLOB SERPL: 1 {RATIO} (ref 1–2)
ALP LIVER SERPL-CCNC: 82 U/L
ALT SERPL-CCNC: 19 U/L
ANION GAP SERPL CALC-SCNC: 0 MMOL/L (ref 0–18)
AST SERPL-CCNC: 22 U/L (ref 15–37)
BASOPHILS # BLD AUTO: 0.02 X10(3) UL (ref 0–0.2)
BASOPHILS NFR BLD AUTO: 0.4 %
BILIRUB SERPL-MCNC: 0.5 MG/DL (ref 0.1–2)
BILIRUB UR QL STRIP.AUTO: NEGATIVE
BUN BLD-MCNC: 19 MG/DL (ref 7–18)
CALCIUM BLD-MCNC: 9 MG/DL (ref 8.5–10.1)
CHLORIDE SERPL-SCNC: 108 MMOL/L (ref 98–112)
CHOLEST SERPL-MCNC: 164 MG/DL (ref ?–200)
CLARITY UR REFRACT.AUTO: CLEAR
CO2 SERPL-SCNC: 29 MMOL/L (ref 21–32)
COLOR UR AUTO: YELLOW
COMPLEXED PSA SERPL-MCNC: 1.23 NG/ML (ref ?–4)
CREAT BLD-MCNC: 1.28 MG/DL
EOSINOPHIL # BLD AUTO: 0.23 X10(3) UL (ref 0–0.7)
EOSINOPHIL NFR BLD AUTO: 4.1 %
ERYTHROCYTE [DISTWIDTH] IN BLOOD BY AUTOMATED COUNT: 12.8 %
FASTING PATIENT LIPID ANSWER: YES
FASTING STATUS PATIENT QL REPORTED: YES
GFR SERPLBLD BASED ON 1.73 SQ M-ARVRAT: 57 ML/MIN/1.73M2 (ref 60–?)
GLOBULIN PLAS-MCNC: 3.4 G/DL (ref 2.8–4.4)
GLUCOSE BLD-MCNC: 105 MG/DL (ref 70–99)
GLUCOSE UR STRIP.AUTO-MCNC: NEGATIVE MG/DL
HCT VFR BLD AUTO: 39.8 %
HDLC SERPL-MCNC: 118 MG/DL (ref 40–59)
HGB BLD-MCNC: 13.4 G/DL
IMM GRANULOCYTES # BLD AUTO: 0.01 X10(3) UL (ref 0–1)
IMM GRANULOCYTES NFR BLD: 0.2 %
KETONES UR STRIP.AUTO-MCNC: NEGATIVE MG/DL
LDLC SERPL CALC-MCNC: 35 MG/DL (ref ?–100)
LEUKOCYTE ESTERASE UR QL STRIP.AUTO: NEGATIVE
LYMPHOCYTES # BLD AUTO: 1.04 X10(3) UL (ref 1–4)
LYMPHOCYTES NFR BLD AUTO: 18.6 %
MCH RBC QN AUTO: 32.4 PG (ref 26–34)
MCHC RBC AUTO-ENTMCNC: 33.7 G/DL (ref 31–37)
MCV RBC AUTO: 96.4 FL
MONOCYTES # BLD AUTO: 0.66 X10(3) UL (ref 0.1–1)
MONOCYTES NFR BLD AUTO: 11.8 %
NEUTROPHILS # BLD AUTO: 3.64 X10 (3) UL (ref 1.5–7.7)
NEUTROPHILS # BLD AUTO: 3.64 X10(3) UL (ref 1.5–7.7)
NEUTROPHILS NFR BLD AUTO: 64.9 %
NITRITE UR QL STRIP.AUTO: NEGATIVE
NONHDLC SERPL-MCNC: 46 MG/DL (ref ?–130)
OSMOLALITY SERPL CALC.SUM OF ELEC: 287 MOSM/KG (ref 275–295)
PH UR STRIP.AUTO: 6 [PH] (ref 5–8)
PLATELET # BLD AUTO: 189 10(3)UL (ref 150–450)
POTASSIUM SERPL-SCNC: 3.8 MMOL/L (ref 3.5–5.1)
PROT SERPL-MCNC: 6.7 G/DL (ref 6.4–8.2)
PROT UR STRIP.AUTO-MCNC: NEGATIVE MG/DL
RBC # BLD AUTO: 4.13 X10(6)UL
RBC UR QL AUTO: NEGATIVE
SODIUM SERPL-SCNC: 137 MMOL/L (ref 136–145)
SP GR UR STRIP.AUTO: 1.02 (ref 1–1.03)
TRIGL SERPL-MCNC: 50 MG/DL (ref 30–149)
TSI SER-ACNC: 2.21 MIU/ML (ref 0.36–3.74)
UROBILINOGEN UR STRIP.AUTO-MCNC: 0.2 MG/DL
VLDLC SERPL CALC-MCNC: 7 MG/DL (ref 0–30)
WBC # BLD AUTO: 5.6 X10(3) UL (ref 4–11)

## 2022-12-26 PROCEDURE — 81003 URINALYSIS AUTO W/O SCOPE: CPT

## 2022-12-26 PROCEDURE — 84443 ASSAY THYROID STIM HORMONE: CPT

## 2022-12-26 PROCEDURE — 85025 COMPLETE CBC W/AUTO DIFF WBC: CPT

## 2022-12-26 PROCEDURE — 80053 COMPREHEN METABOLIC PANEL: CPT

## 2022-12-26 PROCEDURE — 36415 COLL VENOUS BLD VENIPUNCTURE: CPT

## 2022-12-26 PROCEDURE — 80061 LIPID PANEL: CPT

## 2023-01-05 ENCOUNTER — OFFICE VISIT (OUTPATIENT)
Facility: LOCATION | Age: 78
End: 2023-01-05
Payer: MEDICARE

## 2023-01-05 ENCOUNTER — TELEPHONE (OUTPATIENT)
Facility: LOCATION | Age: 78
End: 2023-01-05

## 2023-01-05 DIAGNOSIS — L91.8 HYPERTROPHIC GRANULATION TISSUE: Primary | ICD-10-CM

## 2023-01-05 PROCEDURE — 99024 POSTOP FOLLOW-UP VISIT: CPT | Performed by: SURGERY

## 2023-01-05 PROCEDURE — 17250 CHEM CAUT OF GRANLTJ TISSUE: CPT | Performed by: SURGERY

## 2023-01-05 NOTE — TELEPHONE ENCOUNTER
Per Dr Clara Andrade request, I called Dr Jef Alvarez office to gat patient in ASAP. I spoke with Brittnee at Dr Jef Alvarez. She said they can get him in within the next two weeks.  I called patient and let him know the appointment would be out of pocket as they do not take insurance

## 2023-01-05 NOTE — TELEPHONE ENCOUNTER
Pt was referred to Dr. Julieta Kaminski for a cosmetic surgery by Dr. Gracie House. Since the office do not take insurance, Matt Cheney would like another reommendation.  Phone number [phone]

## 2023-01-06 ENCOUNTER — PATIENT MESSAGE (OUTPATIENT)
Facility: LOCATION | Age: 78
End: 2023-01-06

## 2023-01-06 DIAGNOSIS — L91.8 HYPERTROPHIC GRANULATION TISSUE: Primary | ICD-10-CM

## 2023-01-06 NOTE — TELEPHONE ENCOUNTER
----- [id number] [facility] sent at 1/6/2023  1:59 PM CST -----  Regarding: Plastic surgeon  Raul Rojas. Thanks for referral to Dr. Leatha Carvajal. I just contacted his  office and they do take Medicare and BC /BS. Appointment desk said Dr Clive An and staff need to review my file and they will call me next week for an appointment. Might be helpful if Dr. Syeda Diaz. or your staff could put a note of referral need in the Epic file (maybe this has already been done). Thanks you for the help.        Stephanie Hernandez

## 2023-01-06 NOTE — TELEPHONE ENCOUNTER
----- [facility] [id number] [facility] sent at 1/6/2023  1:59 PM CST -----  Regarding: Plastic surgeon  Raul Barnard. Thanks for referral to Dr. Parveen Barraza. I just contacted his  office and they do take Medicare and BC /BS. Appointment desk said Dr Anne David and staff need to review my file and they will call me next week for an appointment. Might be helpful if Dr. Josué Mendoza. or your staff could put a note of referral need in the Epic file (maybe this has already been done). Thanks you for the help.        Claudia Devries      Referral sent to Dr. Anne David

## 2023-01-09 ENCOUNTER — OFFICE VISIT (OUTPATIENT)
Dept: WOUND CARE | Facility: HOSPITAL | Age: 78
End: 2023-01-09
Attending: INTERNAL MEDICINE
Payer: MEDICARE

## 2023-01-09 VITALS
BODY MASS INDEX: 27.83 KG/M2 | SYSTOLIC BLOOD PRESSURE: 140 MMHG | WEIGHT: 210 LBS | HEART RATE: 73 BPM | HEIGHT: 73 IN | RESPIRATION RATE: 17 BRPM | DIASTOLIC BLOOD PRESSURE: 56 MMHG | TEMPERATURE: 98 F

## 2023-01-09 DIAGNOSIS — D47.2 MGUS (MONOCLONAL GAMMOPATHY OF UNKNOWN SIGNIFICANCE): ICD-10-CM

## 2023-01-09 DIAGNOSIS — L02.419 AXILLARY ABSCESS: ICD-10-CM

## 2023-01-09 DIAGNOSIS — S41.102A OPEN WOUND OF LEFT AXILLARY REGION, INITIAL ENCOUNTER: Primary | ICD-10-CM

## 2023-01-09 PROCEDURE — 87205 SMEAR GRAM STAIN: CPT | Performed by: INTERNAL MEDICINE

## 2023-01-09 PROCEDURE — 99214 OFFICE O/P EST MOD 30 MIN: CPT

## 2023-01-09 PROCEDURE — 87070 CULTURE OTHR SPECIMN AEROBIC: CPT | Performed by: INTERNAL MEDICINE

## 2023-01-09 NOTE — PROGRESS NOTES
.Weekly Wound Education Note    Teaching Provided To: Patient  Training Topics: Cleasing and general instructions;Dressing; Discharge instructions; Test/procedures  Training Method: Explain/Verbal;Written  Training Response: Patient responds and understands           Wound cultured this visit. Patient to make a consult with Dr. Abhishek Mayfield. Honey gel to wound daily, cover with dry dressing.

## 2023-01-09 NOTE — PATIENT INSTRUCTIONS
Get ultrasound done before next appointment. See Plastics Dr. Raya GUTIÉRREZ. Wound culture. Wound Cleaning and Dressings:    Wash your hands with soap and water. Always wear gloves while changing dressings. Donot touch wound / roya-wound skin with un-gloved hands. Remove old dressing, discard and place into trash. DRESSINGS: honey gel  / band aid  Change dressing daily. Miscellaneous Instructions:  Supplement with a daily multivitamin   Low salt diet  Intense blood sugar control - Goal Blood sugar below 180 at all times recommended. Increase protein intake / consider protein supplements - see below  Elevate extremities at all times when sitting / laying down. DIETARY MODIFICATIONS TO HELP WITH WOUND HEALING:    Protein: Meats, beans, eggs, milk and yogurt particularly [place] yogurt), tofu, soy nuts, soy protein products    Vitamin C: Citrus fruits and juices, strawberries, tomatoes, tomato juice, peppers, baked potatoes, spinach, broccoli, cauliflower, [place] sprouts, cabbage    Vitamin A: Dark green, leafy vegetables, orange or yellow vegetables, cantaloupe, fortified dairy products, liver, fortified cereals    Zinc: Fortified cereals, red meats, seafood    Consider Emiliano by [employer] (These are essential branch chain amino acids that help with tissue building and wound healing) and take 2 packets/day. you can order online at [employer]ott or [id number] [facility] [employer] REMINDERS:    The treatment plan has been discussed at length with you and your provider. Follow all instructions carefully, it is very important. If you do not follow all instructions, you are at  risk of your wound not healing, infection, possible loss of limb and even end of life. Please call the clinic during regular business hours ( 7:30 AM - 5:30 PM) if you notice increased bleeding, redness, warmth, pain or pus like drainage or start running a fever greater than 100.3.     For after hour emergencies, please call your primary physician or go to the nearest emergency room.

## 2023-01-12 ENCOUNTER — TELEPHONE (OUTPATIENT)
Dept: SURGERY | Facility: CLINIC | Age: 78
End: 2023-01-12

## 2023-01-12 NOTE — TELEPHONE ENCOUNTER
Received photos of patients wound in department email. Instructed to [facility] patient for consult with Dr. Daksha Draper in 4-6 weeks.

## 2023-01-16 ENCOUNTER — OFFICE VISIT (OUTPATIENT)
Dept: WOUND CARE | Facility: HOSPITAL | Age: 78
End: 2023-01-16
Attending: INTERNAL MEDICINE
Payer: MEDICARE

## 2023-01-16 VITALS
SYSTOLIC BLOOD PRESSURE: 125 MMHG | DIASTOLIC BLOOD PRESSURE: 72 MMHG | HEART RATE: 70 BPM | RESPIRATION RATE: 18 BRPM | TEMPERATURE: 98 F

## 2023-01-16 DIAGNOSIS — L02.419 AXILLARY ABSCESS: ICD-10-CM

## 2023-01-16 DIAGNOSIS — D47.2 MGUS (MONOCLONAL GAMMOPATHY OF UNKNOWN SIGNIFICANCE): ICD-10-CM

## 2023-01-16 DIAGNOSIS — R59.1 LYMPHADENOPATHY: ICD-10-CM

## 2023-01-16 DIAGNOSIS — S41.102A OPEN WOUND OF LEFT AXILLARY REGION, INITIAL ENCOUNTER: Primary | ICD-10-CM

## 2023-01-16 PROCEDURE — 99213 OFFICE O/P EST LOW 20 MIN: CPT

## 2023-01-16 NOTE — PATIENT INSTRUCTIONS
Get ultrasound done before next appointment. See Plastics Dr. Yocasta GUTIÉRREZ. Wound culture. Wound Cleaning and Dressings:    Wash your hands with soap and water. Always wear gloves while changing dressings. Donot touch wound / roya-wound skin with un-gloved hands. Remove old dressing, discard and place into trash. DRESSINGS: honey gel  / band aid  Change dressing daily. Miscellaneous Instructions:  Supplement with a daily multivitamin   Low salt diet  Intense blood sugar control - Goal Blood sugar below 180 at all times recommended. Increase protein intake / consider protein supplements - see below  Elevate extremities at all times when sitting / laying down. DIETARY MODIFICATIONS TO HELP WITH WOUND HEALING:    Protein: Meats, beans, eggs, milk and yogurt particularly [place] yogurt), tofu, soy nuts, soy protein products    Vitamin C: Citrus fruits and juices, strawberries, tomatoes, tomato juice, peppers, baked potatoes, spinach, broccoli, cauliflower, [place] sprouts, cabbage    Vitamin A: Dark green, leafy vegetables, orange or yellow vegetables, cantaloupe, fortified dairy products, liver, fortified cereals    Zinc: Fortified cereals, red meats, seafood    Consider Emiliano by [employer] (These are essential branch chain amino acids that help with tissue building and wound healing) and take 2 packets/day. you can order online at [employer] or [address] [facility] [employer] REMINDERS:    The treatment plan has been discussed at length with you and your provider. Follow all instructions carefully, it is very important. If you do not follow all instructions, you are at  risk of your wound not healing, infection, possible loss of limb and even end of life. Please call the clinic during regular business hours ( 7:30 AM - 5:30 PM) if you notice increased bleeding, redness, warmth, pain or pus like drainage or start running a fever greater than 100.3.     For after hour emergencies, please call your primary physician or go to the nearest emergency room.

## 2023-01-16 NOTE — PROGRESS NOTES
.Weekly Wound Education Note    Teaching Provided To: Patient; Family  Training Topics: Cleasing and general instructions;Dressing; Discharge instructions; Test/procedures  Training Method: Explain/Verbal;Written  Training Response: Patient responds and understands           Ultrasound scheduled for the 27th, Dr. Raya Casas appointment on Feb. 10th. Continue honey gel, dry dressing.

## 2023-01-26 ENCOUNTER — TELEPHONE (OUTPATIENT)
Dept: INTERNAL MEDICINE CLINIC | Facility: CLINIC | Age: 78
End: 2023-01-26

## 2023-01-26 ENCOUNTER — OFFICE VISIT (OUTPATIENT)
Dept: INTERNAL MEDICINE CLINIC | Facility: CLINIC | Age: 78
End: 2023-01-26
Payer: MEDICARE

## 2023-01-26 VITALS
OXYGEN SATURATION: 99 % | WEIGHT: 210 LBS | HEIGHT: 72 IN | BODY MASS INDEX: 28.44 KG/M2 | HEART RATE: 99 BPM | TEMPERATURE: 98 F | SYSTOLIC BLOOD PRESSURE: 126 MMHG | DIASTOLIC BLOOD PRESSURE: 68 MMHG | RESPIRATION RATE: 16 BRPM

## 2023-01-26 DIAGNOSIS — J32.4 CHRONIC PANSINUSITIS: ICD-10-CM

## 2023-01-26 DIAGNOSIS — J33.9 NASAL POLYPOSIS: ICD-10-CM

## 2023-01-26 DIAGNOSIS — G60.9 IDIOPATHIC PERIPHERAL NEUROPATHY: ICD-10-CM

## 2023-01-26 DIAGNOSIS — F33.0 MAJOR DEPRESSIVE DISORDER, RECURRENT, MILD (HCC): ICD-10-CM

## 2023-01-26 DIAGNOSIS — J45.30 MILD PERSISTENT ASTHMA WITHOUT COMPLICATION: ICD-10-CM

## 2023-01-26 DIAGNOSIS — L02.419 ABSCESS OF AXILLA: ICD-10-CM

## 2023-01-26 DIAGNOSIS — I65.23 BILATERAL CAROTID ARTERY STENOSIS: ICD-10-CM

## 2023-01-26 DIAGNOSIS — Z00.00 ENCOUNTER FOR ANNUAL HEALTH EXAMINATION: ICD-10-CM

## 2023-01-26 DIAGNOSIS — D47.2 MGUS (MONOCLONAL GAMMOPATHY OF UNKNOWN SIGNIFICANCE): ICD-10-CM

## 2023-01-26 DIAGNOSIS — Z00.00 ANNUAL PHYSICAL EXAM: Primary | ICD-10-CM

## 2023-01-26 DIAGNOSIS — E03.4 HYPOTHYROIDISM DUE TO ACQUIRED ATROPHY OF THYROID: ICD-10-CM

## 2023-01-26 DIAGNOSIS — I77.9 VERTEBRAL ARTERY DISEASE (HCC): ICD-10-CM

## 2023-01-26 DIAGNOSIS — D47.2 NEUROPATHY ASSOCIATED WITH MGUS (HCC): ICD-10-CM

## 2023-01-26 DIAGNOSIS — F41.9 ANXIETY: ICD-10-CM

## 2023-01-26 DIAGNOSIS — G63 NEUROPATHY ASSOCIATED WITH MGUS (HCC): ICD-10-CM

## 2023-01-26 PROBLEM — D12.4 BENIGN NEOPLASM OF DESCENDING COLON: Status: RESOLVED | Noted: 2022-04-25 | Resolved: 2023-01-26

## 2023-01-26 PROBLEM — L91.8 HYPERTROPHIC GRANULATION TISSUE: Status: RESOLVED | Noted: 2022-10-04 | Resolved: 2023-01-26

## 2023-01-26 PROBLEM — Z86.010 PERSONAL HISTORY OF COLONIC POLYPS: Status: ACTIVE | Noted: 2023-01-26

## 2023-01-26 PROBLEM — Z51.89 ENCOUNTER FOR WOUND RE-CHECK: Status: RESOLVED | Noted: 2022-12-06 | Resolved: 2023-01-26

## 2023-01-26 PROBLEM — D12.3 BENIGN NEOPLASM OF TRANSVERSE COLON: Status: RESOLVED | Noted: 2022-04-25 | Resolved: 2023-01-26

## 2023-01-26 PROBLEM — D12.2 BENIGN NEOPLASM OF ASCENDING COLON: Status: RESOLVED | Noted: 2022-04-25 | Resolved: 2023-01-26

## 2023-01-26 PROBLEM — Z12.11 SPECIAL SCREENING FOR MALIGNANT NEOPLASM OF COLON: Status: RESOLVED | Noted: 2022-04-25 | Resolved: 2023-01-26

## 2023-01-26 PROBLEM — Z98.890 POST-OPERATIVE STATE: Status: RESOLVED | Noted: 2022-08-09 | Resolved: 2023-01-26

## 2023-01-26 PROBLEM — Z86.0100 PERSONAL HISTORY OF COLONIC POLYPS: Status: ACTIVE | Noted: 2023-01-26

## 2023-01-26 PROCEDURE — G0439 PPPS, SUBSEQ VISIT: HCPCS | Performed by: INTERNAL MEDICINE

## 2023-01-26 PROCEDURE — 1125F AMNT PAIN NOTED PAIN PRSNT: CPT | Performed by: INTERNAL MEDICINE

## 2023-01-26 RX ORDER — ALPRAZOLAM 0.25 MG/1
0.25 TABLET ORAL NIGHTLY PRN
Qty: 90 TABLET | Refills: 0 | Status: SHIPPED | OUTPATIENT
Start: 2023-01-26

## 2023-01-26 RX ORDER — LEVOTHYROXINE SODIUM 0.1 MG/1
100 TABLET ORAL
Qty: 90 TABLET | Refills: 3 | Status: SHIPPED | OUTPATIENT
Start: 2023-01-26

## 2023-01-26 RX ORDER — TRAZODONE HYDROCHLORIDE 100 MG/1
100 TABLET ORAL NIGHTLY PRN
Qty: 90 TABLET | Refills: 3 | Status: SHIPPED | OUTPATIENT
Start: 2023-01-26

## 2023-01-26 RX ORDER — FLUTICASONE PROPIONATE 50 MCG
1 SPRAY, SUSPENSION (ML) NASAL 2 TIMES DAILY PRN
Qty: 48 G | Refills: 1 | Status: SHIPPED | OUTPATIENT
Start: 2023-01-26

## 2023-01-27 ENCOUNTER — HOSPITAL ENCOUNTER (OUTPATIENT)
Dept: ULTRASOUND IMAGING | Facility: HOSPITAL | Age: 78
Discharge: HOME | End: 2023-01-27
Attending: INTERNAL MEDICINE
Payer: MEDICARE

## 2023-01-27 DIAGNOSIS — R59.1 LYMPHADENOPATHY: ICD-10-CM

## 2023-01-27 DIAGNOSIS — S41.102A OPEN WOUND OF LEFT AXILLARY REGION, INITIAL ENCOUNTER: ICD-10-CM

## 2023-01-27 DIAGNOSIS — L02.419 AXILLARY ABSCESS: ICD-10-CM

## 2023-01-27 PROCEDURE — 76882 US LMTD JT/FCL EVL NVASC XTR: CPT | Performed by: INTERNAL MEDICINE

## 2023-01-27 NOTE — TELEPHONE ENCOUNTER
[facility] requesting more information. Needs to be completed by 1/30/23    Faxed placed in triage.

## 2023-01-27 NOTE — PROGRESS NOTES
Spoke to patient - made aware of 3cm collection (possible seroma or abscess), provider would like surgeon to review as well. Provider to discuss more at [facility] appointment. Pt verbalizes understanding.

## 2023-01-30 ENCOUNTER — OFFICE VISIT (OUTPATIENT)
Dept: WOUND CARE | Facility: HOSPITAL | Age: 78
End: 2023-01-30
Attending: INTERNAL MEDICINE
Payer: MEDICARE

## 2023-01-30 VITALS
RESPIRATION RATE: 16 BRPM | TEMPERATURE: 98 F | HEART RATE: 92 BPM | SYSTOLIC BLOOD PRESSURE: 129 MMHG | DIASTOLIC BLOOD PRESSURE: 70 MMHG

## 2023-01-30 DIAGNOSIS — L02.419 AXILLARY ABSCESS: ICD-10-CM

## 2023-01-30 DIAGNOSIS — D47.2 MGUS (MONOCLONAL GAMMOPATHY OF UNKNOWN SIGNIFICANCE): ICD-10-CM

## 2023-01-30 DIAGNOSIS — S41.102A OPEN WOUND OF LEFT AXILLARY REGION, INITIAL ENCOUNTER: Primary | ICD-10-CM

## 2023-01-30 DIAGNOSIS — R59.1 LYMPHADENOPATHY: ICD-10-CM

## 2023-01-30 PROCEDURE — 99213 OFFICE O/P EST LOW 20 MIN: CPT

## 2023-01-30 RX ORDER — GENTAMICIN SULFATE 1 MG/G
1 OINTMENT TOPICAL 3 TIMES DAILY
Qty: 30 G | Refills: 3 | Status: SHIPPED | OUTPATIENT
Start: 2023-01-30

## 2023-01-30 NOTE — PATIENT INSTRUCTIONS
Wound Cleaning and Dressings:    Wash your hands with soap and water. Always wear gloves while changing dressings. Donot touch wound / roya-wound skin with un-gloved hands. Remove old dressing, discard and place into trash. DRESSINGS: gentamycin topical /  gauze / bandaid. Change dressing 2-3 times daily. Miscellaneous Instructions:  Supplement with a daily multivitamin   Low salt diet  Intense blood sugar control - Goal Blood sugar below 180 at all times recommended. Increase protein intake / consider protein supplements - see below  Elevate extremities at all times when sitting / laying down. DIETARY MODIFICATIONS TO HELP WITH WOUND HEALING:    Protein: Meats, beans, eggs, milk and yogurt particularly [place] yogurt), tofu, soy nuts, soy protein products    Vitamin C: Citrus fruits and juices, strawberries, tomatoes, tomato juice, peppers, baked potatoes, spinach, broccoli, cauliflower, [place] sprouts, cabbage    Vitamin A: Dark green, leafy vegetables, orange or yellow vegetables, cantaloupe, fortified dairy products, liver, fortified cereals    Zinc: Fortified cereals, red meats, seafood    Consider Emiliano by [employer] (These are essential branch chain amino acids that help with tissue building and wound healing) and take 2 packets/day. you can order online at [employer]ott or [address] [place] [address] [employer] REMINDERS:    The treatment plan has been discussed at length with you and your provider. Follow all instructions carefully, it is very important. If you do not follow all instructions, you are at  risk of your wound not healing, infection, possible loss of limb and even end of life. Please call the clinic during regular business hours ( 7:30 AM - 5:30 PM) if you notice increased bleeding, redness, warmth, pain or pus like drainage or start running a fever greater than 100.3. For after hour emergencies, please call your primary physician or go to the nearest emergency room.

## 2023-01-30 NOTE — PROGRESS NOTES
Weekly Wound Education Note    Teaching Provided To: Patient  Training Topics: Cleasing and general instructions; Discharge instructions;Dressing  Training Method: Explain/Verbal  Training Response: Patient responds and understands; Reinforcement needed        Notes: Stable. Gentamycin ordered today. Bacitracin and bordered gauze applied today. Pt scheduled to see plastic surgeon on 2/10/23.

## 2023-02-10 ENCOUNTER — OFFICE VISIT (OUTPATIENT)
Dept: SURGERY | Facility: CLINIC | Age: 78
End: 2023-02-10
Payer: MEDICARE

## 2023-02-10 VITALS
BODY MASS INDEX: 27.53 KG/M2 | SYSTOLIC BLOOD PRESSURE: 123 MMHG | OXYGEN SATURATION: 99 % | TEMPERATURE: 97 F | WEIGHT: 210 LBS | RESPIRATION RATE: 16 BRPM | HEIGHT: 73.2 IN | HEART RATE: 60 BPM | DIASTOLIC BLOOD PRESSURE: 72 MMHG

## 2023-02-10 DIAGNOSIS — S41.102D WOUND, OPEN AXILLA, LEFT, SUBSEQUENT ENCOUNTER: Primary | ICD-10-CM

## 2023-02-10 PROCEDURE — 99203 OFFICE O/P NEW LOW 30 MIN: CPT | Performed by: SURGERY

## 2023-02-23 ENCOUNTER — TELEPHONE (OUTPATIENT)
Dept: NEUROLOGY | Facility: CLINIC | Age: 78
End: 2023-02-23

## 2023-02-23 ENCOUNTER — OFFICE VISIT (OUTPATIENT)
Dept: NEUROLOGY | Facility: CLINIC | Age: 78
End: 2023-02-23
Payer: MEDICARE

## 2023-02-23 ENCOUNTER — LAB ENCOUNTER (OUTPATIENT)
Dept: LAB | Age: 78
End: 2023-02-23
Attending: Other
Payer: MEDICARE

## 2023-02-23 VITALS
DIASTOLIC BLOOD PRESSURE: 70 MMHG | WEIGHT: 210 LBS | SYSTOLIC BLOOD PRESSURE: 124 MMHG | RESPIRATION RATE: 16 BRPM | BODY MASS INDEX: 28 KG/M2 | HEART RATE: 90 BPM

## 2023-02-23 DIAGNOSIS — G60.9 IDIOPATHIC PERIPHERAL NEUROPATHY: Primary | ICD-10-CM

## 2023-02-23 DIAGNOSIS — M21.371 FOOT DROP, BILATERAL: ICD-10-CM

## 2023-02-23 DIAGNOSIS — D47.2 MGUS (MONOCLONAL GAMMOPATHY OF UNKNOWN SIGNIFICANCE): ICD-10-CM

## 2023-02-23 DIAGNOSIS — G60.9 IDIOPATHIC PERIPHERAL NEUROPATHY: ICD-10-CM

## 2023-02-23 DIAGNOSIS — M21.372 FOOT DROP, BILATERAL: ICD-10-CM

## 2023-02-23 LAB
FOLATE SERPL-MCNC: 19.6 NG/ML (ref 8.7–?)
VIT B12 SERPL-MCNC: 312 PG/ML (ref 193–986)

## 2023-02-23 PROCEDURE — 84425 ASSAY OF VITAMIN B-1: CPT

## 2023-02-23 PROCEDURE — 82746 ASSAY OF FOLIC ACID SERUM: CPT

## 2023-02-23 PROCEDURE — 82607 VITAMIN B-12: CPT

## 2023-02-23 PROCEDURE — 99215 OFFICE O/P EST HI 40 MIN: CPT | Performed by: OTHER

## 2023-02-23 NOTE — TELEPHONE ENCOUNTER
Patient presented to office visit with parking placard renewal form. Completed and signed by Dr. Coco Saunders, original returned to patient, copy to scan.

## 2023-02-23 NOTE — PROGRESS NOTES
Patient states no changes since last office visit with Dr. Mcdonald Both. Patient states most recent fall was yesterday.

## 2023-02-26 LAB — VITAMIN B1 (THIAMINE), WHOLE B: 148 NMOL/L

## 2023-03-01 ENCOUNTER — HOSPITAL ENCOUNTER (OUTPATIENT)
Dept: MRI IMAGING | Age: 78
Discharge: HOME | End: 2023-03-01
Attending: Other
Payer: MEDICARE

## 2023-03-01 DIAGNOSIS — M21.371 FOOT DROP, BILATERAL: ICD-10-CM

## 2023-03-01 DIAGNOSIS — M21.372 FOOT DROP, BILATERAL: ICD-10-CM

## 2023-03-01 PROCEDURE — 72148 MRI LUMBAR SPINE W/O DYE: CPT | Performed by: OTHER

## 2023-04-18 ENCOUNTER — OFFICE VISIT (OUTPATIENT)
Dept: SURGERY | Facility: CLINIC | Age: 78
End: 2023-04-18
Payer: MEDICARE

## 2023-04-18 DIAGNOSIS — S46.992D: Primary | ICD-10-CM

## 2023-04-18 DIAGNOSIS — S41.132D: Primary | ICD-10-CM

## 2023-04-18 PROCEDURE — 99212 OFFICE O/P EST SF 10 MIN: CPT | Performed by: SURGERY

## 2023-04-18 NOTE — PROGRESS NOTES
Shabana Pitts is a 65year old male who presents today for a follow-up. Since last being seen, the patient underwent evaluation by Dr. Steffi Hill and ultimately aspiration of his left shoulder which per the patient's report did not show any growth. .      Physical Examination:  Left axilla: A wound measuring approximately 5 mm in diameter is noted. Significant hypertrophic granulation tissue is noted. Minimal drainage is elicited. There is no purulence, malodor, or surrounding erythema. Assessment and Plan:  Left axillary wound. Despite negative cultures, the patient was counseled that this may still represent an issue with the underlying prosthesis. Given the patient's inability to deal with wound care, we discussed the option of exploration of the wound and closure with possible drain placement. The nature of the procedure was reviewed with the patient. We discussed the risks of surgery including but not limited to bleeding, infection, scarring, delayed wound healing, injury to adjacent structures, recurrent wound healing difficulties, and need for further surgery. We discussed expected postoperative course including need for activity limitation and possibly drains. Multiple questions were answered and patient satisfaction. No guarantees as to outcome were offered. The patient expresses understanding and wishes to proceed.

## 2023-04-18 NOTE — PATIENT INSTRUCTIONS
Surgeon:         Dr. Giles Malloy                                        Tel:         [phone]                                  Fax:        [phone]    Surgery/Procedure:   Exploration of left axillary wound and wound closure. 90 minutes. General anesthesia, outpatient. Hospital:  [facility]: [address] [facility] [address], [address]           [phone]  [facility]: [address], [place], [place]               [phone]    1. Someone will need to drive you to and from the hospital if your procedure is outpatient. 2.Do not drink alcohol or smoke 24 hours prior to your procedure. 3. Bring a picture ID and your insurance card. 4. You will be contacted by the hospital the day before to confirm the procedure time and location. 5. The hospital will also contact you approximately one week before surgery to schedule your COVID test.     6. Do not take any herbal supplements or blood thinners at least one week before your procedure/surgery. This includes NSAID's (aspirin, baby aspirin, Motrin, Ibuprofen, Aleve, Advil, Naproxen, etc), Plavix, fish oil, vitamin E, turmeric, CoQ10, or green tea supplements, etc. *TYLENOL or acetaminophen is ok to take*    7. PRE-OPERATIVE TESTING: History and physical with medical clearance is REQUIRED within 30 days of the surgery date and is mandatory per Dr. Harish Zhang. *If this is not done, your surgery will be postponed*  MEDICAL CLEARANCE WITH DR. MARTIN  CBC  CMP  EKG    8. Please inform us if you develop any Covid-19 like symptoms, test positive or have been exposed for Covid- 19 prior to surgery.      Consent obtained   Photos taken on 04/18/2023

## 2023-04-19 ENCOUNTER — TELEPHONE (OUTPATIENT)
Dept: SURGERY | Facility: CLINIC | Age: 78
End: 2023-04-19

## 2023-04-19 ENCOUNTER — TELEPHONE (OUTPATIENT)
Dept: INTERNAL MEDICINE CLINIC | Facility: CLINIC | Age: 78
End: 2023-04-19

## 2023-04-19 DIAGNOSIS — S46.992D: Primary | ICD-10-CM

## 2023-04-19 DIAGNOSIS — S41.132D: Primary | ICD-10-CM

## 2023-04-19 NOTE — TELEPHONE ENCOUNTER
Calling pt in regards to scheduling surgery. Informed pt that I have 05/17/2023 available at [facility] with Dr. Austen Llanos. Pt verbalized understanding and in agreement with date and location. All questions answered. Encouraged pt to call or [employer] message office with any other questions or concerns.

## 2023-04-19 NOTE — TELEPHONE ENCOUNTER
Spoke with pt he wanted to confirmed he can have his labs drawn tomorrow after his preop visit with Leann Jake. Pt was informed yes he will have his labs after visit and to fast 4 hours. Pt also inquired if he will have his EKG done during the visit and if Dr. Connor Severe will sign off on that and her visit note since he is seeing Leann Joseph. Pt informed yes Dr. Connor Severe will. He expressed understanding.

## 2023-04-19 NOTE — TELEPHONE ENCOUNTER
Patient would like a call back for questions regarding an upcoming surgery.      Pre surgical OV scheduled for 4/20/23  Last OV: 1/26/23

## 2023-04-20 ENCOUNTER — OFFICE VISIT (OUTPATIENT)
Dept: INTERNAL MEDICINE CLINIC | Facility: CLINIC | Age: 78
End: 2023-04-20
Payer: MEDICARE

## 2023-04-20 ENCOUNTER — LAB ENCOUNTER (OUTPATIENT)
Dept: LAB | Age: 78
End: 2023-04-20
Attending: NURSE PRACTITIONER
Payer: MEDICARE

## 2023-04-20 VITALS
WEIGHT: 210 LBS | RESPIRATION RATE: 18 BRPM | SYSTOLIC BLOOD PRESSURE: 112 MMHG | DIASTOLIC BLOOD PRESSURE: 80 MMHG | TEMPERATURE: 98 F | BODY MASS INDEX: 28 KG/M2 | HEART RATE: 52 BPM | OXYGEN SATURATION: 98 %

## 2023-04-20 DIAGNOSIS — J45.30 MILD PERSISTENT ASTHMA WITHOUT COMPLICATION: ICD-10-CM

## 2023-04-20 DIAGNOSIS — R00.1 BRADYCARDIA: ICD-10-CM

## 2023-04-20 DIAGNOSIS — Z01.818 PRE-OP EXAM: ICD-10-CM

## 2023-04-20 DIAGNOSIS — E03.4 HYPOTHYROIDISM DUE TO ACQUIRED ATROPHY OF THYROID: ICD-10-CM

## 2023-04-20 DIAGNOSIS — Z01.818 PRE-OP EXAM: Primary | ICD-10-CM

## 2023-04-20 LAB
ALBUMIN SERPL-MCNC: 3.3 G/DL (ref 3.4–5)
ALBUMIN/GLOB SERPL: 0.9 {RATIO} (ref 1–2)
ALP LIVER SERPL-CCNC: 73 U/L
ALT SERPL-CCNC: 19 U/L
ANION GAP SERPL CALC-SCNC: 1 MMOL/L (ref 0–18)
AST SERPL-CCNC: 24 U/L (ref 15–37)
ATRIAL RATE: 49 BPM
BASOPHILS # BLD AUTO: 0.03 X10(3) UL (ref 0–0.2)
BASOPHILS NFR BLD AUTO: 0.5 %
BILIRUB SERPL-MCNC: 0.3 MG/DL (ref 0.1–2)
BUN BLD-MCNC: 20 MG/DL (ref 7–18)
CALCIUM BLD-MCNC: 9.4 MG/DL (ref 8.5–10.1)
CHLORIDE SERPL-SCNC: 110 MMOL/L (ref 98–112)
CO2 SERPL-SCNC: 27 MMOL/L (ref 21–32)
CREAT BLD-MCNC: 1.3 MG/DL
EOSINOPHIL # BLD AUTO: 0.15 X10(3) UL (ref 0–0.7)
EOSINOPHIL NFR BLD AUTO: 2.3 %
ERYTHROCYTE [DISTWIDTH] IN BLOOD BY AUTOMATED COUNT: 13.5 %
FASTING STATUS PATIENT QL REPORTED: YES
GFR SERPLBLD BASED ON 1.73 SQ M-ARVRAT: 56 ML/MIN/1.73M2 (ref 60–?)
GLOBULIN PLAS-MCNC: 3.8 G/DL (ref 2.8–4.4)
GLUCOSE BLD-MCNC: 86 MG/DL (ref 70–99)
HCT VFR BLD AUTO: 40.7 %
HGB BLD-MCNC: 13.1 G/DL
IMM GRANULOCYTES # BLD AUTO: 0.03 X10(3) UL (ref 0–1)
IMM GRANULOCYTES NFR BLD: 0.5 %
LYMPHOCYTES # BLD AUTO: 1.56 X10(3) UL (ref 1–4)
LYMPHOCYTES NFR BLD AUTO: 24 %
MCH RBC QN AUTO: 31.6 PG (ref 26–34)
MCHC RBC AUTO-ENTMCNC: 32.2 G/DL (ref 31–37)
MCV RBC AUTO: 98.3 FL
MONOCYTES # BLD AUTO: 0.65 X10(3) UL (ref 0.1–1)
MONOCYTES NFR BLD AUTO: 10 %
NEUTROPHILS # BLD AUTO: 4.08 X10 (3) UL (ref 1.5–7.7)
NEUTROPHILS # BLD AUTO: 4.08 X10(3) UL (ref 1.5–7.7)
NEUTROPHILS NFR BLD AUTO: 62.7 %
OSMOLALITY SERPL CALC.SUM OF ELEC: 288 MOSM/KG (ref 275–295)
P AXIS: 64 DEGREES
P-R INTERVAL: 154 MS
PLATELET # BLD AUTO: 199 10(3)UL (ref 150–450)
POTASSIUM SERPL-SCNC: 4.3 MMOL/L (ref 3.5–5.1)
PROT SERPL-MCNC: 7.1 G/DL (ref 6.4–8.2)
Q-T INTERVAL: 486 MS
QRS DURATION: 156 MS
QTC CALCULATION (BEZET): 439 MS
R AXIS: 21 DEGREES
RBC # BLD AUTO: 4.14 X10(6)UL
SODIUM SERPL-SCNC: 138 MMOL/L (ref 136–145)
T AXIS: 35 DEGREES
TSI SER-ACNC: 1.83 MIU/ML (ref 0.36–3.74)
VENTRICULAR RATE: 49 BPM
WBC # BLD AUTO: 6.5 X10(3) UL (ref 4–11)

## 2023-04-20 PROCEDURE — 99214 OFFICE O/P EST MOD 30 MIN: CPT | Performed by: NURSE PRACTITIONER

## 2023-04-20 PROCEDURE — 80053 COMPREHEN METABOLIC PANEL: CPT

## 2023-04-20 PROCEDURE — 93000 ELECTROCARDIOGRAM COMPLETE: CPT | Performed by: NURSE PRACTITIONER

## 2023-04-20 PROCEDURE — 36415 COLL VENOUS BLD VENIPUNCTURE: CPT

## 2023-04-20 PROCEDURE — 85025 COMPLETE CBC W/AUTO DIFF WBC: CPT

## 2023-04-20 PROCEDURE — 84443 ASSAY THYROID STIM HORMONE: CPT

## 2023-04-21 NOTE — PROGRESS NOTES
Spoke to pt. Made aware of results & recommendations. Pt voiced understanding.   Called [facility] and scheduled appt for new bradycardia and needs cardiac clearance  Pt was notified of his appt on 4/26/23 at 8.50 am

## 2023-04-25 ENCOUNTER — PATIENT MESSAGE (OUTPATIENT)
Dept: INTERNAL MEDICINE CLINIC | Facility: CLINIC | Age: 78
End: 2023-04-25

## 2023-04-25 NOTE — TELEPHONE ENCOUNTER
From: Hilary Abrams  To: Kelsiezeeshan Matias APRLYN  Sent: 4/25/2023 3:48 PM CDT  Subject: thyroid test results    [place] - - I see on My Chart that the thyroid test you ordered for me came back in the \"normal\" range and consistent with prior levels. Does that mean that you will not be changing the dosage of my Levothyroxine? I see Cheryl Brasher, cardiologist, tomorrow.  Thanks, Deshawn Ott

## 2023-04-27 ENCOUNTER — TELEPHONE (OUTPATIENT)
Dept: SURGERY | Facility: CLINIC | Age: 78
End: 2023-04-27

## 2023-04-27 NOTE — TELEPHONE ENCOUNTER
Called patient to find out if he met with cardiologist based on PCP recommendation. Patient saw Dr. Lissett Pedroza on 4/26. imaging was ordered. I will fax cardiac clearance request to their office to ensure the results/clearance gets faxed to us. Patient was appreciative of the call.

## 2023-04-28 ENCOUNTER — PROCEDURE VISIT (OUTPATIENT)
Dept: NEUROLOGY | Facility: CLINIC | Age: 78
End: 2023-04-28
Payer: MEDICARE

## 2023-04-28 DIAGNOSIS — M21.371 FOOT DROP, BILATERAL: Primary | ICD-10-CM

## 2023-04-28 DIAGNOSIS — G60.9 IDIOPATHIC PERIPHERAL NEUROPATHY: ICD-10-CM

## 2023-04-28 DIAGNOSIS — M21.372 FOOT DROP, BILATERAL: Primary | ICD-10-CM

## 2023-04-28 PROCEDURE — 95912 NRV CNDJ TEST 11-12 STUDIES: CPT | Performed by: OTHER

## 2023-04-28 PROCEDURE — 95886 MUSC TEST DONE W/N TEST COMP: CPT | Performed by: OTHER

## 2023-05-01 RX ORDER — MELATONIN
1000 DAILY
COMMUNITY

## 2023-05-15 ENCOUNTER — TELEPHONE (OUTPATIENT)
Dept: SURGERY | Facility: CLINIC | Age: 78
End: 2023-05-15

## 2023-05-15 NOTE — TELEPHONE ENCOUNTER
Called [facility] to obtain cardiac clearance on patient. Spoke to [place], nurses will fax letter to our office.

## 2023-05-16 ENCOUNTER — TELEPHONE (OUTPATIENT)
Dept: SURGERY | Facility: CLINIC | Age: 78
End: 2023-05-16

## 2023-05-16 ENCOUNTER — OFFICE VISIT (OUTPATIENT)
Dept: PODIATRY CLINIC | Facility: CLINIC | Age: 78
End: 2023-05-16

## 2023-05-16 ENCOUNTER — ANESTHESIA EVENT (OUTPATIENT)
Dept: SURGERY | Facility: HOSPITAL | Age: 78
End: 2023-05-16
Payer: MEDICARE

## 2023-05-16 DIAGNOSIS — G63 NEUROPATHY ASSOCIATED WITH MGUS (HCC): ICD-10-CM

## 2023-05-16 DIAGNOSIS — B35.1 ONYCHOMYCOSIS: Primary | ICD-10-CM

## 2023-05-16 DIAGNOSIS — L60.0 INGROWN NAIL: ICD-10-CM

## 2023-05-16 DIAGNOSIS — D47.2 NEUROPATHY ASSOCIATED WITH MGUS (HCC): ICD-10-CM

## 2023-05-16 DIAGNOSIS — M20.42 HAMMER TOES OF BOTH FEET: ICD-10-CM

## 2023-05-16 DIAGNOSIS — D47.2 MGUS (MONOCLONAL GAMMOPATHY OF UNKNOWN SIGNIFICANCE): ICD-10-CM

## 2023-05-16 DIAGNOSIS — M20.41 HAMMER TOES OF BOTH FEET: ICD-10-CM

## 2023-05-16 PROCEDURE — 99203 OFFICE O/P NEW LOW 30 MIN: CPT | Performed by: STUDENT IN AN ORGANIZED HEALTH CARE EDUCATION/TRAINING PROGRAM

## 2023-05-16 NOTE — PATIENT INSTRUCTIONS
-Ambulate with supportive shoes. Avoid walking barefoot. Can apply Kerasal to nails to help with their appearance. Follow-up in 2 months for reevaluation or sooner if other concerns arise.

## 2023-05-17 ENCOUNTER — ANESTHESIA (OUTPATIENT)
Dept: SURGERY | Facility: HOSPITAL | Age: 78
End: 2023-05-17
Payer: MEDICARE

## 2023-05-17 ENCOUNTER — HOSPITAL ENCOUNTER (OUTPATIENT)
Facility: HOSPITAL | Age: 78
Setting detail: HOSPITAL OUTPATIENT SURGERY
Discharge: HOME | End: 2023-05-17
Attending: SURGERY | Admitting: SURGERY
Payer: MEDICARE

## 2023-05-17 VITALS
BODY MASS INDEX: 27.57 KG/M2 | WEIGHT: 208 LBS | SYSTOLIC BLOOD PRESSURE: 136 MMHG | TEMPERATURE: 98 F | DIASTOLIC BLOOD PRESSURE: 71 MMHG | RESPIRATION RATE: 18 BRPM | OXYGEN SATURATION: 98 % | HEIGHT: 73 IN | HEART RATE: 77 BPM

## 2023-05-17 DIAGNOSIS — S46.992D: ICD-10-CM

## 2023-05-17 DIAGNOSIS — S41.132D: ICD-10-CM

## 2023-05-17 DIAGNOSIS — L02.419 ABSCESS OF AXILLA: Primary | ICD-10-CM

## 2023-05-17 PROCEDURE — 87205 SMEAR GRAM STAIN: CPT | Performed by: SURGERY

## 2023-05-17 PROCEDURE — 87176 TISSUE HOMOGENIZATION CULTR: CPT | Performed by: SURGERY

## 2023-05-17 PROCEDURE — 3E013KZ INTRODUCTION OF OTHER DIAGNOSTIC SUBSTANCE INTO SUBCUTANEOUS TISSUE, PERCUTANEOUS APPROACH: ICD-10-PCS | Performed by: SURGERY

## 2023-05-17 PROCEDURE — 87075 CULTR BACTERIA EXCEPT BLOOD: CPT | Performed by: SURGERY

## 2023-05-17 PROCEDURE — 87070 CULTURE OTHR SPECIMN AEROBIC: CPT | Performed by: SURGERY

## 2023-05-17 PROCEDURE — 0XB50ZZ EXCISION OF LEFT AXILLA, OPEN APPROACH: ICD-10-PCS | Performed by: SURGERY

## 2023-05-17 RX ORDER — DOCUSATE SODIUM 100 MG/1
100 CAPSULE, LIQUID FILLED ORAL 2 TIMES DAILY
Qty: 30 CAPSULE | Refills: 1 | Status: SHIPPED | OUTPATIENT
Start: 2023-05-17

## 2023-05-17 RX ORDER — ACETAMINOPHEN 500 MG
1000 TABLET ORAL ONCE AS NEEDED
Status: COMPLETED | OUTPATIENT
Start: 2023-05-17 | End: 2023-05-17

## 2023-05-17 RX ORDER — CEFAZOLIN SODIUM/WATER 2 G/20 ML
2 SYRINGE (ML) INTRAVENOUS ONCE
Status: COMPLETED | OUTPATIENT
Start: 2023-05-17 | End: 2023-05-17

## 2023-05-17 RX ORDER — HYDROMORPHONE HYDROCHLORIDE 1 MG/ML
0.2 INJECTION, SOLUTION INTRAMUSCULAR; INTRAVENOUS; SUBCUTANEOUS EVERY 5 MIN PRN
Status: DISCONTINUED | OUTPATIENT
Start: 2023-05-17 | End: 2023-05-17

## 2023-05-17 RX ORDER — HYDROCODONE BITARTRATE AND ACETAMINOPHEN 5; 325 MG/1; MG/1
1-2 TABLET ORAL EVERY 4 HOURS PRN
Qty: 20 TABLET | Refills: 0 | Status: SHIPPED | OUTPATIENT
Start: 2023-05-17

## 2023-05-17 RX ORDER — HYDROCODONE BITARTRATE AND ACETAMINOPHEN 5; 325 MG/1; MG/1
2 TABLET ORAL ONCE AS NEEDED
Status: COMPLETED | OUTPATIENT
Start: 2023-05-17 | End: 2023-05-17

## 2023-05-17 RX ORDER — HYDROCODONE BITARTRATE AND ACETAMINOPHEN 5; 325 MG/1; MG/1
1 TABLET ORAL ONCE AS NEEDED
Status: COMPLETED | OUTPATIENT
Start: 2023-05-17 | End: 2023-05-17

## 2023-05-17 RX ORDER — EPHEDRINE SULFATE 50 MG/ML
INJECTION INTRAVENOUS AS NEEDED
Status: DISCONTINUED | OUTPATIENT
Start: 2023-05-17 | End: 2023-05-17 | Stop reason: SURG

## 2023-05-17 RX ORDER — LIDOCAINE HYDROCHLORIDE 10 MG/ML
INJECTION, SOLUTION EPIDURAL; INFILTRATION; INTRACAUDAL; PERINEURAL AS NEEDED
Status: DISCONTINUED | OUTPATIENT
Start: 2023-05-17 | End: 2023-05-17 | Stop reason: SURG

## 2023-05-17 RX ORDER — SODIUM CHLORIDE, SODIUM LACTATE, POTASSIUM CHLORIDE, CALCIUM CHLORIDE 600; 310; 30; 20 MG/100ML; MG/100ML; MG/100ML; MG/100ML
INJECTION, SOLUTION INTRAVENOUS CONTINUOUS
Status: DISCONTINUED | OUTPATIENT
Start: 2023-05-17 | End: 2023-05-17

## 2023-05-17 RX ORDER — ONDANSETRON 2 MG/ML
4 INJECTION INTRAMUSCULAR; INTRAVENOUS EVERY 6 HOURS PRN
Status: DISCONTINUED | OUTPATIENT
Start: 2023-05-17 | End: 2023-05-17

## 2023-05-17 RX ORDER — METOCLOPRAMIDE HYDROCHLORIDE 5 MG/ML
10 INJECTION INTRAMUSCULAR; INTRAVENOUS EVERY 8 HOURS PRN
Status: DISCONTINUED | OUTPATIENT
Start: 2023-05-17 | End: 2023-05-17

## 2023-05-17 RX ORDER — NALOXONE HYDROCHLORIDE 0.4 MG/ML
80 INJECTION, SOLUTION INTRAMUSCULAR; INTRAVENOUS; SUBCUTANEOUS AS NEEDED
Status: DISCONTINUED | OUTPATIENT
Start: 2023-05-17 | End: 2023-05-17

## 2023-05-17 RX ORDER — ONDANSETRON 2 MG/ML
INJECTION INTRAMUSCULAR; INTRAVENOUS AS NEEDED
Status: DISCONTINUED | OUTPATIENT
Start: 2023-05-17 | End: 2023-05-17 | Stop reason: SURG

## 2023-05-17 RX ORDER — ACETAMINOPHEN 500 MG
1000 TABLET ORAL ONCE
Status: DISCONTINUED | OUTPATIENT
Start: 2023-05-17 | End: 2023-05-17 | Stop reason: HOSPADM

## 2023-05-17 RX ORDER — CEPHALEXIN 500 MG/1
500 CAPSULE ORAL 4 TIMES DAILY
Qty: 40 CAPSULE | Refills: 0 | Status: SHIPPED | OUTPATIENT
Start: 2023-05-17 | End: 2023-05-27

## 2023-05-17 RX ORDER — HYDROMORPHONE HYDROCHLORIDE 1 MG/ML
0.6 INJECTION, SOLUTION INTRAMUSCULAR; INTRAVENOUS; SUBCUTANEOUS EVERY 5 MIN PRN
Status: DISCONTINUED | OUTPATIENT
Start: 2023-05-17 | End: 2023-05-17

## 2023-05-17 RX ORDER — HYDROMORPHONE HYDROCHLORIDE 1 MG/ML
0.4 INJECTION, SOLUTION INTRAMUSCULAR; INTRAVENOUS; SUBCUTANEOUS EVERY 5 MIN PRN
Status: DISCONTINUED | OUTPATIENT
Start: 2023-05-17 | End: 2023-05-17

## 2023-05-17 RX ORDER — DEXAMETHASONE SODIUM PHOSPHATE 4 MG/ML
VIAL (ML) INJECTION AS NEEDED
Status: DISCONTINUED | OUTPATIENT
Start: 2023-05-17 | End: 2023-05-17 | Stop reason: SURG

## 2023-05-17 RX ORDER — LIDOCAINE HYDROCHLORIDE AND EPINEPHRINE 10; 10 MG/ML; UG/ML
INJECTION, SOLUTION INFILTRATION; PERINEURAL AS NEEDED
Status: DISCONTINUED | OUTPATIENT
Start: 2023-05-17 | End: 2023-05-17 | Stop reason: HOSPADM

## 2023-05-17 RX ADMIN — LIDOCAINE HYDROCHLORIDE 50 MG: 10 INJECTION, SOLUTION EPIDURAL; INFILTRATION; INTRACAUDAL; PERINEURAL at 09:08:00

## 2023-05-17 RX ADMIN — CEFAZOLIN SODIUM/WATER 2 G: 2 G/20 ML SYRINGE (ML) INTRAVENOUS at 09:10:00

## 2023-05-17 RX ADMIN — SODIUM CHLORIDE, SODIUM LACTATE, POTASSIUM CHLORIDE, CALCIUM CHLORIDE: 600; 310; 30; 20 INJECTION, SOLUTION INTRAVENOUS at 09:04:00

## 2023-05-17 RX ADMIN — SODIUM CHLORIDE, SODIUM LACTATE, POTASSIUM CHLORIDE, CALCIUM CHLORIDE: 600; 310; 30; 20 INJECTION, SOLUTION INTRAVENOUS at 10:06:00

## 2023-05-17 RX ADMIN — EPHEDRINE SULFATE 5 MG: 50 INJECTION INTRAVENOUS at 09:22:00

## 2023-05-17 RX ADMIN — DEXAMETHASONE SODIUM PHOSPHATE 4 MG: 4 MG/ML VIAL (ML) INJECTION at 09:12:00

## 2023-05-17 RX ADMIN — EPHEDRINE SULFATE 5 MG: 50 INJECTION INTRAVENOUS at 09:45:00

## 2023-05-17 RX ADMIN — ONDANSETRON 4 MG: 2 INJECTION INTRAMUSCULAR; INTRAVENOUS at 10:01:00

## 2023-05-17 NOTE — ANESTHESIA PROCEDURE NOTES
Airway  Date/Time: 5/17/2023 9:09 AM  Urgency: elective    Airway not difficult    General Information and Staff    Patient location during procedure: OR  Anesthesiologist: Isma Jaime MD  Resident/CRNA: Cain Hernadez CRNA  Performed: CRNA   Performed by: Cain Hernadez CRNA  Authorized by: Isma Jaime MD      Indications and Patient Condition  Indications for airway management: anesthesia  Spontaneous Ventilation: absent  Sedation level: deep  Preoxygenated: yes  Patient position: sniffing  Mask difficulty assessment: 0 - not attempted    Final Airway Details  Final airway type: supraglottic airway      Successful airway: classic  Size 3       Number of attempts at approach: 1  Number of other approaches attempted: 0    Additional Comments  Dentition per pre op
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
## 2023-05-17 NOTE — DISCHARGE INSTRUCTIONS
Call for fevers, chills, erythema. May remove dressing and shower in 48 hours but no bathing. Apply antibiotic ointment/gauze daily. No heavy lifting or strenuous activity.
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
## 2023-05-17 NOTE — H&P
[facility]. We reviewed the plan for exploration of the left axillary wound and closure with possible drain placement. The nature of the procedure was reviewed with the patient. We discussed the risks of surgery including but not limited to bleeding, infection, scarring, delayed wound healing, injury to adjacent structures, recurrent wound healing difficulties, and need for further surgery. We discussed expected postoperative course including need for activity limitation and possibly drains. Multiple questions were answered and patient satisfaction. No guarantees as to outcome were offered. The patient expresses understanding and wishes to proceed.

## 2023-05-18 ENCOUNTER — TELEPHONE (OUTPATIENT)
Dept: SURGERY | Facility: CLINIC | Age: 78
End: 2023-05-18

## 2023-05-18 NOTE — TELEPHONE ENCOUNTER
Patient LVM requesting call back. I returned patients call and he was requesting clarification on post op instructions. I explained to the patient that he would be able to shower after 48 hours at that jennifer he would need to remove the dressings and then after his shower he would need to reapply the dressing and abx ointment. Patient verbalized understanding and was appreciative of the call.

## 2023-05-18 NOTE — OPERATIVE REPORT
[facility]    PATIENT'S NAME: Dylan [place] ANANT   ATTENDING PHYSICIAN: Yoni Woods M.D. OPERATING PHYSICIAN: Yoni Woods M.D. PATIENT ACCOUNT#:   [de-identified]    LOCATION:  [facility] 15 EDWP 10  MEDICAL RECORD #:   [id number]       YOB: 1945  ADMISSION DATE:       05/17/2023      OPERATION DATE:  05/17/2023    OPERATIVE REPORT      PREOPERATIVE DIAGNOSIS:  Left axillary wound. POSTOPERATIVE DIAGNOSIS:  Left axillary wound. PROCEDURE:  Debridement of left axillary wound and layered closure. ASSISTANT:  Stefanie Flores. CRISTÓBAL Farris     ANESTHESIA:  General.    ESTIMATED BLOOD LOSS:  Minimal.    COMPLICATIONS:  None. 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  This was complicated by infection requiring exchange. The patient subsequently presented with a draining sinus from an abscess of the left axillary region for which he underwent incision and drainage by Dr. Karen Elias. The patient has subsequently developed a nonhealing wound which has been refractory to local measures. The patient was referred back to his orthopedic surgeon for evaluation. Aspiration of the joint was performed and showed no growth. As such, we discussed the option of exploration of the wound with debridement and re-closure. OPERATIVE TECHNIQUE:  Informed consent was obtained from the patient. The risks, benefits, and alternatives were reviewed with the patient and wife preoperatively. They expressed understanding and wished to proceed. The patient was marked in the preoperative holding area. He was then taken to the operating room, properly identified, and placed in supine position. Sequential compression devices were placed on bilateral lower extremities. Intravenous antibiotic prophylaxis was administered. The patient then underwent successful induction of general anesthesia and endotracheal intubation.   The arms were placed abducted on foam padded cradles and loosely secured with Kerlix. The left axillary region was then prepped and draped sterilely. The wound was then probed with a cotton-tip applicator. It was noted to probe approximately 4 cm. The wound was injected with methylene blue. The margins of the wound were then infiltrated with a total of 10 mL 1% lidocaine with epinephrine. After waiting for the epinephrine to take effect, an ellipse encompassing the wound was incised. A tunnel of granulation tissue was identified and sharply excised from the contiguous structures. There was no visible foreign body nor was there visible extension to the joint noted. Once all the granulation tissue was excised, it was sent for permanent pathologic analysis. Cultures were then taken from the base of the wound. A small amount of residual granulation tissue was noted at the base, and this was curetted with a scalpel. Next, the wound was rinsed with Betadine irrigation and then 1 L of antibiotic irrigation. The margins of the wound were released with a scalpel. The wound was repaired in a layered fashion with 3-0 Vicryl deep sutures and 5-0 nylon vertical mattress sutures. Bacitracin, Xeroform, gauze, and Tegaderm were applied. The patient tolerated the procedure well. There were no complications. The operative findings were discussed postoperatively with the patient's wife, and questions were answered. Dictated By Trey Draper M.D.  d: 05/17/2023 10:29:27  t: 05/17/2023 18:58:46  Job [id number]  [facility]/

## 2023-05-22 ENCOUNTER — TELEPHONE (OUTPATIENT)
Dept: SURGERY | Facility: CLINIC | Age: 78
End: 2023-05-22

## 2023-05-22 NOTE — TELEPHONE ENCOUNTER
Received photo in secure department email. Called patient back. He was offered an appt with Dr. Melina Lynn tomorrow in [facility] at 10:30am and accepted. Patient denies fever. He was instructed if he does develop a fever, spreading redness or worsening drainage from abscess to present to the ER    Patient was appreciative of the call back.

## 2023-05-22 NOTE — TELEPHONE ENCOUNTER
Pt call and was returning [place] call, Pt stated he wasn't doing to well from his wound site Left armpit, call message to passed on to [place].

## 2023-05-22 NOTE — TELEPHONE ENCOUNTER
Spoke to patient who called back. He stated that the axillary wound is draining significantly. He denies redness to the surgical site. He reports the output being blood-tinged and cloudy. No odor. He rates his pain at a 2/10, and only has discomfort with movement. He is complaint with the antibiotics and has not needed to take Norco.     A photo was requested.

## 2023-05-22 NOTE — TELEPHONE ENCOUNTER
Received fax from on call answering service regarding patients wound drainage. Called patient and left voicemail to call back.

## 2023-05-23 ENCOUNTER — OFFICE VISIT (OUTPATIENT)
Dept: SURGERY | Facility: CLINIC | Age: 78
End: 2023-05-23
Payer: MEDICARE

## 2023-05-23 DIAGNOSIS — S41.102D WOUND, OPEN AXILLA, LEFT, SUBSEQUENT ENCOUNTER: Primary | ICD-10-CM

## 2023-05-23 PROCEDURE — 87206 SMEAR FLUORESCENT/ACID STAI: CPT | Performed by: SURGERY

## 2023-05-23 PROCEDURE — 87205 SMEAR GRAM STAIN: CPT | Performed by: SURGERY

## 2023-05-23 PROCEDURE — 87070 CULTURE OTHR SPECIMN AEROBIC: CPT | Performed by: SURGERY

## 2023-05-23 PROCEDURE — 87102 FUNGUS ISOLATION CULTURE: CPT | Performed by: SURGERY

## 2023-05-23 PROCEDURE — 87075 CULTR BACTERIA EXCEPT BLOOD: CPT | Performed by: SURGERY

## 2023-05-23 PROCEDURE — 87116 MYCOBACTERIA CULTURE: CPT | Performed by: SURGERY

## 2023-05-23 NOTE — PROGRESS NOTES
Malcolm Mcdonough is a 65year old male who presents today for a follow-up. Patient reports pain in his left axillary region. Denies fevers or chills. He noticed some clear drainage on his dressing over the past 48 hours. He is on oral antibiotics. Operative cultures have been negative to date. Pathology results are pending. Physical Examination:  Left axilla: The incision is intact. A bulla is noted with subcutaneous fluctuance. There is no erythema noted. Procedure: The area was anesthetized with topical local anesthetic. The area of fluctuance was then aspirated via the existing incision using an 18-gauge needle. 3 cc of straw-colored seroma fluid were aspirated and sent for culture. Assessment and Plan:  The patient was instructed to continue local wound care. He will continue his oral and biotics as previously prescribed. We will continue to follow the culture results. He will follow-up in 1 week for further assessment. The plan was reviewed with the patient and questions were answered.

## 2023-05-30 ENCOUNTER — TELEPHONE (OUTPATIENT)
Dept: SURGERY | Facility: CLINIC | Age: 78
End: 2023-05-30

## 2023-05-30 ENCOUNTER — OFFICE VISIT (OUTPATIENT)
Dept: SURGERY | Facility: CLINIC | Age: 78
End: 2023-05-30
Payer: MEDICARE

## 2023-05-30 DIAGNOSIS — S41.102D OPEN WOUND OF LEFT AXILLARY REGION, SUBSEQUENT ENCOUNTER: Primary | ICD-10-CM

## 2023-05-30 DIAGNOSIS — L02.419 ABSCESS OF AXILLA: ICD-10-CM

## 2023-05-30 RX ORDER — CEPHALEXIN 500 MG/1
500 CAPSULE ORAL 4 TIMES DAILY
Qty: 40 CAPSULE | Refills: 0 | Status: SHIPPED | OUTPATIENT
Start: 2023-05-30 | End: 2023-06-09

## 2023-05-30 NOTE — TELEPHONE ENCOUNTER
LVM for patient to offer appt today with Dr. Jonathon Rose in [facility] after receiving updated photo.

## 2023-05-30 NOTE — TELEPHONE ENCOUNTER
Spoke to [place] at  Dr. Lucas [place] office to schedule patient for ID consult. They stated referral needed to be sent to their office. Referral sent.

## 2023-05-30 NOTE — PROGRESS NOTES
Terence Ahumada is a 65year old male who presents today for a follow-up. Since his last visit, cultures from his aspiration showed Propionibacterium acne. He denies fevers or chills and remains on oral Keflex. Physical Examination:  The left axillary incision is clean dry and intact. An approximately 2 cm bulla is noted. Attempted aspiration of the bulla was performed sterilely. No drainable fluid was encountered. Approximately 1 cc of straw-colored fluid were expressed through the puncture site. A dry dressing was applied. Assessment and Plan:  Findings were reviewed with the patient. As the most recent culture is identical to the culture results from his previous prosthetic infection, concerned about involvement of the shoulder prosthesis. The patient was arranged to see Dr. Rachel Garcia later this week. We also coordinated for infectious disease evaluation. In the interim, the patient will continue his oral Keflex. He instructed to call for fevers or chills. The plan was reviewed with the patient and questions were answered.

## 2023-05-31 NOTE — TELEPHONE ENCOUNTER
Spoke to Dr. Jeimy Box office. Scheduled an appt for patient on 6/1/23 at 9:10am at their [place] location. Records were faxed to Dr. Jeimy Box office.

## 2023-06-02 ENCOUNTER — OFFICE VISIT (OUTPATIENT)
Dept: SURGERY | Facility: CLINIC | Age: 78
End: 2023-06-02
Payer: MEDICARE

## 2023-06-02 ENCOUNTER — TELEPHONE (OUTPATIENT)
Dept: SURGERY | Facility: CLINIC | Age: 78
End: 2023-06-02

## 2023-06-02 DIAGNOSIS — S41.102D OPEN WOUND OF LEFT AXILLARY REGION, SUBSEQUENT ENCOUNTER: Primary | ICD-10-CM

## 2023-06-02 NOTE — PROGRESS NOTES
Tiney Lennox is a 65year old male who presents today for a follow-up. The patient was seen by Dr. Vinay Jenkins yesterday we reviewed treatment options. The patient is leaning towards chronic suppressive antibiotic therapy and will be meeting with ID to discuss this. He reports intermittent drainage from his left axillary wound. He was switched to oral Bactrim by Dr. Vinay Jenkins. Physical Examination:  Left axilla: The left axillary incision is clean dry and intact. An approximately 2 cm bulla is noted. There is a pinpoint area of granulation tissue at the superior aspect of the bulla and approximately 1 cc of slightly cloudy straw-colored fluid was expressed through this. Assessment and Plan:  The patient will continue local wound care and antibiotics as per Dr. Vinay Jenkins. He will return for reassessment after that with infectious disease. He was instructed to call if he develops fevers, chills, or erythema. The plan was reviewed with the patient and questions were answered.

## 2023-06-02 NOTE — TELEPHONE ENCOUNTER
Called DR. White's office and spoke to Lucille. Dr. Clive An is requesting a call back from Dr. Arcelia Quevedo to discuss this patients plan of care. The message was forwarded to Dr. Arcelia Quevedo by Lucille.

## 2023-06-13 ENCOUNTER — TELEPHONE (OUTPATIENT)
Dept: SURGERY | Facility: CLINIC | Age: 78
End: 2023-06-13

## 2023-06-13 NOTE — TELEPHONE ENCOUNTER
Called patient to follow up on infectious disease appt. Patient has an appt scheduled on 06/20/23 with infectious disease.  He confirmed his follow up appt with Dr. Stefan Pate on 06/23/23

## 2023-06-23 ENCOUNTER — OFFICE VISIT (OUTPATIENT)
Dept: SURGERY | Facility: CLINIC | Age: 78
End: 2023-06-23
Payer: MEDICARE

## 2023-06-23 DIAGNOSIS — L02.412 ABSCESS OF LEFT AXILLA: Primary | ICD-10-CM

## 2023-06-23 NOTE — PROGRESS NOTES
Tiney Lennox is a 65year old male who presents today for a follow-up. Since his last visit, the patient met with Dr. Charan Burton from infectious disease and was started on suppressive antibiotics. He denies fevers or chills but reports an area of fluctuance over his left axilla. Physical Examination:  The left axilla is noted to have an area of fluctuance which is slightly raised and measures approxi-1 cm in diameter. The area was sterilely aspirated 3 cc of straw-colored slightly cloudy fluid were evacuated. Assessment and Plan:  The patient will continue his suppressive antibiotic therapy. Should he develop further wound healing difficulties or abscesses, he was instructed to call for further evaluation. The plan is reviewed with the patient and questions were answered.

## 2023-08-02 ENCOUNTER — OFFICE VISIT (OUTPATIENT)
Dept: PODIATRY CLINIC | Facility: CLINIC | Age: 78
End: 2023-08-02

## 2023-08-02 DIAGNOSIS — D47.2 MGUS (MONOCLONAL GAMMOPATHY OF UNKNOWN SIGNIFICANCE): ICD-10-CM

## 2023-08-02 DIAGNOSIS — M20.42 HAMMER TOES OF BOTH FEET: ICD-10-CM

## 2023-08-02 DIAGNOSIS — L60.0 INGROWN NAIL: ICD-10-CM

## 2023-08-02 DIAGNOSIS — M20.41 HAMMER TOES OF BOTH FEET: ICD-10-CM

## 2023-08-02 DIAGNOSIS — D47.2 NEUROPATHY ASSOCIATED WITH MGUS (HCC): ICD-10-CM

## 2023-08-02 DIAGNOSIS — B35.1 ONYCHOMYCOSIS: Primary | ICD-10-CM

## 2023-08-02 DIAGNOSIS — G63 NEUROPATHY ASSOCIATED WITH MGUS (HCC): ICD-10-CM

## 2023-08-02 PROCEDURE — 99213 OFFICE O/P EST LOW 20 MIN: CPT | Performed by: STUDENT IN AN ORGANIZED HEALTH CARE EDUCATION/TRAINING PROGRAM

## 2023-08-02 NOTE — PATIENT INSTRUCTIONS
Ambulate with supportive shoes and avoid walking barefoot. Check feet daily. Follow-up in 2 to 3 months or sooner if other concerns arise.

## 2023-08-02 NOTE — PROGRESS NOTES
[facility], [facility] Podiatry  Progress Note    Rowdy Rodrigez is a 65year old male. Patient presents with:  Toenail Care: Nail care and foot check. 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 He has elongated and thickened nails he has difficulty trimming his own. He does get some ingrown nails to his left foot. He has history of second digit nail matrixectomy. Patient cannot feel his feet very well and has difficulty trimming them on his own. No other complaints are mentioned. Past medical history, medications, allergies reviewed. Allergies: Immune Globulin   Current Outpatient Medications   Medication Sig Dispense Refill    docusate sodium 100 MG Oral Cap Take 1 capsule (100 mg total) by mouth 2 (two) times daily. 30 capsule 1    Glucosamine-Chondroitin (GLUCOSAMINE CHONDR COMPLEX OR) Take by mouth.      cyanocobalamin 1000 MCG Oral Tab Take 1 tablet (1,000 mcg total) by mouth daily. gentamicin 0.1 % External Ointment Apply 1 Application. topically 3 (three) times daily. 30 g 3    ALPRAZolam 0.25 MG Oral Tab Take 1 tablet (0.25 mg total) by mouth nightly as needed. 90 tablet 0    fluticasone propionate 50 MCG/ACT Nasal Suspension 1 spray by Each Nare route 2 (two) times daily as needed for Rhinitis. 48 g 1    levothyroxine 100 MCG Oral Tab Take 1 tablet (100 mcg total) by mouth once daily. Overdue for labs, please complete them. 90 tablet 3    traZODone 100 MG Oral Tab Take 1 tablet (100 mg total) by mouth nightly as needed for Sleep. (Patient taking differently: Take 1 tablet (100 mg total) by mouth nightly as needed for Sleep. Pt takes as needed) 90 tablet 3    Beclomethasone Diprop HFA 40 MCG/ACT Inhalation Aerosol, Breath Activated Inhale 2 puffs into the lungs 2 (two) times daily as needed.  2 each 0    gabapentin 300 MG Oral Cap Take 1 capsule (300 mg total) by mouth in the morning and 1 capsule (300 mg total) in the evening and 1 capsule (300 mg total) before bedtime. 270 capsule 3    senna-docusate 8.6-50 MG Oral Tab Take 1 tablet by mouth daily. 30 tablet 0    erythromycin 5 MG/GM Ophthalmic Ointment Place 1 Application. into both eyes nightly. Uses 3-4 x weekly before bed      tamsulosin (FLOMAX) cap Take 1 capsule (0.4 mg total) by mouth daily. 90 capsule 3    finasteride 5 MG Oral Tab Take 1 tablet (5 mg total) by mouth daily. 90 tablet 3    atorvastatin 10 MG Oral Tab Take 1 tablet (10 mg total) by mouth daily. CALCIUM OR Take by mouth daily. Albuterol Sulfate  (90 BASE) MCG/ACT Inhalation Aero Soln Inhale 2 puffs into the lungs every 6 (six) hours as needed for Wheezing. Multiple Vitamin (MULTI-VITAMIN OR) Take 1 tablet by mouth daily. HYDROcodone-acetaminophen 5-325 MG Oral Tab Take 1-2 tablets by mouth every 4 (four) hours as needed.  20 tablet 0      Past Medical History:   Diagnosis Date    Asthma     Atypical mole frequent body Nevi    Back problem     low back pain, s/p fusion of L3,4,5.  back still stiff    Belching     BPH (benign prostatic hyperplasia)     Cataract 2015    s/p sugery    Constipation     Depression     Disorder of prostate BPH (I'm a 67year old male)    Flatulence/gas pain/belching     Frequent urination 2 x's / night    Frequent use of laxatives     Hearing impairment     Slight    Hemorrhoids very slight    Hemorrhoids, internal 02/29/2012    High cholesterol     Elevated     Insomnia 10/22/2010    Irregular bowel habits     Itch of skin unknown cause    Leg swelling ankles at PM    Neuropathy     R leg    Obesity     slightly overweight    Osteoarthritis 2010    Poor balance     uses cane    Rotator cuff sprain 10/22/2010    Seborrheic keratosis 10/22/2010    Shoulder joint pain 10/22/2010    Sinusitis     Unspecified disorder of thyroid     hypothyroid    Visual impairment     glasses reading      Past Surgical History: Procedure Laterality Date    APPENDECTOMY      ARTHROSCOPY OF JOINT UNLISTED Right     knee    BACK SURGERY  15    triple fusion of L3-L4-L5    BIOPSY  2019    Bone Marrow    BIOPSY  2019    right sural nerve biopsy    CARPAL TUNNEL RELEASE Bilateral     CATARACT Bilateral 2015    IOL'S    COLONOSCOPY      HEMORRHOIDECTOMY  ~     ORTHOPEDIC SURG (PBP)      LT ELBOW - ulnar nerve release    OTHER  2019    Lumbar puncture    OTHER SURGICAL HISTORY      Shoulder Joint Replacement 15, Sinus surgery ,    OTHER SURGICAL HISTORY      I&D of abscess in axilla area 7/3    SIGMOIDOSCOPY,DIAGNOSTIC      SINUS SURGERY    2014    SPINE SURGERY PROCEDURE UNLISTED      L3-4-5    TONSILLECTOMY        Family History   Problem Relation Age of Onset    Breast Cancer Mother     Cancer Mother         breast cancer- late in life    Heart Disorder Mother         cardiac issues related to aging    Heart Disorder Father         partially blocked carotid arteries    Hypertension Father     Cancer Father         ?  cancer? Lipids Father     Hypertension Brother     Hypertension Brother       Social History    Socioeconomic History      Marital status:     Tobacco Use      Smoking status: Former        Packs/day: 1.50        Years: 32.00        Pack years: 50        Types: Cigarettes, Pipe        Quit date: 3/1/1998        Years since quittin.4      Smokeless tobacco: Never      Tobacco comments: Quit many years ago    Vaping Use      Vaping Use: Never used    Substance and Sexual Activity      Alcohol use:  Yes        Alcohol/week: 11.0 - 18.0 standard drinks of alcohol        Types: 6 - 8 Glasses of wine, 1 - 2 Cans of beer, 4 - 8 Shots of liquor per week        Comment: 1-2 drinks daily      Drug use: No    Other Topics      Concerns:        Caffeine Concern: Yes          2-3 cups a day        Exercise: Yes          at home          REVIEW OF SYSTEMS:     Today reviewed systems as documented below  GENERAL HEALTH: feels well otherwise  SKIN: denies any unusual skin lesions or rashes  RESPIRATORY: denies shortness of breath with exertion  CARDIOVASCULAR: denies chest pain on exertion  GI: denies abdominal pain and denies heartburn  NEURO: denies headaches  MUSCULO: denies arthritis, back pain      EXAM:   There were no vitals taken for this visit. GENERAL: well developed, well nourished, in no apparent distress  EXTREMITIES:   1. Integument: Normal skin temperature and turgor. Nails x10 are elongated, thickened, dystrophic, subungual debris. Lateral borders of hallux nails are incurvated, especially right hallux. No acute signs of infection. 2. Vascular: Dorsalis pedis two out of four bilateral and posterior tibial pulses two out of   four bilateral, capillary refill normal.  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 3. Musculoskeletal: All muscle groups are graded 5 out of 5 in the foot and ankle. Compartments soft and compressible. 4. Neurological: Normal sharp dull sensation; reflexes normal.  Decreased protective sensation noted to lower extremities. ASSESSMENT AND PLAN:   Diagnoses and all orders for this visit:    Onychomycosis    Ingrown nail    MGUS (monoclonal gammopathy of unknown significance)    Neuropathy associated with MGUS (HCC)    Hammer toes of both feet          Plan:     -Patient examined, chart history reviewed. -Discussed importance of proper pedal hygiene, regular foot checks.  -Sharply debrided nails x10 with a sterile nail nipper achieving a 20% reduction in thickness and length, without incident. Nails further smoothed with dremel.  -We will monitor nails for now.   If thickness bothersome in the future could consider treatment with topical versus oral medication.  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  -Educated patient on acute signs of infection advised patient to seek immediate medical attention if symptoms arise. The patient indicates understanding of these issues and agrees to the plan. Return in about 2 months (around 10/2/2023) for routine foot care.     Arslan Patel DPM

## 2023-08-07 ENCOUNTER — OFFICE VISIT (OUTPATIENT)
Dept: NEUROLOGY | Facility: CLINIC | Age: 78
End: 2023-08-07
Payer: MEDICARE

## 2023-08-07 ENCOUNTER — LAB ENCOUNTER (OUTPATIENT)
Dept: LAB | Age: 78
End: 2023-08-07
Attending: Other
Payer: MEDICARE

## 2023-08-07 VITALS
BODY MASS INDEX: 27 KG/M2 | WEIGHT: 208 LBS | DIASTOLIC BLOOD PRESSURE: 70 MMHG | SYSTOLIC BLOOD PRESSURE: 130 MMHG | HEART RATE: 49 BPM | RESPIRATION RATE: 16 BRPM

## 2023-08-07 DIAGNOSIS — G60.9 IDIOPATHIC PERIPHERAL NEUROPATHY: ICD-10-CM

## 2023-08-07 DIAGNOSIS — R79.9 ABNORMAL FINDING OF BLOOD CHEMISTRY, UNSPECIFIED: ICD-10-CM

## 2023-08-07 DIAGNOSIS — G60.9 IDIOPATHIC PERIPHERAL NEUROPATHY: Primary | ICD-10-CM

## 2023-08-07 DIAGNOSIS — M54.16 LUMBAR RADICULOPATHY: ICD-10-CM

## 2023-08-07 DIAGNOSIS — M21.371 BILATERAL FOOT-DROP: ICD-10-CM

## 2023-08-07 DIAGNOSIS — D47.2 MGUS (MONOCLONAL GAMMOPATHY OF UNKNOWN SIGNIFICANCE): ICD-10-CM

## 2023-08-07 DIAGNOSIS — R27.8 SENSORY ATAXIA: ICD-10-CM

## 2023-08-07 DIAGNOSIS — M21.372 BILATERAL FOOT-DROP: ICD-10-CM

## 2023-08-07 LAB
CRP SERPL-MCNC: <0.29 MG/DL (ref ?–0.3)
ERYTHROCYTE [SEDIMENTATION RATE] IN BLOOD: 17 MM/HR
EST. AVERAGE GLUCOSE BLD GHB EST-MCNC: 120 MG/DL (ref 68–126)
HBA1C MFR BLD: 5.8 % (ref ?–5.7)
VIT B12 SERPL-MCNC: 623 PG/ML (ref 193–986)

## 2023-08-07 PROCEDURE — 83036 HEMOGLOBIN GLYCOSYLATED A1C: CPT

## 2023-08-07 PROCEDURE — 86140 C-REACTIVE PROTEIN: CPT

## 2023-08-07 PROCEDURE — 36415 COLL VENOUS BLD VENIPUNCTURE: CPT

## 2023-08-07 PROCEDURE — 82607 VITAMIN B-12: CPT

## 2023-08-07 PROCEDURE — 99215 OFFICE O/P EST HI 40 MIN: CPT | Performed by: OTHER

## 2023-08-07 PROCEDURE — 85652 RBC SED RATE AUTOMATED: CPT

## 2023-08-07 RX ORDER — GABAPENTIN 300 MG/1
300 CAPSULE ORAL 3 TIMES DAILY
Qty: 270 CAPSULE | Refills: 3 | Status: SHIPPED | OUTPATIENT
Start: 2023-08-07

## 2023-08-07 RX ORDER — VALACYCLOVIR HYDROCHLORIDE 1 G/1
1000 TABLET, FILM COATED ORAL 2 TIMES DAILY
COMMUNITY
Start: 2023-08-03

## 2023-08-07 RX ORDER — DOXYCYCLINE HYCLATE 100 MG/1
100 CAPSULE ORAL 2 TIMES DAILY
Qty: 60 CAPSULE | Refills: 2 | COMMUNITY
Start: 2023-07-18 | End: 2023-10-16

## 2023-08-07 NOTE — PROGRESS NOTES
Pt states here for follow up on idiopathic peripheral neuropathy. Pt had EMG and went to see Back surgeon. Pt is still doing electro stimulation. Pt did get the AFO braces on legs and it has helped with walking and falls.

## 2023-08-11 ENCOUNTER — TELEPHONE (OUTPATIENT)
Dept: SURGERY | Facility: CLINIC | Age: 78
End: 2023-08-11

## 2023-08-11 NOTE — TELEPHONE ENCOUNTER
Spoke with Dr. Yadira Rodriguez.   Can we please call this patient and see if he can come in for a consultation with me on 8/31 at 1pm?

## 2023-08-11 NOTE — TELEPHONE ENCOUNTER
Pt has been scheduled for 8/31/2023 at 1pm with Asya Reeves. Pt aware to get MRI's from June (mri thoracic and mri cervical spine done at [facility])there is a mri in chart from 3/2023 ordered by Dr Erik Baker.

## 2023-08-21 ENCOUNTER — TELEPHONE (OUTPATIENT)
Dept: SURGERY | Facility: CLINIC | Age: 78
End: 2023-08-21

## 2023-08-24 ENCOUNTER — TELEPHONE (OUTPATIENT)
Dept: NEUROLOGY | Facility: CLINIC | Age: 78
End: 2023-08-24

## 2023-08-24 DIAGNOSIS — G62.9 POLYNEUROPATHY: Primary | ICD-10-CM

## 2023-08-24 RX ORDER — PREDNISONE 20 MG/1
TABLET ORAL
Qty: 82 TABLET | Refills: 0 | Status: SHIPPED | OUTPATIENT
Start: 2023-08-24

## 2023-08-24 NOTE — TELEPHONE ENCOUNTER
Spoke to patient. 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 Start prednisone 60mg daily x 2 weeks, then 50mg daily x 2 weeks, then call clinic for further instructions. Obtain CMP in 1-2 weeks. Check BP at home. Discussed a few possible side effects, including increased BP, weight gain, potassium irregularity, bone loss, rare avascular necrosis, cataracts, vision changes. He is to call back in 3-4 weeks, or sooner if any issues.

## 2023-08-29 ENCOUNTER — OFFICE VISIT (OUTPATIENT)
Dept: PHYSICAL THERAPY | Facility: HOSPITAL | Age: 78
End: 2023-08-29
Attending: Other
Payer: MEDICARE

## 2023-08-29 DIAGNOSIS — M54.16 LUMBAR RADICULOPATHY: ICD-10-CM

## 2023-08-29 DIAGNOSIS — M21.372 BILATERAL FOOT-DROP: ICD-10-CM

## 2023-08-29 DIAGNOSIS — R27.8 SENSORY ATAXIA: ICD-10-CM

## 2023-08-29 DIAGNOSIS — G60.9 IDIOPATHIC PERIPHERAL NEUROPATHY: Primary | ICD-10-CM

## 2023-08-29 DIAGNOSIS — M21.371 BILATERAL FOOT-DROP: ICD-10-CM

## 2023-08-29 PROCEDURE — 97163 PT EVAL HIGH COMPLEX 45 MIN: CPT

## 2023-08-31 ENCOUNTER — OFFICE VISIT (OUTPATIENT)
Dept: SURGERY | Facility: CLINIC | Age: 78
End: 2023-08-31
Payer: MEDICARE

## 2023-08-31 VITALS
SYSTOLIC BLOOD PRESSURE: 122 MMHG | HEART RATE: 56 BPM | DIASTOLIC BLOOD PRESSURE: 82 MMHG | HEIGHT: 73 IN | WEIGHT: 208 LBS | BODY MASS INDEX: 27.57 KG/M2

## 2023-08-31 DIAGNOSIS — D47.2 NEUROPATHY ASSOCIATED WITH MGUS (HCC): Primary | ICD-10-CM

## 2023-08-31 DIAGNOSIS — Z98.1 S/P LUMBAR FUSION: ICD-10-CM

## 2023-08-31 DIAGNOSIS — M21.372 FOOT DROP, BILATERAL: ICD-10-CM

## 2023-08-31 DIAGNOSIS — M21.371 FOOT DROP, BILATERAL: ICD-10-CM

## 2023-08-31 DIAGNOSIS — G63 NEUROPATHY ASSOCIATED WITH MGUS (HCC): Primary | ICD-10-CM

## 2023-08-31 PROCEDURE — 99214 OFFICE O/P EST MOD 30 MIN: CPT | Performed by: NURSE PRACTITIONER

## 2023-08-31 RX ORDER — FLUOROMETHOLONE 0.1 %
1 SUSPENSION, DROPS(FINAL DOSAGE FORM)(ML) OPHTHALMIC (EYE) DAILY
COMMUNITY
Start: 2023-08-03

## 2023-09-05 ENCOUNTER — OFFICE VISIT (OUTPATIENT)
Dept: PHYSICAL THERAPY | Facility: HOSPITAL | Age: 78
End: 2023-09-05
Attending: Other
Payer: MEDICARE

## 2023-09-05 PROCEDURE — 97110 THERAPEUTIC EXERCISES: CPT

## 2023-09-05 PROCEDURE — 97140 MANUAL THERAPY 1/> REGIONS: CPT

## 2023-09-05 NOTE — PROGRESS NOTES
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 While he has constrictions between L5 and S1, they do not correlate with the severity with patient's symptoms. Continues to take prednisone. Had some L-sded neck pain today; severe stiff neck that resolved by now. Does Elieser Chi twice per week; needs to be the wall/support for balance. Able to get down to the floor; needs furniture for support to get up. Pain: 1-2/10      Objective:   Quad length in prone: R: 90; L: 90  EOB hip flexors length in modified Edilson position c/l KTC: (+) B for psoas and RC shortness  Myofascial restrictions R > L lumbar paraspinals, posterior hip, quadriceps and hip flexors      Assessment: Presents with myofascial restrictions in lumbar spine, posterior hips and anterior thighs and with major shortness of bilateral hip flexors and quadriceps. Had good tolerance to supported prone positioning (one pillow under the hips) and to soft tissue mobilization and manual stretching for the tight muscle groups. Instructed on continued stretching with HEP. HEP issued.        Goals:   Goals: (to be met in 16 visits)   Pt will demonstrate improved SLS to >5 seconds CALOS to promote safety and decrease risk of falls on uneven surfaces such as grass  Pt will perform TUG in <15 seconds with least restrictive AD, demonstrating improved gait speed for improved participation in ADL such as community ambulation  Pt will perform 4-Item DGI with score of 9/12 or greater with least restrictive AD to demonstrate ability to ambulate safely in crowded and busy environments  Pt will be able to squat to  light objects around the house without loss of stability  Pt will improve functional hip strength to demonstrate ability to ascend/descend 1 flight of stairs reciprocally with the use of handrail  Pt will be independent and compliant with comprehensive HEP to maintain progress achieved in PT    Plan: MFR/STM; flexibility program; gait and balance  Date: 9/5/2023  TX#: 2/16 Date:                 TX#: 3/ Date:                 TX#: 4/ Date:                 TX#: 5/ Date: Tx#: 6/   Manual: 25'  Prone with one pillow under:  STM lumbar paraspinals, glut med, piriformis, TFL  Manual quad stretch in prone B  Manual modified hip flexors stretch EOB; STM to anterior thigh       TE 15'       SB LTR  SB DKTC       Piriformis stretch Fig 4  EOB hip flexors stretch  Pt education/hip flexors anatomy  HEP        HEP: Access Code: [email or web address]  URL: [email or web address]  Date: 09/05/2023  Prepared by: Roberta Chelita    Exercises  - Supine Hip and Knee Flexion AROM with Swiss Ball  - 1 x daily - 7 x weekly - 3 sets - 10 reps  - Supine Lower Trunk Rotation with Swiss Ball  - 1 x daily - 7 x weekly - 3 sets - 10 reps  - Hip Flexor Stretch at Edge of Bed  - 1 x daily - 7 x weekly - 1 sets - 3 reps - 30-60 sec hold  - Supine Figure 4 Piriformis Stretch  - 1 x daily - 7 x weekly - 1 sets - 3 reps - 30-60 sec hold      Charges: man x 2, TE x 1       Total Timed Treatment: 40 min  Total Treatment Time: 40 min

## 2023-09-11 ENCOUNTER — OFFICE VISIT (OUTPATIENT)
Dept: PHYSICAL THERAPY | Facility: HOSPITAL | Age: 78
End: 2023-09-11
Attending: Other
Payer: MEDICARE

## 2023-09-11 ENCOUNTER — LAB ENCOUNTER (OUTPATIENT)
Dept: LAB | Age: 78
End: 2023-09-11
Attending: Other
Payer: MEDICARE

## 2023-09-11 DIAGNOSIS — G62.9 POLYNEUROPATHY: ICD-10-CM

## 2023-09-11 LAB
ALBUMIN SERPL-MCNC: 3.3 G/DL (ref 3.4–5)
ALBUMIN/GLOB SERPL: 1.2 {RATIO} (ref 1–2)
ALP LIVER SERPL-CCNC: 67 U/L
ALT SERPL-CCNC: 34 U/L
ANION GAP SERPL CALC-SCNC: 6 MMOL/L (ref 0–18)
AST SERPL-CCNC: 24 U/L (ref 15–37)
BILIRUB SERPL-MCNC: 0.5 MG/DL (ref 0.1–2)
BUN BLD-MCNC: 26 MG/DL (ref 7–18)
CALCIUM BLD-MCNC: 9.8 MG/DL (ref 8.5–10.1)
CHLORIDE SERPL-SCNC: 107 MMOL/L (ref 98–112)
CO2 SERPL-SCNC: 25 MMOL/L (ref 21–32)
CREAT BLD-MCNC: 1.5 MG/DL
EGFRCR SERPLBLD CKD-EPI 2021: 47 ML/MIN/1.73M2 (ref 60–?)
FASTING STATUS PATIENT QL REPORTED: YES
GLOBULIN PLAS-MCNC: 2.7 G/DL (ref 2.8–4.4)
GLUCOSE BLD-MCNC: 91 MG/DL (ref 70–99)
OSMOLALITY SERPL CALC.SUM OF ELEC: 290 MOSM/KG (ref 275–295)
POTASSIUM SERPL-SCNC: 3.8 MMOL/L (ref 3.5–5.1)
PROT SERPL-MCNC: 6 G/DL (ref 6.4–8.2)
SODIUM SERPL-SCNC: 138 MMOL/L (ref 136–145)

## 2023-09-11 PROCEDURE — 36415 COLL VENOUS BLD VENIPUNCTURE: CPT

## 2023-09-11 PROCEDURE — 97110 THERAPEUTIC EXERCISES: CPT

## 2023-09-11 PROCEDURE — 97140 MANUAL THERAPY 1/> REGIONS: CPT

## 2023-09-11 PROCEDURE — 80053 COMPREHEN METABOLIC PANEL: CPT

## 2023-09-11 NOTE — PROGRESS NOTES
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 Quads have been sore, \"on fire\" with that. Reduced prednisone dosage from 60 to 50 mg today. Pain: 1-2/10      Objective:   Quad length in prone: R: 105 (was: 90); L: 105 (was: 90)  EOB hip flexors length in modified Edilson position c/l KTC: (+) B for psoas and RC shortness  Myofascial restrictions R > L lumbar paraspinals, posterior hip, quadriceps and hip flexors      Assessment: Presents with myofascial restrictions in lumbar spine, posterior hips and anterior thighs. Shortness of bilateral hip flexors and quadriceps is nicely improving. Practiced A/AAROM for R ankle and toes. Recommended to practice ankle/foot ROM exercises during the off-AFO times at home.        Goals:   Goals: (to be met in 16 visits)   Pt will demonstrate improved SLS to >5 seconds CALOS to promote safety and decrease risk of falls on uneven surfaces such as grass  Pt will perform TUG in <15 seconds with least restrictive AD, demonstrating improved gait speed for improved participation in ADL such as community ambulation  Pt will perform 4-Item DGI with score of 9/12 or greater with least restrictive AD to demonstrate ability to ambulate safely in crowded and busy environments  Pt will be able to squat to  light objects around the house without loss of stability  Pt will improve functional hip strength to demonstrate ability to ascend/descend 1 flight of stairs reciprocally with the use of handrail  Pt will be independent and compliant with comprehensive HEP to maintain progress achieved in PT    Plan: MFR/STM; flexibility program; gait and balance  Date: 9/5/2023  TX#: 2/16 Date: 9/11/2023              TX#: 3/16 Date:                 TX#: 4/ Date:                 TX#: 5/ Date: Tx#: 6/   Manual: 25'  Prone with one pillow under:  STM lumbar paraspinals, glut med, piriformis, TFL  Manual quad stretch in prone B  Manual modified hip flexors stretch EOB; STM to anterior thigh Manual: 15'  Prone with one pillow under:  STM lumbar paraspinals, glut med, piriformis, TFL  Manual quad stretch in prone B  Manual modified hip flexors stretch EOB; STM to anterior thigh      TE 15' TE 25'      SB LTR  SB DKTC NU step L5 5'  SB LTR  SB DKTC      Piriformis stretch Fig 4  EOB hip flexors stretch  Pt education/hip flexors anatomy  HEP  Piriformis stretch Fig 4  EOB hip flexors stretch  Ankle AAROM for DF/PF in sciatic nerve glide position  Supine/Seated AAROM for DF  Toe crunches  A/AROM for PF/EV/INV        HEP: Access Code: [email or web address]  URL: [email or web address]  Date: 09/05/2023  Prepared by: Kylie Call    Exercises  - Supine Hip and Knee Flexion AROM with Swiss Ball  - 1 x daily - 7 x weekly - 3 sets - 10 reps  - Supine Lower Trunk Rotation with Swiss Ball  - 1 x daily - 7 x weekly - 3 sets - 10 reps  - Hip Flexor Stretch at Edge of Bed  - 1 x daily - 7 x weekly - 1 sets - 3 reps - 30-60 sec hold  - Supine Figure 4 Piriformis Stretch  - 1 x daily - 7 x weekly - 1 sets - 3 reps - 30-60 sec hold      Charges: man x 1, TE x 2      Total Timed Treatment: 40 min  Total Treatment Time: 45 min

## 2023-09-13 ENCOUNTER — OFFICE VISIT (OUTPATIENT)
Dept: PHYSICAL THERAPY | Facility: HOSPITAL | Age: 78
End: 2023-09-13
Attending: Other
Payer: MEDICARE

## 2023-09-13 ENCOUNTER — TELEPHONE (OUTPATIENT)
Dept: NEUROLOGY | Facility: CLINIC | Age: 78
End: 2023-09-13

## 2023-09-13 DIAGNOSIS — M21.372 BILATERAL FOOT-DROP: Primary | ICD-10-CM

## 2023-09-13 DIAGNOSIS — M21.371 BILATERAL FOOT-DROP: Primary | ICD-10-CM

## 2023-09-13 DIAGNOSIS — Z79.52 ON PREDNISONE THERAPY: ICD-10-CM

## 2023-09-13 PROCEDURE — 97110 THERAPEUTIC EXERCISES: CPT

## 2023-09-13 PROCEDURE — 97112 NEUROMUSCULAR REEDUCATION: CPT

## 2023-09-18 ENCOUNTER — OFFICE VISIT (OUTPATIENT)
Dept: PHYSICAL THERAPY | Facility: HOSPITAL | Age: 78
End: 2023-09-18
Attending: Other
Payer: MEDICARE

## 2023-09-18 PROCEDURE — 97112 NEUROMUSCULAR REEDUCATION: CPT

## 2023-09-18 PROCEDURE — 97110 THERAPEUTIC EXERCISES: CPT

## 2023-09-20 ENCOUNTER — OFFICE VISIT (OUTPATIENT)
Dept: PHYSICAL THERAPY | Facility: HOSPITAL | Age: 78
End: 2023-09-20
Attending: Other
Payer: MEDICARE

## 2023-09-20 PROCEDURE — 97140 MANUAL THERAPY 1/> REGIONS: CPT

## 2023-09-20 PROCEDURE — 97112 NEUROMUSCULAR REEDUCATION: CPT

## 2023-09-20 NOTE — PROGRESS NOTES
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 Feels better in the mornings with reduced stiffness in the lower back. Otherwise, does not see any changes. Continues with prednisone and repeats blood test this Friday. Appointment with Dr. Misha Eugene on Monday. Pain: 1-2/10      Objective:   Quad length in prone: R: 105 (was: 90); L: 105 (was: 90)  EOB hip flexors length in modified Edilson position c/l KTC: (+) B for psoas and RC shortness  Myofascial restrictions B lumbar paraspinals, posterior hip, quadriceps and hip flexors  Impaired proprioception, coordination and lumbar-pelvic motor control  Major myofascial restrictions B upper trap, levator scap and posterior scalenes. Assessment: Presents with moderate-major stiffness and shortness in bilateral quadriceps/hip flexors right > left contributing to lumbar spine extension and compression; making some improvement with the flexibility program. Moderate stiffness along bilateral lumbar/thoracic paraspinal and QL muscles with excellent tolerance to deep tissue release. Demonstrates difficulty with the coordination and motor control of lumbar-pelvic spine in the sagittal and frontal planes. Anterior line flexibility/mobility restrictions, impaired lumbar-pelvic motor control and myofascial restrictions may be contributing factors to patient's peripheral neuropathy and patient will benefit from the continued therapy to address them.        Goals:   Goals: (to be met in 16 visits)   Pt will demonstrate improved SLS to >5 seconds CALOS to promote safety and decrease risk of falls on uneven surfaces such as grass  Pt will perform TUG in <15 seconds with least restrictive AD, demonstrating improved gait speed for improved participation in ADL such as community ambulation  Pt will perform 4-Item DGI with score of 9/12 or greater with least restrictive AD to demonstrate ability to ambulate safely in crowded and busy environments  Pt will be able to squat to  light objects around the house without loss of stability  Pt will improve functional hip strength to demonstrate ability to ascend/descend 1 flight of stairs reciprocally with the use of handrail  Pt will be independent and compliant with comprehensive HEP to maintain progress achieved in PT    Plan: MFR/STM; flexibility program; gait and balance  Date: 9/5/2023  TX#: 2/16 Date: 9/11/2023              TX#: 3/16 Date:  9/13/2023               TX#: 4/16 Date:  9/18/2023               TX#: 5/16 Date: 9/20/23  Tx#: 6/16   Manual: 25'  Prone with one pillow under:  STM lumbar paraspinals, glut med, piriformis, TFL  Manual quad stretch in prone B  Manual modified hip flexors stretch EOB; STM to anterior thigh Manual: 15'  Prone with one pillow under:  STM lumbar paraspinals, glut med, piriformis, TFL  Manual quad stretch in prone B  Manual modified hip flexors stretch EOB; STM to anterior thigh --  Manual: 25'  Prone with one pillow under:  Deep STM lumbar paraspinals and QL B  Manual quad stretch in prone B  Manual modified hip flexors stretch EOB; STM to anterior thigh   TE 15' TE 25' TE TE TE   SB LTR  SB DKTC NU step L5 5'  SB LTR  SB DKTC NU step L5 5'  Squats to chair 2HHA 2 x 10  YTB lateral steps 2HHA 2 x 15 L/R  Hip flexion holds in SL stance with 2HHA 5 x 10 sec L/R, x 2 sets NU step L5 5'  Squats to chair 2HHA 2 x 10  GXB lateral steps 2HHA 2 x 15 L/R  Hip flexion holds in SL stance with 2HHA 5 x 10 sec L/R, x 2 sets  Leg press 2 black and 1 grey cords x 30  Heel raises with knees bent: x 20 reps 1 grey and 1 yellow cord NU step L5 5'  Bridge 2 x 10   Clam shells 2 x 10, manual resistance   Piriformis stretch Fig 4  EOB hip flexors stretch  Pt education/hip flexors anatomy  HEP  Piriformis stretch Fig 4  EOB hip flexors stretch  Ankle AAROM for DF/PF in sciatic nerve glide position  Supine/Seated AAROM for DF  Toe crunches  A/AROM for PF/EV/INV   Re-ed:  Gait in II bars 2 HHA  Lateral steps in II bars 2HHA  DLS with blocking for the knees  Modified Tandem with forward leg on Airex/presses   Madhavi step overs 2HHA Re-ed:  L[facility] steps in II bars [facility]  DLS wide SUSY with CGA/close supervision  Modified Tandem stance  DLS on Airex with CGA/close supervision  DLS with EC with CGA/close supervision  Airex FSU's x 12 L/R 1HHA  Lunge stance unsupported 3 x 10 sec L/R Re-ed: 15'  Quadruped neutral spine/TrA recruitment/manual cuing  Camel cat with manual assist for movement coordination  Quadruped posterior rocking  Quadruped alternate arm raises x 7 L/R   HEP: Access Code: [email or web address]  URL: [email or web address]  Date: 09/05/2023  Prepared by: Simi Cummings    Exercises  - Supine Hip and Knee Flexion AROM with Swiss Ball  - 1 x daily - 7 x weekly - 3 sets - 10 reps  - Supine Lower Trunk Rotation with Swiss Ball  - 1 x daily - 7 x weekly - 3 sets - 10 reps  - Hip Flexor Stretch at Edge of Bed  - 1 x daily - 7 x weekly - 1 sets - 3 reps - 30-60 sec hold  - Supine Figure 4 Piriformis Stretch  - 1 x daily - 7 x weekly - 1 sets - 3 reps - 30-60 sec hold      Charges: re-ed x 1, man x 2     Total Timed Treatment: 40 min  Total Treatment Time: 45 min

## 2023-09-22 ENCOUNTER — LAB ENCOUNTER (OUTPATIENT)
Dept: LAB | Age: 78
End: 2023-09-22
Attending: Other
Payer: MEDICARE

## 2023-09-22 DIAGNOSIS — M21.371 BILATERAL FOOT-DROP: ICD-10-CM

## 2023-09-22 DIAGNOSIS — Z79.52 ON PREDNISONE THERAPY: ICD-10-CM

## 2023-09-22 DIAGNOSIS — M21.372 BILATERAL FOOT-DROP: ICD-10-CM

## 2023-09-22 LAB
ALBUMIN SERPL-MCNC: 3.2 G/DL (ref 3.4–5)
ALBUMIN/GLOB SERPL: 1 {RATIO} (ref 1–2)
ALP LIVER SERPL-CCNC: 61 U/L
ALT SERPL-CCNC: 42 U/L
ANION GAP SERPL CALC-SCNC: 3 MMOL/L (ref 0–18)
AST SERPL-CCNC: 23 U/L (ref 15–37)
BASOPHILS # BLD AUTO: 0.03 X10(3) UL (ref 0–0.2)
BASOPHILS NFR BLD AUTO: 0.4 %
BILIRUB SERPL-MCNC: 0.6 MG/DL (ref 0.1–2)
BUN BLD-MCNC: 27 MG/DL (ref 7–18)
CALCIUM BLD-MCNC: 9.7 MG/DL (ref 8.5–10.1)
CHLORIDE SERPL-SCNC: 105 MMOL/L (ref 98–112)
CO2 SERPL-SCNC: 30 MMOL/L (ref 21–32)
CREAT BLD-MCNC: 1.51 MG/DL
EGFRCR SERPLBLD CKD-EPI 2021: 47 ML/MIN/1.73M2 (ref 60–?)
EOSINOPHIL # BLD AUTO: 0.04 X10(3) UL (ref 0–0.7)
EOSINOPHIL NFR BLD AUTO: 0.5 %
ERYTHROCYTE [DISTWIDTH] IN BLOOD BY AUTOMATED COUNT: 16.6 %
FASTING STATUS PATIENT QL REPORTED: YES
GLOBULIN PLAS-MCNC: 3.2 G/DL (ref 2.8–4.4)
GLUCOSE BLD-MCNC: 92 MG/DL (ref 70–99)
HCT VFR BLD AUTO: 41.8 %
HGB BLD-MCNC: 14 G/DL
IMM GRANULOCYTES # BLD AUTO: 0.04 X10(3) UL (ref 0–1)
IMM GRANULOCYTES NFR BLD: 0.5 %
LYMPHOCYTES # BLD AUTO: 2.58 X10(3) UL (ref 1–4)
LYMPHOCYTES NFR BLD AUTO: 30.1 %
MCH RBC QN AUTO: 33.9 PG (ref 26–34)
MCHC RBC AUTO-ENTMCNC: 33.5 G/DL (ref 31–37)
MCV RBC AUTO: 101.2 FL
MONOCYTES # BLD AUTO: 0.57 X10(3) UL (ref 0.1–1)
MONOCYTES NFR BLD AUTO: 6.7 %
NEUTROPHILS # BLD AUTO: 5.3 X10 (3) UL (ref 1.5–7.7)
NEUTROPHILS # BLD AUTO: 5.3 X10(3) UL (ref 1.5–7.7)
NEUTROPHILS NFR BLD AUTO: 61.8 %
OSMOLALITY SERPL CALC.SUM OF ELEC: 291 MOSM/KG (ref 275–295)
PLATELET # BLD AUTO: 167 10(3)UL (ref 150–450)
POTASSIUM SERPL-SCNC: 4.1 MMOL/L (ref 3.5–5.1)
PROT SERPL-MCNC: 6.4 G/DL (ref 6.4–8.2)
RBC # BLD AUTO: 4.13 X10(6)UL
SODIUM SERPL-SCNC: 138 MMOL/L (ref 136–145)
WBC # BLD AUTO: 8.6 X10(3) UL (ref 4–11)

## 2023-09-22 PROCEDURE — 83516 IMMUNOASSAY NONANTIBODY: CPT

## 2023-09-22 PROCEDURE — 36415 COLL VENOUS BLD VENIPUNCTURE: CPT

## 2023-09-22 PROCEDURE — 80053 COMPREHEN METABOLIC PANEL: CPT

## 2023-09-22 PROCEDURE — 85025 COMPLETE CBC W/AUTO DIFF WBC: CPT

## 2023-09-22 PROCEDURE — 86037 ANCA TITER EACH ANTIBODY: CPT

## 2023-09-25 ENCOUNTER — OFFICE VISIT (OUTPATIENT)
Dept: PHYSICAL THERAPY | Facility: HOSPITAL | Age: 78
End: 2023-09-25
Attending: Other
Payer: MEDICARE

## 2023-09-25 ENCOUNTER — OFFICE VISIT (OUTPATIENT)
Dept: NEUROLOGY | Facility: CLINIC | Age: 78
End: 2023-09-25
Payer: MEDICARE

## 2023-09-25 VITALS
RESPIRATION RATE: 16 BRPM | DIASTOLIC BLOOD PRESSURE: 74 MMHG | SYSTOLIC BLOOD PRESSURE: 122 MMHG | BODY MASS INDEX: 28 KG/M2 | HEART RATE: 72 BPM | WEIGHT: 214.81 LBS

## 2023-09-25 DIAGNOSIS — D47.2 MGUS (MONOCLONAL GAMMOPATHY OF UNKNOWN SIGNIFICANCE): ICD-10-CM

## 2023-09-25 DIAGNOSIS — G62.9 POLYNEUROPATHY: ICD-10-CM

## 2023-09-25 DIAGNOSIS — Z79.52 ON PREDNISONE THERAPY: ICD-10-CM

## 2023-09-25 DIAGNOSIS — M21.372 BILATERAL FOOT-DROP: Primary | ICD-10-CM

## 2023-09-25 DIAGNOSIS — M21.371 BILATERAL FOOT-DROP: Primary | ICD-10-CM

## 2023-09-25 LAB
ANTI-MPO ANTIBODIES: <0.2 UNITS
ANTI-PR3 ANTIBODIES: <0.2 UNITS

## 2023-09-25 PROCEDURE — 97140 MANUAL THERAPY 1/> REGIONS: CPT

## 2023-09-25 PROCEDURE — 99214 OFFICE O/P EST MOD 30 MIN: CPT | Performed by: OTHER

## 2023-09-25 PROCEDURE — 97112 NEUROMUSCULAR REEDUCATION: CPT

## 2023-09-25 RX ORDER — GARLIC EXTRACT 500 MG
1 CAPSULE ORAL DAILY
COMMUNITY

## 2023-09-25 RX ORDER — PREDNISONE 20 MG/1
TABLET ORAL
Qty: 75 TABLET | Refills: 0 | Status: SHIPPED | OUTPATIENT
Start: 2023-09-25

## 2023-09-25 NOTE — PROGRESS NOTES
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 Misha Eugene earlier today; will continue with 50 mg prednisone and follows up with her in one month. Feels that gluts and hamstrings are getting better adjusted to the exercises and lower back feels feels good right now. Overall there is decreased lower back stiffness in the mornings. Had one fall this weekend when turning at the sink. Pain: 1-2/10      Objective:   Quad length in prone: R: 105 (was: 90); L: 105 (was: 90)  EOB hip flexors length in modified Edilson position c/l KTC: (+) B for psoas and RC shortness  Myofascial restrictions B lumbar paraspinals, posterior hip, quadriceps and hip flexors  Impaired proprioception, coordination and lumbar-pelvic motor control  Major myofascial restrictions B upper trap, levator scap and posterior scalenes. Assessment: Presents with moderate-major stiffness and shortness in bilateral quadriceps/hip flexors right > left contributing to lumbar spine extension and compression. He is making some improvement with the flexibility program. Moderate stiffness along bilateral lumbar/thoracic paraspinal and QL muscles with excellent tolerance to deep tissue release; with deeper tissue pressure R side is noted to be much stiffer/tighter and tender. Better coordination of lumbar-pelvic spine in the sagittal planes with camel-cat stretch. Poor ability to maintain abdominal bracing and/or posterior pelvic tilt with slow marching in hook-lying with movement control impairments in the sagittal and frontal planes.        Goals:   Goals: (to be met in 16 visits) Pt will demonstrate improved SLS to >5 seconds CALOS to promote safety and decrease risk of falls on uneven surfaces such as grass  Pt will perform TUG in <15 seconds with least restrictive AD, demonstrating improved gait speed for improved participation in ADL such as community ambulation  Pt will perform 4-Item DGI with score of 9/12 or greater with least restrictive AD to demonstrate ability to ambulate safely in crowded and busy environments  Pt will be able to squat to  light objects around the house without loss of stability  Pt will improve functional hip strength to demonstrate ability to ascend/descend 1 flight of stairs reciprocally with the use of handrail  Pt will be independent and compliant with comprehensive HEP to maintain progress achieved in PT    Plan: MFR/STM; flexibility program; gait and balance  Date: 9/5/2023  TX#: 2/16 Date: 9/11/2023              TX#: 3/16 Date:  9/13/2023               TX#: 4/16 Date:  9/18/2023               TX#: 5/16 Date: 9/20/23  Tx#: 6/16 Date: 9/25/23  Tx#: 7/16   Manual: 25'  Prone with one pillow under:  STM lumbar paraspinals, glut med, piriformis, TFL  Manual quad stretch in prone B  Manual modified hip flexors stretch EOB; STM to anterior thigh Manual: 15'  Prone with one pillow under:  STM lumbar paraspinals, glut med, piriformis, TFL  Manual quad stretch in prone B  Manual modified hip flexors stretch EOB; STM to anterior thigh --  Manual: 25'  Prone with one pillow under:  Deep STM lumbar paraspinals and QL B  Manual quad stretch in prone B  Manual modified hip flexors stretch EOB; STM to anterior thigh Manual: 25'  Prone with one pillow under:  Deep STM lumbar paraspinals and QL B  Manual quad stretch in prone B  Manual modified hip flexors stretch EOB; STM to anterior thigh   TE 15' TE 25' TE TE TE TE   SB LTR  SB DKTC NU step L5 5'  SB LTR  SB DKTC NU step L5 5'  Squats to chair 2HHA 2 x 10  YTB lateral steps 2HHA 2 x 15 L/R  Hip flexion holds in SL stance with 2HHA 5 x 10 sec L/R, x 2 sets NU step L5 5'  Squats to chair 2HHA 2 x 10  GXB lateral steps 2HHA 2 x 15 L/R  Hip flexion holds in SL stance with 2HHA 5 x 10 sec L/R, x 2 sets  Leg press 2 black and 1 grey cords x 30  Heel raises with knees bent: x 20 reps 1 grey and 1 yellow cord NU step L5 5'  Bridge 2 x 10   Clam shells 2 x 10, manual resistance NU step L5 5'  Bridge 2 x 10   Education on log rolling   Piriformis stretch Fig 4  EOB hip flexors stretch  Pt education/hip flexors anatomy  HEP  Piriformis stretch Fig 4  EOB hip flexors stretch  Ankle AAROM for DF/PF in sciatic nerve glide position  Supine/Seated AAROM for DF  Toe crunches  A/AROM for PF/EV/INV   Re-ed:  Gait in II bars 2 HHA  Lateral steps in II bars 2HHA  DLS with blocking for the knees  Modified Tandem with forward leg on Airex/presses   Madhavi step overs 2HHA Re-ed:  Lateral steps in II bars 2HHA to [facility]  DLS wide SUSY with CGA/close supervision  Modified Tandem stance  DLS on Airex with CGA/close supervision  DLS with EC with CGA/close supervision  Airex FSU's x 12 L/R 1HHA  Lunge stance unsupported 3 x 10 sec L/R Re-ed: 15'  Quadruped neutral spine/TrA recruitment/manual cuing  Camel cat with manual assist for movement coordination  Quadruped posterior rocking  Quadruped alternate arm raises x 7 L/R Re-ed: 15'  Quadruped neutral spine/TrA recruitment/manual cuing  Camel cat with minimal manual assist for movement coordination  Quadruped posterior rocking  Quadruped alternate arm raises x 7 L/R  TrA in hook-lying  TrA/PPT with alternate hip/knee flexion, slow  TrA/PPT with lateral knee fall outs, slow   HEP: Access Code: [email or web address]  URL: [email or web address]  Date: 09/05/2023  Prepared by: Chris Minors    Exercises  - Supine Hip and Knee Flexion AROM with Swiss Ball  - 1 x daily - 7 x weekly - 3 sets - 10 reps  - Supine Lower Trunk Rotation with Swiss Ball  - 1 x daily - 7 x weekly - 3 sets - 10 reps  - Hip Flexor Stretch at [facility]  - 1 x daily - 7 x weekly - 1 sets - 3 reps - 30-60 sec hold  - Supine Figure 4 Piriformis Stretch  - 1 x daily - 7 x weekly - 1 sets - 3 reps - 30-60 sec hold      Charges: re-ed x 1, man x 2     Total Timed Treatment: 45 min  Total Treatment Time: 45 min

## 2023-09-26 ENCOUNTER — PATIENT MESSAGE (OUTPATIENT)
Dept: INTERNAL MEDICINE CLINIC | Facility: CLINIC | Age: 78
End: 2023-09-26

## 2023-09-26 NOTE — TELEPHONE ENCOUNTER
Will need to be discussed with Dr. Esther Bingham when he returns to office on Thursday the 28th. Per Dr. Esther Bingham: Rx prescribed by ophthalmology would not cause increase in creatinine level. Refer to nephrology for further evaluation.

## 2023-09-26 NOTE — TELEPHONE ENCOUNTER
From: Lupis Sunshine  To: Zander Christian  Sent: 9/26/2023 3:45 PM CDT  Subject: CREATININE    I am now seeing Dr. Lupe Yoder for Neuropathy. She has me trying a pretty high dose of Prednisone (60 mg /day for 2 weeks and now on 50mg/d. Anyway, she wanted to monitor my vitals and blood chemistry because of this. I had appointment yesterday and she says my Creatinine is higher than normal and my history. She also says that Prednisone wouldn't cause that. 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 Prescriber [address] [place] [address]. Dr. Latisha Fleming asked me to review my meds w. PCP for opinion on Creatinine rise. \"Elevated Cr- due to valacyclovir? Recommend follow up with PCP and ophtho who is prescribing the antiviral\". Appreciate you advise.  Tatiana Lainez

## 2023-09-27 ENCOUNTER — OFFICE VISIT (OUTPATIENT)
Dept: PHYSICAL THERAPY | Facility: HOSPITAL | Age: 78
End: 2023-09-27
Attending: Other
Payer: MEDICARE

## 2023-09-27 PROCEDURE — 97112 NEUROMUSCULAR REEDUCATION: CPT

## 2023-09-27 PROCEDURE — 97140 MANUAL THERAPY 1/> REGIONS: CPT

## 2023-09-29 NOTE — PROGRESS NOTES
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 Lisa Gaona earlier today; will continue with 50 mg prednisone and follows up with her in one month. Feels that gluts and hamstrings are getting better adjusted to the exercises and lower back feels feels good right now. Overall there is decreased lower back stiffness in the mornings. Had one fall this weekend when turning at the sink. Pain: 1-2/10      Objective:   Quad length in prone: R: 105 (was: 90); L: 105 (was: 90)  EOB hip flexors length in modified Edilson position c/l KTC: (+) B for psoas and RC shortness  Myofascial restrictions B lumbar paraspinals, posterior hip, quadriceps and hip flexors  Impaired proprioception, coordination and lumbar-pelvic motor control  Major myofascial restrictions B upper trap, levator scap and posterior scalenes. Assessment: Poor stabilization right side of trunk. Moderate stiffness along bilateral lumbar/thoracic paraspinal and QL muscles with excellent tolerance to deep tissue release; with deeper tissue pressure R side is noted to be much stiffer/tighter and tender. Better coordination of lumbar-pelvic spine in the sagittal planes with camel-cat stretch. Poor ability to maintain abdominal bracing and/or posterior pelvic tilt with slow marching in hook-lying with movement control impairments in the sagittal and frontal planes.        Goals:   Goals: (to be met in 16 visits)   Pt will demonstrate improved SLS to >5 seconds CALOS to promote safety and decrease risk of falls on uneven surfaces such as grass  Pt will perform TUG in <15 seconds with least restrictive AD, demonstrating improved gait speed for improved participation in ADL such as community ambulation  Pt will perform 4-Item DGI with score of 9/12 or greater with least restrictive AD to demonstrate ability to ambulate safely in crowded and busy environments  Pt will be able to squat to  light objects around the house without loss of stability  Pt will improve functional hip strength to demonstrate ability to ascend/descend 1 flight of stairs reciprocally with the use of handrail  Pt will be independent and compliant with comprehensive HEP to maintain progress achieved in PT    Plan: balance re-ed; gait training, core stabilization, MFR/STM; flexibility program  Date: 9/5/2023  TX#: 2/16 Date: 9/11/2023              TX#: 3/16 Date:  9/13/2023               TX#: 4/16 Date:  9/18/2023               TX#: 5/16 Date: 9/20/23  Tx#: 6/16 Date: 9/25/23  Tx#: 7/16 Date: 9/27/23  Tx#: 8/16   Manual: 25'  Prone with one pillow under:  STM lumbar paraspinals, glut med, piriformis, TFL  Manual quad stretch in prone B  Manual modified hip flexors stretch EOB; STM to anterior thigh Manual: 15'  Prone with one pillow under:  STM lumbar paraspinals, glut med, piriformis, TFL  Manual quad stretch in prone B  Manual modified hip flexors stretch EOB; STM to anterior thigh --  Manual: 25'  Prone with one pillow under:  Deep STM lumbar paraspinals and QL B  Manual quad stretch in prone B  Manual modified hip flexors stretch EOB; STM to anterior thigh Manual: 25'  Prone with one pillow under:  Deep STM lumbar paraspinals and QL B  Manual quad stretch in prone B  Manual modified hip flexors stretch EOB; STM to anterior thigh Manual: 15'  Prone with one pillow under:  Deep STM lumbar paraspinals and QL B  Manual quad stretch in prone B  Manual modified hip flexors stretch EOB; STM to anterior thigh     TE 15' TE 25' TE TE TE TE TE   SB LTR  SB DKTC NU step L5 5'  SB LTR  SB DKTC NU step L5 5'  Squats to chair 2HHA 2 x 10  YTB lateral steps 2HHA 2 x 15 L/R  Hip flexion holds in SL stance with 2HHA 5 x 10 sec L/R, x 2 sets NU step L5 5'  Squats to chair 2HHA 2 x 10  GXB lateral steps 2HHA 2 x 15 L/R  Hip flexion holds in SL stance with 2HHA 5 x 10 sec L/R, x 2 sets  Leg press 2 black and 1 grey cords x 30  Heel raises with knees bent: x 20 reps 1 grey and 1 yellow cord NU step L5 5'  Bridge 2 x 10   Clam shells 2 x 10, manual resistance NU step L5 5'  Bridge 2 x 10   Education on log rolling    Piriformis stretch Fig 4  EOB hip flexors stretch  Pt education/hip flexors anatomy  HEP  Piriformis stretch Fig 4  EOB hip flexors stretch  Ankle AAROM for DF/PF in sciatic nerve glide position  Supine/Seated AAROM for DF  Toe crunches  A/AROM for PF/EV/INV   Re-ed:  Gait in II bars 2 HHA  Lateral steps in II bars 2HHA  DLS with blocking for the knees  Modified Tandem with forward leg on Airex/presses   Madhavi step overs 2HHA Re-ed:  Lateral steps in II bars 2HHA to [facility]  DLS wide SUSY with CGA/close supervision  Modified Tandem stance  DLS on Airex with CGA/close supervision  DLS with EC with CGA/close supervision  Airex FSU's x 12 L/R 1HHA  Lunge stance unsupported 3 x 10 sec L/R Re-ed: 15'  Quadruped neutral spine/TrA recruitment/manual cuing  Camel cat with manual assist for movement coordination  Quadruped posterior rocking  Quadruped alternate arm raises x 7 L/R Re-ed: 15'  Quadruped neutral spine/TrA recruitment/manual cuing  Camel cat with minimal manual assist for movement coordination  Quadruped posterior rocking  Quadruped alternate arm raises x 7 L/R  TrA in hook-lying  TrA/PPT with alternate hip/knee flexion, slow  TrA/PPT with lateral knee fall outs, slow Re-ed: 25'  Quadruped neutral spine/TrA recruitment/manual cuing  Camel cat with minimal manual assist for movement coordination  Quadruped posterior rocking  Quadruped alternate arm raises x 7 L/R  Quadruped modified hip raises 2 x 5 L/R  TrA in hook-lying  TrA/PPT with alternate hip/knee flexion, slow  TrA/PPT with lateral knee fall outs, slow   HEP: Access Code: [email or web address]  URL: [email or web address]  Date: 09/05/2023  Prepared by: Gary Chakraborty    Exercises  - Supine Hip and Knee Flexion AROM with Swiss Ball  - 1 x daily - 7 x weekly - 3 sets - 10 reps  - Supine Lower Trunk Rotation with Swiss Ball  - 1 x daily - 7 x weekly - 3 sets - 10 reps  - Hip Flexor Stretch at Edge of Bed  - 1 x daily - 7 x weekly - 1 sets - 3 reps - 30-60 sec hold  - Supine Figure 4 Piriformis Stretch  - 1 x daily - 7 x weekly - 1 sets - 3 reps - 30-60 sec hold      Charges: re-ed x 2, man x 1     Total Timed Treatment: 40 min  Total Treatment Time: 40 min

## 2023-10-02 ENCOUNTER — OFFICE VISIT (OUTPATIENT)
Dept: PHYSICAL THERAPY | Facility: HOSPITAL | Age: 78
End: 2023-10-02
Attending: Other
Payer: MEDICARE

## 2023-10-02 PROCEDURE — 97110 THERAPEUTIC EXERCISES: CPT

## 2023-10-02 PROCEDURE — 97140 MANUAL THERAPY 1/> REGIONS: CPT

## 2023-10-02 NOTE — PROGRESS NOTES
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 He is in B AFO's for a while now. He is on 50 milligrams of prednisone, but not sure how long he will be on this. He states he fell yesterday on his back. Pain: 1-2/10      Objective:   Quad length in prone: R: 105 (was: 90); L: 105 (was: 90)  EOB hip flexors length in modified Edilson position c/l KTC: (+) B for psoas and RC shortness  Myofascial restrictions B lumbar paraspinals, posterior hip, quadriceps and hip flexors  Impaired proprioception, coordination and lumbar-pelvic motor control  Major myofascial restrictions B upper trap, levator scap and posterior scalenes. Assessment: Nils's Active R DF is much weaker than the L. Discussed the possibility of eventually getting out of the L AFO in time if he feels comfortable. Will trial NMES next time to increase R anterior tib strength. Sofia Bolton felt relief with flex/rotate mob. Good tolerance for all activities.        Goals:   Goals: (to be met in 16 visits)   Pt will demonstrate improved SLS to >5 seconds CALOS to promote safety and decrease risk of falls on uneven surfaces such as grass  Pt will perform TUG in <15 seconds with least restrictive AD, demonstrating improved gait speed for improved participation in ADL such as community ambulation  Pt will perform 4-Item DGI with score of 9/12 or greater with least restrictive AD to demonstrate ability to ambulate safely in crowded and busy environments  Pt will be able to squat to  light objects around the house without loss of stability  Pt will improve functional hip strength to demonstrate ability to ascend/descend 1 flight of stairs reciprocally with the use of handrail  Pt will be independent and compliant with comprehensive HEP to maintain progress achieved in PT    Plan: balance re-ed; gait training, core stabilization, MFR/STM; flexibility program  Date: 9/5/2023  TX#: 2/16 Date: 9/11/2023              TX#: 3/16 Date:  9/13/2023               TX#: 4/16 Date:  9/18/2023               TX#: 5/16 Date: 9/20/23  Tx#: 6/16 Date: 9/25/23  Tx#: 7/16 Date: 9/27/23  Tx#: 8/16 Date:10/2/2023   Tx#:9/16   Manual: 25'  Prone with one pillow under:  STM lumbar paraspinals, glut med, piriformis, TFL  Manual quad stretch in prone B  Manual modified hip flexors stretch EOB; STM to anterior thigh Manual: 15'  Prone with one pillow under:  STM lumbar paraspinals, glut med, piriformis, TFL  Manual quad stretch in prone B  Manual modified hip flexors stretch EOB; STM to anterior thigh --  Manual: 25'  Prone with one pillow under:  Deep STM lumbar paraspinals and QL B  Manual quad stretch in prone B  Manual modified hip flexors stretch EOB; STM to anterior thigh Manual: 25'  Prone with one pillow under:  Deep STM lumbar paraspinals and QL B  Manual quad stretch in prone B  Manual modified hip flexors stretch EOB; STM to anterior thigh Manual: 15'  Prone with one pillow under:  Deep STM lumbar paraspinals and QL B  Manual quad stretch in prone B  Manual modified hip flexors stretch EOB; STM to anterior thigh   ThereX:  10 lumbar flex seated at edge of table  10x towel curls B  2x10 fwd lunge holding bar, out of AFOs  Goblet squats    TE 15' TE 25' TE TE TE TE TE -   SB LTR  SB DKTC NU step L5 5'  SB LTR  SB DKTC NU step L5 5'  Squats to chair 2HHA 2 x 10  YTB lateral steps 2HHA 2 x 15 L/R  Hip flexion holds in SL stance with 2HHA 5 x 10 sec L/R, x 2 sets NU step L5 5'  Squats to chair 2HHA 2 x 10  GXB lateral steps 2HHA 2 x 15 L/R  Hip flexion holds in SL stance with 2HHA 5 x 10 sec L/R, x 2 sets  Leg press 2 black and 1 grey cords x 30  Heel raises with knees bent: x 20 reps 1 grey and 1 yellow cord NU step L5 5'  Bridge 2 x 10   Clam shells 2 x 10, manual resistance NU step L5 5'  Bridge 2 x 10   Education on log rolling  Neuro re-ed:  10x sciatic nerve glide with ankle floss, B  Manual:  SL for flex rotate mob GR III, QL release following. x8 min   Piriformis stretch Fig 4  EOB hip flexors stretch  Pt education/hip flexors anatomy  HEP  Piriformis stretch Fig 4  EOB hip flexors stretch  Ankle AAROM for DF/PF in sciatic nerve glide position  Supine/Seated AAROM for DF  Toe crunches  A/AROM for PF/EV/INV   Re-ed:  Gait in II bars 2 HHA  Lateral steps in II bars 2HHA  DLS with blocking for the knees  Modified Tandem with forward leg on Airex/presses   Madhavi step overs 2HHA Re-ed:  Lateral steps in II bars [facility]HA to [facility]  DLS wide SUSY with CGA/close supervision  Modified Tandem stance  DLS on Airex with CGA/close supervision  DLS with EC with CGA/close supervision  Airex FSU's x 12 L/R 1HHA  Lunge stance unsupported 3 x 10 sec L/R Re-ed: 15'  Quadruped neutral spine/TrA recruitment/manual cuing  Camel cat with manual assist for movement coordination  Quadruped posterior rocking  Quadruped alternate arm raises x 7 L/R Re-ed: 15'  Quadruped neutral spine/TrA recruitment/manual cuing  Camel cat with minimal manual assist for movement coordination  Quadruped posterior rocking  Quadruped alternate arm raises x 7 L/R  TrA in hook-lying  TrA/PPT with alternate hip/knee flexion, slow  TrA/PPT with lateral knee fall outs, slow Re-ed: 25'  Quadruped neutral spine/TrA recruitment/manual cuing  Camel cat with minimal manual assist for movement coordination  Quadruped posterior rocking  Quadruped alternate arm raises x 7 L/R  Quadruped modified hip raises 2 x 5 L/R  TrA in hook-lying  TrA/PPT with alternate hip/knee flexion, slow  TrA/PPT with lateral knee fall outs, slow Therex:  10x sit to stand with exaggerated WS fwd and CGA  Tennis ball roll out firm pressure x2 min R   HEP: Access Code: [email or web address]  URL: [email or web address]  Date: 09/05/2023  Prepared by: Sabrina Elizabeth    Exercises  - Supine Hip and Knee Flexion AROM with Swiss Ball  - 1 x daily - 7 x weekly - 3 sets - 10 reps  - Supine Lower Trunk Rotation with Swiss Ball  - 1 x daily - 7 x weekly - 3 sets - 10 reps  - Hip Flexor Stretch at Edge of Bed  - 1 x daily - 7 x weekly - 1 sets - 3 reps - 30-60 sec hold  - Supine Figure 4 Piriformis Stretch  - 1 x daily - 7 x weekly - 1 sets - 3 reps - 30-60 sec hold      Charges: re-ed x 2, man x 1     Total Timed Treatment: 40 min  Total Treatment Time: 40 min

## 2023-10-04 ENCOUNTER — OFFICE VISIT (OUTPATIENT)
Dept: PHYSICAL THERAPY | Facility: HOSPITAL | Age: 78
End: 2023-10-04
Attending: Other
Payer: MEDICARE

## 2023-10-04 ENCOUNTER — OFFICE VISIT (OUTPATIENT)
Dept: PODIATRY CLINIC | Facility: CLINIC | Age: 78
End: 2023-10-04

## 2023-10-04 DIAGNOSIS — B35.1 ONYCHOMYCOSIS: Primary | ICD-10-CM

## 2023-10-04 DIAGNOSIS — D47.2 NEUROPATHY ASSOCIATED WITH MGUS (HCC): ICD-10-CM

## 2023-10-04 DIAGNOSIS — M20.42 HAMMER TOES OF BOTH FEET: ICD-10-CM

## 2023-10-04 DIAGNOSIS — L60.0 INGROWN NAIL: ICD-10-CM

## 2023-10-04 DIAGNOSIS — G63 NEUROPATHY ASSOCIATED WITH MGUS (HCC): ICD-10-CM

## 2023-10-04 DIAGNOSIS — M20.41 HAMMER TOES OF BOTH FEET: ICD-10-CM

## 2023-10-04 DIAGNOSIS — D47.2 MGUS (MONOCLONAL GAMMOPATHY OF UNKNOWN SIGNIFICANCE): ICD-10-CM

## 2023-10-04 PROCEDURE — 99213 OFFICE O/P EST LOW 20 MIN: CPT | Performed by: STUDENT IN AN ORGANIZED HEALTH CARE EDUCATION/TRAINING PROGRAM

## 2023-10-04 PROCEDURE — 97140 MANUAL THERAPY 1/> REGIONS: CPT

## 2023-10-04 PROCEDURE — 97110 THERAPEUTIC EXERCISES: CPT

## 2023-10-04 PROCEDURE — 97112 NEUROMUSCULAR REEDUCATION: CPT

## 2023-10-04 NOTE — PROGRESS NOTES
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 Pain: 1-2/10      Objective:   Quad length in prone: R: 105 (was: 90); L: 105 (was: 90)  EOB hip flexors length in modified Edilson position c/l KTC: (+) B for psoas and RC shortness  Myofascial restrictions B lumbar paraspinals, posterior hip, quadriceps and hip flexors  Impaired proprioception, coordination and lumbar-pelvic motor control  Major myofascial restrictions B upper trap, levator scap and posterior scalenes. Assessment: Attempted NMES for functional strengthening of R anterior tib. He did have a small mmm contraction but not much full movement at the ankle (had to max out all parameters due to sensory deficits). Good tolerance towards all standing exercises today. He is tolerating flex/rotate mobs well.     Goals:   Goals: (to be met in 16 visits)   Pt will demonstrate improved SLS to >5 seconds CALOS to promote safety and decrease risk of falls on uneven surfaces such as grass  Pt will perform TUG in <15 seconds with least restrictive AD, demonstrating improved gait speed for improved participation in ADL such as community ambulation  Pt will perform 4-Item DGI with score of 9/12 or greater with least restrictive AD to demonstrate ability to ambulate safely in crowded and busy environments  Pt will be able to squat to  light objects around the house without loss of stability  Pt will improve functional hip strength to demonstrate ability to ascend/descend 1 flight of stairs reciprocally with the use of handrail  Pt will be independent and compliant with comprehensive HEP to maintain progress achieved in PT    Plan: PN next time  Date: 9/5/2023  TX#: 2/16 Date: 9/11/2023              TX#: 3/16 Date:  9/13/2023               TX#: 4/16 Date:  9/18/2023               TX#: 5/16 Date: 9/20/23  Tx#: 6/16 Date: 9/25/23  Tx#: 7/16 Date: 9/27/23  Tx#: 8/16 Date:10/2/2023   Tx#:9/16 Date:10/4/2023  Tx#:10/16   Manual: 25'  Prone with one pillow under:  STM lumbar paraspinals, glut med, piriformis, TFL  Manual quad stretch in prone B  Manual modified hip flexors stretch EOB; STM to anterior thigh Manual: 15'  Prone with one pillow under:  STM lumbar paraspinals, glut med, piriformis, TFL  Manual quad stretch in prone B  Manual modified hip flexors stretch EOB; STM to anterior thigh --  Manual: 25'  Prone with one pillow under:  Deep STM lumbar paraspinals and QL B  Manual quad stretch in prone B  Manual modified hip flexors stretch EOB; STM to anterior thigh Manual: 25'  Prone with one pillow under:  Deep STM lumbar paraspinals and QL B  Manual quad stretch in prone B  Manual modified hip flexors stretch EOB; STM to anterior thigh Manual: 15'  Prone with one pillow under:  Deep STM lumbar paraspinals and QL B  Manual quad stretch in prone B  Manual modified hip flexors stretch EOB; STM to anterior thigh   ThereX:  10 lumbar flex seated at edge of table  10x towel curls B  2x10 fwd lunge holding bar, out of AFOs  Goblet squats  Neuro re-ed:  NMES to R anterior tib 400 usec, 100hz, 95 mA  Practived walking 2 laps in p bars  2x10 DF assisted  1x heel walking  2x10 lunges to airex  4x3 hurdles   TE 15' TE 25' TE TE TE TE TE - -   SB LTR  SB DKTC NU step L5 5'  SB LTR  SB DKTC NU step L5 5'  Squats to chair 2HHA 2 x 10  YTB lateral steps 2HHA 2 x 15 L/R  Hip flexion holds in SL stance with 2HHA 5 x 10 sec L/R, x 2 sets NU step L5 5'  Squats to chair 2HHA 2 x 10  GXB lateral steps 2HHA 2 x 15 L/R  Hip flexion holds in SL stance with 2HHA 5 x 10 sec L/R, x 2 sets  Leg press 2 black and 1 grey cords x 30  Heel raises with knees bent: x 20 reps 1 grey and 1 yellow cord NU step L5 5'  Bridge 2 x 10   Clam shells 2 x 10, manual resistance NU step L5 5'  Bridge 2 x 10   Education on log rolling  Neuro re-ed:  10x sciatic nerve glide with ankle floss, B  Manual:  SL for flex rotate mob GR III, QL release following. x8 min Therex:  Seated sciatica nerve glide x 10 R LE   Piriformis stretch Fig 4  EOB hip flexors stretch  Pt education/hip flexors anatomy  HEP  Piriformis stretch Fig 4  EOB hip flexors stretch  Ankle AAROM for DF/PF in sciatic nerve glide position  Supine/Seated AAROM for DF  Toe crunches  A/AROM for PF/EV/INV   Re-ed:  Gait in II bars 2 HHA  Lateral steps in II bars 2HHA  DLS with blocking for the knees  Modified Tandem with forward leg on Airex/presses   Madhavi step overs 2HHA Re-ed:  Lateral steps in II bars [facility]HA [facility]  DLS wide SUSY with CGA/close supervision  Modified Tandem stance  DLS on Airex with CGA/close supervision  DLS with EC with CGA/close supervision  Airex FSU's x 12 L/R 1HHA  Lunge stance unsupported 3 x 10 sec L/R Re-ed: 15'  Quadruped neutral spine/TrA recruitment/manual cuing  Camel cat with manual assist for movement coordination  Quadruped posterior rocking  Quadruped alternate arm raises x 7 L/R Re-ed: 15'  Quadruped neutral spine/TrA recruitment/manual cuing  Camel cat with minimal manual assist for movement coordination  Quadruped posterior rocking  Quadruped alternate arm raises x 7 L/R  TrA in hook-lying  TrA/PPT with alternate hip/knee flexion, slow  TrA/PPT with lateral knee fall outs, slow Re-ed: 25'  Quadruped neutral spine/TrA recruitment/manual cuing  Camel cat with minimal manual assist for movement coordination  Quadruped posterior rocking  Quadruped alternate arm raises x 7 L/R  Quadruped modified hip raises 2 x 5 L/R  TrA in hook-lying  TrA/PPT with alternate hip/knee flexion, slow  TrA/PPT with lateral knee fall outs, slow Therex:  10x sit to stand with exaggerated WS fwd and CGA  Tennis ball roll out firm pressure x2 min R Manual:  SL for flex rotate mob GR III, QL release following. x8 min    Therex:  2x10 SLR B  10 x bridges 4\" holds   HEP: Access Code: [email or web address]  URL: [email or web address]  Date: 09/05/2023  Prepared by: Francisca Gamboa    Exercises  - Supine Hip and Knee Flexion AROM with Swiss Ball  - 1 x daily - 7 x weekly - 3 sets - 10 reps  - Supine Lower Trunk Rotation with Swiss Ball  - 1 x daily - 7 x weekly - 3 sets - 10 reps  - Hip Flexor Stretch at Edge of Bed  - 1 x daily - 7 x weekly - 1 sets - 3 reps - 30-60 sec hold  - Supine Figure 4 Piriformis Stretch  - 1 x daily - 7 x weekly - 1 sets - 3 reps - 30-60 sec hold      Charges: therex:1, neuro:1, man x 1     Total Timed Treatment: 40 min  Total Treatment Time: 40 min

## 2023-10-04 NOTE — PROGRESS NOTES
[id number] [facility] Podiatry  Progress Note    Shabana Pitts is a 65year old male. Patient presents with:  Toenail Care: Nail care and foot check. 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 He has elongated and thickened nails he has difficulty trimming his own. His nails do become incurvated by the end of his stretch between visits. They do cause some pain from rubbing in his shoes. He denies acute signs of infection or other concerns. He continues to walk with AFOs and complete physical therapy. No other complaints are mentioned. Allergies: Immune Globulin   Current Outpatient Medications   Medication Sig Dispense Refill    acidophilus-pectin Oral Cap Take 1 capsule by mouth daily. predniSONE 20 MG Oral Tab Take 2.5 tablets daily for total 50mg daily 75 tablet 0    predniSONE 20 MG Oral Tab Take 60mg daily x 2 weeks, then 50mg daily x 2 weeks, then call clinic for further instructions. In the interim, monitor range of motion and ability to flex foot up or point downwards 82 tablet 0    valACYclovir 1 G Oral Tab Take 0.5 tablets (500 mg total) by mouth daily. doxycycline 100 MG Oral Cap Take 1 capsule (100 mg total) by mouth 2 (two) times daily. 60 capsule 2    gabapentin 300 MG Oral Cap Take 1 capsule (300 mg total) by mouth 3 (three) times daily. 270 capsule 3    Glucosamine-Chondroitin (GLUCOSAMINE CHONDR COMPLEX OR) Take by mouth as needed. cyanocobalamin 1000 MCG Oral Tab Take 1 tablet (1,000 mcg total) by mouth daily. ALPRAZolam 0.25 MG Oral Tab Take 1 tablet (0.25 mg total) by mouth nightly as needed. 90 tablet 0    fluticasone propionate 50 MCG/ACT Nasal Suspension 1 spray by Each Nare route 2 (two) times daily as needed for Rhinitis. 48 g 1    levothyroxine 100 MCG Oral Tab Take 1 tablet (100 mcg total) by mouth once daily.  Overdue for labs, please complete them. 90 tablet 3    traZODone 100 MG Oral Tab Take 1 tablet (100 mg total) by mouth nightly as needed for Sleep. (Patient taking differently: Take 1 tablet (100 mg total) by mouth nightly as needed for Sleep. Pt takes as needed) 90 tablet 3    Beclomethasone Diprop HFA 40 MCG/ACT Inhalation Aerosol, Breath Activated Inhale 2 puffs into the lungs 2 (two) times daily as needed. 2 each 0    erythromycin 5 MG/GM Ophthalmic Ointment Place 1 Application. into both eyes nightly. Uses 3-4 x weekly before bed      tamsulosin (FLOMAX) cap Take 1 capsule (0.4 mg total) by mouth daily. 90 capsule 3    finasteride 5 MG Oral Tab Take 1 tablet (5 mg total) by mouth daily. 90 tablet 3    atorvastatin 10 MG Oral Tab Take 1 tablet (10 mg total) by mouth daily. CALCIUM OR Take by mouth daily. Multiple Vitamin (MULTI-VITAMIN OR) Take 1 tablet by mouth daily. docusate sodium 100 MG Oral Cap Take 1 capsule (100 mg total) by mouth 2 (two) times daily. (Patient taking differently: Take 1 capsule (100 mg total) by mouth 2 (two) times daily as needed.) 30 capsule 1    senna-docusate 8.6-50 MG Oral Tab Take 1 tablet by mouth daily. (Patient taking differently: Take 1 tablet by mouth daily as needed.) 30 tablet 0    Albuterol Sulfate  (90 BASE) MCG/ACT Inhalation Aero Soln Inhale 2 puffs into the lungs every 6 (six) hours as needed for Wheezing.         Past Medical History:   Diagnosis Date    Asthma     Atypical mole frequent body Nevi    Back problem     low back pain, s/p fusion of L3,4,5.  back still stiff    Belching     Benign thyroid cyst     BPH (benign prostatic hyperplasia)     Cataract 2015    s/p sugery    Constipation     Depression     Disorder of prostate BPH (I'm a 67year old male)    Flatulence/gas pain/belching     Frequent urination 2 x's / night    Frequent use of laxatives     Hearing impairment     Slight    Hemorrhoids very slight    Hemorrhoids, internal 02/29/2012    High cholesterol Elevated     Insomnia 10/22/2010    Irregular bowel habits     Itch of skin unknown cause    Leg swelling ankles at PM    Neuropathy     R leg    Obesity     slightly overweight    Osteoarthritis 2010    Poor balance     uses cane    Rotator cuff sprain 10/22/2010    Seborrheic keratosis 10/22/2010    Shoulder joint pain 10/22/2010    Sinusitis     Unspecified disorder of thyroid     hypothyroid    Visual impairment     glasses reading      Past Surgical History:   Procedure Laterality Date    APPENDECTOMY      ARTHROSCOPY OF JOINT UNLISTED Right     knee    BACK SURGERY  16    triple fusion of L3-L4-L5    BIOPSY  2019    Bone Marrow    BIOPSY  2019    right sural nerve biopsy    CARPAL TUNNEL RELEASE Bilateral     CATARACT Bilateral 2015    IOL'S    COLONOSCOPY      HEMORRHOIDECTOMY  ~     ORTHOPEDIC SURG (PBP)      LT ELBOW - ulnar nerve release    OTHER  2019    Lumbar puncture    OTHER SURGICAL HISTORY      Shoulder Joint Replacement 16, Sinus surgery ,    OTHER SURGICAL HISTORY      I&D of abscess in axilla area 7/3    SIGMOIDOSCOPY,DIAGNOSTIC      SINUS SURGERY    2014    SPINE SURGERY PROCEDURE UNLISTED      L3-4-5    TONSILLECTOMY        Family History   Problem Relation Age of Onset    Breast Cancer Mother     Cancer Mother         breast cancer- late in life    Heart Disorder Mother         cardiac issues related to aging    Heart Disorder Father         partially blocked carotid arteries    Hypertension Father     Cancer Father         ?  cancer?     Lipids Father     Hypertension Brother     Hypertension Brother       Social History    Socioeconomic History      Marital status:     Tobacco Use      Smoking status: Former        Packs/day: 1.50        Years: 32.00        Additional pack years: 0.00        Total pack years: 48.00        Types: Cigarettes, Pipe        Quit date: 3/1/1998        Years since quittin.6      Smokeless tobacco: Never      Tobacco comments: Quit many years ago    Vaping Use      Vaping Use: Never used    Substance and Sexual Activity      Alcohol use: Yes        Alcohol/week: 11.0 - 18.0 standard drinks of alcohol        Types: 6 - 8 Glasses of wine, 1 - 2 Cans of beer, 4 - 8 Shots of liquor per week        Comment: 1-2 drinks daily      Drug use: No    Other Topics      Concerns:        Caffeine Concern: Yes          2-3 cups a day        Exercise: Yes          physical therapy          REVIEW OF SYSTEMS:     Today reviewed systems as documented below  GENERAL HEALTH: feels well otherwise  SKIN: denies any unusual skin lesions or rashes  RESPIRATORY: denies shortness of breath with exertion  CARDIOVASCULAR: denies chest pain on exertion  GI: denies abdominal pain and denies heartburn  NEURO: denies headaches  MUSCULO: History of arthritis      EXAM:   There were no vitals taken for this visit. GENERAL: well developed, well nourished, in no apparent distress  EXTREMITIES:   1. Integument: Normal skin temperature and turgor. Nails x10 are elongated, thickened, dystrophic, subungual debris. Lateral borders of hallux nails are incurvated, especially right hallux. No acute signs of infection. 2. Vascular: Dorsalis pedis two out of four bilateral and posterior tibial pulses two out of   four bilateral, capillary refill normal.  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 3. Musculoskeletal: All muscle groups are graded 5 out of 5 in the foot and ankle. Compartments soft and compressible. 4. Neurological: Normal sharp dull sensation; reflexes normal.  Decreased protective sensation noted to lower extremities.         ASSESSMENT AND PLAN:   Diagnoses and all orders for this visit:    Onychomycosis    Ingrown nail    MGUS (monoclonal gammopathy of unknown significance)    Neuropathy associated with MGUS (HCC)    Hammer toes of both feet          Plan:     -Patient examined, chart history reviewed. -Discussed importance of proper pedal hygiene, regular foot checks.  -Sharply debrided nails x10 with a sterile nail nipper achieving a 20% reduction in thickness and length, without incident. Nails further smoothed with dremel.  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  -Educated patient on acute signs of infection advised patient to seek immediate medical attention if symptoms arise. The patient indicates understanding of these issues and agrees to the plan. RTC 2 months.     Jose Marshall DPM

## 2023-10-09 ENCOUNTER — OFFICE VISIT (OUTPATIENT)
Dept: PHYSICAL THERAPY | Facility: HOSPITAL | Age: 78
End: 2023-10-09
Attending: Other
Payer: MEDICARE

## 2023-10-09 PROCEDURE — 97110 THERAPEUTIC EXERCISES: CPT

## 2023-10-09 PROCEDURE — 97112 NEUROMUSCULAR REEDUCATION: CPT

## 2023-10-09 PROCEDURE — 97140 MANUAL THERAPY 1/> REGIONS: CPT

## 2023-10-09 NOTE — PROGRESS NOTES
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 He is doing better today. Pain: 1-2/10      Objective:   Quad length in prone: R: 105 (was: 90); L: 105 (was: 90)  EOB hip flexors length in modified Edilson position c/l KTC: (+) B for psoas and RC shortness  Myofascial restrictions B lumbar paraspinals, posterior hip, quadriceps and hip flexors  Impaired proprioception, coordination and lumbar-pelvic motor control  Major myofascial restrictions B upper trap, levator scap and posterior scalenes. Assessment: Able to tolerate higher levels of e-stim today to perform step ups/step overs. Added in sciatic n glide with adductor bias, no increased symptoms noticed. He did fatigue quickly with knee/ankle hip abd exercise, but did well with SL bridge in fig 4 position.     Goals:   Goals: (to be met in 16 visits)   Pt will demonstrate improved SLS to >5 seconds CALOS to promote safety and decrease risk of falls on uneven surfaces such as grass  Pt will perform TUG in <15 seconds with least restrictive AD, demonstrating improved gait speed for improved participation in ADL such as community ambulation  Pt will perform 4-Item DGI with score of 9/12 or greater with least restrictive AD to demonstrate ability to ambulate safely in crowded and busy environments  Pt will be able to squat to  light objects around the house without loss of stability  Pt will improve functional hip strength to demonstrate ability to ascend/descend 1 flight of stairs reciprocally with the use of handrail  Pt will be independent and compliant with comprehensive HEP to maintain progress achieved in PT    Plan: PN next time  Date: 9/5/2023  TX#: 2/16 Date: 9/11/2023              TX#: 3/16 Date:  9/13/2023               TX#: 4/16 Date:  9/18/2023               TX#: 5/16 Date: 9/20/23  Tx#: 6/16 Date: 9/25/23  Tx#: 7/16 Date: 9/27/23  Tx#: 8/16 Date:10/2/2023   Tx#:9/16 Date:10/4/2023  Tx#:10/16 Date:10/9/2023   Tx#:11/16   Manual: 25'  Prone with one pillow under:  STM lumbar paraspinals, glut med, piriformis, TFL  Manual quad stretch in prone B  Manual modified hip flexors stretch EOB; STM to anterior thigh Manual: 15'  Prone with one pillow under:  STM lumbar paraspinals, glut med, piriformis, TFL  Manual quad stretch in prone B  Manual modified hip flexors stretch EOB; STM to anterior thigh --  Manual: 25'  Prone with one pillow under:  Deep STM lumbar paraspinals and QL B  Manual quad stretch in prone B  Manual modified hip flexors stretch EOB; STM to anterior thigh Manual: 25'  Prone with one pillow under:  Deep STM lumbar paraspinals and QL B  Manual quad stretch in prone B  Manual modified hip flexors stretch EOB; STM to anterior thigh Manual: 15'  Prone with one pillow under:  Deep STM lumbar paraspinals and QL B  Manual quad stretch in prone B  Manual modified hip flexors stretch EOB; STM to anterior thigh   ThereX:  10 lumbar flex seated at edge of table  10x towel curls B  2x10 fwd lunge holding bar, out of AFOs  Goblet squats  Neuro re-ed:  NMES to R anterior tib 400 usec, 100hz, 95 mA  Practived walking 2 laps in p bars  2x10 DF assisted  1x heel walking  2x10 lunges to airex  4x3 hurdles Neuro re-ed:  NMES to R anterior tib 400 usec, 100hz, 95 mA  2x10 DF assisted  2x10 goblet squats  1x heel walking  10x step up to 6' R LE, 10 x step over step R LE    TE 15' TE 25' TE TE TE TE TE - -    SB LTR  SB DKTC NU step L5 5'  SB LTR  SB DKTC NU step L5 5'  Squats to chair 2HHA 2 x 10  YTB lateral steps 2HHA 2 x 15 L/R  Hip flexion holds in SL stance with 2HHA 5 x 10 sec L/R, x 2 sets NU step L5 5'  Squats to chair 2HHA 2 x 10  GXB lateral steps 2HHA 2 x 15 L/R  Hip flexion holds in SL stance with 2HHA 5 x 10 sec L/R, x 2 sets  Leg press 2 black and 1 grey cords x 30  Heel raises with knees bent: x 20 reps 1 grey and 1 yellow cord NU step L5 5'  Bridge 2 x 10   Clam shells 2 x 10, manual resistance NU step L5 5'  Bridge 2 x 10   Education on log rolling  Neuro re-ed:  10x sciatic nerve glide with ankle floss, B  Manual:  SL for flex rotate mob GR III, QL release following. x8 min Therex:  Seated sciatica nerve glide x 10 R LE Neuro re-ed:  10x sciatic N glide with adductor bias and floss on R   Piriformis stretch Fig 4  EOB hip flexors stretch  Pt education/hip flexors anatomy  HEP  Piriformis stretch Fig 4  EOB hip flexors stretch  Ankle AAROM for DF/PF in sciatic nerve glide position  Supine/Seated AAROM for DF  Toe crunches  A/AROM for PF/EV/INV   Re-ed:  Gait in II bars 2 HHA  Lateral steps in II bars 2HHA  DLS with blocking for the knees  Modified Tandem with forward leg on Airex/presses   Madhavi step overs 2HHA Re-ed:  Lateral steps in II bars 2[facility]HA [facility]  DLS wide SUSY with CGA/close supervision  Modified Tandem stance  DLS on Airex with CGA/close supervision  DLS with EC with CGA/close supervision  Airex FSU's x 12 L/R 1HHA  Lunge stance unsupported 3 x 10 sec L/R Re-ed: 15'  Quadruped neutral spine/TrA recruitment/manual cuing  Camel cat with manual assist for movement coordination  Quadruped posterior rocking  Quadruped alternate arm raises x 7 L/R Re-ed: 15'  Quadruped neutral spine/TrA recruitment/manual cuing  Camel cat with minimal manual assist for movement coordination  Quadruped posterior rocking  Quadruped alternate arm raises x 7 L/R  TrA in hook-lying  TrA/PPT with alternate hip/knee flexion, slow  TrA/PPT with lateral knee fall outs, slow Re-ed: 25'  Quadruped neutral spine/TrA recruitment/manual cuing  Camel cat with minimal manual assist for movement coordination  Quadruped posterior rocking  Quadruped alternate arm raises x 7 L/R  Quadruped modified hip raises 2 x 5 L/R  TrA in hook-lying  TrA/PPT with alternate hip/knee flexion, slow  TrA/PPT with lateral knee fall outs, slow Therex:  10x sit to stand with exaggerated WS fwd and CGA  Tennis ball roll out firm pressure x2 min R Manual:  SL for flex rotate mob GR III, QL release following. x8 min    Therex:  2x10 SLR B  10 x bridges 4\" holds Therex:  2x10 knee ankle/knee ankle B  15x dead bugs in shelf position   10x fig 4 bridges SL    Manual:  SL for flex rotate mob GR III, QL release following. x8 min   HEP: Access Code: [email or web address]  URL: [email or web address]  Date: 09/05/2023  Prepared by: Ruchi Manley    Exercises  - Supine Hip and Knee Flexion AROM with Swiss Ball  - 1 x daily - 7 x weekly - 3 sets - 10 reps  - Supine Lower Trunk Rotation with Swiss Ball  - 1 x daily - 7 x weekly - 3 sets - 10 reps  - Hip Flexor Stretch at Edge of Bed  - 1 x daily - 7 x weekly - 1 sets - 3 reps - 30-60 sec hold  - Supine Figure 4 Piriformis Stretch  - 1 x daily - 7 x weekly - 1 sets - 3 reps - 30-60 sec hold      Charges: therex:1, neuro:1, man x 1     Total Timed Treatment: 40 min  Total Treatment Time: 40 min

## 2023-10-11 ENCOUNTER — OFFICE VISIT (OUTPATIENT)
Dept: PHYSICAL THERAPY | Facility: HOSPITAL | Age: 78
End: 2023-10-11
Attending: Other
Payer: MEDICARE

## 2023-10-11 PROCEDURE — 97110 THERAPEUTIC EXERCISES: CPT

## 2023-10-11 PROCEDURE — 97140 MANUAL THERAPY 1/> REGIONS: CPT

## 2023-10-11 NOTE — PROGRESS NOTES
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 Subjective: Joaquin Jaimes reports achiness in hip flexors and glutes from last session. Pain: 1-2/10      Objective: Ankle ROM: R ankle DF: to neutral only; L: 5 deg     Accessory motion: not tested today  Palpation: myofascial restrictions bilateral posterior hips and lower back region     Strength: (* denotes performed with pain)  LE   Hip flexion (L2): R 5/5; L 5/5  Hip abduction: R 5/5; L 5/5  Glut max: R: 5/5, L;4+/5  Knee Flexion: R 5/5; L 5/5            Knee extension (L3): R 5/5; L 5/5            DF (L4): R 2/5; L 3-/5  Great Toe Ext (L5): R 1+/5, L 2-/5  PF (S1): R 3+/5; L 4-/5         Quad length in prone: R: 105 (was: 90); L: 105 (was: 90)  EOB hip flexors length in modified Edilson position c/l KTC: (+) B for psoas and RC shortness  Myofascial restrictions B lumbar paraspinals, posterior hip, quadriceps and hip flexors      Assessment: Joaquin Jaimes demonstrates improvements in glute max and PF strength. His DF and great toe ext continue to very limited by his neuropathy. His quad length has improved from 90 deg to 105 deg since evaluation, though he does cont to have a bit of a crouched gait (mostly from weakness though). His functional mobility has improved and his pain levels have come down. He is progressing well towards LTG. He would benefit from further skilled therapy to meet the remainder of his goals and to improve functional mobiilty.      Goals:   Goals: (to be met in 16 visits)   Pt will demonstrate improved SLS to >5 seconds CALOS to promote safety and decrease risk of falls on uneven surfaces such as grass. Progressing. Pt will perform TUG in <15 seconds with least restrictive AD, demonstrating improved gait speed for improved participation in ADL such as community ambulation Progressing. Pt will perform 4-Item DGI with score of 9/12 or greater with least restrictive AD to demonstrate ability to ambulate safely in crowded and busy environments  Pt will be able to squat to  light objects around the house without loss of stability  Pt will improve functional hip strength to demonstrate ability to ascend/descend 1 flight of stairs reciprocally with the use of handrail. Goal met. Pt will be independent and compliant with comprehensive HEP to maintain progress achieved in PT Goal met. Post Oswestry Disability Index Score  Post Score: 18 % (10/11/2023 10:29 AM)    -18 % improvement    Plan: Continue skilled Physical Therapy 1-2 x/week or a total of 8 visits over a 30 day period. Treatment will include: neuro re-ed, NMES as needed, therex, manual   Re-test tug next time. Patient/Family/Caregiver was advised of these findings, precautions, and treatment options and has agreed to actively participate in planning and for this course of care. Thank you for your referral. If you have any questions, please contact me at Dept: [phone]. Sincerely,  Electronically signed by therapist: Christo Davis, PT , DPT    Physician's certification required:  Yes  Please co-sign or sign and return this letter via fax as soon as possible to [phone]. I certify the need for these services furnished under this plan of treatment and while under my care.     X___________________________________________________ Date____________________    Certification From: 19/03/5723  To:1/9/2024    Date: 9/5/2023  TX#: 2/16 Date: 9/11/2023              TX#: 3/16 Date:  9/13/2023               TX#: 4/16 Date:  9/18/2023               TX#: 5/16 Date: 9/20/23  Tx#: 6/16 Date: 9/25/23  Tx#: 7/16 Date: 9/27/23  Tx#: 8/16 Date:10/2/2023   Tx#:9/16 Date:10/4/2023  Tx#:10/16 Date:10/9/2023   Tx#:11/16 Date: 10/11/2023   Tx#:12/16   Manual: 25'  Prone with one pillow under:  STM lumbar paraspinals, glut med, piriformis, TFL  Manual quad stretch in prone B  Manual modified hip flexors stretch EOB; STM to anterior thigh Manual: 15'  Prone with one pillow under:  STM lumbar paraspinals, glut med, piriformis, TFL  Manual quad stretch in prone B  Manual modified hip flexors stretch EOB; STM to anterior thigh --  Manual: 25'  Prone with one pillow under:  Deep STM lumbar paraspinals and QL B  Manual quad stretch in prone B  Manual modified hip flexors stretch EOB; STM to anterior thigh Manual: 25'  Prone with one pillow under:  Deep STM lumbar paraspinals and QL B  Manual quad stretch in prone B  Manual modified hip flexors stretch EOB; STM to anterior thigh Manual: 15'  Prone with one pillow under:  Deep STM lumbar paraspinals and QL B  Manual quad stretch in prone B  Manual modified hip flexors stretch EOB; STM to anterior thigh   ThereX:  10 lumbar flex seated at edge of table  10x towel curls B  2x10 fwd lunge holding bar, out of AFOs  Goblet squats  Neuro re-ed:  NMES to R anterior tib 400 usec, 100hz, 95 mA  Practived walking 2 laps in p bars  2x10 DF assisted  1x heel walking  2x10 lunges to airex  4x3 hurdles Neuro re-ed:  NMES to R anterior tib 400 usec, 100hz, 95 mA  2x10 DF assisted  2x10 goblet squats  1x heel walking  10x step up to 6' R LE, 10 x step over step R LE  Therex:  PN see above, objective measures taken    Mod joão position for passive hip flexor stretch 2x30\"  10x fig 4 SL bridge   TE 15' TE 25' TE TE TE TE TE - -     SB LTR  SB DKTC NU step L5 5'  SB LTR  SB DKTC NU step L5 5'  Squats to chair 2HHA 2 x 10  YTB lateral steps 2HHA 2 x 15 L/R  Hip flexion holds in SL stance with 2HHA 5 x 10 sec L/R, x 2 sets NU step L5 5'  Squats to chair 2HHA 2 x 10  GXB lateral steps 2HHA 2 x 15 L/R  Hip flexion holds in SL stance with 2HHA 5 x 10 sec L/R, x 2 sets  Leg press 2 black and 1 grey cords x 30  Heel raises with knees bent: x 20 reps 1 grey and 1 yellow cord NU step L5 5'  Bridge 2 x 10   Clam shells 2 x 10, manual resistance NU step L5 5'  Bridge 2 x 10   Education on log rolling  Neuro re-ed:  10x sciatic nerve glide with ankle floss, B  Manual:  SL for flex rotate mob GR III, QL release following. x8 min Therex:  Seated sciatica nerve glide x 10 R LE Neuro re-ed:  10x sciatic N glide with adductor bias and floss on R Neuro re-ed:  10x sciatic N glide with adductor bias and floss on R   Piriformis stretch Fig 4  EOB hip flexors stretch  Pt education/hip flexors anatomy  HEP  Piriformis stretch Fig 4  EOB hip flexors stretch  Ankle AAROM for DF/PF in sciatic nerve glide position  Supine/Seated AAROM for DF  Toe crunches  A/AROM for PF/EV/INV   Re-ed:  Gait in II bars 2 HHA  Lateral steps in II bars 2HHA  DLS with blocking for the knees  Modified Tandem with forward leg on Airex/presses   Madhavi step overs 2HHA Re-ed:  Lateral steps in II bars 2HHA to [facility]  DLS wide SUSY with CGA/close supervision  Modified Tandem stance  DLS on Airex with CGA/close supervision  DLS with EC with CGA/close supervision  Airex FSU's x 12 L/R 1HHA  Lunge stance unsupported 3 x 10 sec L/R Re-ed: 15'  Quadruped neutral spine/TrA recruitment/manual cuing  Camel cat with manual assist for movement coordination  Quadruped posterior rocking  Quadruped alternate arm raises x 7 L/R Re-ed: 15'  Quadruped neutral spine/TrA recruitment/manual cuing  Camel cat with minimal manual assist for movement coordination  Quadruped posterior rocking  Quadruped alternate arm raises x 7 L/R  TrA in hook-lying  TrA/PPT with alternate hip/knee flexion, slow  TrA/PPT with lateral knee fall outs, slow Re-ed: 25'  Quadruped neutral spine/TrA recruitment/manual cuing  Camel cat with minimal manual assist for movement coordination  Quadruped posterior rocking  Quadruped alternate arm raises x 7 L/R  Quadruped modified hip raises 2 x 5 L/R  TrA in hook-lying  TrA/PPT with alternate hip/knee flexion, slow  TrA/PPT with lateral knee fall outs, slow Therex:  10x sit to stand with exaggerated WS fwd and CGA  Tennis ball roll out firm pressure x2 min R Manual:  SL for flex rotate mob GR III, QL release following. x8 min    Therex:  2x10 SLR B  10 x bridges 4\" holds Therex:  2x10 knee ankle/knee ankle B  15x dead bugs in shelf position   10x fig 4 bridges SL    Manual:  SL for flex rotate mob GR III, QL release following. x8 min   Manual:  SL for flex rotate mob GR III, QL release following, Prone CPA to L3-L5 GR III.x10 min    Therex:  2x8 fwd lunges to bosu   HEP: Access Code: [email or web address]  URL: [email or web address]  Date: 09/05/2023  Prepared by: Siim Cummings    Exercises  - Supine Hip and Knee Flexion AROM with Swiss Ball  - 1 x daily - 7 x weekly - 3 sets - 10 reps  - Supine Lower Trunk Rotation with Swiss Ball  - 1 x daily - 7 x weekly - 3 sets - 10 reps  - Hip Flexor Stretch at Edge of Bed  - 1 x daily - 7 x weekly - 1 sets - 3 reps - 30-60 sec hold  - Supine Figure 4 Piriformis Stretch  - 1 x daily - 7 x weekly - 1 sets - 3 reps - 30-60 sec hold      Charges: therex:2 man x 1     Total Timed Treatment: 40 min  Total Treatment Time: 40 min

## 2023-10-16 ENCOUNTER — OFFICE VISIT (OUTPATIENT)
Dept: PHYSICAL THERAPY | Facility: HOSPITAL | Age: 78
End: 2023-10-16
Attending: Other
Payer: MEDICARE

## 2023-10-16 PROCEDURE — 97110 THERAPEUTIC EXERCISES: CPT

## 2023-10-16 PROCEDURE — 97140 MANUAL THERAPY 1/> REGIONS: CPT

## 2023-10-16 PROCEDURE — 97112 NEUROMUSCULAR REEDUCATION: CPT

## 2023-10-16 NOTE — PROGRESS NOTES
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 Otherwise he is doing well. Pain: 1-2/10      Objective: Ankle ROM: R ankle DF: to neutral only; L: 5 deg     Accessory motion: not tested today  Palpation: myofascial restrictions bilateral posterior hips and lower back region     Strength: (* denotes performed with pain)  LE   Hip flexion (L2): R 5/5; L 5/5  Hip abduction: R 5/5; L 5/5  Glut max: R: 5/5, L;4+/5  Knee Flexion: R 5/5; L 5/5            Knee extension (L3): R 5/5; L 5/5            DF (L4): R 2/5; L 3-/5  Great Toe Ext (L5): R 1+/5, L 2-/5  PF (S1): R 3+/5; L 4-/5         Quad length in prone: R: 105 (was: 90); L: 105 (was: 90)  EOB hip flexors length in modified Edilson position c/l KTC: (+) B for psoas and RC shortness  Myofascial restrictions B lumbar paraspinals, posterior hip, quadriceps and hip flexors      Assessment: Nils able to tolerate higher levels of NMES. He cont to have trouble initiating DF on the R due severity of neuropathy and nerve damage on the R. He was able to get through PNF pattern of the hip on an airex. Goals:   Goals: (to be met in 16 visits)   Pt will demonstrate improved SLS to >5 seconds CALOS to promote safety and decrease risk of falls on uneven surfaces such as grass. Progressing. Pt will perform TUG in <15 seconds with least restrictive AD, demonstrating improved gait speed for improved participation in ADL such as community ambulation Progressing.   Pt will perform 4-Item DGI with score of 9/12 or greater with least restrictive AD to demonstrate ability to ambulate safely in crowded and busy environments  Pt will be able to squat to  light objects around the house without loss of stability  Pt will improve functional hip strength to demonstrate ability to ascend/descend 1 flight of stairs reciprocally with the use of handrail. Goal met. Pt will be independent and compliant with comprehensive HEP to maintain progress achieved in PT Goal met.      Post Oswestry Disability Index Score  Post Score: 18 % (10/11/2023 10:29 AM)    -18 % improvement    Plan: Progress strength per tolerance  Date: 9/20/23  Tx#: 6/16 Date: 9/25/23  Tx#: 7/16 Date: 9/27/23  Tx#: 8/16 Date:10/2/2023   Tx#:9/16 Date:10/4/2023  Tx#:10/16 Date:10/9/2023   Tx#:11/16 Date: 10/11/2023   Tx#:12/16 Date:10/16/2023   Tx#:13/16   Manual: 25'  Prone with one pillow under:  Deep STM lumbar paraspinals and QL B  Manual quad stretch in prone B  Manual modified hip flexors stretch EOB; STM to anterior thigh Manual: 25'  Prone with one pillow under:  Deep STM lumbar paraspinals and QL B  Manual quad stretch in prone B  Manual modified hip flexors stretch EOB; STM to anterior thigh Manual: 15'  Prone with one pillow under:  Deep STM lumbar paraspinals and QL B  Manual quad stretch in prone B  Manual modified hip flexors stretch EOB; STM to anterior thigh   ThereX:  10 lumbar flex seated at edge of table  10x towel curls B  2x10 fwd lunge holding bar, out of AFOs  Goblet squats  Neuro re-ed:  NMES to R anterior tib 400 usec, 100hz, 95 mA  Practived walking 2 laps in p bars  2x10 DF assisted  1x heel walking  2x10 lunges to airex  4x3 hurdles Neuro re-ed:  NMES to R anterior tib 400 usec, 100hz, 95 mA  2x10 DF assisted  2x10 goblet squats  1x heel walking  10x step up to 6' R LE, 10 x step over step R LE  Therex:  PN see above, objective measures taken    Mod joão position for passive hip flexor stretch 2x30\"  10x fig 4 SL bridge Neuro re-ed:  NMES to R anterior tib 400 usec, 100hz, 95 mA  2x10 DF assisted  15 bosu lunges on R  2x10 step ups to 6\"  15x D1 PND hip flex on airex, R   TE TE TE - -   -   NU step L5 5'  Bridge 2 x 10   Clam shells 2 x 10, manual resistance NU step L5 5'  Bridge 2 x 10   Education on log rolling  Neuro re-ed:  10x sciatic nerve glide with ankle floss, B  Manual:  SL for flex rotate mob GR III, QL release following. x8 min Therex:  Seated sciatica nerve glide x 10 R LE Neuro re-ed:  10x sciatic N glide with adductor bias and floss on R Neuro re-ed:  10x sciatic N glide with adductor bias and floss on R 15x bridges  Neuro re-ed: Attempted to discriminate between 3 different textures (soft, sand paper, and poky) R legx4 min   Re-ed: 15'  Quadruped neutral spine/TrA recruitment/manual cuing  Camel cat with manual assist for movement coordination  Quadruped posterior rocking  Quadruped alternate arm raises x 7 L/R Re-ed: 15'  Quadruped neutral spine/TrA recruitment/manual cuing  Camel cat with minimal manual assist for movement coordination  Quadruped posterior rocking  Quadruped alternate arm raises x 7 L/R  TrA in hook-lying  TrA/PPT with alternate hip/knee flexion, slow  TrA/PPT with lateral knee fall outs, slow Re-ed: 25'  Quadruped neutral spine/TrA recruitment/manual cuing  Camel cat with minimal manual assist for movement coordination  Quadruped posterior rocking  Quadruped alternate arm raises x 7 L/R  Quadruped modified hip raises 2 x 5 L/R  TrA in hook-lying  TrA/PPT with alternate hip/knee flexion, slow  TrA/PPT with lateral knee fall outs, slow Therex:  10x sit to stand with exaggerated WS fwd and CGA  Tennis ball roll out firm pressure x2 min R Manual:  SL for flex rotate mob GR III, QL release following. x8 min    Therex:  2x10 SLR B  10 x bridges 4\" holds Therex:  2x10 knee ankle/knee ankle B  15x dead bugs in shelf position   10x fig 4 bridges SL    Manual:  SL for flex rotate mob GR III, QL release following. x8 min   Manual:  SL for flex rotate mob GR III, QL release following, Prone CPA to L3-L5 GR III.x10 min    Therex:  2x8 fwd lunges to bosu Therex: Towel  toe curls, R LE x10    Manual:  SL for flex rotate mob GR III, QL release following, B   HEP: Access Code: [email or web address]  URL: [email or web address]  Date: 09/05/2023  Prepared by: Capri Muñoz    Exercises  - Supine Hip and Knee Flexion AROM with Swiss Ball  - 1 x daily - 7 x weekly - 3 sets - 10 reps  - Supine Lower Trunk Rotation with Swiss Ball  - 1 x daily - 7 x weekly - 3 sets - 10 reps  - Hip Flexor Stretch at Edge of Bed  - 1 x daily - 7 x weekly - 1 sets - 3 reps - 30-60 sec hold  - Supine Figure 4 Piriformis Stretch  - 1 x daily - 7 x weekly - 1 sets - 3 reps - 30-60 sec hold      Charges: therex:1, nueor: 1,  man x 1     Total Timed Treatment: 40 min  Total Treatment Time: 40 min

## 2023-10-17 ENCOUNTER — OFFICE VISIT (OUTPATIENT)
Dept: NEUROLOGY | Facility: CLINIC | Age: 78
End: 2023-10-17
Payer: MEDICARE

## 2023-10-17 VITALS
HEART RATE: 60 BPM | DIASTOLIC BLOOD PRESSURE: 64 MMHG | SYSTOLIC BLOOD PRESSURE: 122 MMHG | BODY MASS INDEX: 28 KG/M2 | WEIGHT: 210 LBS

## 2023-10-17 DIAGNOSIS — D47.2 MGUS (MONOCLONAL GAMMOPATHY OF UNKNOWN SIGNIFICANCE): Primary | ICD-10-CM

## 2023-10-17 DIAGNOSIS — M21.371 BILATERAL FOOT-DROP: ICD-10-CM

## 2023-10-17 DIAGNOSIS — M21.372 BILATERAL FOOT-DROP: ICD-10-CM

## 2023-10-17 DIAGNOSIS — G61.81 DADS (DISTAL ACQUIRED DEMYELINATING SYMMETRIC NEUROPATHY) (HCC): ICD-10-CM

## 2023-10-17 PROCEDURE — 99214 OFFICE O/P EST MOD 30 MIN: CPT | Performed by: OTHER

## 2023-10-17 RX ORDER — PREDNISONE 10 MG/1
TABLET ORAL
Qty: 90 TABLET | Refills: 0 | Status: SHIPPED | OUTPATIENT
Start: 2023-10-17

## 2023-10-23 ENCOUNTER — OFFICE VISIT (OUTPATIENT)
Dept: PHYSICAL THERAPY | Facility: HOSPITAL | Age: 78
End: 2023-10-23
Attending: Other
Payer: MEDICARE

## 2023-10-23 PROCEDURE — 97110 THERAPEUTIC EXERCISES: CPT

## 2023-10-23 PROCEDURE — 97140 MANUAL THERAPY 1/> REGIONS: CPT

## 2023-10-23 PROCEDURE — 97112 NEUROMUSCULAR REEDUCATION: CPT

## 2023-10-23 NOTE — PROGRESS NOTES
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 He saw his neurologist who lowered his prednisone and is considering a  new drug. Pain: 1-2/10      Objective: Ankle ROM: R ankle DF: to neutral only; L: 5 deg     Accessory motion: not tested today  Palpation: myofascial restrictions bilateral posterior hips and lower back region     Strength: (* denotes performed with pain)  LE   Hip flexion (L2): R 5/5; L 5/5  Hip abduction: R 5/5; L 5/5  Glut max: R: 5/5, L;4+/5  Knee Flexion: R 5/5; L 5/5            Knee extension (L3): R 5/5; L 5/5            DF (L4): R 2/5; L 3-/5  Great Toe Ext (L5): R 1+/5, L 2-/5  PF (S1): R 3+/5; L 4-/5         Quad length in prone: R: 105 (was: 90); L: 105 (was: 90)  EOB hip flexors length in modified Edilson position c/l KTC: (+) B for psoas and RC shortness  Myofascial restrictions B lumbar paraspinals, posterior hip, quadriceps and hip flexors      Assessment: Focused a bit more on balance today as he had a near fall over the weekend. He cont to rely on UE support throughout, also kept the AFOs on for safety. Trialed the hurricaine, he felt it was very similar to his [facility] and will likely stick with it. Continues to respond well to flex/rotate mobilization as it is a  good stretch. He will not be seen until next week due to family in town. Goals:   Goals: (to be met in 16 visits)   Pt will demonstrate improved SLS to >5 seconds CALOS to promote safety and decrease risk of falls on uneven surfaces such as grass. Progressing. Pt will perform TUG in <15 seconds with least restrictive AD, demonstrating improved gait speed for improved participation in ADL such as community ambulation Progressing. Pt will perform 4-Item DGI with score of 9/12 or greater with least restrictive AD to demonstrate ability to ambulate safely in crowded and busy environments  Pt will be able to squat to  light objects around the house without loss of stability  Pt will improve functional hip strength to demonstrate ability to ascend/descend 1 flight of stairs reciprocally with the use of handrail. Goal met. Pt will be independent and compliant with comprehensive HEP to maintain progress achieved in PT Goal met.      Post Oswestry Disability Index Score  Post Score: 18 % (10/11/2023 10:29 AM)    -18 % improvement    Plan: Progress strength per tolerance  Date: 9/20/23  Tx#: 6/16 Date: 9/25/23  Tx#: 7/16 Date: 9/27/23  Tx#: 8/16 Date:10/2/2023   Tx#:9/16 Date:10/4/2023  Tx#:10/16 Date:10/9/2023   Tx#:11/16 Date: 10/11/2023   Tx#:12/16 Date:10/16/2023   Tx#:13/16 Date:10/23/2023   Tx#:14/20   Manual: 25'  Prone with one pillow under:  Deep STM lumbar paraspinals and QL B  Manual quad stretch in prone B  Manual modified hip flexors stretch EOB; STM to anterior thigh Manual: 25'  Prone with one pillow under:  Deep STM lumbar paraspinals and QL B  Manual quad stretch in prone B  Manual modified hip flexors stretch EOB; STM to anterior thigh Manual: 15'  Prone with one pillow under:  Deep STM lumbar paraspinals and QL B  Manual quad stretch in prone B  Manual modified hip flexors stretch EOB; STM to anterior thigh   ThereX:  10 lumbar flex seated at edge of table  10x towel curls B  2x10 fwd lunge holding bar, out of AFOs  Goblet squats  Neuro re-ed:  NMES to R anterior tib 400 usec, 100hz, 95 mA  Practived walking 2 laps in p bars  2x10 DF assisted  1x heel walking  2x10 lunges to airex  4x3 hurdles Neuro re-ed:  NMES to R anterior tib 400 usec, 100hz, 95 mA  2x10 DF assisted  2x10 goblet squats  1x heel walking  10x step up to 6' R LE, 10 x step over step R LE  Therex:  PN see above, objective measures taken    Mod joão position for passive hip flexor stretch 2x30\"  10x fig 4 SL bridge Neuro re-ed:  NMES to R anterior tib 400 usec, 100hz, 95 mA  2x10 DF assisted  15 bosu lunges on R  2x10 step ups to 6\"  15x D1 PND hip flex on airex, R Neuro re-ed:  10x 3-cone tap in mod SLS on airex, B  Tandem walk in p bars light HHA x2  20 x marches on sanddune    TE TE TE - -   - Gait; walking with hurricaine and SBA   NU step L5 5'  Bridge 2 x 10   Clam shells 2 x 10, manual resistance NU step L5 5'  Bridge 2 x 10   Education on log rolling  Neuro re-ed:  10x sciatic nerve glide with ankle floss, B  Manual:  SL for flex rotate mob GR III, QL release following. x8 min Therex:  Seated sciatica nerve glide x 10 R LE Neuro re-ed:  10x sciatic N glide with adductor bias and floss on R Neuro re-ed:  10x sciatic N glide with adductor bias and floss on R 15x bridges  Neuro re-ed:   Attempted to discriminate between 3 different textures (soft, sand paper, and poky) R legx4 min Therex:  2x10 donkey kicks, 3 lbs B  2x15 lateral bosu lunges, HHA  15 squat to row, 30 lbs, CGA   Re-ed: 15'  Quadruped neutral spine/TrA recruitment/manual cuing  Camel cat with manual assist for movement coordination  Quadruped posterior rocking  Quadruped alternate arm raises x 7 L/R Re-ed: 15'  Quadruped neutral spine/TrA recruitment/manual cuing  Camel cat with minimal manual assist for movement coordination  Quadruped posterior rocking  Quadruped alternate arm raises x 7 L/R  TrA in hook-lying  TrA/PPT with alternate hip/knee flexion, slow  TrA/PPT with lateral knee fall outs, slow Re-ed: 25'  Quadruped neutral spine/TrA recruitment/manual cuing  Camel cat with minimal manual assist for movement coordination  Quadruped posterior rocking  Quadruped alternate arm raises x 7 L/R  Quadruped modified hip raises 2 x 5 L/R  TrA in hook-lying  TrA/PPT with alternate hip/knee flexion, slow  TrA/PPT with lateral knee fall outs, slow Therex:  10x sit to stand with exaggerated WS fwd and CGA  Tennis ball roll out firm pressure x2 min R Manual:  SL for flex rotate mob GR III, QL release following. x8 min    Therex:  2x10 SLR B  10 x bridges 4\" holds Therex:  2x10 knee ankle/knee ankle B  15x dead bugs in shelf position   10x fig 4 bridges SL    Manual:  SL for flex rotate mob GR III, QL release following. x8 min   Manual:  SL for flex rotate mob GR III, QL release following, Prone CPA to L3-L5 GR III.x10 min    Therex:  2x8 fwd lunges to bosu Therex: Towel  toe curls, R LE x10    Manual:  SL for flex rotate mob GR III, QL release following, B Manual:  SL for flex rotate mob GR III, QL release following, B x 8 min    Therex:    2x10 bridges with airex under one foot, B  2x10 SL raise on mat  15x SL hip/ankle hip abd, B   HEP: Access Code: [email or web address]  URL: [email or web address]  Date: 09/05/2023  Prepared by: Stephen Encarnacion    Exercises  - Supine Hip and Knee Flexion AROM with Swiss Ball  - 1 x daily - 7 x weekly - 3 sets - 10 reps  - Supine Lower Trunk Rotation with Swiss Ball  - 1 x daily - 7 x weekly - 3 sets - 10 reps  - Hip Flexor Stretch at Edge of Bed  - 1 x daily - 7 x weekly - 1 sets - 3 reps - 30-60 sec hold  - Supine Figure 4 Piriformis Stretch  - 1 x daily - 7 x weekly - 1 sets - 3 reps - 30-60 sec hold      Charges: therex:1, nueor: 1,  man x 1     Total Timed Treatment: 40 min  Total Treatment Time: 40 min

## 2023-10-25 ENCOUNTER — APPOINTMENT (OUTPATIENT)
Dept: PHYSICAL THERAPY | Facility: HOSPITAL | Age: 78
End: 2023-10-25
Attending: INTERNAL MEDICINE
Payer: MEDICARE

## 2023-10-30 ENCOUNTER — OFFICE VISIT (OUTPATIENT)
Dept: PHYSICAL THERAPY | Facility: HOSPITAL | Age: 78
End: 2023-10-30
Attending: INTERNAL MEDICINE
Payer: MEDICARE

## 2023-10-30 PROCEDURE — 97112 NEUROMUSCULAR REEDUCATION: CPT

## 2023-10-30 PROCEDURE — 97110 THERAPEUTIC EXERCISES: CPT

## 2023-10-30 NOTE — PROGRESS NOTES
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 provider found  Date of Evaluation:    08/29/2023    Precautions:  Fall Risk Next MD visit:   none scheduled  Date of Surgery: n/a   Insurance Primary/Secondary: MEDICARE / Marina Robin     # Auth Visits: 20 per POC            Subjective: Nothing seems to be changing. Had a fall about two weeks ago; tried to catch himself with forearm on the wall but unsuccessfully and fell. Continues with TaiChi and once per month chiropractor treatments. He saw his neurologist who lowered his prednisone and is considering a  new drug. Pain: 1-2/10      Objective: Ankle ROM: R ankle DF: to neutral only; L: 5 deg     Accessory motion: not tested today  Palpation: myofascial restrictions bilateral posterior hips and lower back region     Strength: (* denotes performed with pain)  LE   Hip flexion (L2): R 5/5; L 5/5  Hip abduction: R 5/5; L 5/5  Glut max: R: 5/5, L;4+/5  Knee Flexion: R 5/5; L 5/5            Knee extension (L3): R 5/5; L 5/5            DF (L4): R 2/5; L 3-/5  Great Toe Ext (L5): R 1+/5, L 2-/5  PF (S1): R 3+/5; L 4-/5         Quad length in prone: R: 105 (was: 90); L: 105 (was: 90)  EOB hip flexors length in modified Edilson position c/l KTC: (+) B for psoas and RC shortness  Myofascial restrictions B lumbar paraspinals, posterior hip, quadriceps and hip flexors      Assessment: Continued with balance training and with lower extremity strengthening. Patient did well with the leg press tolerating higher resistance. Heel raises on leg press completed with better range and better endurance; also stronger resistance used. Kept AFO's on during the standing balance training today; also utilizes one hand assist consistently and occasionally leans into the railings for additional support.  He was able to briefly maintain double leg stance on Airex while keeping knee straight and without UE assist for about 5 sec prior to starting to lose balance backwards. Goals:   Goals: (to be met in 16 visits)   Pt will demonstrate improved SLS to >5 seconds CALOS to promote safety and decrease risk of falls on uneven surfaces such as grass. Progressing. Pt will perform TUG in <15 seconds with least restrictive AD, demonstrating improved gait speed for improved participation in ADL such as community ambulation Progressing. Pt will perform 4-Item DGI with score of 9/12 or greater with least restrictive AD to demonstrate ability to ambulate safely in crowded and busy environments  Pt will be able to squat to  light objects around the house without loss of stability  Pt will improve functional hip strength to demonstrate ability to ascend/descend 1 flight of stairs reciprocally with the use of handrail. Goal met. Pt will be independent and compliant with comprehensive HEP to maintain progress achieved in PT Goal met.      Post Oswestry Disability Index Score  Post Score: 18 % (10/11/2023 10:29 AM)    -18 % improvement    Plan: Progress strength per tolerance  Date: 9/20/23  Tx#: 6/16 Date: 9/25/23  Tx#: 7/16 Date: 9/27/23  Tx#: 8/16 Date:10/2/2023   Tx#:9/16 Date:10/4/2023  Tx#:10/16 Date:10/9/2023   Tx#:11/16 Date: 10/11/2023   Tx#:12/16 Date:10/16/2023   Tx#:13/16 Date:10/23/2023   Tx#:14/20 Date:10/30/2023   Tx#:15/20   Manual: 25'  Prone with one pillow under:  Deep STM lumbar paraspinals and QL B  Manual quad stretch in prone B  Manual modified hip flexors stretch EOB; STM to anterior thigh Manual: 25'  Prone with one pillow under:  Deep STM lumbar paraspinals and QL B  Manual quad stretch in prone B  Manual modified hip flexors stretch EOB; STM to anterior thigh Manual: 15'  Prone with one pillow under:  Deep STM lumbar paraspinals and QL B  Manual quad stretch in prone B  Manual modified hip flexors stretch EOB; STM to anterior thigh ThereX:  10 lumbar flex seated at edge of table  10x towel curls B  2x10 fwd lunge holding bar, out of AFOs  Goblet squats  Neuro re-ed:  NMES to R anterior tib 400 usec, 100hz, 95 mA  Practived walking 2 laps in p bars  2x10 DF assisted  1x heel walking  2x10 lunges to airex  4x3 hurdles Neuro re-ed:  NMES to R anterior tib 400 usec, 100hz, 95 mA  2x10 DF assisted  2x10 goblet squats  1x heel walking  10x step up to 6' R LE, 10 x step over step R LE  Therex:  PN see above, objective measures taken    Mod joão position for passive hip flexor stretch 2x30\"  10x fig 4 SL bridge Neuro re-ed:  NMES to R anterior tib 400 usec, 100hz, 95 mA  2x10 DF assisted  15 bosu lunges on R  2x10 step ups to 6\"  15x D1 PND hip flex on airex, R Neuro re-ed:  10x 3-cone tap in mod SLS on airex, B  Tandem walk in p bars light HHA x2  20 x marches on sanddune     TE TE TE - -   - Gait; walking with hurricaine and SBA    NU step L5 5'  Bridge 2 x 10   Clam shells 2 x 10, manual resistance NU step L5 5'  Bridge 2 x 10   Education on log rolling  Neuro re-ed:  10x sciatic nerve glide with ankle floss, B  Manual:  SL for flex rotate mob GR III, QL release following. x8 min Therex:  Seated sciatica nerve glide x 10 R LE Neuro re-ed:  10x sciatic N glide with adductor bias and floss on R Neuro re-ed:  10x sciatic N glide with adductor bias and floss on R 15x bridges  Neuro re-ed:   Attempted to discriminate between 3 different textures (soft, sand paper, and poky) R legx4 min Therex:  2x10 donkey kicks, 3 lbs B  2x15 lateral bosu lunges, HHA  15 squat to row, 30 lbs, CGA TE  Bridge x 15  Clams with manual resistance x 10 L/R  Sit to stand x 5; hands on upper thighs  Leg press with AFO's off  Double leg press 3 black cords x 30   Re-ed: 15'  Quadruped neutral spine/TrA recruitment/manual cuing  Camel cat with manual assist for movement coordination  Quadruped posterior rocking  Quadruped alternate arm raises x 7 L/R Re-ed: 13'  Quadruped neutral spine/TrA recruitment/manual cuing  Camel cat with minimal manual assist for movement coordination  Quadruped posterior rocking  Quadruped alternate arm raises x 7 L/R  TrA in hook-lying  TrA/PPT with alternate hip/knee flexion, slow  TrA/PPT with lateral knee fall outs, slow Re-ed: 25'  Quadruped neutral spine/TrA recruitment/manual cuing  Camel cat with minimal manual assist for movement coordination  Quadruped posterior rocking  Quadruped alternate arm raises x 7 L/R  Quadruped modified hip raises 2 x 5 L/R  TrA in hook-lying  TrA/PPT with alternate hip/knee flexion, slow  TrA/PPT with lateral knee fall outs, slow Therex:  10x sit to stand with exaggerated WS fwd and CGA  Tennis ball roll out firm pressure x2 min R Manual:  SL for flex rotate mob GR III, QL release following. x8 min    Therex:  2x10 SLR B  10 x bridges 4\" holds Therex:  2x10 knee ankle/knee ankle B  15x dead bugs in shelf position   10x fig 4 bridges SL    Manual:  SL for flex rotate mob GR III, QL release following. x8 min   Manual:  SL for flex rotate mob GR III, QL release following, Prone CPA to L3-L5 GR III.x10 min    Therex:  2x8 fwd lunges to bosu Therex: Towel  toe curls, R LE x10    Manual:  SL for flex rotate mob GR III, QL release following, B Manual:  SL for flex rotate mob GR III, QL release following, B x 8 min    Therex:    2x10 bridges with airex under one foot, B  2x10 SL raise on mat  15x SL hip/ankle hip abd, B Single leg press 1 black, 1 grey and 1 yellow cords x 20 L/R  B heel raises on leg press with knees extended, 1 black cord; 2 x 12    Re-ed: AFO's on  DLS on Airex SBA/CGA  DLS on the ground SBA  Madhavi step over with w/s forward x 15 L/R HHA  Cone taps x 15 L/R 1HHA       HEP: Access Code: [email or web address]  URL: [email or web address]  Date: 09/05/2023  Prepared by: Prabhjot Garcia    Exercises  - Supine Hip and Knee Flexion AROM with Swiss Ball  - 1 x daily - 7 x weekly - 3 sets - 10 reps  - Supine Lower Trunk Rotation with [employer]  - 1 x daily - 7 x weekly - 3 sets - 10 reps  - Hip Flexor Stretch at Edge of Bed  - 1 x daily - 7 x weekly - 1 sets - 3 reps - 30-60 sec hold  - Supine Figure 4 Piriformis Stretch  - 1 x daily - 7 x weekly - 1 sets - 3 reps - 30-60 sec hold      Charges: therex:2, re-ed: 1    Total Timed Treatment: 45 min  Total Treatment Time: 45 min

## 2023-11-01 ENCOUNTER — OFFICE VISIT (OUTPATIENT)
Dept: PHYSICAL THERAPY | Facility: HOSPITAL | Age: 78
End: 2023-11-01
Attending: INTERNAL MEDICINE
Payer: MEDICARE

## 2023-11-01 PROCEDURE — 97140 MANUAL THERAPY 1/> REGIONS: CPT

## 2023-11-01 PROCEDURE — 97110 THERAPEUTIC EXERCISES: CPT

## 2023-11-01 PROCEDURE — 97112 NEUROMUSCULAR REEDUCATION: CPT

## 2023-11-01 NOTE — PROGRESS NOTES
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 provider found  Date of Evaluation:    08/29/2023    Precautions:  Fall Risk Next MD visit:   none scheduled  Date of Surgery: n/a   Insurance Primary/Secondary: MEDICARE / Arthea Karin     # Auth Visits: 20 per POC            Subjective: He felt very sore yesterday in both buttocks/hamstrings and even some in lower back L > R. It is better today. Did stretches only yesterday. Pain: 1-2/10      Objective: Ankle ROM: R ankle DF: to neutral only; L: 5 deg     Accessory motion: not tested today  Palpation: myofascial restrictions bilateral posterior hips and lower back region     Strength: (* denotes performed with pain)  LE   Hip flexion (L2): R 5/5; L 5/5  Hip abduction: R 5/5; L 5/5  Glut max: R: 5/5, L;4+/5  Knee Flexion: R 5/5; L 5/5            Knee extension (L3): R 5/5; L 5/5            DF (L4): R 2/5; L 3-/5  Great Toe Ext (L5): R 1+/5, L 2-/5  PF (S1): R 3+/5; L 4-/5         Quad length in prone: R: 105 (was: 90); L: 105 (was: 90)  EOB hip flexors length in modified Edilson position c/l KTC: (+) B for psoas and RC shortness  Myofascial restrictions B lumbar paraspinals, posterior hip, quadriceps and hip flexors      Assessment: Continued with leg press keeping same resistance; AFO's on. Kept AFO's on during the standing balance training today. Able to maintain unsupported double leg stance balance for brief periods of time, on the ground and on a foam block. Works hard to prevent B knee buckling; feels gluts working. Resumed lumbar spine mobilization today per patient's request with good tolerance. Goals:   Goals: (to be met in 16 visits)   Pt will demonstrate improved SLS to >5 seconds CALOS to promote safety and decrease risk of falls on uneven surfaces such as grass. Progressing.    Pt will perform TUG in <15 seconds with least restrictive AD, demonstrating improved gait speed for improved participation in ADL such as community ambulation Progressing. Pt will perform 4-Item DGI with score of 9/12 or greater with least restrictive AD to demonstrate ability to ambulate safely in crowded and busy environments  Pt will be able to squat to  light objects around the house without loss of stability  Pt will improve functional hip strength to demonstrate ability to ascend/descend 1 flight of stairs reciprocally with the use of handrail. Goal met. Pt will be independent and compliant with comprehensive HEP to maintain progress achieved in PT Goal met.      Post Oswestry Disability Index Score  Post Score: 18 % (10/11/2023 10:29 AM)    -18 % improvement    Plan: Progress strength per tolerance  Date: 9/20/23  Tx#: 6/16 Date: 9/25/23  Tx#: 7/16 Date: 9/27/23  Tx#: 8/16 Date:10/2/2023   Tx#:9/16 Date:10/4/2023  Tx#:10/16 Date:10/9/2023   Tx#:11/16 Date: 10/11/2023   Tx#:12/16 Date:10/16/2023   Tx#:13/16 Date:10/23/2023   Tx#:14/20 Date:10/30/2023   Tx#:15/20 Date:11/01/2023   Tx#:16/20   Manual: 25'  Prone with one pillow under:  Deep STM lumbar paraspinals and QL B  Manual quad stretch in prone B  Manual modified hip flexors stretch EOB; STM to anterior thigh Manual: 25'  Prone with one pillow under:  Deep STM lumbar paraspinals and QL B  Manual quad stretch in prone B  Manual modified hip flexors stretch EOB; STM to anterior thigh Manual: 15'  Prone with one pillow under:  Deep STM lumbar paraspinals and QL B  Manual quad stretch in prone B  Manual modified hip flexors stretch EOB; STM to anterior thigh   ThereX:  10 lumbar flex seated at edge of table  10x towel curls B  2x10 fwd lunge holding bar, out of AFOs  Goblet squats  Neuro re-ed:  NMES to R anterior tib 400 usec, 100hz, 95 mA  Practived walking 2 laps in p bars  2x10 DF assisted  1x heel walking  2x10 lunges to airex  4x3 hurdles Neuro re-ed:  NMES to R anterior tib 400 usec, 100hz, 95 mA  2x10 DF assisted  2x10 goblet squats  1x heel walking  10x step up to 6' R LE, 10 x step over step R LE  Therex:  PN see above, objective measures taken    Mod joão position for passive hip flexor stretch 2x30\"  10x fig 4 SL bridge Neuro re-ed:  NMES to R anterior tib 400 usec, 100hz, 95 mA  2x10 DF assisted  15 bosu lunges on R  2x10 step ups to 6\"  15x D1 PND hip flex on airex, R Neuro re-ed:  10x 3-cone tap in mod SLS on airex, B  Tandem walk in p bars light HHA x2  20 x marches on sanddune   Manual:  SL for flex rotate mob GR III, QL release following, B x 8 min  STM L posterior hip    TE TE TE - -   - Gait; walking with hurricaine and SBA     NU step L5 5'  Bridge 2 x 10   Clam shells 2 x 10, manual resistance NU step L5 5'  Bridge 2 x 10   Education on log rolling  Neuro re-ed:  10x sciatic nerve glide with ankle floss, B  Manual:  SL for flex rotate mob GR III, QL release following. x8 min Therex:  Seated sciatica nerve glide x 10 R LE Neuro re-ed:  10x sciatic N glide with adductor bias and floss on R Neuro re-ed:  10x sciatic N glide with adductor bias and floss on R 15x bridges  Neuro re-ed:   Attempted to discriminate between 3 different textures (soft, sand paper, and poky) R legx4 min Therex:  2x10 donkey kicks, 3 lbs B  2x15 lateral bosu lunges, HHA  15 squat to row, 30 lbs, CGA TE  Bridge x 15  Clams with manual resistance x 10 L/R  Sit to stand x 5; hands on upper thighs  Leg press with AFO's off  Double leg press 3 black cords x 30 TE  Bridge x 15  Clams with manual resistance x 12 L/R  Leg press with AFO's ON  Double leg press 3 black cords 2 x 25   Re-ed: 15'  Quadruped neutral spine/TrA recruitment/manual cuing  Camel cat with manual assist for movement coordination  Quadruped posterior rocking  Quadruped alternate arm raises x 7 L/R Re-ed: 15'  Quadruped neutral spine/TrA recruitment/manual cuing  Camel cat with minimal manual assist for movement coordination  Quadruped posterior rocking  Quadruped alternate arm raises x 7 L/R  TrA in hook-lying  TrA/PPT with alternate hip/knee flexion, slow  TrA/PPT with lateral knee fall outs, slow Re-ed: 25'  Quadruped neutral spine/TrA recruitment/manual cuing  Camel cat with minimal manual assist for movement coordination  Quadruped posterior rocking  Quadruped alternate arm raises x 7 L/R  Quadruped modified hip raises 2 x 5 L/R  TrA in hook-lying  TrA/PPT with alternate hip/knee flexion, slow  TrA/PPT with lateral knee fall outs, slow Therex:  10x sit to stand with exaggerated WS fwd and CGA  Tennis ball roll out firm pressure x2 min R Manual:  SL for flex rotate mob GR III, QL release following. x8 min    Therex:  2x10 SLR B  10 x bridges 4\" holds Therex:  2x10 knee ankle/knee ankle B  15x dead bugs in shelf position   10x fig 4 bridges SL    Manual:  SL for flex rotate mob GR III, QL release following. x8 min   Manual:  SL for flex rotate mob GR III, QL release following, Prone CPA to L3-L5 GR III.x10 min    Therex:  2x8 fwd lunges to bosu Therex: Towel  toe curls, R LE x10    Manual:  SL for flex rotate mob GR III, QL release following, B Manual:  SL for flex rotate mob GR III, QL release following, B x 8 min    Therex:    2x10 bridges with airex under one foot, B  2x10 SL raise on mat  15x SL hip/ankle hip abd, B Single leg press 1 black, 1 grey and 1 yellow cords x 20 L/R  B heel raises on leg press with knees extended, 1 black cord; 2 x 12    Re-ed: AFO's on  DLS on Airex SBA/CGA  DLS on the ground SBA  Madhavi step over with w/s forward x 15 L/R HHA  Cone taps x 15 L/R 1HHA     Single leg press 1 black, 1 grey and 1 yellow cords x 20 L/R    Re-ed: AFO's on  DLS on Airex SBA/CGA  DLS on the ground SBA  Madhavi step over with w/s forward x 15 L/R HHA  Cone taps x 15 L/R 1HHA   HEP: Access Code: [email or web address]  URL: [email or web address]  Date: 09/05/2023  Prepared by: Prabhjot Garcia    Exercises  - Supine Hip and Knee Flexion AROM with Swiss Ball  - 1 x daily - 7 x weekly - 3 sets - 10 reps  - Supine Lower Trunk Rotation with Swiss Ball  - 1 x daily - 7 x weekly - 3 sets - 10 reps  - Hip Flexor Stretch at Edge of Bed  - 1 x daily - 7 x weekly - 1 sets - 3 reps - 30-60 sec hold  - Supine Figure 4 Piriformis Stretch  - 1 x daily - 7 x weekly - 1 sets - 3 reps - 30-60 sec hold      Charges: therex:1, re-ed: 1, man x 1   Total Timed Treatment: 45 min  Total Treatment Time: 45 min

## 2023-11-06 ENCOUNTER — OFFICE VISIT (OUTPATIENT)
Dept: PHYSICAL THERAPY | Facility: HOSPITAL | Age: 78
End: 2023-11-06
Attending: INTERNAL MEDICINE
Payer: MEDICARE

## 2023-11-06 PROCEDURE — 97140 MANUAL THERAPY 1/> REGIONS: CPT

## 2023-11-06 PROCEDURE — 97112 NEUROMUSCULAR REEDUCATION: CPT

## 2023-11-06 PROCEDURE — 97110 THERAPEUTIC EXERCISES: CPT

## 2023-11-06 NOTE — PROGRESS NOTES
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 provider found  Date of Evaluation:    08/29/2023    Precautions:  Fall Risk Next MD visit:   none scheduled  Date of Surgery: n/a   Insurance Primary/Secondary: MEDICARE / [facility]     # Auth Visits: 20 per POC            Subjective: He feels much better today in regards to the soreness that he experienced last time. Has been sleeping with a hot pack for his lower back and that feels good to him. Pain: 1-2/10      Objective:   Limited and stiff left lumbar thoracic rotation  R piriformis stiffness and shortness  R ITB stiffness and shortness    DLS on Airex unassisted: up to 10 sec      Assessment: Added deep STM to address stiffness along R ITB, glut med, TFL and piriformis. Frequent cuing provided to engage lower abdominal muscles to move out of APT and create better lumbar spine stabilization. Continued with leg press, increasing the resistance for the single leg press; AFO's on. Kept AFO's on during the standing balance training today. Able to maintain unsupported double leg stance balance for brief periods of time, on the ground and on a foam block. Works hard to prevent B knee buckling; feels gluts working. Leans moderately into the railings right > left for additional stabilization. Patient continues to require skilled physical therapy to address gait, balance, strength and functional mobility impairment. Goals:   Goals: (to be met in 16 visits)   Pt will demonstrate improved SLS to >5 seconds CALOS to promote safety and decrease risk of falls on uneven surfaces such as grass. Progressing. Pt will perform TUG in <15 seconds with least restrictive AD, demonstrating improved gait speed for improved participation in ADL such as community ambulation Progressing.   Pt will perform 4-Item DGI with score of 9/12 or greater with least restrictive AD to demonstrate ability to ambulate safely in crowded and busy environments  Pt will be able to squat to  light objects around the house without loss of stability  Pt will improve functional hip strength to demonstrate ability to ascend/descend 1 flight of stairs reciprocally with the use of handrail. Goal met. Pt will be independent and compliant with comprehensive HEP to maintain progress achieved in PT Goal met.      Post Oswestry Disability Index Score  Post Score: 18 % (10/11/2023 10:29 AM)    -18 % improvement    Plan: Progress strength per tolerance  Date: 9/20/23  Tx#: 6/16 Date: 9/25/23  Tx#: 7/16 Date: 9/27/23  Tx#: 8/16 Date:10/2/2023   Tx#:9/16 Date:10/4/2023  Tx#:10/16 Date:10/9/2023   Tx#:11/16 Date: 10/11/2023   Tx#:12/16 Date:10/16/2023   Tx#:13/16 Date:10/23/2023   Tx#:14/20 Date:10/30/2023   Tx#:15/20 Date:11/01/2023   Tx#:16/20 Date:11/06/2023   Tx#:16/20   Manual: 25'  Prone with one pillow under:  Deep STM lumbar paraspinals and QL B  Manual quad stretch in prone B  Manual modified hip flexors stretch EOB; STM to anterior thigh Manual: 25'  Prone with one pillow under:  Deep STM lumbar paraspinals and QL B  Manual quad stretch in prone B  Manual modified hip flexors stretch EOB; STM to anterior thigh Manual: 15'  Prone with one pillow under:  Deep STM lumbar paraspinals and QL B  Manual quad stretch in prone B  Manual modified hip flexors stretch EOB; STM to anterior thigh   ThereX:  10 lumbar flex seated at edge of table  10x towel curls B  2x10 fwd lunge holding bar, out of AFOs  Goblet squats  Neuro re-ed:  NMES to R anterior tib 400 usec, 100hz, 95 mA  Practived walking 2 laps in p bars  2x10 DF assisted  1x heel walking  2x10 lunges to airex  4x3 hurdles Neuro re-ed:  NMES to R anterior tib 400 usec, 100hz, 95 mA  2x10 DF assisted  2x10 goblet squats  1x heel walking  10x step up to 6' R LE, 10 x step over step R LE  Therex:  PN see above, objective measures taken    Mod joão position for passive hip flexor stretch 2x30\"  10x fig 4 SL bridge Neuro re-ed:  NMES to R anterior tib 400 usec, 100hz, 95 mA  2x10 DF assisted  15 bosu lunges on R  2x10 step ups to 6\"  15x D1 PND hip flex on airex, R Neuro re-ed:  10x 3-cone tap in mod SLS on airex, B  Tandem walk in p bars light HHA x2  20 x marches on sanddune   Manual:  SL for flex rotate mob GR III, QL release following, B x 8 min  STM L posterior hip  Manual: 18'  Deep STM R posterior hip   Deep STM R ITB/TFL   SL for flex rotate mob GR III, QL release following, B   Manual R piriformis stretch     TE TE TE - -   - Gait; walking with hurricaine and SBA      NU step L5 5'  Bridge 2 x 10   Clam shells 2 x 10, manual resistance NU step L5 5'  Bridge 2 x 10   Education on log rolling  Neuro re-ed:  10x sciatic nerve glide with ankle floss, B  Manual:  SL for flex rotate mob GR III, QL release following. x8 min Therex:  Seated sciatica nerve glide x 10 R LE Neuro re-ed:  10x sciatic N glide with adductor bias and floss on R Neuro re-ed:  10x sciatic N glide with adductor bias and floss on R 15x bridges  Neuro re-ed:   Attempted to discriminate between 3 different textures (soft, sand paper, and poky) R legx4 min Therex:  2x10 donkey kicks, 3 lbs B  2x15 lateral bosu lunges, HHA  15 squat to row, 30 lbs, CGA TE  Bridge x 15  Clams with manual resistance x 10 L/R  Sit to stand x 5; hands on upper thighs  Leg press with AFO's off  Double leg press 3 black cords x 30 TE  Bridge x 15  Clams with manual resistance x 12 L/R  Leg press with AFO's ON  Double leg press 3 black cords 2 x 25 TE  Bridge x 20  LTR x 10  LTR with legs crossed for ITB bias x 10 L/R  R clam shells with manual resistance 2 x 15  Hkly PPT with DKTC x 8 L/R  Double leg press 3 black cords 1 x 30   Re-ed: 15'  Quadruped neutral spine/TrA recruitment/manual cuing  Camel cat with manual assist for movement coordination  Quadruped posterior rocking  Quadruped alternate arm raises x 7 L/R Re-ed: 15'  Quadruped neutral spine/TrA recruitment/manual cuing  Camel cat with minimal manual assist for movement coordination  Quadruped posterior rocking  Quadruped alternate arm raises x 7 L/R  TrA in hook-lying  TrA/PPT with alternate hip/knee flexion, slow  TrA/PPT with lateral knee fall outs, slow Re-ed: 25'  Quadruped neutral spine/TrA recruitment/manual cuing  Camel cat with minimal manual assist for movement coordination  Quadruped posterior rocking  Quadruped alternate arm raises x 7 L/R  Quadruped modified hip raises 2 x 5 L/R  TrA in hook-lying  TrA/PPT with alternate hip/knee flexion, slow  TrA/PPT with lateral knee fall outs, slow Therex:  10x sit to stand with exaggerated WS fwd and CGA  Tennis ball roll out firm pressure x2 min R Manual:  SL for flex rotate mob GR III, QL release following. x8 min    Therex:  2x10 SLR B  10 x bridges 4\" holds Therex:  2x10 knee ankle/knee ankle B  15x dead bugs in shelf position   10x fig 4 bridges SL    Manual:  SL for flex rotate mob GR III, QL release following. x8 min   Manual:  SL for flex rotate mob GR III, QL release following, Prone CPA to L3-L5 GR III.x10 min    Therex:  2x8 fwd lunges to bosu Therex:   Towel  toe curls, R LE x10    Manual:  SL for flex rotate mob GR III, QL release following, B Manual:  SL for flex rotate mob GR III, QL release following, B x 8 min    Therex:    2x10 bridges with airex under one foot, B  2x10 SL raise on mat  15x SL hip/ankle hip abd, B Single leg press 1 black, 1 grey and 1 yellow cords x 20 L/R  B heel raises on leg press with knees extended, 1 black cord; 2 x 12    Re-ed: AFO's on  DLS on Airex SBA/CGA  DLS on the ground SBA  Madhavi step over with w/s forward x 15 L/R HHA  Cone taps x 15 L/R 1HHA     Single leg press 1 black, 1 grey and 1 yellow cords x 20 L/R    Re-ed: AFO's on  DLS on Airex SBA/CGA  DLS on the ground SBA  Madhavi step over with w/s forward x 15 L/R HHA  Cone taps x 15 L/R 1HHA Single leg press 2 black cords 1 x 30 L/R    Re-ed: AFO's on  DLS on Airex SBA/CGA  DLS on the ground SBA  Madhavi step over with w/s forward x 15 L/R HHA  Cone taps x 15 L/R 1HHA  Forward lunge unsupported with hands hovering and SBA; 7 x 5 sec L/R   HEP: Access Code: [email or web address]  URL: [email or web address]  Date: 09/05/2023  Prepared by: Oneida Decker    Exercises  - Supine Hip and Knee Flexion AROM with Swiss Ball  - 1 x daily - 7 x weekly - 3 sets - 10 reps  - Supine Lower Trunk Rotation with Swiss Ball  - 1 x daily - 7 x weekly - 3 sets - 10 reps  - Hip Flexor Stretch at Edge of Bed  - 1 x daily - 7 x weekly - 1 sets - 3 reps - 30-60 sec hold  - Supine Figure 4 Piriformis Stretch  - 1 x daily - 7 x weekly - 1 sets - 3 reps - 30-60 sec hold      Charges: therex:1, re-ed: 1, man x 1   Total Timed Treatment: 45 min  Total Treatment Time: 45 min

## 2023-11-08 ENCOUNTER — OFFICE VISIT (OUTPATIENT)
Dept: PHYSICAL THERAPY | Facility: HOSPITAL | Age: 78
End: 2023-11-08
Attending: INTERNAL MEDICINE
Payer: MEDICARE

## 2023-11-08 PROCEDURE — 97140 MANUAL THERAPY 1/> REGIONS: CPT

## 2023-11-08 PROCEDURE — 97110 THERAPEUTIC EXERCISES: CPT

## 2023-11-08 PROCEDURE — 97112 NEUROMUSCULAR REEDUCATION: CPT

## 2023-11-08 NOTE — PROGRESS NOTES
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 provider found  Date of Evaluation:    08/29/2023    Precautions:  Fall Risk Next MD visit:   none scheduled  Date of Surgery: n/a   Insurance Primary/Secondary: MEDICARE / [facility]     # Auth Visits: 20 per POC            Subjective: Sees oncology today to plan out the procedure for IV medication prescribed by his neurologist. Some stiffness in his legs and lower back; right buttock feels tender following STM. Pain: 1-2/10      Objective:   Limited and stiff left lumbar thoracic rotation  R piriformis stiffness and shortness  R ITB stiffness and shortness    DLS on Airex unassisted: up to 10 sec      Assessment: DLS stance on Airex improved to 30 sec while having better ability to control for knee buckling; completed 3 reps like that indicating better endurance. Able to complete 5 reps of sit to stand transfer without UE assist; SBA for blocking at the knees provided; unable to control for safe return to sitting unless uses UE assist. Patient continues to require skilled physical therapy to address gait, balance, strength and functional mobility impairment. Goals:   Goals: (to be met in 16 visits)   Pt will demonstrate improved SLS to >5 seconds CALOS to promote safety and decrease risk of falls on uneven surfaces such as grass. Progressing. Pt will perform TUG in <15 seconds with least restrictive AD, demonstrating improved gait speed for improved participation in ADL such as community ambulation Progressing.   Pt will perform 4-Item DGI with score of 9/12 or greater with least restrictive AD to demonstrate ability to ambulate safely in crowded and busy environments  Pt will be able to squat to  light objects around the house without loss of stability  Pt will improve functional hip strength to demonstrate ability to ascend/descend 1 flight of stairs reciprocally with the use of handrail. Goal met. Pt will be independent and compliant with comprehensive HEP to maintain progress achieved in PT Goal met.      Post Oswestry Disability Index Score  Post Score: 18 % (10/11/2023 10:29 AM)    -18 % improvement    Plan: manual therapy; trunk stabilization; LE strengthening as tolerated   Date: 9/20/23  Tx#: 6/16 Date: 9/25/23  Tx#: 7/16 Date: 9/27/23  Tx#: 8/16 Date:10/2/2023   Tx#:9/16 Date:10/4/2023  Tx#:10/16 Date:10/9/2023   Tx#:11/16 Date: 10/11/2023   Tx#:12/16 Date:10/16/2023   Tx#:13/16 Date:10/23/2023   Tx#:14/20 Date:10/30/2023   Tx#:15/20 Date:11/01/2023   Tx#:16/20 Date:11/06/2023   Tx#:17/20 Date:11/08/2023   Tx#:18/20   Manual: 25'  Prone with one pillow under:  Deep STM lumbar paraspinals and QL B  Manual quad stretch in prone B  Manual modified hip flexors stretch EOB; STM to anterior thigh Manual: 25'  Prone with one pillow under:  Deep STM lumbar paraspinals and QL B  Manual quad stretch in prone B  Manual modified hip flexors stretch EOB; STM to anterior thigh Manual: 15'  Prone with one pillow under:  Deep STM lumbar paraspinals and QL B  Manual quad stretch in prone B  Manual modified hip flexors stretch EOB; STM to anterior thigh   ThereX:  10 lumbar flex seated at edge of table  10x towel curls B  2x10 fwd lunge holding bar, out of AFOs  Goblet squats  Neuro re-ed:  NMES to R anterior tib 400 usec, 100hz, 95 mA  Practived walking 2 laps in p bars  2x10 DF assisted  1x heel walking  2x10 lunges to airex  4x3 hurdles Neuro re-ed:  NMES to R anterior tib 400 usec, 100hz, 95 mA  2x10 DF assisted  2x10 goblet squats  1x heel walking  10x step up to 6' R LE, 10 x step over step R LE  Therex:  PN see above, objective measures taken    Mod joão position for passive hip flexor stretch 2x30\"  10x fig 4 SL bridge Neuro re-ed:  NMES to R anterior tib 400 usec, 100hz, 95 mA  2x10 DF assisted  15 bosu lunges on R  2x10 step ups to 6\"  15x D1 PND hip flex on airex, R Neuro re-ed:  10x 3-cone tap in mod SLS on airex, B  Tandem walk in p bars light HHA x2  20 x marches on sanddune   Manual:  SL for flex rotate mob GR III, QL release following, B x 8 min  STM L posterior hip  Manual: 18'  Deep STM R posterior hip   Deep STM R ITB/TFL   SL for flex rotate mob GR III, QL release following, B   Manual R piriformis stretch   Manual: 15'  B quad stretch in prone  SL for flex rotate mob GR III, QL release following, B   Manual R piriformis stretch  Prone thoracic spine PA mobs Gr III  Prone central PA mobs L5/S1 Gr III   TE TE TE - -   - Gait; walking with hurricaine and SBA       NU step L5 5'  Bridge 2 x 10   Clam shells 2 x 10, manual resistance NU step L5 5'  Bridge 2 x 10   Education on log rolling  Neuro re-ed:  10x sciatic nerve glide with ankle floss, B  Manual:  SL for flex rotate mob GR III, QL release following. x8 min Therex:  Seated sciatica nerve glide x 10 R LE Neuro re-ed:  10x sciatic N glide with adductor bias and floss on R Neuro re-ed:  10x sciatic N glide with adductor bias and floss on R 15x bridges  Neuro re-ed:   Attempted to discriminate between 3 different textures (soft, sand paper, and poky) R legx4 min Therex:  2x10 donkey kicks, 3 lbs B  2x15 lateral bosu lunges, HHA  15 squat to row, 30 lbs, CGA TE  Bridge x 15  Clams with manual resistance x 10 L/R  Sit to stand x 5; hands on upper thighs  Leg press with AFO's off  Double leg press 3 black cords x 30 TE  Bridge x 15  Clams with manual resistance x 12 L/R  Leg press with AFO's ON  Double leg press 3 black cords 2 x 25 TE  Bridge x 20  LTR x 10  LTR with legs crossed for ITB bias x 10 L/R  R clam shells with manual resistance 2 x 15  Hkly PPT with DKTC x 8 L/R  Double leg press 3 black cords 1 x 30 TE  Bridge x 20  LTR x 10  LTR with legs crossed for ITB bias x 10 L/R  R clam shells with manual resistance 2 x 15  Hkly PPT with DKTC x 8 L/R  Sit to stand without UE assist x 5, SBA for blocking at the knees; 2HHA to return to sitting   Re-ed: 15'  Quadruped neutral spine/TrA recruitment/manual cuing  Camel cat with manual assist for movement coordination  Quadruped posterior rocking  Quadruped alternate arm raises x 7 L/R Re-ed: 15'  Quadruped neutral spine/TrA recruitment/manual cuing  Camel cat with minimal manual assist for movement coordination  Quadruped posterior rocking  Quadruped alternate arm raises x 7 L/R  TrA in hook-lying  TrA/PPT with alternate hip/knee flexion, slow  TrA/PPT with lateral knee fall outs, slow Re-ed: 25'  Quadruped neutral spine/TrA recruitment/manual cuing  Camel cat with minimal manual assist for movement coordination  Quadruped posterior rocking  Quadruped alternate arm raises x 7 L/R  Quadruped modified hip raises 2 x 5 L/R  TrA in hook-lying  TrA/PPT with alternate hip/knee flexion, slow  TrA/PPT with lateral knee fall outs, slow Therex:  10x sit to stand with exaggerated WS fwd and CGA  Tennis ball roll out firm pressure x2 min R Manual:  SL for flex rotate mob GR III, QL release following. x8 min    Therex:  2x10 SLR B  10 x bridges 4\" holds Therex:  2x10 knee ankle/knee ankle B  15x dead bugs in shelf position   10x fig 4 bridges SL    Manual:  SL for flex rotate mob GR III, QL release following. x8 min   Manual:  SL for flex rotate mob GR III, QL release following, Prone CPA to L3-L5 GR III.x10 min    Therex:  2x8 fwd lunges to bosu Therex:   Towel  toe curls, R LE x10    Manual:  SL for flex rotate mob GR III, QL release following, B Manual:  SL for flex rotate mob GR III, QL release following, B x 8 min    Therex:    2x10 bridges with airex under one foot, B  2x10 SL raise on mat  15x SL hip/ankle hip abd, B Single leg press 1 black, 1 grey and 1 yellow cords x 20 L/R  B heel raises on leg press with knees extended, 1 black cord; 2 x 12    Re-ed: AFO's on  DLS on Airex SBA/CGA  DLS on the ground SBA  Madhavi step over with w/s forward x 15 L/R HHA  Cone taps x 15 L/R 1HHA     Single leg press 1 black, 1 grey and 1 yellow cords x 20 L/R    Re-ed: AFO's on  DLS on Airex SBA/CGA  DLS on the ground SBA  Madhavi step over with w/s forward x 15 L/R HHA  Cone taps x 15 L/R 1HHA Single leg press 2 black cords 1 x 30 L/R    Re-ed: AFO's on  DLS on Airex SBA/CGA  DLS on the ground SBA  Madhavi step over with w/s forward x 15 L/R HHA  Cone taps x 15 L/R 1HHA  Forward lunge unsupported with hands hovering and SBA; 7 x 5 sec L/R Partial squats into chair 2HHA x 10  Red cord lateral steps x 20 L/R 2HHA    Re-ed: AFO's on  DLS on Airex SBA 3 x 30 sec  DLS on the ground SBA  Tandem stance 2 x 20 sec L/R finger touch B  Cone taps x 15 L/R 1HHA  Forward lunge unsupported with hands hovering and SBA; 7 x 5 sec L/R   HEP: Access Code: [email or web address]  URL: [email or web address]  Date: 09/05/2023  Prepared by: James Telles    Exercises  - Supine Hip and Knee Flexion AROM with Swiss Ball  - 1 x daily - 7 x weekly - 3 sets - 10 reps  - Supine Lower Trunk Rotation with Swiss Ball  - 1 x daily - 7 x weekly - 3 sets - 10 reps  - Hip Flexor Stretch at Edge of Bed  - 1 x daily - 7 x weekly - 1 sets - 3 reps - 30-60 sec hold  - Supine Figure 4 Piriformis Stretch  - 1 x daily - 7 x weekly - 1 sets - 3 reps - 30-60 sec hold      Charges: therex:1, re-ed: 1, man x 1   Total Timed Treatment: 45 min  Total Treatment Time: 45 min

## 2023-11-13 ENCOUNTER — OFFICE VISIT (OUTPATIENT)
Dept: PHYSICAL THERAPY | Facility: HOSPITAL | Age: 78
End: 2023-11-13
Attending: INTERNAL MEDICINE
Payer: MEDICARE

## 2023-11-13 PROCEDURE — 97110 THERAPEUTIC EXERCISES: CPT

## 2023-11-13 PROCEDURE — 97140 MANUAL THERAPY 1/> REGIONS: CPT

## 2023-11-13 PROCEDURE — 97112 NEUROMUSCULAR REEDUCATION: CPT

## 2023-11-13 NOTE — PROGRESS NOTES
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 provider found  Date of Evaluation:    08/29/2023    Precautions:  Fall Risk Next MD visit:   none scheduled  Date of Surgery: n/a   Insurance Primary/Secondary: MEDICARE / [facility]     # Auth Visits: 20 per POC            Subjective: Approved for 4 IV infusions starting next week to be done once per week. Overall having a pretty good today; feels looser in his back and legs; very little pain in the lower back. Pain: 1/10      Objective:   Limited and stiff left lumbar thoracic rotation  R piriformis stiffness and shortness  R ITB stiffness and shortness    DLS on Airex unassisted: up to 10 sec      Assessment: Continued to work on sit to stand transfer without the UE assist. Patient was cautioned to focus on movement control and biomechanics vs momentum to get the motion going. Able to complete two reps with fair form and good control prior to fatigue onset. Improving knee control with DLS stance on Airex; used to buckle in the knees immediately with an onset of activity but now he is able to maintain upright position with knees straight for a few seconds. Patient continues to require skilled physical therapy to address gait, balance, strength and functional mobility impairment. Heaven Wesley will continue with HEP for the next few weeks and will return to PT for re-assessment in early December. Goals:   Goals: (to be met in 16 visits)   Pt will demonstrate improved SLS to >5 seconds CALOS to promote safety and decrease risk of falls on uneven surfaces such as grass. Progressing. Pt will perform TUG in <15 seconds with least restrictive AD, demonstrating improved gait speed for improved participation in ADL such as community ambulation Progressing.   Pt will perform 4-Item DGI with score of 9/12 or greater with least restrictive AD to demonstrate ability to ambulate safely in crowded and busy environments  Pt will be able to squat to  light objects around the house without loss of stability  Pt will improve functional hip strength to demonstrate ability to ascend/descend 1 flight of stairs reciprocally with the use of handrail. Goal met. Pt will be independent and compliant with comprehensive HEP to maintain progress achieved in PT Goal met.      Post Oswestry Disability Index Score  Post Score: 18 % (10/11/2023 10:29 AM)    -18 % improvement    Plan: re-assess; progress note  Date: 9/20/23  Tx#: 6/16 Date: 9/25/23  Tx#: 7/16 Date: 9/27/23  Tx#: 8/16 Date:10/2/2023   Tx#:9/16 Date:10/4/2023  Tx#:10/16 Date:10/9/2023   Tx#:11/16 Date: 10/11/2023   Tx#:12/16 Date:10/16/2023   Tx#:13/16 Date:10/23/2023   Tx#:14/20 Date:10/30/2023   Tx#:15/20 Date:11/01/2023   Tx#:16/20 Date:11/06/2023   Tx#:17/20 Date:11/08/2023   Tx#:18/20 Date:11/13/2023   Tx#:19/20   Manual: 25'  Prone with one pillow under:  Deep STM lumbar paraspinals and QL B  Manual quad stretch in prone B  Manual modified hip flexors stretch EOB; STM to anterior thigh Manual: 25'  Prone with one pillow under:  Deep STM lumbar paraspinals and QL B  Manual quad stretch in prone B  Manual modified hip flexors stretch EOB; STM to anterior thigh Manual: 15'  Prone with one pillow under:  Deep STM lumbar paraspinals and QL B  Manual quad stretch in prone B  Manual modified hip flexors stretch EOB; STM to anterior thigh   ThereX:  10 lumbar flex seated at edge of table  10x towel curls B  2x10 fwd lunge holding bar, out of AFOs  Goblet squats  Neuro re-ed:  NMES to R anterior tib 400 usec, 100hz, 95 mA  Practived walking 2 laps in p bars  2x10 DF assisted  1x heel walking  2x10 lunges to airex  4x3 hurdles Neuro re-ed:  NMES to R anterior tib 400 usec, 100hz, 95 mA  2x10 DF assisted  2x10 goblet squats  1x heel walking  10x step up to 6' R LE, 10 x step over step R LE  Therex:  PN see above, objective measures taken    Mod joão position for passive hip flexor stretch 2x30\"  10x fig 4 SL bridge Neuro re-ed:  NMES to R anterior tib 400 usec, 100hz, 95 mA  2x10 DF assisted  15 bosu lunges on R  2x10 step ups to 6\"  15x D1 PND hip flex on airex, R Neuro re-ed:  10x 3-cone tap in mod SLS on airex, B  Tandem walk in p bars light HHA x2  20 x marches on sanddune   Manual:  SL for flex rotate mob GR III, QL release following, B x 8 min  STM L posterior hip  Manual: 18'  Deep STM R posterior hip   Deep STM R ITB/TFL   SL for flex rotate mob GR III, QL release following, B   Manual R piriformis stretch   Manual: 15'  B quad stretch in prone  SL for flex rotate mob GR III, QL release following, B   Manual R piriformis stretch  Prone thoracic spine PA mobs Gr III  Prone central PA mobs L5/S1 Gr III Manual: 15'  B quad stretch in prone  SL for flex rotate mob GR III, QL release following, B   Manual R piriformis stretch  Prone thoracic spine PA mobs Gr III  Prone central PA mobs L5/S1 Gr III   TE TE TE - -   - Gait; walking with hurricaine and SBA        NU step L5 5'  Bridge 2 x 10   Clam shells 2 x 10, manual resistance NU step L5 5'  Bridge 2 x 10   Education on log rolling  Neuro re-ed:  10x sciatic nerve glide with ankle floss, B  Manual:  SL for flex rotate mob GR III, QL release following. x8 min Therex:  Seated sciatica nerve glide x 10 R LE Neuro re-ed:  10x sciatic N glide with adductor bias and floss on R Neuro re-ed:  10x sciatic N glide with adductor bias and floss on R 15x bridges  Neuro re-ed:   Attempted to discriminate between 3 different textures (soft, sand paper, and poky) R legx4 min Therex:  2x10 donkey kicks, 3 lbs B  2x15 lateral bosu lunges, HHA  15 squat to row, 30 lbs, CGA TE  Bridge x 15  Clams with manual resistance x 10 L/R  Sit to stand x 5; hands on upper thighs  Leg press with AFO's off  Double leg press 3 black cords x 30 TE  Bridge x 15  Clams with manual resistance x 12 L/R  Leg press with AFO's ON  Double leg press 3 black cords 2 x 25 TE  Bridge x 20  LTR x 10  LTR with legs crossed for ITB bias x 10 L/R  R clam shells with manual resistance 2 x 15  Hkly PPT with DKTC x 8 L/R  Double leg press 3 black cords 1 x 30 TE  Bridge x 20  LTR x 10  LTR with legs crossed for ITB bias x 10 L/R  R clam shells with manual resistance 2 x 15  Hkly PPT with DKTC x 8 L/R  Sit to stand without UE assist x 5, SBA for blocking at the knees; 2HHA to return to sitting TE  Leg press with AFO's ON  Double leg press 3 black cords 2 x 25  Single leg press 2 black cords 1 x 30 L/R  Sit to stand without UE assist x 5, SBA for blocking at the knees; 2HHA to return to sitting  Partial squats into chair 2HHA 1 x 10, 1 x 7  Red cord lateral steps x 30 L/R 2HHA   Re-ed: 15'  Quadruped neutral spine/TrA recruitment/manual cuing  Camel cat with manual assist for movement coordination  Quadruped posterior rocking  Quadruped alternate arm raises x 7 L/R Re-ed: 15'  Quadruped neutral spine/TrA recruitment/manual cuing  Camel cat with minimal manual assist for movement coordination  Quadruped posterior rocking  Quadruped alternate arm raises x 7 L/R  TrA in hook-lying  TrA/PPT with alternate hip/knee flexion, slow  TrA/PPT with lateral knee fall outs, slow Re-ed: 25'  Quadruped neutral spine/TrA recruitment/manual cuing  Camel cat with minimal manual assist for movement coordination  Quadruped posterior rocking  Quadruped alternate arm raises x 7 L/R  Quadruped modified hip raises 2 x 5 L/R  TrA in hook-lying  TrA/PPT with alternate hip/knee flexion, slow  TrA/PPT with lateral knee fall outs, slow Therex:  10x sit to stand with exaggerated WS fwd and CGA  Tennis ball roll out firm pressure x2 min R Manual:  SL for flex rotate mob GR III, QL release following. x8 min    Therex:  2x10 SLR B  10 x bridges 4\" holds Therex:  2x10 knee ankle/knee ankle B  15x dead bugs in shelf position   10x fig 4 bridges SL    Manual:  SL for flex rotate mob GR III, QL release following. x8 min   Manual:  SL for flex rotate mob GR III, QL release following, Prone CPA to L3-L5 GR III.x10 min    Therex:  2x8 fwd lunges to bosu Therex: Towel  toe curls, R LE x10    Manual:  SL for flex rotate mob GR III, QL release following, B Manual:  SL for flex rotate mob GR III, QL release following, B x 8 min    Therex:    2x10 bridges with airex under one foot, B  2x10 SL raise on mat  15x SL hip/ankle hip abd, B Single leg press 1 black, 1 grey and 1 yellow cords x 20 L/R  B heel raises on leg press with knees extended, 1 black cord; 2 x 12    Re-ed: AFO's on  DLS on Airex SBA/CGA  DLS on the ground SBA  Madhavi step over with w/s forward x 15 L/R HHA  Cone taps x 15 L/R 1HHA     Single leg press 1 black, 1 grey and 1 yellow cords x 20 L/R    Re-ed: AFO's on  DLS on Airex SBA/CGA  DLS on the ground SBA  Madhavi step over with w/s forward x 15 L/R HHA  Cone taps x 15 L/R 1HHA Single leg press 2 black cords 1 x 30 L/R    Re-ed: AFO's on  DLS on Airex SBA/CGA  DLS on the ground SBA  Madhavi step over with w/s forward x 15 L/R HHA  Cone taps x 15 L/R 1HHA  Forward lunge unsupported with hands hovering and SBA; 7 x 5 sec L/R Partial squats into chair 2HHA x 10  Red cord lateral steps x 20 L/R 2HHA    Re-ed: AFO's on  DLS on Airex SBA 3 x 30 sec  DLS on the ground SBA  Tandem stance 2 x 20 sec L/R finger touch B  Cone taps x 15 L/R 1HHA  Forward lunge unsupported with hands hovering and SBA; 7 x 5 sec L/R Re-ed: AFO's on  DLS on Airex SBA 3 x 30 sec  DLS on the ground SBA  Tandem stance 2 x 20 sec L/R finger touch B  Cone taps x 15 L/R 1HHA  Forward lunge unsupported with hands hovering and SBA; 7 x 5 sec L/R   HEP: Access Code: [email or web address]  URL: [email or web address]  Date: 09/05/2023  Prepared by: Law Mayfield    Exercises  - Supine Hip and Knee Flexion AROM with Swiss Ball  - 1 x daily - 7 x weekly - 3 sets - 10 reps  - Supine Lower Trunk Rotation with Swiss Ball  - 1 x daily - 7 x weekly - 3 sets - 10 reps  - Hip Flexor Stretch at [employer] of Bed  - 1 x daily - 7 x weekly - 1 sets - 3 reps - 30-60 sec hold  - Supine Figure 4 Piriformis Stretch  - 1 x daily - 7 x weekly - 1 sets - 3 reps - 30-60 sec hold      Charges: therex:1, re-ed: 1, man x 1   Total Timed Treatment: 45 min  Total Treatment Time: 45 min

## 2023-11-17 ENCOUNTER — PATIENT MESSAGE (OUTPATIENT)
Dept: NEUROLOGY | Facility: CLINIC | Age: 78
End: 2023-11-17

## 2023-11-17 RX ORDER — PREDNISONE 20 MG/1
TABLET ORAL
Qty: 82 TABLET | Refills: 0 | Status: CANCELLED
Start: 2023-11-17

## 2023-11-17 NOTE — TELEPHONE ENCOUNTER
From: Allen Mejia  To: Tiffany Christie  Sent: 11/17/2023 3:19 PM CST  Subject: Hector Bear Dr. Moya Close - - At last appointment you asked me to follow up with update at approx. this time. I begin a 4 week series of Rituxan infusions next Tues. 11/21 (every Tues. for 4 straight weeks). I see Ophthalmologist again tomorrow to test eye pressure and follow up on Herpetic eye. At Physical Therapist advice I am in a temporary \"window\" of treatments but will resume twice a week in-clinic visits on 12/6. And finally I am on down to 30mg/day of Prednisone and I think that is what you wanted me to check in with. I seem to tolerate the medication OK and my only outward symptoms are difficult sleep (I'm not a good sleeper anyway) and some additional belly fat which I had a pretty good start on before med. I see no discernible improvement in my Neuropathy, however, my P.T. thinks I am making slow and modest improvements. So the question at this point is do you want me to continue on Prednisone, and at what dose, and for what duration? Assuming you want me to continue, I   will need new Rx as I am running low on 10mg tablets. If you want to move me into dosages of 5mg increments (ie 25mg, 15mg) it would be helpful to prescribe some 10mg tablets as well as some 5mg tablets. When I was on 35mg dose, I had a terrible time trying to cut/slice/divide the 10 mg tabs. They are small and the binder wasn't very solid resulting in a crumbled mess and inaccurate dosages. They are extremely cheap so cost is not a factor. Please advise.  Thanks Kely Ovalle

## 2023-11-20 RX ORDER — PREDNISONE 5 MG/1
TABLET ORAL
Qty: 30 TABLET | Refills: 1 | Status: SHIPPED | OUTPATIENT
Start: 2023-11-20

## 2023-11-20 RX ORDER — PREDNISONE 10 MG/1
TABLET ORAL
Qty: 75 TABLET | Refills: 1 | Status: SHIPPED | OUTPATIENT
Start: 2023-11-20

## 2023-12-06 ENCOUNTER — OFFICE VISIT (OUTPATIENT)
Dept: PHYSICAL THERAPY | Facility: HOSPITAL | Age: 78
End: 2023-12-06
Attending: INTERNAL MEDICINE
Payer: MEDICARE

## 2023-12-06 ENCOUNTER — OFFICE VISIT (OUTPATIENT)
Dept: PODIATRY CLINIC | Facility: CLINIC | Age: 78
End: 2023-12-06
Payer: MEDICARE

## 2023-12-06 DIAGNOSIS — M20.42 HAMMER TOES OF BOTH FEET: ICD-10-CM

## 2023-12-06 DIAGNOSIS — L60.0 INGROWN NAIL: ICD-10-CM

## 2023-12-06 DIAGNOSIS — G63 NEUROPATHY ASSOCIATED WITH MGUS (HCC): ICD-10-CM

## 2023-12-06 DIAGNOSIS — M20.41 HAMMER TOES OF BOTH FEET: ICD-10-CM

## 2023-12-06 DIAGNOSIS — D47.2 NEUROPATHY ASSOCIATED WITH MGUS (HCC): ICD-10-CM

## 2023-12-06 DIAGNOSIS — D47.2 MGUS (MONOCLONAL GAMMOPATHY OF UNKNOWN SIGNIFICANCE): ICD-10-CM

## 2023-12-06 DIAGNOSIS — B35.1 ONYCHOMYCOSIS: Primary | ICD-10-CM

## 2023-12-06 PROCEDURE — 97140 MANUAL THERAPY 1/> REGIONS: CPT

## 2023-12-06 PROCEDURE — 97112 NEUROMUSCULAR REEDUCATION: CPT

## 2023-12-06 PROCEDURE — 99213 OFFICE O/P EST LOW 20 MIN: CPT | Performed by: STUDENT IN AN ORGANIZED HEALTH CARE EDUCATION/TRAINING PROGRAM

## 2023-12-06 PROCEDURE — 97110 THERAPEUTIC EXERCISES: CPT

## 2023-12-06 NOTE — PROGRESS NOTES
Progress Summary  Pt has attended 20 visits in Physical Therapy. Subjective: Had 3 IV infusions that he tolerates well (has one more to go). Takes declining dosage of Prednisone. He feels good; he does not see any changes with lower extremity symptoms. Notices some lower back pain and groin hip pain. Has been doing some exercises on his own. Assessment: Finds physical therapy beneficial for the symptoms of lower back pain and stiffness. Patient presents with neuropathy, gait and balance impairments, strength and flexibility restrictions. Shane Oliva makes some improvements in glute max, quadriceps and PF strength. His DF and great toe ext continue to be very limited by his neuropathy. His endurance with standing unsupported is getting better. He reports improvement in lower back symptoms with manual therapy and flexibility exercises.  Patient continues to require skilled physical therapy to address gait, balance, strength and functional mobility impairment     Objective:  Pt wearing B AFO's  Standing unsupported: 20 sec prior to buckling in the knees and ankles  Sit to stand: able to perform one sit to stand transfer unsupported while maintaining wide base of support  Tandem stance: 5 sec B  Single leg balance: unable       Strength: (* denotes performed with pain)  LE   Hip flexion (L2): R 5/5; L 5/5  Hip abduction: R 5/5; L 5/5  Glut max: R: 5/5, L;4+/5  Knee Flexion: R 5/5; L 5/5            Knee extension (L3): R 5/5; L 5/5            DF (L4): R 2/5; L 3-/5  Great Toe Ext (L5): R 1+/5, L 2-/5  PF (S1): R 3+/5; L 4-/5         Quad length in prone: R: 105 (was: 90); L: 105 (was: 90)  EOB hip flexors length in modified Edilson position c/l KTC: (+) B for psoas and RC shortness  Myofascial restrictions B lumbar paraspinals, posterior hip, quadriceps and hip flexors  Global hypomobility of thoracic spine      Goals: (to be met in 10+ 10 + 10 visits)   Pt will demonstrate improved SLS to >5 seconds CALOS to promote safety and decrease risk of falls on uneven surfaces such as grass. Progressing. Pt will perform TUG in <15 seconds with least restrictive AD, demonstrating improved gait speed for improved participation in ADL such as community ambulation Progressing. Pt will perform 4-Item DGI with score of 9/12 or greater with least restrictive AD to demonstrate ability to ambulate safely in crowded and busy environments. Progressing  Pt will be able to squat to  light objects around the house without loss of stability. Progressing  Pt will improve functional hip strength to demonstrate ability to ascend/descend 1 flight of stairs reciprocally with the use of handrail. Goal met. Pt will be independent and compliant with comprehensive HEP to maintain progress achieved in PT Goal met. Rehab Potential: good    Plan: Continue skilled Physical Therapy 2 x/week or a total of 8-10 visits over a 90 day period. Treatment will include: Manual therapy: soft tissue and joint mobilizations to restore normal joint mechanics and decrease pain; Therapeutic exercises including ROM, strengthening, stretching program; neuromuscular re-education for posture/scapular training; core and pelvic strengthening and stabilization training; Pt. education for posture, body mechanics, and ergonomics, Modalities as needed (including heat/cold and e-stim for pain relief), HEP instruction and progression         Patient/Family/Caregiver was advised of these findings, precautions, and treatment options and has agreed to actively participate in planning and for this course of care. Thank you for your referral. If you have any questions, please contact me at Dept: [phone]. Sincerely,  Electronically signed by therapist: Stanley Santiago PT    Physician's certification required: Yes  Please co-sign or sign and return this letter via fax as soon as possible to [phone].    I certify the need for these services furnished under this plan of treatment and while under my care.     X___________________________________________________ Date____________________    Certification From: 05/2/2480  To:3/5/2024          Date: 9/20/23  Tx#: 6/16 Date: 9/25/23  Tx#: 7/16 Date: 9/27/23  Tx#: 8/16 Date:10/2/2023   Tx#:9/16 Date:10/4/2023  Tx#:10/16 Date:10/9/2023   Tx#:11/16 Date: 10/11/2023   Tx#:12/16 Date:10/16/2023   Tx#:13/16 Date:10/23/2023   Tx#:14/20 Date:10/30/2023   Tx#:15/20 Date:11/01/2023   Tx#:16/20 Date:11/06/2023   Tx#:17/20 Date:11/08/2023   Tx#:18/20 Date:11/13/2023   Tx#:19/20 Date:12/6/2023   Tx#:20/20   Manual: 25'  Prone with one pillow under:  Deep STM lumbar paraspinals and QL B  Manual quad stretch in prone B  Manual modified hip flexors stretch EOB; STM to anterior thigh Manual: 25'  Prone with one pillow under:  Deep STM lumbar paraspinals and QL B  Manual quad stretch in prone B  Manual modified hip flexors stretch EOB; STM to anterior thigh Manual: 15'  Prone with one pillow under:  Deep STM lumbar paraspinals and QL B  Manual quad stretch in prone B  Manual modified hip flexors stretch EOB; STM to anterior thigh   ThereX:  10 lumbar flex seated at edge of table  10x towel curls B  2x10 fwd lunge holding bar, out of AFOs  Goblet squats  Neuro re-ed:  NMES to R anterior tib 400 usec, 100hz, 95 mA  Practived walking 2 laps in p bars  2x10 DF assisted  1x heel walking  2x10 lunges to airex  4x3 hurdles Neuro re-ed:  NMES to R anterior tib 400 usec, 100hz, 95 mA  2x10 DF assisted  2x10 goblet squats  1x heel walking  10x step up to 6' R LE, 10 x step over step R LE  Therex:  PN see above, objective measures taken    Mod joão position for passive hip flexor stretch 2x30\"  10x fig 4 SL bridge Neuro re-ed:  NMES to R anterior tib 400 usec, 100hz, 95 mA  2x10 DF assisted  15 bosu lunges on R  2x10 step ups to 6\"  15x D1 PND hip flex on airex, R Neuro re-ed:  10x 3-cone tap in mod SLS on airex, B  Tandem walk in p bars light HHA x2  20 x marches on sanddune   Manual:  SL for flex rotate mob GR III, QL release following, B x 8 min  STM L posterior hip  Manual: 18'  Deep STM R posterior hip   Deep STM R ITB/TFL   SL for flex rotate mob GR III, QL release following, B   Manual R piriformis stretch   Manual: 15'  B quad stretch in prone  SL for flex rotate mob GR III, QL release following, B   Manual R piriformis stretch  Prone thoracic spine PA mobs Gr III  Prone central PA mobs L5/S1 Gr III Manual: 15'  B quad stretch in prone  SL for flex rotate mob GR III, QL release following, B   Manual R piriformis stretch  Prone thoracic spine PA mobs Gr III  Prone central PA mobs L5/S1 Gr III Manual: 15'  B quad stretch in prone  SL for flex rotate mob GR III, QL release following, B   Manual R piriformis stretch  Prone thoracic spine PA mobs Gr III  Prone central PA mobs L5/S1 Gr III   TE TE TE - -   - Gait; walking with hurricaine and SBA         NU step L5 5'  Bridge 2 x 10   Clam shells 2 x 10, manual resistance NU step L5 5'  Bridge 2 x 10   Education on log rolling  Neuro re-ed:  10x sciatic nerve glide with ankle floss, B  Manual:  SL for flex rotate mob GR III, QL release following. x8 min Therex:  Seated sciatica nerve glide x 10 R LE Neuro re-ed:  10x sciatic N glide with adductor bias and floss on R Neuro re-ed:  10x sciatic N glide with adductor bias and floss on R 15x bridges  Neuro re-ed:   Attempted to discriminate between 3 different textures (soft, sand paper, and poky) R legx4 min Therex:  2x10 donkey kicks, 3 lbs B  2x15 lateral bosu lunges, HHA  15 squat to row, 30 lbs, CGA TE  Bridge x 15  Clams with manual resistance x 10 L/R  Sit to stand x 5; hands on upper thighs  Leg press with AFO's off  Double leg press 3 black cords x 30 TE  Bridge x 15  Clams with manual resistance x 12 L/R  Leg press with AFO's ON  Double leg press 3 black cords 2 x 25 TE  Bridge x 20  LTR x 10  LTR with legs crossed for ITB bias x 10 L/R  R clam shells with manual resistance 2 x 15  Hkly PPT with DKTC x 8 L/R  Double leg press 3 black cords 1 x 30 TE  Bridge x 20  LTR x 10  LTR with legs crossed for ITB bias x 10 L/R  R clam shells with manual resistance 2 x 15  Hkly PPT with DKTC x 8 L/R  Sit to stand without UE assist x 5, SBA for blocking at the knees; 2HHA to return to sitting TE  Leg press with AFO's ON  Double leg press 3 black cords 2 x 25  Single leg press 2 black cords 1 x 30 L/R  Sit to stand without UE assist x 5, SBA for blocking at the knees; 2HHA to return to sitting  Partial squats into chair 2HHA 1 x 10, 1 x 7  Red cord lateral steps x 30 L/R 2HHA TE  Leg press with AFO's ON  Double leg press 3 black cords 2 x 25  Single leg press 2 black cords 1 x 30 L/R  Sit to stand without UE assist x 5, SBA for blocking at the knees; 2HHA to return to sitting  Partial squats into chair 2HHA 1 x 10, 1 x 7  Red cord lateral steps x 30 L/R 2HHA   Re-ed: 15'  Quadruped neutral spine/TrA recruitment/manual cuing  Camel cat with manual assist for movement coordination  Quadruped posterior rocking  Quadruped alternate arm raises x 7 L/R Re-ed: 15'  Quadruped neutral spine/TrA recruitment/manual cuing  Camel cat with minimal manual assist for movement coordination  Quadruped posterior rocking  Quadruped alternate arm raises x 7 L/R  TrA in hook-lying  TrA/PPT with alternate hip/knee flexion, slow  TrA/PPT with lateral knee fall outs, slow Re-ed: 25'  Quadruped neutral spine/TrA recruitment/manual cuing  Camel cat with minimal manual assist for movement coordination  Quadruped posterior rocking  Quadruped alternate arm raises x 7 L/R  Quadruped modified hip raises 2 x 5 L/R  TrA in hook-lying  TrA/PPT with alternate hip/knee flexion, slow  TrA/PPT with lateral knee fall outs, slow Therex:  10x sit to stand with exaggerated WS fwd and CGA  Tennis ball roll out firm pressure x2 min R Manual:  SL for flex rotate mob GR III, QL release following. x8 min    Therex:  2x10 SLR B  10 x bridges 4\" holds Therex:  2x10 knee ankle/knee ankle B  15x dead bugs in shelf position   10x fig 4 bridges SL    Manual:  SL for flex rotate mob GR III, QL release following. x8 min   Manual:  SL for flex rotate mob GR III, QL release following, Prone CPA to L3-L5 GR III.x10 min    Therex:  2x8 fwd lunges to bosu Therex:   Towel  toe curls, R LE x10    Manual:  SL for flex rotate mob GR III, QL release following, B Manual:  SL for flex rotate mob GR III, QL release following, B x 8 min    Therex:    2x10 bridges with airex under one foot, B  2x10 SL raise on mat  15x SL hip/ankle hip abd, B Single leg press 1 black, 1 grey and 1 yellow cords x 20 L/R  B heel raises on leg press with knees extended, 1 black cord; 2 x 12    Re-ed: AFO's on  DLS on Airex SBA/CGA  DLS on the ground SBA  Madhavi step over with w/s forward x 15 L/R HHA  Cone taps x 15 L/R 1HHA     Single leg press 1 black, 1 grey and 1 yellow cords x 20 L/R    Re-ed: AFO's on  DLS on Airex SBA/CGA  DLS on the ground SBA  Madhavi step over with w/s forward x 15 L/R HHA  Cone taps x 15 L/R 1HHA Single leg press 2 black cords 1 x 30 L/R    Re-ed: AFO's on  DLS on Airex SBA/CGA  DLS on the ground SBA  Madhavi step over with w/s forward x 15 L/R HHA  Cone taps x 15 L/R 1HHA  Forward lunge unsupported with hands hovering and SBA; 7 x 5 sec L/R Partial squats into chair 2HHA x 10  Red cord lateral steps x 20 L/R 2HHA    Re-ed: AFO's on  DLS on Airex SBA 3 x 30 sec  DLS on the ground SBA  Tandem stance 2 x 20 sec L/R finger touch B  Cone taps x 15 L/R 1HHA  Forward lunge unsupported with hands hovering and SBA; 7 x 5 sec L/R Re-ed: AFO's on  DLS on Airex SBA 3 x 30 sec  DLS on the ground SBA  Tandem stance 2 x 20 sec L/R finger touch B  Cone taps x 15 L/R 1HHA  Forward lunge unsupported with hands hovering and SBA; 7 x 5 sec L/R Re-ed: AFO's on  DLS on Airex SBA 3 x 30 sec  DLS on the ground SBA  Tandem stance 2 x 20 sec L/R finger touch B  Cone taps x 15 L/R 1HHA  Forward lunge unsupported with hands hovering and SBA; 7 x 5 sec L/R   HEP: Access Code: [email or web address]  URL: [email or web address]  Date: 09/05/2023  Prepared by: Abdi Beat    Exercises  - Supine Hip and Knee Flexion AROM with Swiss Ball  - 1 x daily - 7 x weekly - 3 sets - 10 reps  - Supine Lower Trunk Rotation with Swiss Ball  - 1 x daily - 7 x weekly - 3 sets - 10 reps  - Hip Flexor Stretch at Edge of Bed  - 1 x daily - 7 x weekly - 1 sets - 3 reps - 30-60 sec hold  - Supine Figure 4 Piriformis Stretch  - 1 x daily - 7 x weekly - 1 sets - 3 reps - 30-60 sec hold      Charges: therex:1, re-ed: 1, man x 1   Total Timed Treatment: 45 min  Total Treatment Time: 45 min

## 2023-12-06 NOTE — PROGRESS NOTES
[facility], [facility] Podiatry  Progress Note    Catherine Do is a 77year old male. Chief Complaint   Patient presents with    Toenail Care     F/u Nail care and foot check. Patient unable to trim toenails due to medical condition. Hx of neuropathy. 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 He has elongated and thickened nails he has difficulty trimming his own. His nails do become incurvated by the end of his stretch between visits. They do cause some pain from rubbing in his shoes. He denies acute signs of infection or other concerns. He continues to walk with AFOs and complete physical therapy. No other complaints are mentioned. Allergies: Immune globulin   Current Outpatient Medications   Medication Sig Dispense Refill    predniSONE 10 MG Oral Tab For a total of 2 weeks, take 30mg daily, then 25mg x 2 weeks, using 10mg and 5mg tablets, then 20mg daily x 2 weeks, then 17.5mg x 2 weeks, using 10mg and 5mg tabs, then 15mg daily using 10mg and 5mg tabs 75 tablet 1    predniSONE 5 MG Oral Tab For a total of 2 weeks, take 30mg daily, then 25mg x 2 weeks, using 10mg and 5mg tablets, then 20mg daily x 2 weeks, then 17.5mg x 2 weeks, using 10mg and 5mg tabs, then 15mg daily using 10mg and 5mg tabs 30 tablet 1    predniSONE 10 MG Oral Tab Take 40mg daily for 2 weeks, then decrease to 35mg daily x 2 weeks, then follow instructions given by Dr. Janice Shepard 90 tablet 0    acidophilus-pectin Oral Cap Take 1 capsule by mouth daily. predniSONE 20 MG Oral Tab Take 2.5 tablets daily for total 50mg daily 75 tablet 0    valACYclovir 1 G Oral Tab Take 0.5 tablets (500 mg total) by mouth daily. gabapentin 300 MG Oral Cap Take 1 capsule (300 mg total) by mouth 3 (three) times daily. 270 capsule 3    Glucosamine-Chondroitin (GLUCOSAMINE CHONDR COMPLEX OR) Take by mouth as needed.       cyanocobalamin 1000 MCG Oral Tab Take 1 tablet (1,000 mcg total) by mouth daily. ALPRAZolam 0.25 MG Oral Tab Take 1 tablet (0.25 mg total) by mouth nightly as needed. 90 tablet 0    fluticasone propionate 50 MCG/ACT Nasal Suspension 1 spray by Each Nare route 2 (two) times daily as needed for Rhinitis. 48 g 1    levothyroxine 100 MCG Oral Tab Take 1 tablet (100 mcg total) by mouth once daily. Overdue for labs, please complete them. 90 tablet 3    traZODone 100 MG Oral Tab Take 1 tablet (100 mg total) by mouth nightly as needed for Sleep. (Patient taking differently: Take 1 tablet (100 mg total) by mouth nightly as needed for Sleep. Pt takes as needed) 90 tablet 3    Beclomethasone Diprop HFA 40 MCG/ACT Inhalation Aerosol, Breath Activated Inhale 2 puffs into the lungs 2 (two) times daily as needed. 2 each 0    erythromycin 5 MG/GM Ophthalmic Ointment Place 1 Application. into both eyes nightly. Uses 3-4 x weekly before bed      tamsulosin (FLOMAX) cap Take 1 capsule (0.4 mg total) by mouth daily. 90 capsule 3    finasteride 5 MG Oral Tab Take 1 tablet (5 mg total) by mouth daily. 90 tablet 3    atorvastatin 10 MG Oral Tab Take 1 tablet (10 mg total) by mouth daily. predniSONE 20 MG Oral Tab Take 60mg daily x 2 weeks, then 50mg daily x 2 weeks, then call clinic for further instructions. In the interim, monitor range of motion and ability to flex foot up or point downwards 82 tablet 0    docusate sodium 100 MG Oral Cap Take 1 capsule (100 mg total) by mouth 2 (two) times daily. (Patient taking differently: Take 1 capsule (100 mg total) by mouth 2 (two) times daily as needed.) 30 capsule 1    senna-docusate 8.6-50 MG Oral Tab Take 1 tablet by mouth daily. (Patient taking differently: Take 1 tablet by mouth daily as needed.) 30 tablet 0    CALCIUM OR Take by mouth daily. Albuterol Sulfate  (90 BASE) MCG/ACT Inhalation Aero Soln Inhale 2 puffs into the lungs every 6 (six) hours as needed for Wheezing. Multiple Vitamin (MULTI-VITAMIN OR) Take 1 tablet by mouth daily.           Past Medical History:   Diagnosis Date    Asthma     Atypical mole frequent body Nevi    Back problem     low back pain, s/p fusion of L3,4,5.  back still stiff    Belching     Benign thyroid cyst     BPH (benign prostatic hyperplasia)     Cataract 2015    s/p sugery    Constipation     Depression     Disorder of prostate BPH (I'm a 67year old male)    Flatulence/gas pain/belching     Frequent urination 2 x's / night    Frequent use of laxatives     Hearing impairment     Slight    Hemorrhoids very slight    Hemorrhoids, internal 02/29/2012    High cholesterol     Elevated     Insomnia 10/22/2010    Irregular bowel habits     Itch of skin unknown cause    Leg swelling ankles at PM    Neuropathy     R leg    Obesity     slightly overweight    Osteoarthritis 2010    Poor balance     uses cane    Rotator cuff sprain 10/22/2010    Seborrheic keratosis 10/22/2010    Shoulder joint pain 10/22/2010    Sinusitis     Unspecified disorder of thyroid     hypothyroid    Visual impairment     glasses reading      Past Surgical History:   Procedure Laterality Date    APPENDECTOMY      ARTHROSCOPY OF JOINT UNLISTED Right     knee    BACK SURGERY  9/12/16    triple fusion of L3-L4-L5    BIOPSY  07/26/2019    Bone Marrow    BIOPSY  06/25/2019    right sural nerve biopsy    CARPAL TUNNEL RELEASE Bilateral     CATARACT Bilateral August 2015    IOL'S    COLONOSCOPY      HEMORRHOIDECTOMY  ~ 2005    ORTHOPEDIC SURG (PBP)      LT ELBOW - ulnar nerve release    OTHER  02/2019    Lumbar puncture    OTHER SURGICAL HISTORY      Shoulder Joint Replacement 7/21/16, Sinus surgery Feb. '14,    OTHER SURGICAL HISTORY      I&D of abscess in axilla area 7/3    SIGMOIDOSCOPY,DIAGNOSTIC  1980's    SINUS SURGERY    2/14/2014    SPINE SURGERY PROCEDURE UNLISTED      L3-4-5    TONSILLECTOMY        Family History   Problem Relation Age of Onset    Breast Cancer Mother Cancer Mother         breast cancer- late in life    Heart Disorder Mother         cardiac issues related to aging    Heart Disorder Father         partially blocked carotid arteries    Hypertension Father     Cancer Father         ?  cancer? Lipids Father     Hypertension Brother     Hypertension Brother       Social History     Socioeconomic History    Marital status:    Tobacco Use    Smoking status: Former     Packs/day: 1.50     Years: 32.00     Additional pack years: 0.00     Total pack years: 48.00     Types: Cigarettes, Pipe     Quit date: 3/1/1998     Years since quittin.7    Smokeless tobacco: Never    Tobacco comments:     Quit many years ago   Vaping Use    Vaping Use: Never used   Substance and Sexual Activity    Alcohol use: Yes     Alcohol/week: 11.0 - 18.0 standard drinks of alcohol     Types: 6 - 8 Glasses of wine, 1 - 2 Cans of beer, 4 - 8 Shots of liquor per week     Comment: 1-2 drinks daily    Drug use: No   Other Topics Concern    Caffeine Concern Yes     Comment: 2-3 cups a day    Exercise Yes     Comment: physical therapy           REVIEW OF SYSTEMS:     Today reviewed systems as documented below  GENERAL HEALTH: feels well otherwise  SKIN: denies any unusual skin lesions or rashes  RESPIRATORY: denies shortness of breath with exertion  CARDIOVASCULAR: denies chest pain on exertion  GI: denies abdominal pain and denies heartburn  NEURO: denies headaches  MUSCULO: History of arthritis      EXAM:   There were no vitals taken for this visit. GENERAL: well developed, well nourished, in no apparent distress  EXTREMITIES:   1. Integument: Normal skin temperature and turgor. Nails x10 are elongated, thickened, dystrophic, subungual debris. Lateral borders of hallux nails are incurvated, especially right hallux. Left 3rd toe is incurvated as well.   2. Vascular: Dorsalis pedis two out of four bilateral and posterior tibial pulses two out of   four bilateral, capillary refill normal. 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 3. Musculoskeletal: All muscle groups are graded 5 out of 5 in the foot and ankle. Compartments soft and compressible. 4. Neurological: Normal sharp dull sensation; reflexes normal.  Decreased protective sensation noted to lower extremities. ASSESSMENT AND PLAN:   Diagnoses and all orders for this visit:    Onychomycosis    Ingrown nail    MGUS (monoclonal gammopathy of unknown significance)    Neuropathy associated with MGUS (HCC)    Hammer toes of both feet            Plan:     -Patient examined, chart history reviewed. -Discussed importance of proper pedal hygiene, regular foot checks.  -Sharply debrided nails x10 with a sterile nail nipper achieving a 20% reduction in thickness and length, without incident. Nails further smoothed with dremel. Slant back performed to ingrown nails. Dressed with bacitracin and band-aid.  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  -Educated patient on acute signs of infection advised patient to seek immediate medical attention if symptoms arise. The patient indicates understanding of these issues and agrees to the plan. RTC 2 months.     Sammuel Heimlich, DPM

## 2023-12-13 ENCOUNTER — OFFICE VISIT (OUTPATIENT)
Dept: PHYSICAL THERAPY | Facility: HOSPITAL | Age: 78
End: 2023-12-13
Attending: INTERNAL MEDICINE
Payer: MEDICARE

## 2023-12-13 PROCEDURE — 97110 THERAPEUTIC EXERCISES: CPT

## 2023-12-13 PROCEDURE — 97112 NEUROMUSCULAR REEDUCATION: CPT

## 2023-12-13 NOTE — PROGRESS NOTES
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 provider found  Date of Evaluation:    08/29/2023    Precautions:  Fall Risk Next MD visit:   none scheduled  Date of Surgery: n/a   Insurance Primary/Secondary: MEDICARE / 3 [facility]     # Auth Visits: 20 per POC            Subjective: Completed 4th IV infusion yesterday. Was sore in both hips and upper thighs over the past few days; subsided by yesterday. Pain: 1/10      Objective:   Limited and stiff left lumbar thoracic rotation  R piriformis stiffness and shortness  R ITB stiffness and shortness    DLS on Airex [id number]:08 sec today      Assessment: Continued with lower extremity strengthening, flexibility program, core stabilization and balance training. Added inclined double heel raises to facilitate ankle and calf strengthening. Does best with initial reps; more difficulty afterwards due to muscular fatigue. Patient continues to require skilled physical therapy to address gait, balance, strength and functional mobility impairment. Goals: (to be met in 10+ 10 + 10 visits)   Pt will demonstrate improved SLS to >5 seconds CALOS to promote safety and decrease risk of falls on uneven surfaces such as grass. Progressing. Pt will perform TUG in <15 seconds with least restrictive AD, demonstrating improved gait speed for improved participation in ADL such as community ambulation Progressing. Pt will perform 4-Item DGI with score of 9/12 or greater with least restrictive AD to demonstrate ability to ambulate safely in crowded and busy environments. Progressing  Pt will be able to squat to  light objects around the house without loss of stability. Progressing  Pt will improve functional hip strength to demonstrate ability to ascend/descend 1 flight of stairs reciprocally with the use of handrail. Goal met.    Pt will be independent and compliant with comprehensive HEP to maintain progress achieved in PT Goal met.          Plan: lower extremity strengthening; flexibility program; balance re-ed  Date: 9/20/23  Tx#: 6/16 Date: 9/25/23  Tx#: 7/16 Date: 9/27/23  Tx#: 8/16 Date:10/2/2023   Tx#:9/16 Date:10/4/2023  Tx#:10/16 Date:10/9/2023   Tx#:11/16 Date: 10/11/2023   Tx#:12/16 Date:10/16/2023   Tx#:13/16 Date:10/23/2023   Tx#:14/20 Date:10/30/2023   Tx#:15/20 Date:11/01/2023   Tx#:16/20 Date:11/06/2023   Tx#:17/20 Date:11/08/2023   Tx#:18/20 Date:11/13/2023   Tx#:19/20 Date:12/6/2023   Tx#:20/20 Date:12/13/2023   Tx#:21/30   Manual: 25'  Prone with one pillow under:  Deep STM lumbar paraspinals and QL B  Manual quad stretch in prone B  Manual modified hip flexors stretch EOB; STM to anterior thigh Manual: 25'  Prone with one pillow under:  Deep STM lumbar paraspinals and QL B  Manual quad stretch in prone B  Manual modified hip flexors stretch EOB; STM to anterior thigh Manual: 15'  Prone with one pillow under:  Deep STM lumbar paraspinals and QL B  Manual quad stretch in prone B  Manual modified hip flexors stretch EOB; STM to anterior thigh   ThereX:  10 lumbar flex seated at edge of table  10x towel curls B  2x10 fwd lunge holding bar, out of AFOs  Goblet squats  Neuro re-ed:  NMES to R anterior tib 400 usec, 100hz, 95 mA  Practived walking 2 laps in p bars  2x10 DF assisted  1x heel walking  2x10 lunges to airex  4x3 hurdles Neuro re-ed:  NMES to R anterior tib 400 usec, 100hz, 95 mA  2x10 DF assisted  2x10 goblet squats  1x heel walking  10x step up to 6' R LE, 10 x step over step R LE  Therex:  PN see above, objective measures taken    Mod joão position for passive hip flexor stretch 2x30\"  10x fig 4 SL bridge Neuro re-ed:  NMES to R anterior tib 400 usec, 100hz, 95 mA  2x10 DF assisted  15 bosu lunges on R  2x10 step ups to 6\"  15x D1 PND hip flex on airex, R Neuro re-ed:  10x 3-cone tap in mod SLS on airex, B  Tandem walk in p bars light HHA x2  20 x marches on sanddune   Manual:  SL for flex rotate mob GR III, QL release following, B x 8 min  STM L posterior hip  Manual: 18'  Deep STM R posterior hip   Deep STM R ITB/TFL   SL for flex rotate mob GR III, QL release following, B   Manual R piriformis stretch   Manual: 15'  B quad stretch in prone  SL for flex rotate mob GR III, QL release following, B   Manual R piriformis stretch  Prone thoracic spine PA mobs Gr III  Prone central PA mobs L5/S1 Gr III Manual: 15'  B quad stretch in prone  SL for flex rotate mob GR III, QL release following, B   Manual R piriformis stretch  Prone thoracic spine PA mobs Gr III  Prone central PA mobs L5/S1 Gr III Manual: 15'  B quad stretch in prone  SL for flex rotate mob GR III, QL release following, B   Manual R piriformis stretch  Prone thoracic spine PA mobs Gr III  Prone central PA mobs L5/S1 Gr III Manual:     B quad stretch in prone  B piriformis stretch   TE TE TE - -   - Gait; walking with hurricaine and SBA          NU step L5 5'  Bridge 2 x 10   Clam shells 2 x 10, manual resistance NU step L5 5'  Bridge 2 x 10   Education on log rolling  Neuro re-ed:  10x sciatic nerve glide with ankle floss, B  Manual:  SL for flex rotate mob GR III, QL release following. x8 min Therex:  Seated sciatica nerve glide x 10 R LE Neuro re-ed:  10x sciatic N glide with adductor bias and floss on R Neuro re-ed:  10x sciatic N glide with adductor bias and floss on R 15x bridges  Neuro re-ed:   Attempted to discriminate between 3 different textures (soft, sand paper, and poky) R legx4 min Therex:  2x10 donkey kicks, 3 lbs B  2x15 lateral bosu lunges, HHA  15 squat to row, 30 lbs, CGA TE  Bridge x 15  Clams with manual resistance x 10 L/R  Sit to stand x 5; hands on upper thighs  Leg press with AFO's off  Double leg press 3 black cords x 30 TE  Bridge x 15  Clams with manual resistance x 12 L/R  Leg press with AFO's ON  Double leg press 3 black cords 2 x 25 TE  Bridge x 20  LTR x 10  LTR with legs crossed for ITB bias x 10 L/R  R clam shells with manual resistance 2 x 15  Hkly PPT with DKTC x 8 L/R  Double leg press 3 black cords 1 x 30 TE  Bridge x 20  LTR x 10  LTR with legs crossed for ITB bias x 10 L/R  R clam shells with manual resistance 2 x 15  Hkly PPT with DKTC x 8 L/R  Sit to stand without UE assist x 5, SBA for blocking at the knees; 2HHA to return to sitting TE  Leg press with AFO's ON  Double leg press 3 black cords 2 x 25  Single leg press 2 black cords 1 x 30 L/R  Sit to stand without UE assist x 5, SBA for blocking at the knees; 2HHA to return to sitting  Partial squats into chair 2HHA 1 x 10, 1 x 7  Red cord lateral steps x 30 L/R 2HHA TE  Leg press with AFO's ON  Double leg press 3 black cords 2 x 25  Single leg press 2 black cords 1 x 30 L/R  Sit to stand without UE assist x 5, SBA for blocking at the knees; 2HHA to return to sitting  Partial squats into chair 2HHA 1 x 10, 1 x 7  Red cord lateral steps x 30 L/R 2HHA TE AFO's on  NU step L5 6'  LTR\"s x 20   Bridge x 15  SB Bridge x 15  Quadruped arm raises x 10 L/R  Quadruped leg raises x 8 L/R  Partial squats into chair 2HHA 1 x 10, 1 x 7  Red cord lateral steps x 30 L/R 2HHA  Inclined forward   heel raises x 8 reps  90 deg squats 2HHA, wide SUSY 2 x 5  Lateral lunges with hold x 5 L/R 2HHA   Re-ed: 15'  Quadruped neutral spine/TrA recruitment/manual cuing  Camel cat with manual assist for movement coordination  Quadruped posterior rocking  Quadruped alternate arm raises x 7 L/R Re-ed: 15'  Quadruped neutral spine/TrA recruitment/manual cuing  Camel cat with minimal manual assist for movement coordination  Quadruped posterior rocking  Quadruped alternate arm raises x 7 L/R  TrA in hook-lying  TrA/PPT with alternate hip/knee flexion, slow  TrA/PPT with lateral knee fall outs, slow Re-ed: 25'  Quadruped neutral spine/TrA recruitment/manual cuing  Camel cat with minimal manual assist for movement coordination  Quadruped posterior rocking  Quadruped alternate arm raises x 7 L/R  Quadruped modified hip raises 2 x 5 L/R  TrA in hook-lying  TrA/PPT with alternate hip/knee flexion, slow  TrA/PPT with lateral knee fall outs, slow Therex:  10x sit to stand with exaggerated WS fwd and CGA  Tennis ball roll out firm pressure x2 min R Manual:  SL for flex rotate mob GR III, QL release following. x8 min    Therex:  2x10 SLR B  10 x bridges 4\" holds Therex:  2x10 knee ankle/knee ankle B  15x dead bugs in shelf position   10x fig 4 bridges SL    Manual:  SL for flex rotate mob GR III, QL release following. x8 min   Manual:  SL for flex rotate mob GR III, QL release following, Prone CPA to L3-L5 GR III.x10 min    Therex:  2x8 fwd lunges to bosu Therex:   Towel  toe curls, R LE x10    Manual:  SL for flex rotate mob GR III, QL release following, B Manual:  SL for flex rotate mob GR III, QL release following, B x 8 min    Therex:    2x10 bridges with airex under one foot, B  2x10 SL raise on mat  15x SL hip/ankle hip abd, B Single leg press 1 black, 1 grey and 1 yellow cords x 20 L/R  B heel raises on leg press with knees extended, 1 black cord; 2 x 12    Re-ed: AFO's on  DLS on Airex SBA/CGA  DLS on the ground SBA  Madhavi step over with w/s forward x 15 L/R HHA  Cone taps x 15 L/R 1HHA     Single leg press 1 black, 1 grey and 1 yellow cords x 20 L/R    Re-ed: AFO's on  DLS on Airex SBA/CGA  DLS on the ground SBA  Madhavi step over with w/s forward x 15 L/R HHA  Cone taps x 15 L/R 1HHA Single leg press 2 black cords 1 x 30 L/R    Re-ed: AFO's on  DLS on Airex SBA/CGA  DLS on the ground SBA  Madhavi step over with w/s forward x 15 L/R HHA  Cone taps x 15 L/R 1HHA  Forward lunge unsupported with hands hovering and SBA; 7 x 5 sec L/R Partial squats into chair 2HHA x 10  Red cord lateral steps x 20 L/R 2HHA    Re-ed: AFO's on  DLS on Airex SBA 3 x 30 sec  DLS on the ground SBA  Tandem stance 2 x 20 sec L/R finger touch B  Cone taps x 15 L/R 1HHA  Forward lunge unsupported with hands hovering and SBA; 7 x 5 sec L/R Re-ed: AFO's on  DLS on Airex SBA 3 x 30 sec  DLS on the ground SBA  Tandem stance 2 x 20 sec L/R finger touch B  Cone taps x 15 L/R 1HHA  Forward lunge unsupported with hands hovering and SBA; 7 x 5 sec L/R Re-ed: AFO's on  DLS on Airex SBA 3 x 30 sec  DLS on the ground SBA  Tandem stance 2 x 20 sec L/R finger touch B  Cone taps x 15 L/R 1HHA  Forward lunge unsupported with hands hovering and SBA; 7 x 5 sec L/R   Re-ed: AFO's on  DLS on Airex SBA 3 x 30 sec  Tandem stance 2 x 20 sec L/R finger touch B  Cone taps x 15 L/R 1HHA  Forward lunge unsupported with hands hovering and SBA; 7 x 5 sec L/R  DLS on Airex with forward reach x 5 L/R     HEP: Access Code: [email or web address]  URL: [email or web address]  Date: 09/05/2023  Prepared by: Johana Dela Cruz    Exercises  - Supine Hip and Knee Flexion AROM with Swiss Ball  - 1 x daily - 7 x weekly - 3 sets - 10 reps  - Supine Lower Trunk Rotation with Swiss Ball  - 1 x daily - 7 x weekly - 3 sets - 10 reps  - Hip Flexor Stretch at Edge of Bed  - 1 x daily - 7 x weekly - 1 sets - 3 reps - 30-60 sec hold  - Supine Figure 4 Piriformis Stretch  - 1 x daily - 7 x weekly - 1 sets - 3 reps - 30-60 sec hold      Charges: therex:2, re-ed: 1  Total Timed Treatment: 45 min  Total Treatment Time: 45 min

## 2023-12-18 ENCOUNTER — OFFICE VISIT (OUTPATIENT)
Dept: PHYSICAL THERAPY | Facility: HOSPITAL | Age: 78
End: 2023-12-18
Attending: INTERNAL MEDICINE
Payer: MEDICARE

## 2023-12-18 PROCEDURE — 97140 MANUAL THERAPY 1/> REGIONS: CPT

## 2023-12-18 PROCEDURE — 97110 THERAPEUTIC EXERCISES: CPT

## 2023-12-18 NOTE — PROGRESS NOTES
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 provider found  Date of Evaluation:    08/29/2023    Precautions:  Fall Risk Next MD visit:   none scheduled  Date of Surgery: n/a   Insurance Primary/Secondary: MEDICARE / 3 [facility]     # Auth Visits: 20 per POC            Subjective: He feels better today than last week. Less sore in the hips and quads but still sore. Pain: 1/10      Objective:   Limited and stiff left lumbar thoracic rotation  R piriformis stiffness and shortness  R ITB stiffness and shortness    DLS on Airex [id number]:62 sec today      Assessment: Continued with lower extremity strengthening, flexibility program, core stabilization and balance training. Maintains DLS on a foam block with better endurance and more controlled flexed knee position. L FSU's with 1HHA are more challenging to the patient. Patient continues to require skilled physical therapy to address gait, balance, strength and functional mobility impairment. Goals: (to be met in 10+ 10 + 10 visits)   Pt will demonstrate improved SLS to >5 seconds CALOS to promote safety and decrease risk of falls on uneven surfaces such as grass. Progressing. Pt will perform TUG in <15 seconds with least restrictive AD, demonstrating improved gait speed for improved participation in ADL such as community ambulation Progressing. Pt will perform 4-Item DGI with score of 9/12 or greater with least restrictive AD to demonstrate ability to ambulate safely in crowded and busy environments. Progressing  Pt will be able to squat to  light objects around the house without loss of stability. Progressing  Pt will improve functional hip strength to demonstrate ability to ascend/descend 1 flight of stairs reciprocally with the use of handrail. Goal met.    Pt will be independent and compliant with comprehensive HEP to maintain progress achieved in PT Goal met.          Plan: lower extremity strengthening; flexibility program; balance re-ed  Date: 9/20/23  Tx#: 6/16 Date: 9/25/23  Tx#: 7/16 Date: 9/27/23  Tx#: 8/16 Date:10/2/2023   Tx#:9/16 Date:10/4/2023  Tx#:10/16 Date:10/9/2023   Tx#:11/16 Date: 10/11/2023   Tx#:12/16 Date:10/16/2023   Tx#:13/16 Date:10/23/2023   Tx#:14/20 Date:10/30/2023   Tx#:15/20 Date:11/01/2023   Tx#:16/20 Date:11/06/2023   Tx#:17/20 Date:11/08/2023   Tx#:18/20 Date:11/13/2023   Tx#:19/20 Date:12/6/2023   Tx#:20/20 Date:12/13/2023   Tx#:21/30 Date:12/18/2023   Tx#:22/30   Manual: 25'  Prone with one pillow under:  Deep STM lumbar paraspinals and QL B  Manual quad stretch in prone B  Manual modified hip flexors stretch EOB; STM to anterior thigh Manual: 25'  Prone with one pillow under:  Deep STM lumbar paraspinals and QL B  Manual quad stretch in prone B  Manual modified hip flexors stretch EOB; STM to anterior thigh Manual: 15'  Prone with one pillow under:  Deep STM lumbar paraspinals and QL B  Manual quad stretch in prone B  Manual modified hip flexors stretch EOB; STM to anterior thigh   ThereX:  10 lumbar flex seated at edge of table  10x towel curls B  2x10 fwd lunge holding bar, out of AFOs  Goblet squats  Neuro re-ed:  NMES to R anterior tib 400 usec, 100hz, 95 mA  Practived walking 2 laps in p bars  2x10 DF assisted  1x heel walking  2x10 lunges to airex  4x3 hurdles Neuro re-ed:  NMES to R anterior tib 400 usec, 100hz, 95 mA  2x10 DF assisted  2x10 goblet squats  1x heel walking  10x step up to 6' R LE, 10 x step over step R LE  Therex:  PN see above, objective measures taken    Mod joão position for passive hip flexor stretch 2x30\"  10x fig 4 SL bridge Neuro re-ed:  NMES to R anterior tib 400 usec, 100hz, 95 mA  2x10 DF assisted  15 bosu lunges on R  2x10 step ups to 6\"  15x D1 PND hip flex on airex, R Neuro re-ed:  10x 3-cone tap in mod SLS on airex, B  Tandem walk in p bars light HHA x2  20 x marches on sanddune   Manual:  SL for flex rotate mob GR III, QL release following, B x 8 min  STM L posterior hip  Manual: 18'  Deep STM R posterior hip   Deep STM R ITB/TFL   SL for flex rotate mob GR III, QL release following, B   Manual R piriformis stretch   Manual: 15'  B quad stretch in prone  SL for flex rotate mob GR III, QL release following, B   Manual R piriformis stretch  Prone thoracic spine PA mobs Gr III  Prone central PA mobs L5/S1 Gr III Manual: 15'  B quad stretch in prone  SL for flex rotate mob GR III, QL release following, B   Manual R piriformis stretch  Prone thoracic spine PA mobs Gr III  Prone central PA mobs L5/S1 Gr III Manual: 15'  B quad stretch in prone  SL for flex rotate mob GR III, QL release following, B   Manual R piriformis stretch  Prone thoracic spine PA mobs Gr III  Prone central PA mobs L5/S1 Gr III Manual:     B quad stretch in prone  B piriformis stretch --     TE TE TE - -   - Gait; walking with hurricaine and SBA           NU step L5 5'  Bridge 2 x 10   Clam shells 2 x 10, manual resistance NU step L5 5'  Bridge 2 x 10   Education on log rolling  Neuro re-ed:  10x sciatic nerve glide with ankle floss, B  Manual:  SL for flex rotate mob GR III, QL release following. x8 min Therex:  Seated sciatica nerve glide x 10 R LE Neuro re-ed:  10x sciatic N glide with adductor bias and floss on R Neuro re-ed:  10x sciatic N glide with adductor bias and floss on R 15x bridges  Neuro re-ed:   Attempted to discriminate between 3 different textures (soft, sand paper, and poky) R legx4 min Therex:  2x10 donkey kicks, 3 lbs B  2x15 lateral bosu lunges, HHA  15 squat to row, 30 lbs, CGA TE  Bridge x 15  Clams with manual resistance x 10 L/R  Sit to stand x 5; hands on upper thighs  Leg press with AFO's off  Double leg press 3 black cords x 30 TE  Bridge x 15  Clams with manual resistance x 12 L/R  Leg press with AFO's ON  Double leg press 3 black cords 2 x 25 TE  Bridge x 20  LTR x 10  LTR with legs crossed for ITB bias x 10 L/R  R clam shells with manual resistance 2 x 15  Hkly PPT with DKTC x 8 L/R  Double leg press 3 black cords 1 x 30 TE  Bridge x 20  LTR x 10  LTR with legs crossed for ITB bias x 10 L/R  R clam shells with manual resistance 2 x 15  Hkly PPT with DKTC x 8 L/R  Sit to stand without UE assist x 5, SBA for blocking at the knees; 2HHA to return to sitting TE  Leg press with AFO's ON  Double leg press 3 black cords 2 x 25  Single leg press 2 black cords 1 x 30 L/R  Sit to stand without UE assist x 5, SBA for blocking at the knees; 2HHA to return to sitting  Partial squats into chair 2HHA 1 x 10, 1 x 7  Red cord lateral steps x 30 L/R 2HHA TE  Leg press with AFO's ON  Double leg press 3 black cords 2 x 25  Single leg press 2 black cords 1 x 30 L/R  Sit to stand without UE assist x 5, SBA for blocking at the knees; 2HHA to return to sitting  Partial squats into chair 2HHA 1 x 10, 1 x 7  Red cord lateral steps x 30 L/R 2HHA TE AFO's on  NU step L5 6'  LTR\"s x 20   Bridge x 15  SB Bridge x 15  Quadruped arm raises x 10 L/R  Quadruped leg raises x 8 L/R  Partial squats into chair 2HHA 1 x 10, 1 x 7  Red cord lateral steps x 30 L/R 2HHA  Inclined forward   heel raises x 8 reps  90 deg squats 2HHA, wide SUSY 2 x 5  Lateral lunges with hold x 5 L/R 2HHA TE AFO's on  NU step L5 6'  LTR\"s x 20   B Fig 4 stretch  B FADDIR stretch  B hamstring stretch  Bridge x 15  SB Bridge x 15  6\" FSU's 1HHA x 10 L/R  Partial squats into chair 2HHA 1 x 10, 1 x 7  Red cord lateral steps x 30 L/R 2HHA  Inclined forward   heel raises x 10 reps     Re-ed: 15'  Quadruped neutral spine/TrA recruitment/manual cuing  Camel cat with manual assist for movement coordination  Quadruped posterior rocking  Quadruped alternate arm raises x 7 L/R Re-ed: 15'  Quadruped neutral spine/TrA recruitment/manual cuing  Camel cat with minimal manual assist for movement coordination  Quadruped posterior rocking  Quadruped alternate arm raises x 7 L/R  TrA in hook-lying  TrA/PPT with alternate hip/knee flexion, slow  TrA/PPT with lateral knee fall outs, slow Re-ed: 25'  Quadruped neutral spine/TrA recruitment/manual cuing  Camel cat with minimal manual assist for movement coordination  Quadruped posterior rocking  Quadruped alternate arm raises x 7 L/R  Quadruped modified hip raises 2 x 5 L/R  TrA in hook-lying  TrA/PPT with alternate hip/knee flexion, slow  TrA/PPT with lateral knee fall outs, slow Therex:  10x sit to stand with exaggerated WS fwd and CGA  Tennis ball roll out firm pressure x2 min R Manual:  SL for flex rotate mob GR III, QL release following. x8 min    Therex:  2x10 SLR B  10 x bridges 4\" holds Therex:  2x10 knee ankle/knee ankle B  15x dead bugs in shelf position   10x fig 4 bridges SL    Manual:  SL for flex rotate mob GR III, QL release following. x8 min   Manual:  SL for flex rotate mob GR III, QL release following, Prone CPA to L3-L5 GR III.x10 min    Therex:  2x8 fwd lunges to bosu Therex:   Towel  toe curls, R LE x10    Manual:  SL for flex rotate mob GR III, QL release following, B Manual:  SL for flex rotate mob GR III, QL release following, B x 8 min    Therex:    2x10 bridges with airex under one foot, B  2x10 SL raise on mat  15x SL hip/ankle hip abd, B Single leg press 1 black, 1 grey and 1 yellow cords x 20 L/R  B heel raises on leg press with knees extended, 1 black cord; 2 x 12    Re-ed: AFO's on  DLS on Airex SBA/CGA  DLS on the ground SBA  Madhavi step over with w/s forward x 15 L/R HHA  Cone taps x 15 L/R 1HHA     Single leg press 1 black, 1 grey and 1 yellow cords x 20 L/R    Re-ed: AFO's on  DLS on Airex SBA/CGA  DLS on the ground SBA  Madhavi step over with w/s forward x 15 L/R HHA  Cone taps x 15 L/R 1HHA Single leg press 2 black cords 1 x 30 L/R    Re-ed: AFO's on  DLS on Airex SBA/CGA  DLS on the ground SBA  Madhavi step over with w/s forward x 15 L/R HHA  Cone taps x 15 L/R 1HHA  Forward lunge unsupported with hands hovering and SBA; 7 x 5 sec L/R Partial squats into chair 2HHA x 10  Red cord lateral steps x 20 L/R 2HHA    Re-ed: AFO's on  DLS on Airex SBA 3 x 30 sec  DLS on the ground SBA  Tandem stance 2 x 20 sec L/R finger touch B  Cone taps x 15 L/R 1HHA  Forward lunge unsupported with hands hovering and SBA; 7 x 5 sec L/R Re-ed: AFO's on  DLS on Airex SBA 3 x 30 sec  DLS on the ground SBA  Tandem stance 2 x 20 sec L/R finger touch B  Cone taps x 15 L/R 1HHA  Forward lunge unsupported with hands hovering and SBA; 7 x 5 sec L/R Re-ed: AFO's on  DLS on Airex SBA 3 x 30 sec  DLS on the ground SBA  Tandem stance 2 x 20 sec L/R finger touch B  Cone taps x 15 L/R 1HHA  Forward lunge unsupported with hands hovering and SBA; 7 x 5 sec L/R   Re-ed: AFO's on  DLS on Airex SBA 3 x 30 sec  Tandem stance 2 x 20 sec L/R finger touch B  Cone taps x 15 L/R 1HHA  Forward lunge unsupported with hands hovering and SBA; 7 x 5 sec L/R  DLS on Airex with forward reach x 5 L/R   Double leg press 3 black cords 1 x 30  Single leg press 2 black cords 1 x 30 L/R    Re-ed: AFO's on  DLS on Airex SBA 3 x 30 sec  Tandem stance 2 x 20 sec L/R finger touch B  Cone taps x 15 L/R 1HHA  Tandem walk 2HHA  DLS on Airex with forward reach x 5 L/R   HEP: Access Code: [email or web address]  URL: [email or web address]  Date: 09/05/2023  Prepared by: Santana Line    Exercises  - Supine Hip and Knee Flexion AROM with Swiss Ball  - 1 x daily - 7 x weekly - 3 sets - 10 reps  - Supine Lower Trunk Rotation with Swiss Ball  - 1 x daily - 7 x weekly - 3 sets - 10 reps  - Hip Flexor Stretch at Edge of Bed  - 1 x daily - 7 x weekly - 1 sets - 3 reps - 30-60 sec hold  - Supine Figure 4 Piriformis Stretch  - 1 x daily - 7 x weekly - 1 sets - 3 reps - 30-60 sec hold      Charges: therex:2, re-ed: 1  Total Timed Treatment: 45 min  Total Treatment Time: 45 min

## 2023-12-20 ENCOUNTER — OFFICE VISIT (OUTPATIENT)
Dept: PHYSICAL THERAPY | Facility: HOSPITAL | Age: 78
End: 2023-12-20
Attending: INTERNAL MEDICINE
Payer: MEDICARE

## 2023-12-20 PROCEDURE — 97112 NEUROMUSCULAR REEDUCATION: CPT

## 2023-12-20 PROCEDURE — 97140 MANUAL THERAPY 1/> REGIONS: CPT

## 2023-12-20 PROCEDURE — 97110 THERAPEUTIC EXERCISES: CPT

## 2023-12-20 NOTE — PROGRESS NOTES
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 provider found  Date of Evaluation:    08/29/2023    Precautions:  Fall Risk Next MD visit:   none scheduled  Date of Surgery: n/a   Insurance Primary/Secondary: MEDICARE / [facility]     # Auth Visits: 20 + 10 per POC            Subjective: Feels better than Monday. Did not get very sore. Feels that he has made some modest improvement in functionality since the start of PT. Pain: 1/10      Objective:   Limited and stiff left lumbar thoracic rotation  R piriformis stiffness and shortness  R ITB stiffness and shortness    DLS on Airex QUTNAFPUOL:05 sec today      Assessment: Continued with lower extremity strengthening, flexibility program, core stabilization and balance training. Practiced sit to stand transfers from an elevated surface with focus on independent stabilization in standing; cuing provided for gluteal recruitment. With practice, patient developed more stability and control with standing up  but continued to be apprehensive, prone to LOB posterior and stabilizing with the backs of his legs at the edge of treatment table. Patient continues to require skilled physical therapy to address gait, balance, strength and functional mobility impairment. Goals: (to be met in 10+ 10 + 10 visits)   Pt will demonstrate improved SLS to >5 seconds CALOS to promote safety and decrease risk of falls on uneven surfaces such as grass. Progressing. Pt will perform TUG in <15 seconds with least restrictive AD, demonstrating improved gait speed for improved participation in ADL such as community ambulation Progressing. Pt will perform 4-Item DGI with score of 9/12 or greater with least restrictive AD to demonstrate ability to ambulate safely in crowded and busy environments.  Progressing  Pt will be able to squat to  light objects around the house without loss of stability. Progressing  Pt will improve functional hip strength to demonstrate ability to ascend/descend 1 flight of stairs reciprocally with the use of handrail. Goal met. Pt will be independent and compliant with comprehensive HEP to maintain progress achieved in PT Goal met.          Plan: lower extremity strengthening; flexibility program; balance re-ed  Date: 9/20/23  Tx#: 6/16 Date: 9/25/23  Tx#: 7/16 Date: 9/27/23  Tx#: 8/16 Date:10/2/2023   Tx#:9/16 Date:10/4/2023  Tx#:10/16 Date:10/9/2023   Tx#:11/16 Date: 10/11/2023   Tx#:12/16 Date:10/16/2023   Tx#:13/16 Date:10/23/2023   Tx#:14/20 Date:10/30/2023   Tx#:15/20 Date:11/01/2023   Tx#:16/20 Date:11/06/2023   Tx#:17/20 Date:11/08/2023   Tx#:18/20 Date:11/13/2023   Tx#:19/20 Date:12/6/2023   Tx#:20/20 Date:12/13/2023   Tx#:21/30 Date:12/18/2023   Tx#:22/30 Date:12/20/2023   Tx#:23/30   Manual: 25'  Prone with one pillow under:  Deep STM lumbar paraspinals and QL B  Manual quad stretch in prone B  Manual modified hip flexors stretch EOB; STM to anterior thigh Manual: 25'  Prone with one pillow under:  Deep STM lumbar paraspinals and QL B  Manual quad stretch in prone B  Manual modified hip flexors stretch EOB; STM to anterior thigh Manual: 15'  Prone with one pillow under:  Deep STM lumbar paraspinals and QL B  Manual quad stretch in prone B  Manual modified hip flexors stretch EOB; STM to anterior thigh   ThereX:  10 lumbar flex seated at edge of table  10x towel curls B  2x10 fwd lunge holding bar, out of AFOs  Goblet squats  Neuro re-ed:  NMES to R anterior tib 400 usec, 100hz, 95 mA  Practived walking 2 laps in p bars  2x10 DF assisted  1x heel walking  2x10 lunges to airex  4x3 hurdles Neuro re-ed:  NMES to R anterior tib 400 usec, 100hz, 95 mA  2x10 DF assisted  2x10 goblet squats  1x heel walking  10x step up to 6' R LE, 10 x step over step R LE  Therex:  PN see above, objective measures taken    Mod joão position for passive hip flexor stretch 2x30\"  10x fig 4 SL bridge Neuro re-ed:  NMES to R anterior tib 400 usec, 100hz, 95 mA  2x10 DF assisted  15 bosu lunges on R  2x10 step ups to 6\"  15x D1 PND hip flex on airex, R Neuro re-ed:  10x 3-cone tap in mod SLS on airex, B  Tandem walk in p bars light HHA x2  20 x marches on sanddune   Manual:  SL for flex rotate mob GR III, QL release following, B x 8 min  STM L posterior hip  Manual: 18'  Deep STM R posterior hip   Deep STM R ITB/TFL   SL for flex rotate mob GR III, QL release following, B   Manual R piriformis stretch   Manual: 15'  B quad stretch in prone  SL for flex rotate mob GR III, QL release following, B   Manual R piriformis stretch  Prone thoracic spine PA mobs Gr III  Prone central PA mobs L5/S1 Gr III Manual: 15'  B quad stretch in prone  SL for flex rotate mob GR III, QL release following, B   Manual R piriformis stretch  Prone thoracic spine PA mobs Gr III  Prone central PA mobs L5/S1 Gr III Manual: 15'  B quad stretch in prone  SL for flex rotate mob GR III, QL release following, B   Manual R piriformis stretch  Prone thoracic spine PA mobs Gr III  Prone central PA mobs L5/S1 Gr III Manual:     B quad stretch in prone  B piriformis stretch --   Manual: 15'  B quad stretch in prone  SL for flex rotate mob GR III, QL release following, B   Manual R piriformis stretch  Prone thoracic spine PA mobs Gr III  Prone central PA mobs L5/S1 Gr III   TE TE TE - -   - Gait; walking with hurricaine and SBA            NU step L5 5'  Bridge 2 x 10   Clam shells 2 x 10, manual resistance NU step L5 5'  Bridge 2 x 10   Education on log rolling  Neuro re-ed:  10x sciatic nerve glide with ankle floss, B  Manual:  SL for flex rotate mob GR III, QL release following. x8 min Therex:  Seated sciatica nerve glide x 10 R LE Neuro re-ed:  10x sciatic N glide with adductor bias and floss on R Neuro re-ed:  10x sciatic N glide with adductor bias and floss on R 15x bridges  Neuro re-ed:  Attempted to discriminate between 3 different textures (soft, sand paper, and poky) R legx4 min Therex:  2x10 donkey kicks, 3 lbs B  2x15 lateral bosu lunges, HHA  15 squat to row, 30 lbs, CGA TE  Bridge x 15  Clams with manual resistance x 10 L/R  Sit to stand x 5; hands on upper thighs  Leg press with AFO's off  Double leg press 3 black cords x 30 TE  Bridge x 15  Clams with manual resistance x 12 L/R  Leg press with AFO's ON  Double leg press 3 black cords 2 x 25 TE  Bridge x 20  LTR x 10  LTR with legs crossed for ITB bias x 10 L/R  R clam shells with manual resistance 2 x 15  Hkly PPT with DKTC x 8 L/R  Double leg press 3 black cords 1 x 30 TE  Bridge x 20  LTR x 10  LTR with legs crossed for ITB bias x 10 L/R  R clam shells with manual resistance 2 x 15  Hkly PPT with DKTC x 8 L/R  Sit to stand without UE assist x 5, SBA for blocking at the knees; 2HHA to return to sitting TE  Leg press with AFO's ON  Double leg press 3 black cords 2 x 25  Single leg press 2 black cords 1 x 30 L/R  Sit to stand without UE assist x 5, SBA for blocking at the knees; 2HHA to return to sitting  Partial squats into chair 2HHA 1 x 10, 1 x 7  Red cord lateral steps x 30 L/R 2HHA TE  Leg press with AFO's ON  Double leg press 3 black cords 2 x 25  Single leg press 2 black cords 1 x 30 L/R  Sit to stand without UE assist x 5, SBA for blocking at the knees; 2HHA to return to sitting  Partial squats into chair 2HHA 1 x 10, 1 x 7  Red cord lateral steps x 30 L/R 2HHA TE AFO's on  NU step L5 6'  LTR\"s x 20   Bridge x 15  SB Bridge x 15  Quadruped arm raises x 10 L/R  Quadruped leg raises x 8 L/R  Partial squats into chair 2HHA 1 x 10, 1 x 7  Red cord lateral steps x 30 L/R 2HHA  Inclined forward   heel raises x 8 reps  90 deg squats 2HHA, wide SUSY 2 x 5  Lateral lunges with hold x 5 L/R 2HHA TE AFO's on  NU step L5 6'  LTR\"s x 20   B Fig 4 stretch  B FADDIR stretch  B hamstring stretch  Bridge x 15  SB Bridge x 15  6\" FSU's 1HHA x 10 L/R  Partial squats into chair 2HHA 1 x 10, 1 x 7  Red cord lateral steps x 30 L/R 2HHA  Inclined forward   heel raises x 10 reps   TE AFO's on 20'  NU step L5 6'  LTR\"s x 20   B Fig 4 stretch  B FADDIR stretch  B hamstring stretch  Bridge x 15  SB Bridge x 15  PPT with marching x 15 L/R  Sit to stand from an elevated surface 2 x 10  Red cord lateral steps x 30 L/R 2HHA     Re-ed: 15'  Quadruped neutral spine/TrA recruitment/manual cuing  Camel cat with manual assist for movement coordination  Quadruped posterior rocking  Quadruped alternate arm raises x 7 L/R Re-ed: 15'  Quadruped neutral spine/TrA recruitment/manual cuing  Camel cat with minimal manual assist for movement coordination  Quadruped posterior rocking  Quadruped alternate arm raises x 7 L/R  TrA in hook-lying  TrA/PPT with alternate hip/knee flexion, slow  TrA/PPT with lateral knee fall outs, slow Re-ed: 25'  Quadruped neutral spine/TrA recruitment/manual cuing  Camel cat with minimal manual assist for movement coordination  Quadruped posterior rocking  Quadruped alternate arm raises x 7 L/R  Quadruped modified hip raises 2 x 5 L/R  TrA in hook-lying  TrA/PPT with alternate hip/knee flexion, slow  TrA/PPT with lateral knee fall outs, slow Therex:  10x sit to stand with exaggerated WS fwd and CGA  Tennis ball roll out firm pressure x2 min R Manual:  SL for flex rotate mob GR III, QL release following. x8 min    Therex:  2x10 SLR B  10 x bridges 4\" holds Therex:  2x10 knee ankle/knee ankle B  15x dead bugs in shelf position   10x fig 4 bridges SL    Manual:  SL for flex rotate mob GR III, QL release following. x8 min   Manual:  SL for flex rotate mob GR III, QL release following, Prone CPA to L3-L5 GR III.x10 min    Therex:  2x8 fwd lunges to bosu Therex:   Towel  toe curls, R LE x10    Manual:  SL for flex rotate mob GR III, QL release following, B Manual:  SL for flex rotate mob GR III, QL release following, B x 8 min    Therex:    2x10 bridges with airex under one foot, B  2x10 SL raise on mat  15x SL hip/ankle hip abd, B Single leg press 1 black, 1 grey and 1 yellow cords x 20 L/R  B heel raises on leg press with knees extended, 1 black cord; 2 x 12    Re-ed: AFO's on  DLS on Airex SBA/CGA  DLS on the ground SBA  Madhavi step over with w/s forward x 15 L/R HHA  Cone taps x 15 L/R 1HHA     Single leg press 1 black, 1 grey and 1 yellow cords x 20 L/R    Re-ed: AFO's on  DLS on Airex SBA/CGA  DLS on the ground SBA  Madhavi step over with w/s forward x 15 L/R HHA  Cone taps x 15 L/R 1HHA Single leg press 2 black cords 1 x 30 L/R    Re-ed: AFO's on  DLS on Airex SBA/CGA  DLS on the ground SBA  Madhavi step over with w/s forward x 15 L/R HHA  Cone taps x 15 L/R 1HHA  Forward lunge unsupported with hands hovering and SBA; 7 x 5 sec L/R Partial squats into chair 2HHA x 10  Red cord lateral steps x 20 L/R 2HHA    Re-ed: AFO's on  DLS on Airex SBA 3 x 30 sec  DLS on the ground SBA  Tandem stance 2 x 20 sec L/R finger touch B  Cone taps x 15 L/R 1HHA  Forward lunge unsupported with hands hovering and SBA; 7 x 5 sec L/R Re-ed: AFO's on  DLS on Airex SBA 3 x 30 sec  DLS on the ground SBA  Tandem stance 2 x 20 sec L/R finger touch B  Cone taps x 15 L/R 1HHA  Forward lunge unsupported with hands hovering and SBA; 7 x 5 sec L/R Re-ed: AFO's on  DLS on Airex SBA 3 x 30 sec  DLS on the ground SBA  Tandem stance 2 x 20 sec L/R finger touch B  Cone taps x 15 L/R 1HHA  Forward lunge unsupported with hands hovering and SBA; 7 x 5 sec L/R   Re-ed: AFO's on  DLS on Airex SBA 3 x 30 sec  Tandem stance 2 x 20 sec L/R finger touch B  Cone taps x 15 L/R 1HHA  Forward lunge unsupported with hands hovering and SBA; 7 x 5 sec L/R  DLS on Airex with forward reach x 5 L/R   Double leg press 3 black cords 1 x 30  Single leg press 2 black cords 1 x 30 L/R    Re-ed: AFO's on  DLS on Airex SBA 3 x 30 sec  Tandem stance 2 x 20 sec L/R finger touch B  Cone taps x 15 L/R 1HHA  Tandem walk 2HHA  DLS on Airex with forward reach x 5 L/R Re-ed: AFO's on 10'  DLS on Airex SBA 3 x 30 sec  Tandem stance 2 x 20 sec L/R finger touch B  Cone taps x 15 L/R 1HHA  Tandem walk 2HHA  DLS on Airex with forward reach x 10 L/R  Lunge stance with A/P w/s x 5 L/R HHA    HEP: Access Code: [email or web address]  URL: [email or web address]  Date: 09/05/2023  Prepared by: Stephen Encarnacion    Exercises  - Supine Hip and Knee Flexion AROM with Swiss Ball  - 1 x daily - 7 x weekly - 3 sets - 10 reps  - Supine Lower Trunk Rotation with Swiss Ball  - 1 x daily - 7 x weekly - 3 sets - 10 reps  - Hip Flexor Stretch at Edge of Bed  - 1 x daily - 7 x weekly - 1 sets - 3 reps - 30-60 sec hold  - Supine Figure 4 Piriformis Stretch  - 1 x daily - 7 x weekly - 1 sets - 3 reps - 30-60 sec hold      Charges: therex:1, re-ed: 1, man x 1  Total Timed Treatment: 45 min  Total Treatment Time: 45 min

## 2024-01-03 ENCOUNTER — OFFICE VISIT (OUTPATIENT)
Dept: PHYSICAL THERAPY | Facility: HOSPITAL | Age: 79
End: 2024-01-03
Attending: INTERNAL MEDICINE
Payer: MEDICARE

## 2024-01-03 PROCEDURE — 97140 MANUAL THERAPY 1/> REGIONS: CPT

## 2024-01-03 PROCEDURE — 97110 THERAPEUTIC EXERCISES: CPT

## 2024-01-03 NOTE — PROGRESS NOTES
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 provider found  Date of Evaluation:    08/29/2023    Precautions:  Fall Risk Next MD visit:   none scheduled  Date of Surgery: n/a   Insurance Primary/Secondary: MEDICARE / [employer] [place] INDEMNITY     # Auth Visits: 20 + 10 per POC            Subjective: He fell to his right side yesterday when getting to the couch. Strained medial side of left knee. Medial left knee 7-8/10 pain with twisting positions, may be 1/10 at rest. Feels better than last night. There is no bruising or swelling.  There is no pain with walking or weight-bearing activities.   Pain: 7-8/10 transient left knee pain with twisting motions      Objective:   L knee flexion ROM is full, pain-free passively; some pain with active extension  Slight suprapatellar swelling is noted  Tender to palpation L MCL      Assessment: Patient had a fall to the right yesterday and likely strained left MCL. Applied kinesiotape for proprioceptive feedback and swelling relief; recommended to apply ice and educated on quad sets/SLR's/SAQ's to perform in pain-free range. Did really well with all of the treatment today. Patient will be travelling and returns to PT in two weeks. Patient continues to require skilled physical therapy to address gait, balance, strength and functional mobility impairment.         Goals: (to be met in 10+ 10 + 10 visits)   Pt will demonstrate improved SLS to >5 seconds CALOS to promote safety and decrease risk of falls on uneven surfaces such as grass. Progressing.   Pt will perform TUG in <15 seconds with least restrictive AD, demonstrating improved gait speed for improved participation in ADL such as community ambulation Progressing.  Pt will perform 4-Item DGI with score of 9/12 or greater with least restrictive AD to demonstrate ability to ambulate safely in crowded  and busy environments. Progressing  Pt will be able to squat to  light objects around the house without loss of stability. Progressing  Pt will improve functional hip strength to demonstrate ability to ascend/descend 1 flight of stairs reciprocally with the use of handrail. Goal met.   Pt will be independent and compliant with comprehensive HEP to maintain progress achieved in PT Goal met.         Plan: lower extremity strengthening; flexibility program; balance re-ed  Date: 9/20/23  Tx#: 6/16 Date: 9/25/23  Tx#: 7/16 Date: 9/27/23  Tx#: 8/16 Date:10/2/2023   Tx#:9/16 Date:10/4/2023  Tx#:10/16 Date:10/9/2023   Tx#:11/16 Date: 10/11/2023   Tx#:12/16 Date:10/16/2023   Tx#:13/16 Date:10/23/2023   Tx#:14/20 Date:10/30/2023   Tx#:15/20 Date:11/01/2023   Tx#:16/20 Date:11/06/2023   Tx#:17/20 Date:11/08/2023   Tx#:18/20 Date:11/13/2023   Tx#:19/20 Date:12/6/2023   Tx#:20/20 Date:12/13/2023   Tx#:21/30 Date:12/18/2023   Tx#:22/30 Date:12/20/2023   Tx#:23/30 Date:1/3/2024  Tx#:24/30   Manual: 25'  Prone with one pillow under:  Deep STM lumbar paraspinals and QL B  Manual quad stretch in prone B  Manual modified hip flexors stretch EOB; STM to anterior thigh Manual: 25'  Prone with one pillow under:  Deep STM lumbar paraspinals and QL B  Manual quad stretch in prone B  Manual modified hip flexors stretch EOB; STM to anterior thigh Manual: 15'  Prone with one pillow under:  Deep STM lumbar paraspinals and QL B  Manual quad stretch in prone B  Manual modified hip flexors stretch EOB; STM to anterior thigh   ThereX:  10 lumbar flex seated at edge of table  10x towel curls B  2x10 fwd lunge holding bar, out of AFOs  Goblet squats  Neuro re-ed:  NMES to R anterior tib 400 usec, 100hz, 95 mA  Practived walking 2 laps in p bars  2x10 DF assisted  1x heel walking  2x10 lunges to airex  4x3 hurdles Neuro re-ed:  NMES to R anterior tib 400 usec, 100hz, 95 mA  2x10 DF assisted  2x10 goblet squats  1x heel walking  10x step up to  6' R LE, 10 x step over step R LE  Therex:  PN see above, objective measures taken    Mod joão position for passive hip flexor stretch 2x30\"  10x fig 4 SL bridge Neuro re-ed:  NMES to R anterior tib 400 usec, 100hz, 95 mA  2x10 DF assisted  15 bosu lunges on R  2x10 step ups to 6\"  15x D1 PND hip flex on airex, R Neuro re-ed:  10x 3-cone tap in mod SLS on airex, B  Tandem walk in p bars light HHA x2  20 x marches on sanddune   Manual:  SL for flex rotate mob GR III, QL release following, B x 8 min  STM L posterior hip  Manual: 18'  Deep STM R posterior hip   Deep STM R ITB/TFL   SL for flex rotate mob GR III, QL release following, B   Manual R piriformis stretch   Manual: 15'  B quad stretch in prone  SL for flex rotate mob GR III, QL release following, B   Manual R piriformis stretch  Prone thoracic spine PA mobs Gr III  Prone central PA mobs L5/S1 Gr III Manual: 15'  B quad stretch in prone  SL for flex rotate mob GR III, QL release following, B   Manual R piriformis stretch  Prone thoracic spine PA mobs Gr III  Prone central PA mobs L5/S1 Gr III Manual: 15'  B quad stretch in prone  SL for flex rotate mob GR III, QL release following, B   Manual R piriformis stretch  Prone thoracic spine PA mobs Gr III  Prone central PA mobs L5/S1 Gr III Manual:     B quad stretch in prone  B piriformis stretch --   Manual: 15'  B quad stretch in prone  SL for flex rotate mob GR III, QL release following, B   Manual R piriformis stretch  Prone thoracic spine PA mobs Gr III  Prone central PA mobs L5/S1 Gr III Manual: 8'  B piriformis stretch  L hamstring stretch  KT application left knee   TE TE TE - -   - Gait; walking with hurricaine and SBA             NU step L5 5'  Bridge 2 x 10   Clam shells 2 x 10, manual resistance NU step L5 5'  Bridge 2 x 10   Education on log rolling  Neuro re-ed:  10x sciatic nerve glide with ankle floss, B  Manual:  SL for flex rotate mob GR III, QL release following.x8 min Therex:  Seated sciatica  nerve glide x 10 R LE Neuro re-ed:  10x sciatic N glide with adductor bias and floss on R Neuro re-ed:  10x sciatic N glide with adductor bias and floss on R 15x bridges  Neuro re-ed:  Attempted to discriminate between 3 different textures (soft, sand paper, and poky) R legx4 min Therex:  2x10 donkey kicks, 3 lbs B  2x15 lateral bosu lunges, HHA  15 squat to row, 30 lbs, CGA TE  Bridge x 15  Clams with manual resistance x 10 L/R  Sit to stand x 5; hands on upper thighs  Leg press with AFO's off  Double leg press 3 black cords x 30 TE  Bridge x 15  Clams with manual resistance x 12 L/R  Leg press with AFO's ON  Double leg press 3 black cords 2 x 25 TE  Bridge x 20  LTR x 10  LTR with legs crossed for ITB bias x 10 L/R  R clam shells with manual resistance 2 x 15  Hkly PPT with DKTC x 8 L/R  Double leg press 3 black cords 1 x 30 TE  Bridge x 20  LTR x 10  LTR with legs crossed for ITB bias x 10 L/R  R clam shells with manual resistance 2 x 15  Hkly PPT with DKTC x 8 L/R  Sit to stand without UE assist x 5, SBA for blocking at the knees; 2HHA to return to sitting TE  Leg press with AFO's ON  Double leg press 3 black cords 2 x 25  Single leg press 2 black cords 1 x 30 L/R  Sit to stand without UE assist x 5, SBA for blocking at the knees; 2HHA to return to sitting  Partial squats into chair 2HHA 1 x 10, 1 x 7  Red cord lateral steps x 30 L/R 2HHA TE  Leg press with AFO's ON  Double leg press 3 black cords 2 x 25  Single leg press 2 black cords 1 x 30 L/R  Sit to stand without UE assist x 5, SBA for blocking at the knees; 2HHA to return to sitting  Partial squats into chair 2HHA 1 x 10, 1 x 7  Red cord lateral steps x 30 L/R 2HHA TE AFO's on  NU step L5 6'  LTR\"s x 20   Bridge x 15  SB Bridge x 15  Quadruped arm raises x 10 L/R  Quadruped leg raises x 8 L/R  Partial squats into chair 2HHA 1 x 10, 1 x 7  Red cord lateral steps x 30 L/R 2HHA  Inclined forward   heel raises x 8 reps  90 deg squats 2HHA, wide SUSY 2 x  5  Lateral lunges with hold x 5 L/R 2HHA TE AFO's on  NU step L5 6'  LTR\"s x 20   B Fig 4 stretch  B FADDIR stretch  B hamstring stretch  Bridge x 15  SB Bridge x 15  6\" FSU's 1HHA x 10 L/R  Partial squats into chair 2HHA 1 x 10, 1 x 7  Red cord lateral steps x 30 L/R 2HHA  Inclined forward   heel raises x 10 reps   TE AFO's on 20'  NU step L5 6'  LTR\"s x 20   B Fig 4 stretch  B FADDIR stretch  B hamstring stretch  Bridge x 15  SB Bridge x 15  PPT with marching x 15 L/R  Sit to stand from an elevated surface 2 x 10  Red cord lateral steps x 30 L/R 2HHA   TE 30'  SB rolls DKTC  SB rolls LTR  L quad sets 10 x 10 sec, x 2  L/R SLR 2 x 10  SAQ's 2 x 10  Bridge 2 x 10  SB Bridge x 15  PPT with marching x 15 L/R   Re-ed: 15'  Quadruped neutral spine/TrA recruitment/manual cuing  Camel cat with manual assist for movement coordination  Quadruped posterior rocking  Quadruped alternate arm raises x 7 L/R Re-ed: 15'  Quadruped neutral spine/TrA recruitment/manual cuing  Camel cat with minimal manual assist for movement coordination  Quadruped posterior rocking  Quadruped alternate arm raises x 7 L/R  TrA in hook-lying  TrA/PPT with alternate hip/knee flexion, slow  TrA/PPT with lateral knee fall outs, slow Re-ed: 25'  Quadruped neutral spine/TrA recruitment/manual cuing  Camel cat with minimal manual assist for movement coordination  Quadruped posterior rocking  Quadruped alternate arm raises x 7 L/R  Quadruped modified hip raises 2 x 5 L/R  TrA in hook-lying  TrA/PPT with alternate hip/knee flexion, slow  TrA/PPT with lateral knee fall outs, slow Therex:  10x sit to stand with exaggerated WS fwd and CGA  Tennis ball roll out firm pressure x2 min R Manual:  SL for flex rotate mob GR III, QL release following.x8 min    Therex:  2x10 SLR B  10 x bridges 4\" holds Therex:  2x10 knee ankle/knee ankle B  15x dead bugs in shelf position   10x fig 4 bridges SL    Manual:  SL for flex rotate mob GR III, QL release following.x8 min    Manual:  SL for flex rotate mob GR III, QL release following, Prone CPA to L3-L5 GR III.x10 min    Therex:  2x8 fwd lunges to bosu Therex:  Towel  toe curls, R LE x10    Manual:  SL for flex rotate mob GR III, QL release following, B Manual:  SL for flex rotate mob GR III, QL release following, B x 8 min    Therex:    2x10 bridges with airex under one foot, B  2x10 SL raise on mat  15x SL hip/ankle hip abd, B Single leg press 1 black, 1 grey and 1 yellow cords x 20 L/R  B heel raises on leg press with knees extended, 1 black cord; 2 x 12    Re-ed: AFO's on  DLS on Airex SBA/CGA  DLS on the ground SBA  Madhavi step over with w/s forward x 15 L/R HHA  Cone taps x 15 L/R 1HHA     Single leg press 1 black, 1 grey and 1 yellow cords x 20 L/R    Re-ed: AFO's on  DLS on Airex SBA/CGA  DLS on the ground SBA  Madhavi step over with w/s forward x 15 L/R HHA  Cone taps x 15 L/R 1HHA Single leg press 2 black cords 1 x 30 L/R    Re-ed: AFO's on  DLS on Airex SBA/CGA  DLS on the ground SBA  Madhavi step over with w/s forward x 15 L/R HHA  Cone taps x 15 L/R 1HHA  Forward lunge unsupported with hands hovering and SBA; 7 x 5 sec L/R Partial squats into chair 2HHA x 10  Red cord lateral steps x 20 L/R 2HHA    Re-ed: AFO's on  DLS on Airex SBA 3 x 30 sec  DLS on the ground SBA  Tandem stance 2 x 20 sec L/R finger touch B  Cone taps x 15 L/R 1HHA  Forward lunge unsupported with hands hovering and SBA; 7 x 5 sec L/R Re-ed: AFO's on  DLS on Airex SBA 3 x 30 sec  DLS on the ground SBA  Tandem stance 2 x 20 sec L/R finger touch B  Cone taps x 15 L/R 1HHA  Forward lunge unsupported with hands hovering and SBA; 7 x 5 sec L/R Re-ed: AFO's on  DLS on Airex SBA 3 x 30 sec  DLS on the ground SBA  Tandem stance 2 x 20 sec L/R finger touch B  Cone taps x 15 L/R 1HHA  Forward lunge unsupported with hands hovering and SBA; 7 x 5 sec L/R   Re-ed: AFO's on  DLS on Airex SBA 3 x 30 sec  Tandem stance 2 x 20 sec L/R finger touch B  Cone taps x 15 L/R  1HHA  Forward lunge unsupported with hands hovering and SBA; 7 x 5 sec L/R  DLS on Airex with forward reach x 5 L/R   Double leg press 3 black cords 1 x 30  Single leg press 2 black cords 1 x 30 L/R    Re-ed: AFO's on  DLS on Airex SBA 3 x 30 sec  Tandem stance 2 x 20 sec L/R finger touch B  Cone taps x 15 L/R 1HHA  Tandem walk 2HHA  DLS on Airex with forward reach x 5 L/R Re-ed: AFO's on 10'  DLS on Airex SBA 3 x 30 sec  Tandem stance 2 x 20 sec L/R finger touch B  Cone taps x 15 L/R 1HHA  Tandem walk 2HHA  DLS on Airex with forward reach x 10 L/R  Lunge stance with A/P w/s x 5 L/R HHA     HEP: Access Code: [email or web address]  URL: [email or web address]  Date: 09/05/2023  Prepared by: Juanita Koehler    Exercises  - Supine Hip and Knee Flexion AROM with Swiss Ball  - 1 x daily - 7 x weekly - 3 sets - 10 reps  - Supine Lower Trunk Rotation with Swiss Ball  - 1 x daily - 7 x weekly - 3 sets - 10 reps  - Hip Flexor Stretch at Edge of Bed  - 1 x daily - 7 x weekly - 1 sets - 3 reps - 30-60 sec hold  - Supine Figure 4 Piriformis Stretch  - 1 x daily - 7 x weekly - 1 sets - 3 reps - 30-60 sec hold      Charges: therex x 2, man x 1  Total Timed Treatment: 38 min  Total Treatment Time: 38 min

## 2024-01-17 ENCOUNTER — OFFICE VISIT (OUTPATIENT)
Dept: PHYSICAL THERAPY | Facility: HOSPITAL | Age: 79
End: 2024-01-17
Attending: INTERNAL MEDICINE
Payer: MEDICARE

## 2024-01-17 ENCOUNTER — TELEPHONE (OUTPATIENT)
Dept: PHYSICAL THERAPY | Facility: HOSPITAL | Age: 79
End: 2024-01-17

## 2024-01-17 PROCEDURE — 97110 THERAPEUTIC EXERCISES: CPT

## 2024-01-17 PROCEDURE — 97112 NEUROMUSCULAR REEDUCATION: CPT

## 2024-01-17 NOTE — PROGRESS NOTES
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 provider found  Date of Evaluation:    08/29/2023    Precautions:  Fall Risk Next MD visit:   none scheduled  Date of Surgery: n/a   Insurance Primary/Secondary: MEDICARE / [employer] [place] INDEMNITY     # Auth Visits: 20 + 10 per POC            Subjective: Will continue with 15 mg of prednisone for the next few weeks till his follow-up with Dr. Solis on 3/7. Meeting with Dr. Gutierrez next week to follow up on IV treatment. Was away to [place] for two weeks. His balance has been about the same. Had one fall on a pool deck a few days ago. Experienced an increase in lower back pain at one time; he increased intensity of his stretches and lower back pain improved. Feels pretty good right now. Taping for left knee has really helped. Left knee feels much better but still a little bit tender with twisting.   Pain: 1-3/10 transient left knee pain with twisting motions      Objective:   L knee flexion ROM is full, no swelling or tenderness appreciated.      Assessment: Able to maintain standing balance on a foam block for nearly 40 sec and encouraged by that. Challenged patient by increasing resistance with lateral steps and hold time with chair hovers (hands on the railings for balance). Patient continues to require skilled physical therapy to address gait, balance, strength and functional mobility impairment.         Goals: (to be met in 10+ 10 + 10 visits)   Pt will demonstrate improved SLS to >5 seconds CALOS to promote safety and decrease risk of falls on uneven surfaces such as grass. Progressing.   Pt will perform TUG in <15 seconds with least restrictive AD, demonstrating improved gait speed for improved participation in ADL such as community ambulation Progressing.  Pt will perform 4-Item DGI with score of 9/12 or greater with least restrictive AD  to demonstrate ability to ambulate safely in crowded and busy environments. Progressing  Pt will be able to squat to  light objects around the house without loss of stability. Progressing  Pt will improve functional hip strength to demonstrate ability to ascend/descend 1 flight of stairs reciprocally with the use of handrail. Goal met.   Pt will be independent and compliant with comprehensive HEP to maintain progress achieved in PT Goal met.         Plan: lower extremity strengthening; flexibility program; balance re-ed  Date: 9/20/23  Tx#: 6/16 Date: 9/25/23  Tx#: 7/16 Date: 9/27/23  Tx#: 8/16 Date:10/2/2023   Tx#:9/16 Date:10/4/2023  Tx#:10/16 Date:10/9/2023   Tx#:11/16 Date: 10/11/2023   Tx#:12/16 Date:10/16/2023   Tx#:13/16 Date:10/23/2023   Tx#:14/20 Date:10/30/2023   Tx#:15/20 Date:11/01/2023   Tx#:16/20 Date:11/06/2023   Tx#:17/20 Date:11/08/2023   Tx#:18/20 Date:11/13/2023   Tx#:19/20 Date:12/6/2023   Tx#:20/20 Date:12/13/2023   Tx#:21/30 Date:12/18/2023   Tx#:22/30 Date:12/20/2023   Tx#:23/30 Date:1/3/2024  Tx#:24/30 Date:1/17/2024  Tx#:25/30   Manual: 25'  Prone with one pillow under:  Deep STM lumbar paraspinals and QL B  Manual quad stretch in prone B  Manual modified hip flexors stretch EOB; STM to anterior thigh Manual: 25'  Prone with one pillow under:  Deep STM lumbar paraspinals and QL B  Manual quad stretch in prone B  Manual modified hip flexors stretch EOB; STM to anterior thigh Manual: 15'  Prone with one pillow under:  Deep STM lumbar paraspinals and QL B  Manual quad stretch in prone B  Manual modified hip flexors stretch EOB; STM to anterior thigh   ThereX:  10 lumbar flex seated at edge of table  10x towel curls B  2x10 fwd lunge holding bar, out of AFOs  Goblet squats  Neuro re-ed:  NMES to R anterior tib 400 usec, 100hz, 95 mA  Practived walking 2 laps in p bars  2x10 DF assisted  1x heel walking  2x10 lunges to airex  4x3 hurdles Neuro re-ed:  NMES to R anterior tib 400 usec,  100hz, 95 mA  2x10 DF assisted  2x10 goblet squats  1x heel walking  10x step up to 6' R LE, 10 x step over step R LE  Therex:  PN see above, objective measures taken    Mod joão position for passive hip flexor stretch 2x30\"  10x fig 4 SL bridge Neuro re-ed:  NMES to R anterior tib 400 usec, 100hz, 95 mA  2x10 DF assisted  15 bosu lunges on R  2x10 step ups to 6\"  15x D1 PND hip flex on airex, R Neuro re-ed:  10x 3-cone tap in mod SLS on airex, B  Tandem walk in p bars light HHA x2  20 x marches on sanddune   Manual:  SL for flex rotate mob GR III, QL release following, B x 8 min  STM L posterior hip  Manual: 18'  Deep STM R posterior hip   Deep STM R ITB/TFL   SL for flex rotate mob GR III, QL release following, B   Manual R piriformis stretch   Manual: 15'  B quad stretch in prone  SL for flex rotate mob GR III, QL release following, B   Manual R piriformis stretch  Prone thoracic spine PA mobs Gr III  Prone central PA mobs L5/S1 Gr III Manual: 15'  B quad stretch in prone  SL for flex rotate mob GR III, QL release following, B   Manual R piriformis stretch  Prone thoracic spine PA mobs Gr III  Prone central PA mobs L5/S1 Gr III Manual: 15'  B quad stretch in prone  SL for flex rotate mob GR III, QL release following, B   Manual R piriformis stretch  Prone thoracic spine PA mobs Gr III  Prone central PA mobs L5/S1 Gr III Manual:     B quad stretch in prone  B piriformis stretch --   Manual: 15'  B quad stretch in prone  SL for flex rotate mob GR III, QL release following, B   Manual R piriformis stretch  Prone thoracic spine PA mobs Gr III  Prone central PA mobs L5/S1 Gr III Manual: 8'  B piriformis stretch  L hamstring stretch  KT application left knee --   TE TE TE - -   - Gait; walking with hurricaine and SBA              NU step L5 5'  Bridge 2 x 10   Clam shells 2 x 10, manual resistance NU step L5 5'  Bridge 2 x 10   Education on log rolling  Neuro re-ed:  10x sciatic nerve glide with ankle floss,  B  Manual:  SL for flex rotate mob GR III, QL release following.x8 min Therex:  Seated sciatica nerve glide x 10 R LE Neuro re-ed:  10x sciatic N glide with adductor bias and floss on R Neuro re-ed:  10x sciatic N glide with adductor bias and floss on R 15x bridges  Neuro re-ed:  Attempted to discriminate between 3 different textures (soft, sand paper, and poky) R legx4 min Therex:  2x10 donkey kicks, 3 lbs B  2x15 lateral bosu lunges, HHA  15 squat to row, 30 lbs, CGA TE  Bridge x 15  Clams with manual resistance x 10 L/R  Sit to stand x 5; hands on upper thighs  Leg press with AFO's off  Double leg press 3 black cords x 30 TE  Bridge x 15  Clams with manual resistance x 12 L/R  Leg press with AFO's ON  Double leg press 3 black cords 2 x 25 TE  Bridge x 20  LTR x 10  LTR with legs crossed for ITB bias x 10 L/R  R clam shells with manual resistance 2 x 15  Hkly PPT with DKTC x 8 L/R  Double leg press 3 black cords 1 x 30 TE  Bridge x 20  LTR x 10  LTR with legs crossed for ITB bias x 10 L/R  R clam shells with manual resistance 2 x 15  Hkly PPT with DKTC x 8 L/R  Sit to stand without UE assist x 5, SBA for blocking at the knees; 2HHA to return to sitting TE  Leg press with AFO's ON  Double leg press 3 black cords 2 x 25  Single leg press 2 black cords 1 x 30 L/R  Sit to stand without UE assist x 5, SBA for blocking at the knees; 2HHA to return to sitting  Partial squats into chair 2HHA 1 x 10, 1 x 7  Red cord lateral steps x 30 L/R 2HHA TE  Leg press with AFO's ON  Double leg press 3 black cords 2 x 25  Single leg press 2 black cords 1 x 30 L/R  Sit to stand without UE assist x 5, SBA for blocking at the knees; 2HHA to return to sitting  Partial squats into chair 2HHA 1 x 10, 1 x 7  Red cord lateral steps x 30 L/R 2HHA TE AFO's on  NU step L5 6'  LTR\"s x 20   Bridge x 15  SB Bridge x 15  Quadruped arm raises x 10 L/R  Quadruped leg raises x 8 L/R  Partial squats into chair 2HHA 1 x 10, 1 x 7  Red cord lateral steps  x 30 L/R 2HHA  Inclined forward   heel raises x 8 reps  90 deg squats 2HHA, wide SUSY 2 x 5  Lateral lunges with hold x 5 L/R 2HHA TE AFO's on  NU step L5 6'  LTR\"s x 20   B Fig 4 stretch  B FADDIR stretch  B hamstring stretch  Bridge x 15  SB Bridge x 15  6\" FSU's 1HHA x 10 L/R  Partial squats into chair 2HHA 1 x 10, 1 x 7  Red cord lateral steps x 30 L/R 2HHA  Inclined forward   heel raises x 10 reps   TE AFO's on 20'  NU step L5 6'  LTR\"s x 20   B Fig 4 stretch  B FADDIR stretch  B hamstring stretch  Bridge x 15  SB Bridge x 15  PPT with marching x 15 L/R  Sit to stand from an elevated surface 2 x 10  Red cord lateral steps x 30 L/R 2HHA   TE 30'  SB rolls DKTC  SB rolls LTR  L quad sets 10 x 10 sec, x 2  L/R SLR 2 x 10  SAQ's 2 x 10  Bridge 2 x 10  SB Bridge x 15  PPT with marching x 15 L/R TE 35'  NU step L6 5'  SB rolls LTR  SB rolls DKTC  SB bridge 2 x 12  Bridge with ADD ball 2 x 12  PPT with toe taps 2 x 12 L/R  Quadruped camel cat  X 10  Bird dog x 5 L/R CGA for safety  Red XB lateral steps x 30 L/R  Chair hovers x 12  Lateral lunges with hold x 10 L/R   Re-ed: 15'  Quadruped neutral spine/TrA recruitment/manual cuing  Camel cat with manual assist for movement coordination  Quadruped posterior rocking  Quadruped alternate arm raises x 7 L/R Re-ed: 15'  Quadruped neutral spine/TrA recruitment/manual cuing  Camel cat with minimal manual assist for movement coordination  Quadruped posterior rocking  Quadruped alternate arm raises x 7 L/R  TrA in hook-lying  TrA/PPT with alternate hip/knee flexion, slow  TrA/PPT with lateral knee fall outs, slow Re-ed: 25'  Quadruped neutral spine/TrA recruitment/manual cuing  Camel cat with minimal manual assist for movement coordination  Quadruped posterior rocking  Quadruped alternate arm raises x 7 L/R  Quadruped modified hip raises 2 x 5 L/R  TrA in hook-lying  TrA/PPT with alternate hip/knee flexion, slow  TrA/PPT with lateral knee fall outs, slow Therex:  10x sit to  stand with exaggerated WS fwd and CGA  Tennis ball roll out firm pressure x2 min R Manual:  SL for flex rotate mob GR III, QL release following.x8 min    Therex:  2x10 SLR B  10 x bridges 4\" holds Therex:  2x10 knee ankle/knee ankle B  15x dead bugs in shelf position   10x fig 4 bridges SL    Manual:  SL for flex rotate mob GR III, QL release following.x8 min   Manual:  SL for flex rotate mob GR III, QL release following, Prone CPA to L3-L5 GR III.x10 min    Therex:  2x8 fwd lunges to bosu Therex:  Towel  toe curls, R LE x10    Manual:  SL for flex rotate mob GR III, QL release following, B Manual:  SL for flex rotate mob GR III, QL release following, B x 8 min    Therex:    2x10 bridges with airex under one foot, B  2x10 SL raise on mat  15x SL hip/ankle hip abd, B Single leg press 1 black, 1 grey and 1 yellow cords x 20 L/R  B heel raises on leg press with knees extended, 1 black cord; 2 x 12    Re-ed: AFO's on  DLS on Airex SBA/CGA  DLS on the ground SBA  Madhavi step over with w/s forward x 15 L/R HHA  Cone taps x 15 L/R 1HHA     Single leg press 1 black, 1 grey and 1 yellow cords x 20 L/R    Re-ed: AFO's on  DLS on Airex SBA/CGA  DLS on the ground SBA  Madhavi step over with w/s forward x 15 L/R HHA  Cone taps x 15 L/R 1HHA Single leg press 2 black cords 1 x 30 L/R    Re-ed: AFO's on  DLS on Airex SBA/CGA  DLS on the ground SBA  Madhavi step over with w/s forward x 15 L/R HHA  Cone taps x 15 L/R 1HHA  Forward lunge unsupported with hands hovering and SBA; 7 x 5 sec L/R Partial squats into chair 2HHA x 10  Red cord lateral steps x 20 L/R 2HHA    Re-ed: AFO's on  DLS on Airex SBA 3 x 30 sec  DLS on the ground SBA  Tandem stance 2 x 20 sec L/R finger touch B  Cone taps x 15 L/R 1HHA  Forward lunge unsupported with hands hovering and SBA; 7 x 5 sec L/R Re-ed: AFO's on  DLS on Airex SBA 3 x 30 sec  DLS on the ground SBA  Tandem stance 2 x 20 sec L/R finger touch B  Cone taps x 15 L/R 1HHA  Forward lunge unsupported  with hands hovering and SBA; 7 x 5 sec L/R Re-ed: AFO's on  DLS on Airex SBA 3 x 30 sec  DLS on the ground SBA  Tandem stance 2 x 20 sec L/R finger touch B  Cone taps x 15 L/R 1HHA  Forward lunge unsupported with hands hovering and SBA; 7 x 5 sec L/R   Re-ed: AFO's on  DLS on Airex SBA 3 x 30 sec  Tandem stance 2 x 20 sec L/R finger touch B  Cone taps x 15 L/R 1HHA  Forward lunge unsupported with hands hovering and SBA; 7 x 5 sec L/R  DLS on Airex with forward reach x 5 L/R   Double leg press 3 black cords 1 x 30  Single leg press 2 black cords 1 x 30 L/R    Re-ed: AFO's on  DLS on Airex SBA 3 x 30 sec  Tandem stance 2 x 20 sec L/R finger touch B  Cone taps x 15 L/R 1HHA  Tandem walk 2HHA  DLS on Airex with forward reach x 5 L/R Re-ed: AFO's on 10'  DLS on Airex SBA 3 x 30 sec  Tandem stance 2 x 20 sec L/R finger touch B  Cone taps x 15 L/R 1HHA  Tandem walk 2HHA  DLS on Airex with forward reach x 10 L/R  Lunge stance with A/P w/s x 5 L/R HHA   Re-ed: AFO's on 8'  DLS on Airex SBA 3 x 30 sec  Tandem stance 2 x 20 sec L/R finger touch B  Cone taps x 15 L/R 1HHA  Tandem walk 2HHA   HEP: Access Code: [email or web address]  URL: [email or web address]  Date: 09/05/2023  Prepared by: Juanita Koehler    Exercises  - Supine Hip and Knee Flexion AROM with Swiss Ball  - 1 x daily - 7 x weekly - 3 sets - 10 reps  - Supine Lower Trunk Rotation with Swiss Ball  - 1 x daily - 7 x weekly - 3 sets - 10 reps  - Hip Flexor Stretch at Edge of Bed  - 1 x daily - 7 x weekly - 1 sets - 3 reps - 30-60 sec hold  - Supine Figure 4 Piriformis Stretch  - 1 x daily - 7 x weekly - 1 sets - 3 reps - 30-60 sec hold      Charges: therex x 2 35', re-ed x 1 8' Total Timed Treatment: 43 min  Total Treatment Time: 43 min

## 2024-01-18 DIAGNOSIS — E03.4 HYPOTHYROIDISM DUE TO ACQUIRED ATROPHY OF THYROID: ICD-10-CM

## 2024-01-18 RX ORDER — LEVOTHYROXINE SODIUM 0.1 MG/1
100 TABLET ORAL
Qty: 90 TABLET | Refills: 0 | Status: SHIPPED | OUTPATIENT
Start: 2024-01-18

## 2024-01-19 ENCOUNTER — OFFICE VISIT (OUTPATIENT)
Dept: PHYSICAL THERAPY | Facility: HOSPITAL | Age: 79
End: 2024-01-19
Attending: INTERNAL MEDICINE
Payer: MEDICARE

## 2024-01-19 PROCEDURE — 97110 THERAPEUTIC EXERCISES: CPT

## 2024-01-19 PROCEDURE — 97140 MANUAL THERAPY 1/> REGIONS: CPT

## 2024-01-19 NOTE — PROGRESS NOTES
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 provider found  Date of Evaluation:    08/29/2023    Precautions:  Fall Risk Next MD visit:   none scheduled  Date of Surgery: n/a   Insurance Primary/Secondary: MEDICARE / [employer] [place] INDEMNITY     # Auth Visits: 20 + 10 per POC            Subjective: No new events today.   Pain: 1-3/10 transient left knee pain with twisting motions      Objective:   L knee flexion ROM is full, no swelling or tenderness appreciated.      Assessment: Independent standing balance on a foam block improved to nearly 50 sec. Practiced sit to stand transfers from an elevated surface and independent stabilization with that (no leaning back); able to complete x 3 reps; also apprehensive due to being in more open environment. Patient continues to require skilled physical therapy to address gait, balance, strength and functional mobility impairment.         Goals: (to be met in 10+ 10 + 10 visits)   Pt will demonstrate improved SLS to >5 seconds CALOS to promote safety and decrease risk of falls on uneven surfaces such as grass. Progressing.   Pt will perform TUG in <15 seconds with least restrictive AD, demonstrating improved gait speed for improved participation in ADL such as community ambulation Progressing.  Pt will perform 4-Item DGI with score of 9/12 or greater with least restrictive AD to demonstrate ability to ambulate safely in crowded and busy environments. Progressing  Pt will be able to squat to  light objects around the house without loss of stability. Progressing  Pt will improve functional hip strength to demonstrate ability to ascend/descend 1 flight of stairs reciprocally with the use of handrail. Goal met.   Pt will be independent and compliant with comprehensive HEP to maintain progress achieved in PT Goal met.         Plan: lower extremity  strengthening; flexibility program; balance re-ed  Date: 9/20/23  Tx#: 6/16 Date: 9/25/23  Tx#: 7/16 Date: 9/27/23  Tx#: 8/16 Date:10/2/2023   Tx#:9/16 Date:10/4/2023  Tx#:10/16 Date:10/9/2023   Tx#:11/16 Date: 10/11/2023   Tx#:12/16 Date:10/16/2023   Tx#:13/16 Date:10/23/2023   Tx#:14/20 Date:10/30/2023   Tx#:15/20 Date:11/01/2023   Tx#:16/20 Date:11/06/2023   Tx#:17/20 Date:11/08/2023   Tx#:18/20 Date:11/13/2023   Tx#:19/20 Date:12/6/2023   Tx#:20/20 Date:12/13/2023   Tx#:21/30 Date:12/18/2023   Tx#:22/30 Date:12/20/2023   Tx#:23/30 Date:1/3/2024  Tx#:24/30 Date:1/17/2024  Tx#:25/30 Date:1/19/2024  Tx#:26/30   Manual: 25'  Prone with one pillow under:  Deep STM lumbar paraspinals and QL B  Manual quad stretch in prone B  Manual modified hip flexors stretch EOB; STM to anterior thigh Manual: 25'  Prone with one pillow under:  Deep STM lumbar paraspinals and QL B  Manual quad stretch in prone B  Manual modified hip flexors stretch EOB; STM to anterior thigh Manual: 15'  Prone with one pillow under:  Deep STM lumbar paraspinals and QL B  Manual quad stretch in prone B  Manual modified hip flexors stretch EOB; STM to anterior thigh   ThereX:  10 lumbar flex seated at edge of table  10x towel curls B  2x10 fwd lunge holding bar, out of AFOs  Goblet squats  Neuro re-ed:  NMES to R anterior tib 400 usec, 100hz, 95 mA  Practived walking 2 laps in p bars  2x10 DF assisted  1x heel walking  2x10 lunges to airex  4x3 hurdles Neuro re-ed:  NMES to R anterior tib 400 usec, 100hz, 95 mA  2x10 DF assisted  2x10 goblet squats  1x heel walking  10x step up to 6' R LE, 10 x step over step R LE  Therex:  PN see above, objective measures taken    Mod joão position for passive hip flexor stretch 2x30\"  10x fig 4 SL bridge Neuro re-ed:  NMES to R anterior tib 400 usec, 100hz, 95 mA  2x10 DF assisted  15 bosu lunges on R  2x10 step ups to 6\"  15x D1 PND hip flex on airex, R Neuro re-ed:  10x 3-cone tap in mod SLS on airex, B  Tandem  walk in p bars light HHA x2  20 x marches on sanddune   Manual:  SL for flex rotate mob GR III, QL release following, B x 8 min  STM L posterior hip  Manual: 18'  Deep STM R posterior hip   Deep STM R ITB/TFL   SL for flex rotate mob GR III, QL release following, B   Manual R piriformis stretch   Manual: 15'  B quad stretch in prone  SL for flex rotate mob GR III, QL release following, B   Manual R piriformis stretch  Prone thoracic spine PA mobs Gr III  Prone central PA mobs L5/S1 Gr III Manual: 15'  B quad stretch in prone  SL for flex rotate mob GR III, QL release following, B   Manual R piriformis stretch  Prone thoracic spine PA mobs Gr III  Prone central PA mobs L5/S1 Gr III Manual: 15'  B quad stretch in prone  SL for flex rotate mob GR III, QL release following, B   Manual R piriformis stretch  Prone thoracic spine PA mobs Gr III  Prone central PA mobs L5/S1 Gr III Manual:     B quad stretch in prone  B piriformis stretch --   Manual: 15'  B quad stretch in prone  SL for flex rotate mob GR III, QL release following, B   Manual R piriformis stretch  Prone thoracic spine PA mobs Gr III  Prone central PA mobs L5/S1 Gr III Manual: 8'  B piriformis stretch  L hamstring stretch  KT application left knee -- Manual: 12'  B piriformis stretch  B quad stretch in prone  Prone thoracic spine PA mobs Gr III  Prone central PA mobs L5/S1 Gr III     TE TE TE - -   - Gait; walking with hurricaine and SBA               NU step L5 5'  Bridge 2 x 10   Clam shells 2 x 10, manual resistance NU step L5 5'  Bridge 2 x 10   Education on log rolling  Neuro re-ed:  10x sciatic nerve glide with ankle floss, B  Manual:  SL for flex rotate mob GR III, QL release following.x8 min Therex:  Seated sciatica nerve glide x 10 R LE Neuro re-ed:  10x sciatic N glide with adductor bias and floss on R Neuro re-ed:  10x sciatic N glide with adductor bias and floss on R 15x bridges  Neuro re-ed:  Attempted to discriminate between 3 different  textures (soft, sand paper, and poky) R legx4 min Therex:  2x10 donkey kicks, 3 lbs B  2x15 lateral bosu lunges, HHA  15 squat to row, 30 lbs, CGA TE  Bridge x 15  Clams with manual resistance x 10 L/R  Sit to stand x 5; hands on upper thighs  Leg press with AFO's off  Double leg press 3 black cords x 30 TE  Bridge x 15  Clams with manual resistance x 12 L/R  Leg press with AFO's ON  Double leg press 3 black cords 2 x 25 TE  Bridge x 20  LTR x 10  LTR with legs crossed for ITB bias x 10 L/R  R clam shells with manual resistance 2 x 15  Hkly PPT with DKTC x 8 L/R  Double leg press 3 black cords 1 x 30 TE  Bridge x 20  LTR x 10  LTR with legs crossed for ITB bias x 10 L/R  R clam shells with manual resistance 2 x 15  Hkly PPT with DKTC x 8 L/R  Sit to stand without UE assist x 5, SBA for blocking at the knees; 2HHA to return to sitting TE  Leg press with AFO's ON  Double leg press 3 black cords 2 x 25  Single leg press 2 black cords 1 x 30 L/R  Sit to stand without UE assist x 5, SBA for blocking at the knees; 2HHA to return to sitting  Partial squats into chair 2HHA 1 x 10, 1 x 7  Red cord lateral steps x 30 L/R 2HHA TE  Leg press with AFO's ON  Double leg press 3 black cords 2 x 25  Single leg press 2 black cords 1 x 30 L/R  Sit to stand without UE assist x 5, SBA for blocking at the knees; 2HHA to return to sitting  Partial squats into chair 2HHA 1 x 10, 1 x 7  Red cord lateral steps x 30 L/R 2HHA TE AFO's on  NU step L5 6'  LTR\"s x 20   Bridge x 15  SB Bridge x 15  Quadruped arm raises x 10 L/R  Quadruped leg raises x 8 L/R  Partial squats into chair 2HHA 1 x 10, 1 x 7  Red cord lateral steps x 30 L/R 2HHA  Inclined forward   heel raises x 8 reps  90 deg squats 2HHA, wide SUSY 2 x 5  Lateral lunges with hold x 5 L/R 2HHA TE AFO's on  NU step L5 6'  LTR\"s x 20   B Fig 4 stretch  B FADDIR stretch  B hamstring stretch  Bridge x 15  SB Bridge x 15  6\" FSU's 1HHA x 10 L/R  Partial squats into chair 2HHA 1 x 10, 1 x  7  Red cord lateral steps x 30 L/R 2HHA  Inclined forward   heel raises x 10 reps   TE AFO's on 20'  NU step L5 6'  LTR\"s x 20   B Fig 4 stretch  B FADDIR stretch  B hamstring stretch  Bridge x 15  SB Bridge x 15  PPT with marching x 15 L/R  Sit to stand from an elevated surface 2 x 10  Red cord lateral steps x 30 L/R 2HHA   TE 30'  SB rolls DKTC  SB rolls LTR  L quad sets 10 x 10 sec, x 2  L/R SLR 2 x 10  SAQ's 2 x 10  Bridge 2 x 10  SB Bridge x 15  PPT with marching x 15 L/R TE 35'  NU step L6 5'  SB rolls LTR  SB rolls DKTC  SB bridge 2 x 12  Bridge with ADD ball 2 x 12  PPT with toe taps 2 x 12 L/R  Quadruped camel cat  X 10  Bird dog x 5 L/R CGA for safety  Red XB lateral steps x 30 L/R  Chair hovers x 12  Lateral lunges with hold x 10 L/R TE 30'  NU step L6 5'  SB rolls LTR  SB rolls DKTC  SB bridge 2 x 12  Quadruped camel cat  X 10  Bird dog x 5 L/R CGA for safety  Red XB lateral steps x 30 L/R  Sit to stand from an elevated seat position x 8 with focus on independent stabilization in standing   Forward lunge hold, sustained, 60 sec L/R  DLS on Airex x 3 (longest hold: 50 sec)   Re-ed: 15'  Quadruped neutral spine/TrA recruitment/manual cuing  Camel cat with manual assist for movement coordination  Quadruped posterior rocking  Quadruped alternate arm raises x 7 L/R Re-ed: 15'  Quadruped neutral spine/TrA recruitment/manual cuing  Camel cat with minimal manual assist for movement coordination  Quadruped posterior rocking  Quadruped alternate arm raises x 7 L/R  TrA in hook-lying  TrA/PPT with alternate hip/knee flexion, slow  TrA/PPT with lateral knee fall outs, slow Re-ed: 25'  Quadruped neutral spine/TrA recruitment/manual cuing  Camel cat with minimal manual assist for movement coordination  Quadruped posterior rocking  Quadruped alternate arm raises x 7 L/R  Quadruped modified hip raises 2 x 5 L/R  TrA in hook-lying  TrA/PPT with alternate hip/knee flexion, slow  TrA/PPT with lateral knee fall outs, slow  Therex:  10x sit to stand with exaggerated WS fwd and CGA  Tennis ball roll out firm pressure x2 min R Manual:  SL for flex rotate mob GR III, QL release following.x8 min    Therex:  2x10 SLR B  10 x bridges 4\" holds Therex:  2x10 knee ankle/knee ankle B  15x dead bugs in shelf position   10x fig 4 bridges SL    Manual:  SL for flex rotate mob GR III, QL release following.x8 min   Manual:  SL for flex rotate mob GR III, QL release following, Prone CPA to L3-L5 GR III.x10 min    Therex:  2x8 fwd lunges to bosu Therex:  Towel  toe curls, R LE x10    Manual:  SL for flex rotate mob GR III, QL release following, B Manual:  SL for flex rotate mob GR III, QL release following, B x 8 min    Therex:    2x10 bridges with airex under one foot, B  2x10 SL raise on mat  15x SL hip/ankle hip abd, B Single leg press 1 black, 1 grey and 1 yellow cords x 20 L/R  B heel raises on leg press with knees extended, 1 black cord; 2 x 12    Re-ed: AFO's on  DLS on Airex SBA/CGA  DLS on the ground SBA  Madhavi step over with w/s forward x 15 L/R HHA  Cone taps x 15 L/R 1HHA     Single leg press 1 black, 1 grey and 1 yellow cords x 20 L/R    Re-ed: AFO's on  DLS on Airex SBA/CGA  DLS on the ground SBA  Madhavi step over with w/s forward x 15 L/R HHA  Cone taps x 15 L/R 1HHA Single leg press 2 black cords 1 x 30 L/R    Re-ed: AFO's on  DLS on Airex SBA/CGA  DLS on the ground SBA  Madhavi step over with w/s forward x 15 L/R HHA  Cone taps x 15 L/R 1HHA  Forward lunge unsupported with hands hovering and SBA; 7 x 5 sec L/R Partial squats into chair 2HHA x 10  Red cord lateral steps x 20 L/R 2HHA    Re-ed: AFO's on  DLS on Airex SBA 3 x 30 sec  DLS on the ground SBA  Tandem stance 2 x 20 sec L/R finger touch B  Cone taps x 15 L/R 1HHA  Forward lunge unsupported with hands hovering and SBA; 7 x 5 sec L/R Re-ed: AFO's on  DLS on Airex SBA 3 x 30 sec  DLS on the ground SBA  Tandem stance 2 x 20 sec L/R finger touch B  Cone taps x 15 L/R 1HHA  Forward  lunge unsupported with hands hovering and SBA; 7 x 5 sec L/R Re-ed: AFO's on  DLS on Airex SBA 3 x 30 sec  DLS on the ground SBA  Tandem stance 2 x 20 sec L/R finger touch B  Cone taps x 15 L/R 1HHA  Forward lunge unsupported with hands hovering and SBA; 7 x 5 sec L/R   Re-ed: AFO's on  DLS on Airex SBA 3 x 30 sec  Tandem stance 2 x 20 sec L/R finger touch B  Cone taps x 15 L/R 1HHA  Forward lunge unsupported with hands hovering and SBA; 7 x 5 sec L/R  DLS on Airex with forward reach x 5 L/R   Double leg press 3 black cords 1 x 30  Single leg press 2 black cords 1 x 30 L/R    Re-ed: AFO's on  DLS on Airex SBA 3 x 30 sec  Tandem stance 2 x 20 sec L/R finger touch B  Cone taps x 15 L/R 1HHA  Tandem walk 2HHA  DLS on Airex with forward reach x 5 L/R Re-ed: AFO's on 10'  DLS on Airex SBA 3 x 30 sec  Tandem stance 2 x 20 sec L/R finger touch B  Cone taps x 15 L/R 1HHA  Tandem walk 2HHA  DLS on Airex with forward reach x 10 L/R  Lunge stance with A/P w/s x 5 L/R HHA   Re-ed: AFO's on 8'  DLS on Airex SBA 3 x 30 sec  Tandem stance 2 x 20 sec L/R finger touch B  Cone taps x 15 L/R 1HHA  Tandem walk 2HHA    HEP: Access Code: [email or web address]  URL: [email or web address]  Date: 09/05/2023  Prepared by: Juanita Koehler    Exercises  - Supine Hip and Knee Flexion AROM with Swiss Ball  - 1 x daily - 7 x weekly - 3 sets - 10 reps  - Supine Lower Trunk Rotation with Swiss Ball  - 1 x daily - 7 x weekly - 3 sets - 10 reps  - Hip Flexor Stretch at Edge of Bed  - 1 x daily - 7 x weekly - 1 sets - 3 reps - 30-60 sec hold  - Supine Figure 4 Piriformis Stretch  - 1 x daily - 7 x weekly - 1 sets - 3 reps - 30-60 sec hold      Charges: therex x 2 30', man x 1 12' Total Timed Treatment: 42 min  Total Treatment Time: 42 min

## 2024-01-23 ENCOUNTER — OFFICE VISIT (OUTPATIENT)
Dept: PHYSICAL THERAPY | Facility: HOSPITAL | Age: 79
End: 2024-01-23
Attending: INTERNAL MEDICINE
Payer: MEDICARE

## 2024-01-23 PROCEDURE — 97110 THERAPEUTIC EXERCISES: CPT

## 2024-01-23 NOTE — PROGRESS NOTES
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 provider found  Date of Evaluation:    08/29/2023    Precautions:  Fall Risk Next MD visit:   none scheduled  Date of Surgery: n/a   Insurance Primary/Secondary: MEDICARE / [employer] [place] INDEMNITY     # Auth Visits: 20 + 10 per POC            Subjective: Thinks that he slept awkward on Friday night and woke up Saturday with left knee feeling sore.   Pain: 1-3/10 transient left knee pain with twisting motions      Objective:   L knee flexion ROM is full, no swelling or tenderness appreciated.      Assessment: Pt will continue independently with home exercise program for 6 weeks. Home exercises have been updated with the instructions to complete 5-6x/week. Will re-assess after 6 weeks.         Goals: (to be met in 10+ 10 + 10 visits)   Pt will demonstrate improved SLS to >5 seconds CALOS to promote safety and decrease risk of falls on uneven surfaces such as grass. Progressing.   Pt will perform TUG in <15 seconds with least restrictive AD, demonstrating improved gait speed for improved participation in ADL such as community ambulation Progressing.  Pt will perform 4-Item DGI with score of 9/12 or greater with least restrictive AD to demonstrate ability to ambulate safely in crowded and busy environments. Progressing  Pt will be able to squat to  light objects around the house without loss of stability. Progressing  Pt will improve functional hip strength to demonstrate ability to ascend/descend 1 flight of stairs reciprocally with the use of handrail. Goal met.   Pt will be independent and compliant with comprehensive HEP to maintain progress achieved in PT Goal met.         Plan: re-assess after 6 weeks of HEP  Date: 9/20/23  Tx#: 6/16 Date: 9/25/23  Tx#: 7/16 Date: 9/27/23  Tx#: 8/16 Date:10/2/2023   Tx#:9/16 Date:10/4/2023  Tx#:10/16  Date:10/9/2023   Tx#:11/16 Date: 10/11/2023   Tx#:12/16 Date:10/16/2023   Tx#:13/16 Date:10/23/2023   Tx#:14/20 Date:10/30/2023   Tx#:15/20 Date:11/01/2023   Tx#:16/20 Date:11/06/2023   Tx#:17/20 Date:11/08/2023   Tx#:18/20 Date:11/13/2023   Tx#:19/20 Date:12/6/2023   Tx#:20/20 Date:12/13/2023   Tx#:21/30 Date:12/18/2023   Tx#:22/30 Date:12/20/2023   Tx#:23/30 Date:1/3/2024  Tx#:24/30 Date:1/17/2024  Tx#:25/30 Date:1/19/2024  Tx#:26/30 Date:1/23/2024  Tx#:27/30   Manual: 25'  Prone with one pillow under:  Deep STM lumbar paraspinals and QL B  Manual quad stretch in prone B  Manual modified hip flexors stretch EOB; STM to anterior thigh Manual: 25'  Prone with one pillow under:  Deep STM lumbar paraspinals and QL B  Manual quad stretch in prone B  Manual modified hip flexors stretch EOB; STM to anterior thigh Manual: 15'  Prone with one pillow under:  Deep STM lumbar paraspinals and QL B  Manual quad stretch in prone B  Manual modified hip flexors stretch EOB; STM to anterior thigh   ThereX:  10 lumbar flex seated at edge of table  10x towel curls B  2x10 fwd lunge holding bar, out of AFOs  Goblet squats  Neuro re-ed:  NMES to R anterior tib 400 usec, 100hz, 95 mA  Practived walking 2 laps in p bars  2x10 DF assisted  1x heel walking  2x10 lunges to airex  4x3 hurdles Neuro re-ed:  NMES to R anterior tib 400 usec, 100hz, 95 mA  2x10 DF assisted  2x10 goblet squats  1x heel walking  10x step up to 6' R LE, 10 x step over step R LE  Therex:  PN see above, objective measures taken    Mod joão position for passive hip flexor stretch 2x30\"  10x fig 4 SL bridge Neuro re-ed:  NMES to R anterior tib 400 usec, 100hz, 95 mA  2x10 DF assisted  15 bosu lunges on R  2x10 step ups to 6\"  15x D1 PND hip flex on airex, R Neuro re-ed:  10x 3-cone tap in mod SLS on airex, B  Tandem walk in p bars light HHA x2  20 x marches on sanddune   Manual:  SL for flex rotate mob GR III, QL release following, B x 8 min  STM L posterior hip   Manual: 18'  Deep STM R posterior hip   Deep STM R ITB/TFL   SL for flex rotate mob GR III, QL release following, B   Manual R piriformis stretch   Manual: 15'  B quad stretch in prone  SL for flex rotate mob GR III, QL release following, B   Manual R piriformis stretch  Prone thoracic spine PA mobs Gr III  Prone central PA mobs L5/S1 Gr III Manual: 15'  B quad stretch in prone  SL for flex rotate mob GR III, QL release following, B   Manual R piriformis stretch  Prone thoracic spine PA mobs Gr III  Prone central PA mobs L5/S1 Gr III Manual: 15'  B quad stretch in prone  SL for flex rotate mob GR III, QL release following, B   Manual R piriformis stretch  Prone thoracic spine PA mobs Gr III  Prone central PA mobs L5/S1 Gr III Manual:     B quad stretch in prone  B piriformis stretch --   Manual: 15'  B quad stretch in prone  SL for flex rotate mob GR III, QL release following, B   Manual R piriformis stretch  Prone thoracic spine PA mobs Gr III  Prone central PA mobs L5/S1 Gr III Manual: 8'  B piriformis stretch  L hamstring stretch  KT application left knee -- Manual: 12'  B piriformis stretch  B quad stretch in prone  Prone thoracic spine PA mobs Gr III  Prone central PA mobs L5/S1 Gr III   Manual: 5'  B piriformis stretch  B quad stretch in prone   TE TE TE - -   - Gait; walking with hurricaine and SBA                NU step L5 5'  Bridge 2 x 10   Clam shells 2 x 10, manual resistance NU step L5 5'  Bridge 2 x 10   Education on log rolling  Neuro re-ed:  10x sciatic nerve glide with ankle floss, B  Manual:  SL for flex rotate mob GR III, QL release following.x8 min Therex:  Seated sciatica nerve glide x 10 R LE Neuro re-ed:  10x sciatic N glide with adductor bias and floss on R Neuro re-ed:  10x sciatic N glide with adductor bias and floss on R 15x bridges  Neuro re-ed:  Attempted to discriminate between 3 different textures (soft, sand paper, and poky) R legx4 min Therex:  2x10 donkey kicks, 3 lbs B  2x15  lateral bosu lunges, HHA  15 squat to row, 30 lbs, CGA TE  Bridge x 15  Clams with manual resistance x 10 L/R  Sit to stand x 5; hands on upper thighs  Leg press with AFO's off  Double leg press 3 black cords x 30 TE  Bridge x 15  Clams with manual resistance x 12 L/R  Leg press with AFO's ON  Double leg press 3 black cords 2 x 25 TE  Bridge x 20  LTR x 10  LTR with legs crossed for ITB bias x 10 L/R  R clam shells with manual resistance 2 x 15  Hkly PPT with DKTC x 8 L/R  Double leg press 3 black cords 1 x 30 TE  Bridge x 20  LTR x 10  LTR with legs crossed for ITB bias x 10 L/R  R clam shells with manual resistance 2 x 15  Hkly PPT with DKTC x 8 L/R  Sit to stand without UE assist x 5, SBA for blocking at the knees; 2HHA to return to sitting TE  Leg press with AFO's ON  Double leg press 3 black cords 2 x 25  Single leg press 2 black cords 1 x 30 L/R  Sit to stand without UE assist x 5, SBA for blocking at the knees; 2HHA to return to sitting  Partial squats into chair 2HHA 1 x 10, 1 x 7  Red cord lateral steps x 30 L/R 2HHA TE  Leg press with AFO's ON  Double leg press 3 black cords 2 x 25  Single leg press 2 black cords 1 x 30 L/R  Sit to stand without UE assist x 5, SBA for blocking at the knees; 2HHA to return to sitting  Partial squats into chair 2HHA 1 x 10, 1 x 7  Red cord lateral steps x 30 L/R 2HHA TE AFO's on  NU step L5 6'  LTR\"s x 20   Bridge x 15  SB Bridge x 15  Quadruped arm raises x 10 L/R  Quadruped leg raises x 8 L/R  Partial squats into chair 2HHA 1 x 10, 1 x 7  Red cord lateral steps x 30 L/R 2HHA  Inclined forward   heel raises x 8 reps  90 deg squats 2HHA, wide SUSY 2 x 5  Lateral lunges with hold x 5 L/R 2HHA TE AFO's on  NU step L5 6'  LTR\"s x 20   B Fig 4 stretch  B FADDIR stretch  B hamstring stretch  Bridge x 15  SB Bridge x 15  6\" FSU's 1HHA x 10 L/R  Partial squats into chair 2HHA 1 x 10, 1 x 7  Red cord lateral steps x 30 L/R 2HHA  Inclined forward   heel raises x 10 reps   TE AFO's on  20'  NU step L5 6'  LTR\"s x 20   B Fig 4 stretch  B FADDIR stretch  B hamstring stretch  Bridge x 15  SB Bridge x 15  PPT with marching x 15 L/R  Sit to stand from an elevated surface 2 x 10  Red cord lateral steps x 30 L/R 2HHA   TE 30'  SB rolls DKTC  SB rolls LTR  L quad sets 10 x 10 sec, x 2  L/R SLR 2 x 10  SAQ's 2 x 10  Bridge 2 x 10  SB Bridge x 15  PPT with marching x 15 L/R TE 35'  NU step L6 5'  SB rolls LTR  SB rolls DKTC  SB bridge 2 x 12  Bridge with ADD ball 2 x 12  PPT with toe taps 2 x 12 L/R  Quadruped camel cat  X 10  Bird dog x 5 L/R CGA for safety  Red XB lateral steps x 30 L/R  Chair hovers x 12  Lateral lunges with hold x 10 L/R TE 30'  NU step L6 5'  SB rolls LTR  SB rolls DKTC  SB bridge 2 x 12  Quadruped camel cat  X 10  Bird dog x 5 L/R CGA for safety  Red XB lateral steps x 30 L/R  Sit to stand from an elevated seat position x 8 with focus on independent stabilization in standing   Forward lunge hold, sustained, 60 sec L/R  DLS on Airex x 3 (longest hold: 50 sec) TE 40'  SB rolls LTR  SB rolls DKTC  SB bridge x 15  Bridge x 15  Open book B  Clam shells BTB x 20 L/R  Prone quad stretch with a strap  Prone hip extension x 10 L/R  Camel Cat  Bird Dog  Lateral steps at the counter Grey TB  Squats at the counter  Tandem stance at the counter, modified  DLS wide SUSY at the counter   Re-ed: 15'  Quadruped neutral spine/TrA recruitment/manual cuing  Camel cat with manual assist for movement coordination  Quadruped posterior rocking  Quadruped alternate arm raises x 7 L/R Re-ed: 15'  Quadruped neutral spine/TrA recruitment/manual cuing  Camel cat with minimal manual assist for movement coordination  Quadruped posterior rocking  Quadruped alternate arm raises x 7 L/R  TrA in hook-lying  TrA/PPT with alternate hip/knee flexion, slow  TrA/PPT with lateral knee fall outs, slow Re-ed: 25'  Quadruped neutral spine/TrA recruitment/manual cuing  Camel cat with minimal manual assist for movement  coordination  Quadruped posterior rocking  Quadruped alternate arm raises x 7 L/R  Quadruped modified hip raises 2 x 5 L/R  TrA in hook-lying  TrA/PPT with alternate hip/knee flexion, slow  TrA/PPT with lateral knee fall outs, slow Therex:  10x sit to stand with exaggerated WS fwd and CGA  Tennis ball roll out firm pressure x2 min R Manual:  SL for flex rotate mob GR III, QL release following.x8 min    Therex:  2x10 SLR B  10 x bridges 4\" holds Therex:  2x10 knee ankle/knee ankle B  15x dead bugs in shelf position   10x fig 4 bridges SL    Manual:  SL for flex rotate mob GR III, QL release following.x8 min   Manual:  SL for flex rotate mob GR III, QL release following, Prone CPA to L3-L5 GR III.x10 min    Therex:  2x8 fwd lunges to bosu Therex:  Towel  toe curls, R LE x10    Manual:  SL for flex rotate mob GR III, QL release following, B Manual:  SL for flex rotate mob GR III, QL release following, B x 8 min    Therex:    2x10 bridges with airex under one foot, B  2x10 SL raise on mat  15x SL hip/ankle hip abd, B Single leg press 1 black, 1 grey and 1 yellow cords x 20 L/R  B heel raises on leg press with knees extended, 1 black cord; 2 x 12    Re-ed: AFO's on  DLS on Airex SBA/CGA  DLS on the ground SBA  Madhavi step over with w/s forward x 15 L/R HHA  Cone taps x 15 L/R 1HHA     Single leg press 1 black, 1 grey and 1 yellow cords x 20 L/R    Re-ed: AFO's on  DLS on Airex SBA/CGA  DLS on the ground SBA  Madhavi step over with w/s forward x 15 L/R HHA  Cone taps x 15 L/R 1HHA Single leg press 2 black cords 1 x 30 L/R    Re-ed: AFO's on  DLS on Airex SBA/CGA  DLS on the ground SBA  Madhavi step over with w/s forward x 15 L/R HHA  Cone taps x 15 L/R 1HHA  Forward lunge unsupported with hands hovering and SBA; 7 x 5 sec L/R Partial squats into chair 2HHA x 10  Red cord lateral steps x 20 L/R 2HHA    Re-ed: AFO's on  DLS on Airex SBA 3 x 30 sec  DLS on the ground SBA  Tandem stance 2 x 20 sec L/R finger touch B  Cone taps  x 15 L/R 1HHA  Forward lunge unsupported with hands hovering and SBA; 7 x 5 sec L/R Re-ed: AFO's on  DLS on Airex SBA 3 x 30 sec  DLS on the ground SBA  Tandem stance 2 x 20 sec L/R finger touch B  Cone taps x 15 L/R 1HHA  Forward lunge unsupported with hands hovering and SBA; 7 x 5 sec L/R Re-ed: AFO's on  DLS on Airex SBA 3 x 30 sec  DLS on the ground SBA  Tandem stance 2 x 20 sec L/R finger touch B  Cone taps x 15 L/R 1HHA  Forward lunge unsupported with hands hovering and SBA; 7 x 5 sec L/R   Re-ed: AFO's on  DLS on Airex SBA 3 x 30 sec  Tandem stance 2 x 20 sec L/R finger touch B  Cone taps x 15 L/R 1HHA  Forward lunge unsupported with hands hovering and SBA; 7 x 5 sec L/R  DLS on Airex with forward reach x 5 L/R   Double leg press 3 black cords 1 x 30  Single leg press 2 black cords 1 x 30 L/R    Re-ed: AFO's on  DLS on Airex SBA 3 x 30 sec  Tandem stance 2 x 20 sec L/R finger touch B  Cone taps x 15 L/R 1HHA  Tandem walk 2HHA  DLS on Airex with forward reach x 5 L/R Re-ed: AFO's on 10'  DLS on Airex SBA 3 x 30 sec  Tandem stance 2 x 20 sec L/R finger touch B  Cone taps x 15 L/R 1HHA  Tandem walk 2HHA  DLS on Airex with forward reach x 10 L/R  Lunge stance with A/P w/s x 5 L/R HHA   Re-ed: AFO's on 8'  DLS on Airex SBA 3 x 30 sec  Tandem stance 2 x 20 sec L/R finger touch B  Cone taps x 15 L/R 1HHA  Tandem walk 2HHA     HEP: Access Code: [email or web address]  Access Code: [email or web address]  URL: [email or web address]  Date: 01/23/2024  Prepared by: Juanita Koehler    Exercises  - Supine Hip and Knee Flexion AROM with Swiss Ball  - 1 x daily - 7 x weekly - 3 sets - 10 reps  - Supine Lower Trunk Rotation with Swiss Ball  - 1 x daily - 7 x weekly - 3 sets - 10 reps  - Supine Figure 4 Piriformis Stretch  - 1 x daily - 7 x weekly - 1 sets - 3 reps - 30-60 sec hold  - Supine Bridge  - 1 x daily - 7 x weekly - 2-3 sets - 10 reps  - Bridge with Heels on Swiss Ball  - 1 x daily - 7 x weekly - 2-3 sets - 10 reps  - Sidelying Open  Book Thoracic Lumbar Rotation and Extension  - 1 x daily - 7 x weekly - 1 sets - 10 reps  - Clamshell with Resistance  - 1 x daily - 7 x weekly - 3 sets - 10 reps  - Prone Quadriceps Stretch with Strap  - 1 x daily - 7 x weekly - 1 sets - 3 reps - 30-60 sec hold  - Prone Hip Extension  - 1 x daily - 7 x weekly - 1-2 sets - 10 reps  - Cat Cow  - 1 x daily - 7 x weekly - 2 sets - 10 reps  - Bird Dog  - 1 x daily - 7 x weekly - 1-2 sets - 10 reps  - Side Stepping with Counter Support  - 1 x daily - 7 x weekly - 3 sets - 10 reps  - Mini Squat with Counter Support  - 1 x daily - 7 x weekly - 1-2 sets - 10 reps  - Tandem Stance with Support  - 1 x daily - 7 x weekly - 1 sets - 3 reps - 30 sec hold      Charges: therex x 3 40', man x 0 Total Timed Treatment: 45 min  Total Treatment Time: 45 min

## 2024-01-26 ENCOUNTER — APPOINTMENT (OUTPATIENT)
Dept: PHYSICAL THERAPY | Facility: HOSPITAL | Age: 79
End: 2024-01-26
Attending: INTERNAL MEDICINE
Payer: MEDICARE

## 2024-01-29 ENCOUNTER — APPOINTMENT (OUTPATIENT)
Dept: PHYSICAL THERAPY | Facility: HOSPITAL | Age: 79
End: 2024-01-29
Attending: INTERNAL MEDICINE
Payer: MEDICARE

## 2024-01-29 ENCOUNTER — TELEPHONE (OUTPATIENT)
Dept: INTERNAL MEDICINE CLINIC | Facility: CLINIC | Age: 79
End: 2024-01-29

## 2024-01-29 ENCOUNTER — LAB ENCOUNTER (OUTPATIENT)
Dept: LAB | Age: 79
End: 2024-01-29
Attending: INTERNAL MEDICINE
Payer: MEDICARE

## 2024-01-29 DIAGNOSIS — E03.4 HYPOTHYROIDISM DUE TO ACQUIRED ATROPHY OF THYROID: Primary | ICD-10-CM

## 2024-01-29 DIAGNOSIS — Z12.5 ENCOUNTER FOR SCREENING FOR MALIGNANT NEOPLASM OF PROSTATE: ICD-10-CM

## 2024-01-29 DIAGNOSIS — D47.2 MGUS (MONOCLONAL GAMMOPATHY OF UNKNOWN SIGNIFICANCE): ICD-10-CM

## 2024-01-29 DIAGNOSIS — E03.4 HYPOTHYROIDISM DUE TO ACQUIRED ATROPHY OF THYROID: ICD-10-CM

## 2024-01-29 DIAGNOSIS — Z79.899 ENCOUNTER FOR LONG-TERM (CURRENT) USE OF MEDICATIONS: ICD-10-CM

## 2024-01-29 LAB
ALBUMIN SERPL-MCNC: 3.5 G/DL (ref 3.4–5)
ALBUMIN/GLOB SERPL: 1.3 {RATIO} (ref 1–2)
ALP LIVER SERPL-CCNC: 62 U/L
ANION GAP SERPL CALC-SCNC: 6 MMOL/L (ref 0–18)
AST SERPL-CCNC: 25 U/L (ref 15–37)
BILIRUB SERPL-MCNC: 0.6 MG/DL (ref 0.1–2)
BILIRUB UR QL STRIP.AUTO: NEGATIVE
BUN BLD-MCNC: 24 MG/DL (ref 9–23)
CALCIUM BLD-MCNC: 9.6 MG/DL (ref 8.5–10.1)
CHLORIDE SERPL-SCNC: 108 MMOL/L (ref 98–112)
CHOLEST SERPL-MCNC: 230 MG/DL (ref ?–200)
CLARITY UR REFRACT.AUTO: CLEAR
CO2 SERPL-SCNC: 28 MMOL/L (ref 21–32)
COLOR UR AUTO: YELLOW
COMPLEXED PSA SERPL-MCNC: 1.63 NG/ML (ref ?–4)
CREAT BLD-MCNC: 1.43 MG/DL
EGFRCR SERPLBLD CKD-EPI 2021: 50 ML/MIN/1.73M2 (ref 60–?)
FASTING PATIENT LIPID ANSWER: YES
FASTING STATUS PATIENT QL REPORTED: YES
GLOBULIN PLAS-MCNC: 2.8 G/DL (ref 2.8–4.4)
GLUCOSE BLD-MCNC: 105 MG/DL (ref 70–99)
GLUCOSE UR STRIP.AUTO-MCNC: NORMAL MG/DL
HDLC SERPL-MCNC: 168 MG/DL (ref 40–59)
KETONES UR STRIP.AUTO-MCNC: NEGATIVE MG/DL
LDLC SERPL CALC-MCNC: 52 MG/DL (ref ?–100)
LEUKOCYTE ESTERASE UR QL STRIP.AUTO: NEGATIVE
NITRITE UR QL STRIP.AUTO: NEGATIVE
NONHDLC SERPL-MCNC: 62 MG/DL (ref ?–130)
OSMOLALITY SERPL CALC.SUM OF ELEC: 298 MOSM/KG (ref 275–295)
PH UR STRIP.AUTO: 6 [PH] (ref 5–8)
POTASSIUM SERPL-SCNC: 3.8 MMOL/L (ref 3.5–5.1)
PROT SERPL-MCNC: 6.3 G/DL (ref 6.4–8.2)
PROT UR STRIP.AUTO-MCNC: NEGATIVE MG/DL
RBC UR QL AUTO: NEGATIVE
SODIUM SERPL-SCNC: 142 MMOL/L (ref 136–145)
SP GR UR STRIP.AUTO: 1.03 (ref 1–1.03)
TRIGL SERPL-MCNC: 57 MG/DL (ref 30–149)
TSI SER-ACNC: 2.55 MIU/ML (ref 0.36–3.74)
UROBILINOGEN UR STRIP.AUTO-MCNC: NORMAL MG/DL
VLDLC SERPL CALC-MCNC: 8 MG/DL (ref 0–30)

## 2024-01-29 PROCEDURE — 81003 URINALYSIS AUTO W/O SCOPE: CPT

## 2024-01-29 PROCEDURE — 84443 ASSAY THYROID STIM HORMONE: CPT

## 2024-01-29 PROCEDURE — 80053 COMPREHEN METABOLIC PANEL: CPT

## 2024-01-29 PROCEDURE — 80061 LIPID PANEL: CPT

## 2024-01-29 NOTE — TELEPHONE ENCOUNTER
Preferred Lab: [facility]   [facility] location and fax #:  NA  LOV: 4/20/23  Next OV & Reason:  1/30/24 PHYSICAL    Patient was notified to fast 8-10 hours drinking only water/black coffee prior to having labs drawn and may need to submit urine sample.  Hemoglobin A1C will be ordered if patient has diabetes or prediabetes.  Lab orders will be placed by end of day:  YES  Patient requesting A1C: NO  Patient informed insurance may not cover A1C and they may be responsible for cost if denied by insurance:  YES  Patient requesting return call:  YES; CALL TO NOTIFY      PT HAS APPT RIGHT NOW & WOULD LIKE ORDERS IN EPIC

## 2024-01-30 ENCOUNTER — OFFICE VISIT (OUTPATIENT)
Dept: INTERNAL MEDICINE CLINIC | Facility: CLINIC | Age: 79
End: 2024-01-30
Payer: MEDICARE

## 2024-01-30 VITALS
SYSTOLIC BLOOD PRESSURE: 138 MMHG | TEMPERATURE: 97 F | BODY MASS INDEX: 30.34 KG/M2 | WEIGHT: 224 LBS | HEIGHT: 72 IN | OXYGEN SATURATION: 99 % | RESPIRATION RATE: 16 BRPM | DIASTOLIC BLOOD PRESSURE: 70 MMHG | HEART RATE: 78 BPM

## 2024-01-30 DIAGNOSIS — G60.9 IDIOPATHIC PERIPHERAL NEUROPATHY: ICD-10-CM

## 2024-01-30 DIAGNOSIS — E03.4 HYPOTHYROIDISM DUE TO ACQUIRED ATROPHY OF THYROID: ICD-10-CM

## 2024-01-30 DIAGNOSIS — I65.23 BILATERAL CAROTID ARTERY STENOSIS: ICD-10-CM

## 2024-01-30 DIAGNOSIS — D47.2 MGUS (MONOCLONAL GAMMOPATHY OF UNKNOWN SIGNIFICANCE): ICD-10-CM

## 2024-01-30 DIAGNOSIS — I77.9 VERTEBRAL ARTERY DISEASE (HCC): ICD-10-CM

## 2024-01-30 DIAGNOSIS — J45.30 MILD PERSISTENT ASTHMA WITHOUT COMPLICATION: ICD-10-CM

## 2024-01-30 DIAGNOSIS — G63 NEUROPATHY ASSOCIATED WITH MGUS (HCC): ICD-10-CM

## 2024-01-30 DIAGNOSIS — F33.0 MAJOR DEPRESSIVE DISORDER, RECURRENT, MILD (HCC): ICD-10-CM

## 2024-01-30 DIAGNOSIS — D47.2 NEUROPATHY ASSOCIATED WITH MGUS (HCC): ICD-10-CM

## 2024-01-30 DIAGNOSIS — Z00.00 ANNUAL PHYSICAL EXAM: Primary | ICD-10-CM

## 2024-01-30 DIAGNOSIS — Z00.00 ENCOUNTER FOR ANNUAL HEALTH EXAMINATION: ICD-10-CM

## 2024-01-30 PROBLEM — L02.419 ABSCESS OF AXILLA: Status: RESOLVED | Noted: 2022-08-09 | Resolved: 2024-01-30

## 2024-01-30 PROCEDURE — 1125F AMNT PAIN NOTED PAIN PRSNT: CPT | Performed by: INTERNAL MEDICINE

## 2024-01-30 PROCEDURE — G0439 PPPS, SUBSEQ VISIT: HCPCS | Performed by: INTERNAL MEDICINE

## 2024-01-30 NOTE — PROGRESS NOTES
Subjective:   Nils Joseph is a 78 year old male who presents for a Medicare Subsequent Annual Wellness visit (Pt already had Initial Annual Wellness) and scheduled follow up of multiple significant but stable problems.   HPI  Normal state of health  No new issues    PAST MEDICAL, SOCIAL, FAMILY HISTORIES REVIEWED WITH PT    History/Other:   Fall Risk Assessment:   He has been screened for Falls and is low risk.      Cognitive Assessment:   He had a completely normal cognitive assessment - see flowsheet entries       Functional Ability/Status:   Nils Joseph has some abnormal functions as listed below:  He has Dressing and/or Bathing issues based on screening of functional status.  Difficulty dressing or bathing?: Yes  Bathing or Showering: Able without help  Dressing: Need some help  He has Meal Preparation difficulties based on screening of functional status.He has Hearing problems based on screening of functional status.He has Vision problems based on screening of functional status. He has Walking problems based on screening of functional status. He has problems with Daily Activities based on screening of functional status.       Depression Screening (PHQ-2/PHQ-9): PHQ-2 SCORE: 0  , done 1/30/2024            Advanced Directives:   He has a Living Will on file in [employer]; reviewed and discussed documents with patient (and family/surrogate if present).  He has a Power of  for Health Care on file in [employer].  Discussed Advance Care Planning with patient (and family/surrogate if present). Standard forms made available to patient in After Visit Summary.      Patient Active Problem List   Diagnosis    Mild persistent asthma without complication    Hypothyroidism due to acquired atrophy of thyroid    Idiopathic peripheral neuropathy    MGUS (monoclonal gammopathy of unknown significance)    Neuropathy associated with MGUS (HCC)    Vertebral artery disease (HCC)    Stenosis of carotid artery    Major  depressive disorder, recurrent, mild (HCC)    Personal history of colonic polyps     Allergies:  He is allergic to immune globulin.    Current Medications:  Outpatient Medications Marked as Taking for the 1/30/24 encounter (Office Visit) with Alex Case MD   Medication Sig    LEVOTHYROXINE 100 MCG Oral Tab Take 1 tablet (100 mcg total) by mouth once daily. Overdue for labs, please complete them.    predniSONE 5 MG Oral Tab Take 1 tablet (5 mg total) by mouth daily.    predniSONE 10 MG Oral Tab Take 1 tablet (10 mg total) by mouth daily.    predniSONE 5 MG Oral Tab For a total of 2 weeks, take 30mg daily, then 25mg x 2 weeks, using 10mg and 5mg tablets, then 20mg daily x 2 weeks, then 17.5mg x 2 weeks, using 10mg and 5mg tabs, then 15mg daily using 10mg and 5mg tabs    predniSONE 10 MG Oral Tab Take 40mg daily for 2 weeks, then decrease to 35mg daily x 2 weeks, then follow instructions given by Dr. Solis    acidophilus-pectin Oral Cap Take 1 capsule by mouth daily.    predniSONE 20 MG Oral Tab Take 2.5 tablets daily for total 50mg daily    predniSONE 20 MG Oral Tab Take 60mg daily x 2 weeks, then 50mg daily x 2 weeks, then call clinic for further instructions. In the interim, monitor range of motion and ability to flex foot up or point downwards    valACYclovir 1 G Oral Tab Take 0.5 tablets (500 mg total) by mouth daily.    gabapentin 300 MG Oral Cap Take 1 capsule (300 mg total) by mouth 3 (three) times daily.    docusate sodium 100 MG Oral Cap Take 1 capsule (100 mg total) by mouth 2 (two) times daily. (Patient taking differently: Take 1 capsule (100 mg total) by mouth 2 (two) times daily as needed.)    Glucosamine-Chondroitin (GLUCOSAMINE CHONDR COMPLEX OR) Take by mouth as needed.    cyanocobalamin 1000 MCG Oral Tab Take 1 tablet (1,000 mcg total) by mouth daily.    ALPRAZolam 0.25 MG Oral Tab Take 1 tablet (0.25 mg total) by mouth nightly as needed.    fluticasone propionate 50 MCG/ACT Nasal Suspension 1  spray by Each Nare route 2 (two) times daily as needed for Rhinitis.    traZODone 100 MG Oral Tab Take 1 tablet (100 mg total) by mouth nightly as needed for Sleep. (Patient taking differently: Take 1 tablet (100 mg total) by mouth nightly as needed for Sleep. Pt takes as needed)    Beclomethasone Diprop HFA 40 MCG/ACT Inhalation Aerosol, Breath Activated Inhale 2 puffs into the lungs 2 (two) times daily as needed.    senna-docusate 8.6-50 MG Oral Tab Take 1 tablet by mouth daily. (Patient taking differently: Take 1 tablet by mouth daily as needed.)    erythromycin 5 MG/GM Ophthalmic Ointment Place 1 Application. into both eyes nightly. Uses 3-4 x weekly before bed    tamsulosin (FLOMAX) cap Take 1 capsule (0.4 mg total) by mouth daily.    finasteride 5 MG Oral Tab Take 1 tablet (5 mg total) by mouth daily.    atorvastatin 10 MG Oral Tab Take 1 tablet (10 mg total) by mouth daily.    CALCIUM OR Take by mouth daily.      Albuterol Sulfate  (90 BASE) MCG/ACT Inhalation Aero Soln Inhale 2 puffs into the lungs every 6 (six) hours as needed for Wheezing.    Multiple Vitamin (MULTI-VITAMIN OR) Take 1 tablet by mouth daily.         Medical History:  He  has a past medical history of Asthma, Atypical mole (frequent body Nevi), Back problem, Belching, Benign thyroid cyst, BPH (benign prostatic hyperplasia), Cataract (2015), Constipation, Depression, Disorder of prostate (BPH (I'm a 76 year old male)), Flatulence/gas pain/belching, Frequent urination (2 x's / night), Frequent use of laxatives, Hearing impairment, Hemorrhoids (very slight), Hemorrhoids, internal (02/29/2012), High cholesterol, Insomnia (10/22/2010), Irregular bowel habits, Itch of skin (unknown cause), Leg swelling (ankles at PM), Neuropathy, Obesity, Osteoarthritis (2010), Poor balance, Rotator cuff sprain (10/22/2010), Seborrheic keratosis (10/22/2010), Shoulder joint pain (10/22/2010), Sinusitis, Unspecified disorder of thyroid, and Visual  impairment.  Surgical History:  He  has a past surgical history that includes appendectomy; orthopedic surg (pbp); tonsillectomy; cataract (Bilateral, 2015); arthroscopy of joint unlisted (Right); carpal tunnel release (Bilateral); sinus surgery   (2014); hemorrhoidectomy (~ ); spine surgery procedure unlisted; biopsy (2019); biopsy (2019); other (2019); back surgery (16); sigmoidoscopy,diagnostic (); other surgical history; other surgical history; and colonoscopy.   Family History:  His family history includes Breast Cancer in his mother; Cancer in his father and mother; Heart Disorder in his father and mother; Hypertension in his brother, brother, and father; Lipids in his father.  Social History:  He  reports that he quit smoking about 25 years ago. His smoking use included cigarettes and pipe. He has a 48 pack-year smoking history. He has never used smokeless tobacco. He reports current alcohol use of about 11.0 - 18.0 standard drinks of alcohol per week. He reports that he does not use drugs.    Tobacco:  He smoked tobacco in the past but quit greater than 12 months ago.  Social History    Tobacco Use      Smoking status: Former        Packs/day: 1.50        Years: 32.00        Additional pack years: 0.00        Total pack years: 48.00        Types: Cigarettes, Pipe        Quit date: 3/1/1998        Years since quittin.9      Smokeless tobacco: Never      Tobacco comments: Quit many years ago       CAGE Alcohol Screen:   CAGE screening score of 0 on 2024, showing low risk of alcohol abuse.      Patient Care Team:  Alex Case MD as PCP - General (Internal Medicine)  Tao Moore MD (UROLOGY)  Ruddy Davis MD (UROLOGY)  Gen Gutierrez MD (ONCOLOGY)  Chon Lassiter MD as Neurologist (NEUROLOGY)  Eliu Hickey MD as Consulting Physician (NEUROSURGERY)  Ana Maria Solis DO as Consulting Physician (NEUROLOGY)  Farooq Farah MD (CARDIOLOGY)  Shannan  Alli Contreras DPM (Surgery, Foot & Ankle)  Juanita Koehler PT as Physical Therapist  Dolores Koroma APN (Nurse Practitioner Family)  Aleisha Lozoya, EDUARDO as Physical Therapist    Review of Systems  A comprehensive 10 point review of systems was completed.     Pertinent positives and negatives noted in the HPI.      Objective:   Physical Exam  General: No acute distress. Alert and oriented x 3.  HEENT: Normocephalic atraumatic. Moist mucous membranes. EOM-I. PERRLA. Anicteric.  Neck: No lymphadenopathy.   Respiratory: Clear to auscultation bilaterally. No wheezes. No rhonchi.  Cardiovascular: S1, S2. Regular rate and rhythm.   Abdomen: Soft,  no pain.  nontender, nondistended.  Positive bowel sounds. .  Neurologic: No focal neurological deficits.   Musculoskeletal: Moves all extremities.  Extremities: No edema or cyanosis.  Integument: No rashes or lesions.    Psychiatric: Appropriate mood and affect.    /70   Pulse 78   Temp 97.2 °F (36.2 °C)   Resp 16   Ht 6' (1.829 m)   Wt 224 lb (101.6 kg)   SpO2 99%   BMI 30.38 kg/m²  Estimated body mass index is 30.38 kg/m² as calculated from the following:    Height as of this encounter: 6' (1.829 m).    Weight as of this encounter: 224 lb (101.6 kg).    Medicare Hearing Assessment:   Hearing Screening    Screening Method: Whisper Test  Whisper Test Result: Pass                     Assessment & Plan:   Nils Joseph is a 78 year old male who presents for a Medicare Assessment.     1. Annual physical exam (Primary)  2. Vertebral artery disease (HCC)  3. Bilateral carotid artery stenosis  4. Neuropathy associated with MGUS (HCC)  5. Mild persistent asthma without complication  6. MGUS (monoclonal gammopathy of unknown significance)  7. Major depressive disorder, recurrent, mild (HCC)  8. Idiopathic peripheral neuropathy  9. Hypothyroidism due to acquired atrophy of thyroid  10. Encounter for annual health examination    mild persistent asthma without  complication- controlled. Cont current med therapy     Hypothyroidism due to acquired atrophy of thyroid     Elevated PSA- sees urology. Now normal     Benign prostatic hyperplasia without lower urinary tract symptoms- stable sxs. Sees urology     Idiopathic peripheral neuropathy- - defer to neuroogy     MGUS (monoclonal gammopathy of unknown significance)-stable sxs on current gabapentin dose.  defer to oncology     Neuropathy associated with MGUS (HCC)- stable sxs. Sees neurology. Defer med titration  to neuro. Consider eval at [facility]     Cervical stenosis of spinal canal- stable sxs. Monitor    The patient indicates understanding of these issues and agrees to the plan.  Continue with current treatment plan.  Lab work ordered.  Reinforced healthy diet, lifestyle, and exercise.      No follow-ups on file.     Alex Case MD, 1/30/2024     Supplementary Documentation:   General Health:  In the past six months, have you lost more than 10 pounds without trying?: 2 - No  Has your appetite been poor?: No  Type of Diet: Balanced  How does the patient maintain a good energy level?: Appropriate Exercise  How would you describe your daily physical activity?: Light  How would you describe your current health state?: Fair  How do you maintain positive mental well-being?: Social Interaction;Puzzles;Games;Visiting Friends;Visiting Family  On a scale of 0 to 10, with 0 being no pain and 10 being severe pain, what is your pain level?: 4 - (Moderate)  In the past six months, have you experienced urine leakage?: 1-Yes  At any time do you feel concerned for the safety/well-being of yourself and/or your children, in your home or elsewhere?: No  Have you had any immunizations at another office such as Influenza, Hepatitis B, Tetanus, or Pneumococcal?: Yes        Nils Joseph's SCREENING SCHEDULE   Tests on this list are recommended by your physician but may not be covered, or covered at this frequency, by your insurer.    Please check with your insurance carrier before scheduling to verify coverage.   PREVENTATIVE SERVICES FREQUENCY &  COVERAGE DETAILS LAST COMPLETION DATE   Diabetes Screening    Fasting Blood Sugar / Glucose    One screening every 12 months if never tested or if previously tested but not diagnosed with pre-diabetes   One screening every 6 months if diagnosed with pre-diabetes Lab Results   Component Value Date     (H) 01/29/2024        Cardiovascular Disease Screening    Lipid Panel  Cholesterol  Lipoprotein (HDL)  Triglycerides Covered every 5 years for all Medicare beneficiaries without apparent signs or symptoms of cardiovascular disease Lab Results   Component Value Date    CHOLEST 230 (H) 01/29/2024     (H) 01/29/2024    LDL 52 01/29/2024    TRIG 57 01/29/2024         Electrocardiogram (EKG)   Covered if needed at Welcome to Medicare, and non-screening if indicated for medical reasons 04/20/2023      Ultrasound Screening for Abdominal Aortic Aneurysm (AAA) Covered once in a lifetime for one of the following risk factors    Men who are 65-75 years old and have ever smoked    Anyone with a family history -     Colorectal Cancer Screening  Covered for ages 50-85; only need ONE of the following:    Colonoscopy   Covered every 10 years    Covered every 2 years if patient is at high risk or previous colonoscopy was abnormal 04/25/2022    Health Maintenance   Topic Date Due    Colorectal Cancer Screening  04/25/2025       Flexible Sigmoidoscopy   Covered every 4 years -    Fecal Occult Blood Test Covered annually -   Prostate Cancer Screening    Prostate-Specific Antigen (PSA) Annually Lab Results   Component Value Date    PSA 1.46 11/16/2018     There are no preventive care reminders to display for this patient.   Immunizations    Influenza Covered once per flu season  Please get every year -  No recommendations at this time    Pneumococcal Each vaccine ([id number] & [id number]) covered once after 65  Prevnar 13: 11/05/2015    Juwalxbsi84: 10/04/2016     No recommendations at this time    Hepatitis B One screening covered for patients with certain risk factors   -  No recommendations at this time    Tetanus Toxoid Not covered by Medicare Part B unless medically necessary (cut with metal); may be covered with your pharmacy prescription benefits 12/07/2015    Tetanus, Diptheria and Pertusis TD and TDaP Not covered by Medicare Part B -  No recommendations at this time    Zoster Not covered by Medicare Part B; may be covered with your pharmacy  prescription benefits 03/09/2014  No recommendations at this time     Annual Monitoring of Persistent Medications (ACE/ARB, digoxin diuretics, anticonvulsants)    Potassium Annually Lab Results   Component Value Date    K 3.8 01/29/2024         Creatinine   Annually Lab Results   Component Value Date    CREATSERUM 1.43 (H) 01/29/2024         BUN Annually Lab Results   Component Value Date    BUN 24 (H) 01/29/2024       Drug Serum Conc Annually No results found for: \"DIGOXIN\", \"DIG\", \"VALP\"

## 2024-01-30 NOTE — PATIENT INSTRUCTIONS
Nils Joseph's SCREENING SCHEDULE   Tests on this list are recommended by your physician but may not be covered, or covered at this frequency, by your insurer.   Please check with your insurance carrier before scheduling to verify coverage.   PREVENTATIVE SERVICES FREQUENCY &  COVERAGE DETAILS LAST COMPLETION DATE   Diabetes Screening    Fasting Blood Sugar / Glucose    One screening every 12 months if never tested or if previously tested but not diagnosed with pre-diabetes   One screening every 6 months if diagnosed with pre-diabetes Lab Results   Component Value Date     (H) 01/29/2024        Cardiovascular Disease Screening    Lipid Panel  Cholesterol  Lipoprotein (HDL)  Triglycerides Covered every 5 years for all Medicare beneficiaries without apparent signs or symptoms of cardiovascular disease Lab Results   Component Value Date    CHOLEST 230 (H) 01/29/2024     (H) 01/29/2024    LDL 52 01/29/2024    TRIG 57 01/29/2024         Electrocardiogram (EKG)   Covered if needed at Welcome to Medicare, and non-screening if indicated for medical reasons 04/20/2023      Ultrasound Screening for Abdominal Aortic Aneurysm (AAA) Covered once in a lifetime for one of the following risk factors   • Men who are 65-75 years old and have ever smoked   • Anyone with a family history -     Colorectal Cancer Screening  Covered for ages 50-85; only need ONE of the following:    Colonoscopy   Covered every 10 years    Covered every 2 years if patient is at high risk or previous colonoscopy was abnormal 04/25/2022    Health Maintenance   Topic Date Due   • Colorectal Cancer Screening  04/25/2025       Flexible Sigmoidoscopy   Covered every 4 years -    Fecal Occult Blood Test Covered annually -   Prostate Cancer Screening    Prostate-Specific Antigen (PSA) Annually Lab Results   Component Value Date    PSA 1.46 11/16/2018     There are no preventive care reminders to display for this patient.   Immunizations     Influenza Covered once per flu season  Please get every year -  Influenza Vaccine(1) due on 10/01/2023    Pneumococcal Each vaccine ([id number] & [id number]) covered once after 65 Prevnar 13: 11/05/2015    [id number]: 10/04/2016     No recommendations at this time    Hepatitis B One screening covered for patients with certain risk factors   -  No recommendations at this time    Tetanus Toxoid Not covered by Medicare Part B unless medically necessary (cut with metal); may be covered with your pharmacy prescription benefits 12/07/2015    Tetanus, Diptheria and Pertusis TD and TDaP Not covered by Medicare Part B -  No recommendations at this time    Zoster Not covered by Medicare Part B; may be covered with your pharmacy  prescription benefits 03/09/2014  No recommendations at this time     Annual Monitoring of Persistent Medications (ACE/ARB, digoxin diuretics, anticonvulsants)    Potassium Annually Lab Results   Component Value Date    K 3.8 01/29/2024         Creatinine   Annually Lab Results   Component Value Date    CREATSERUM 1.43 (H) 01/29/2024         BUN Annually Lab Results   Component Value Date    BUN 24 (H) 01/29/2024       Drug Serum Conc Annually No results found for: \"DIGOXIN\", \"DIG\", \"VALP\"

## 2024-01-31 ENCOUNTER — APPOINTMENT (OUTPATIENT)
Dept: PHYSICAL THERAPY | Facility: HOSPITAL | Age: 79
End: 2024-01-31
Attending: INTERNAL MEDICINE
Payer: MEDICARE

## 2024-02-08 NOTE — TELEPHONE ENCOUNTER
Medication: PREDNISONE 5 MG Oral Tab     Date of last refill: 01/03/24 (30/0)  [place]/24    Last office visit: 10/17/23  Due back to clinic per last office note:  not indicated   Date next office visit scheduled:    Future Appointments   Date Time Provider Department Center   2/20/2024  2:00 PM Alli Campbell DPM [id number]   3/7/2024  2:35 PM Ana Maria Solis DO [facility]   3/8/2024 10:00 AM Juanita Koehler, PT [facility]   3/8/2024 11:45 AM Alex Case MD [facility]   3/12/2024 10:00 AM Juanita Koehler, PT [facility]   3/15/2024 10:00 AM Juanita Koehler, PT [facility]   3/19/2024 10:00 AM Juanita Koehler, PT [facility]   3/11/2025 10:30 AM Alex Case MD [facility]        Last OV note recommendation:     Plan:   Mgus (monoclonal gammopathy of unknown significance)  (primary encounter diagnosis)  Bilateral foot-drop  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)         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

## 2024-02-09 RX ORDER — PREDNISONE 5 MG/1
5 TABLET ORAL DAILY
Qty: 30 TABLET | Refills: 0 | Status: SHIPPED | OUTPATIENT
Start: 2024-02-09

## 2024-02-20 ENCOUNTER — OFFICE VISIT (OUTPATIENT)
Dept: PODIATRY CLINIC | Facility: CLINIC | Age: 79
End: 2024-02-20
Payer: MEDICARE

## 2024-02-20 DIAGNOSIS — L60.0 INGROWN NAIL: ICD-10-CM

## 2024-02-20 DIAGNOSIS — D47.2 NEUROPATHY ASSOCIATED WITH MGUS (HCC): ICD-10-CM

## 2024-02-20 DIAGNOSIS — M10.079 IDIOPATHIC GOUT OF FOOT, UNSPECIFIED CHRONICITY, UNSPECIFIED LATERALITY: ICD-10-CM

## 2024-02-20 DIAGNOSIS — D47.2 MGUS (MONOCLONAL GAMMOPATHY OF UNKNOWN SIGNIFICANCE): ICD-10-CM

## 2024-02-20 DIAGNOSIS — G63 NEUROPATHY ASSOCIATED WITH MGUS (HCC): ICD-10-CM

## 2024-02-20 DIAGNOSIS — B35.1 ONYCHOMYCOSIS: Primary | ICD-10-CM

## 2024-02-20 PROCEDURE — 99213 OFFICE O/P EST LOW 20 MIN: CPT | Performed by: STUDENT IN AN ORGANIZED HEALTH CARE EDUCATION/TRAINING PROGRAM

## 2024-02-22 NOTE — PROGRESS NOTES
[facility] Podiatry  Progress Note    Nils Joseph is a 78 year old male.   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  He has elongated and thickened nails he has difficulty trimming his own.  His nails do become incurvated by the end of his stretch between visits.  They do cause some pain from rubbing in his shoes.  He denies acute signs of infection or other concerns.  He continues to walk with AFOs and complete physical therapy.  He is concerned he possibly had gout attack recently.  This seems to have resolved on its own.  No other complaints are mentioned.      Allergies: Immune globulin   Current Outpatient Medications   Medication Sig Dispense Refill    predniSONE 5 MG Oral Tab Take 1 tablet (5 mg total) by mouth daily. In addition to 10mg tablet, for total 15mg daily. 30 tablet 0    LEVOTHYROXINE 100 MCG Oral Tab Take 1 tablet (100 mcg total) by mouth once daily. Overdue for labs, please complete them. 90 tablet 0    predniSONE 10 MG Oral Tab Take 1 tablet (10 mg total) by mouth daily. 30 tablet 0    predniSONE 10 MG Oral Tab Take 40mg daily for 2 weeks, then decrease to 35mg daily x 2 weeks, then follow instructions given by Dr. Solis 90 tablet 0    acidophilus-pectin Oral Cap Take 1 capsule by mouth daily.      predniSONE 20 MG Oral Tab Take 60mg daily x 2 weeks, then 50mg daily x 2 weeks, then call clinic for further instructions. In the interim, monitor range of motion and ability to flex foot up or point downwards 82 tablet 0    valACYclovir 1 G Oral Tab Take 0.5 tablets (500 mg total) by mouth daily.      gabapentin 300 MG Oral Cap Take 1 capsule (300 mg total) by mouth 3  (three) times daily. 270 capsule 3    docusate sodium 100 MG Oral Cap Take 1 capsule (100 mg total) by mouth 2 (two) times daily. (Patient taking differently: Take 1 capsule (100 mg total) by mouth 2 (two) times daily as needed.) 30 capsule 1    Glucosamine-Chondroitin (GLUCOSAMINE CHONDR COMPLEX OR) Take by mouth as needed.      cyanocobalamin 1000 MCG Oral Tab Take 1 tablet (1,000 mcg total) by mouth daily.      ALPRAZolam 0.25 MG Oral Tab Take 1 tablet (0.25 mg total) by mouth nightly as needed. 90 tablet 0    fluticasone propionate 50 MCG/ACT Nasal Suspension 1 spray by Each Nare route 2 (two) times daily as needed for Rhinitis. 48 g 1    traZODone 100 MG Oral Tab Take 1 tablet (100 mg total) by mouth nightly as needed for Sleep. (Patient taking differently: Take 1 tablet (100 mg total) by mouth nightly as needed for Sleep. Pt takes as needed) 90 tablet 3    Beclomethasone Diprop HFA 40 MCG/ACT Inhalation Aerosol, Breath Activated Inhale 2 puffs into the lungs 2 (two) times daily as needed. 2 each 0    senna-docusate 8.6-50 MG Oral Tab Take 1 tablet by mouth daily. (Patient taking differently: Take 1 tablet by mouth daily as needed.) 30 tablet 0    erythromycin 5 MG/GM Ophthalmic Ointment Place 1 Application. into both eyes nightly. Uses 3-4 x weekly before bed      tamsulosin (FLOMAX) cap Take 1 capsule (0.4 mg total) by mouth daily. 90 capsule 3    finasteride 5 MG Oral Tab Take 1 tablet (5 mg total) by mouth daily. 90 tablet 3    atorvastatin 10 MG Oral Tab Take 1 tablet (10 mg total) by mouth daily.      CALCIUM OR Take by mouth daily.        Albuterol Sulfate  (90 BASE) MCG/ACT Inhalation Aero Soln Inhale 2 puffs into the lungs every 6 (six) hours as needed for Wheezing.      Multiple Vitamin (MULTI-VITAMIN OR) Take 1 tablet by mouth daily.        predniSONE 5 MG Oral Tab For a total of 2 weeks, take 30mg daily, then 25mg x 2 weeks, using 10mg and 5mg tablets, then 20mg daily x 2 weeks, then 17.5mg x  2 weeks, using 10mg and 5mg tabs, then 15mg daily using 10mg and 5mg tabs 30 tablet 1    predniSONE 20 MG Oral Tab Take 2.5 tablets daily for total 50mg daily 75 tablet 0      Past Medical History:   Diagnosis Date    Asthma (HCC)     Atypical mole frequent body Nevi    Back problem     low back pain, s/p fusion of L3,4,5.  back still stiff    Belching     Benign thyroid cyst     BPH (benign prostatic hyperplasia)     Cataract 2015    s/p sugery    Constipation     Depression     Disorder of prostate BPH (I'm a 76 year old male)    Flatulence/gas pain/belching     Frequent urination 2 x's / night    Frequent use of laxatives     Hearing impairment     Slight    Hemorrhoids very slight    Hemorrhoids, internal 02/29/2012    High cholesterol     Elevated     Insomnia 10/22/2010    Irregular bowel habits     Itch of skin unknown cause    Leg swelling ankles at PM    Neuropathy     R leg    Obesity     slightly overweight    Osteoarthritis 2010    Poor balance     uses cane    Rotator cuff sprain 10/22/2010    Seborrheic keratosis 10/22/2010    Shoulder joint pain 10/22/2010    Sinusitis     Unspecified disorder of thyroid     hypothyroid    Visual impairment     glasses reading      Past Surgical History:   Procedure Laterality Date    APPENDECTOMY      ARTHROSCOPY OF JOINT UNLISTED Right     knee    BACK SURGERY  9/12/16    triple fusion of L3-L4-L5    BIOPSY  07/26/2019    Bone Marrow    BIOPSY  06/25/2019    right sural nerve biopsy    CARPAL TUNNEL RELEASE Bilateral     CATARACT Bilateral August 2015    IOL'S    COLONOSCOPY      HEMORRHOIDECTOMY  ~ 2005    ORTHOPEDIC SURG (PBP)      LT ELBOW - ulnar nerve release    OTHER  02/2019    Lumbar puncture    OTHER SURGICAL HISTORY      Shoulder Joint Replacement 7/21/16, Sinus surgery Feb. '14,    OTHER SURGICAL HISTORY      I&D of abscess in axilla area 7/3    SIGMOIDOSCOPY,DIAGNOSTIC  1980's    SINUS SURGERY    2/14/2014    SPINE SURGERY PROCEDURE UNLISTED       L3-4-5    TONSILLECTOMY        Family History   Problem Relation Age of Onset    Breast Cancer Mother     Cancer Mother         breast cancer- late in life    Heart Disorder Mother         cardiac issues related to aging    Heart Disorder Father         partially blocked carotid arteries    Hypertension Father     Cancer Father         ? cancer?    Lipids Father     Hypertension Brother     Hypertension Brother       Social History     Socioeconomic History    Marital status:    Tobacco Use    Smoking status: Former     Packs/day: 1.50     Years: 32.00     Additional pack years: 0.00     Total pack years: 48.00     Types: Cigarettes, Pipe     Quit date: 3/1/1998     Years since quittin.9    Smokeless tobacco: Never    Tobacco comments:     Quit many years ago   Vaping Use    Vaping Use: Never used   Substance and Sexual Activity    Alcohol use: Yes     Alcohol/week: 11.0 - 18.0 standard drinks of alcohol     Types: 6 - 8 Glasses of wine, 1 - 2 Cans of beer, 4 - 8 Shots of liquor per week     Comment: 1-2 drinks daily    Drug use: No   Other Topics Concern    Caffeine Concern Yes     Comment: 2-3 cups a day    Exercise Yes     Comment: physical therapy           REVIEW OF SYSTEMS:     Today reviewed systems as documented below  GENERAL HEALTH: feels well otherwise  SKIN: denies any unusual skin lesions or rashes  RESPIRATORY: denies shortness of breath with exertion  CARDIOVASCULAR: denies chest pain on exertion  GI: denies abdominal pain and denies heartburn  NEURO: denies headaches  MUSCULO: History of arthritis      EXAM:   There were no vitals taken for this visit.  GENERAL: well developed, well nourished, in no apparent distress  EXTREMITIES:   1. Integument: Normal skin temperature and turgor.  Nails x10 are elongated, thickened, dystrophic, subungual debris.  Lateral borders of hallux nails are incurvated, especially right hallux.  2. Vascular: Dorsalis pedis two out of four bilateral and  posterior tibial pulses two out of   four bilateral, capillary refill normal.  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   3. Musculoskeletal: All muscle groups are graded 5 out of 5 in the foot and ankle.  Compartments soft and compressible.   4. Neurological: Normal sharp dull sensation; reflexes normal.  Decreased protective sensation noted to lower extremities.        ASSESSMENT AND PLAN:   Diagnoses and all orders for this visit:    Onychomycosis    Idiopathic gout of foot, unspecified chronicity, unspecified laterality  -     Uric Acid; Future    Ingrown nail    MGUS (monoclonal gammopathy of unknown significance)    Neuropathy associated with MGUS (HCC)            Plan:     -Patient examined, chart history reviewed.  -Discussed importance of proper pedal hygiene, regular foot checks.  -Sharply debrided nails x10 with a sterile nail nipper achieving a 20% reduction in thickness and length, without incident. Nails further smoothed with dremel. Slant back performed to ingrown nails.   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  -Uric acid levels ordered the patient can check at his convenience.  -Educated patient on acute signs of infection advised patient to seek immediate medical attention if symptoms arise.    The patient indicates understanding of these issues and agrees to the plan.    RTC 2 months.    Alli Campbell DPM

## 2024-02-23 RX ORDER — FAMOTIDINE 40 MG/1
40 TABLET, FILM COATED ORAL DAILY
COMMUNITY

## 2024-02-23 RX ORDER — DOXYCYCLINE HYCLATE 100 MG/1
100 CAPSULE ORAL 2 TIMES DAILY
COMMUNITY

## 2024-03-07 ENCOUNTER — OFFICE VISIT (OUTPATIENT)
Dept: NEUROLOGY | Facility: CLINIC | Age: 79
End: 2024-03-07
Payer: MEDICARE

## 2024-03-07 VITALS
DIASTOLIC BLOOD PRESSURE: 82 MMHG | BODY MASS INDEX: 30.07 KG/M2 | RESPIRATION RATE: 14 BRPM | SYSTOLIC BLOOD PRESSURE: 138 MMHG | WEIGHT: 222 LBS | HEART RATE: 73 BPM | HEIGHT: 72 IN | OXYGEN SATURATION: 96 %

## 2024-03-07 DIAGNOSIS — G60.9 IDIOPATHIC PERIPHERAL NEUROPATHY: ICD-10-CM

## 2024-03-07 DIAGNOSIS — D47.2 MGUS (MONOCLONAL GAMMOPATHY OF UNKNOWN SIGNIFICANCE): Primary | ICD-10-CM

## 2024-03-07 PROCEDURE — 99214 OFFICE O/P EST MOD 30 MIN: CPT | Performed by: OTHER

## 2024-03-07 RX ORDER — PREDNISONE 10 MG/1
10 TABLET ORAL DAILY
Qty: 30 TABLET | Refills: 0 | Status: SHIPPED | OUTPATIENT
Start: 2024-03-07

## 2024-03-07 RX ORDER — PREDNISONE 5 MG/1
TABLET ORAL
Qty: 30 TABLET | Refills: 2 | Status: SHIPPED | OUTPATIENT
Start: 2024-03-07

## 2024-03-07 NOTE — PATIENT INSTRUCTIONS
Refill policies:    Allow 2-3 business days for refills; controlled substances may take longer.  Contact your pharmacy at least 5 days prior to running out of medication and have them send an electronic request or submit request through the “request refill” option in your [employer] account.  Refills are not addressed on weekends; covering physicians do not authorize routine medications on weekends.  No narcotics or controlled substances are refilled after noon on Fridays or by on call physicians.  By law, narcotics must be electronically prescribed.  A 30 day supply with no refills is the maximum allowed.  If your prescription is due for a refill, you may be due for a follow up appointment.  To best provide you care, patients receiving routine medications need to be seen at least once a year.  Patients receiving narcotic/controlled substance medications need to be seen at least once every 3 months.  In the event that your preferred pharmacy does not have the requested medication in stock (e.g. Backordered), it is your responsibility to find another pharmacy that has the requested medication available.  We will gladly send a new prescription to that pharmacy at your request.    Scheduling Tests:    If your physician has ordered radiology tests such as MRI or CT scans, please contact Central Scheduling at [phone] right away to schedule the test.  Once scheduled, the [facility] Centralized Referral Team will work with your insurance carrier to obtain pre-certification or prior authorization.  Depending on your insurance carrier, approval may take 3-10 days.  It is highly recommended patients assure they have received an authorization before having a test performed.  If test is done without insurance authorization, patient may be responsible for the entire amount billed.      Precertification and Prior Authorizations:  If your physician has recommended that you have a procedure or additional testing performed the [facility]  Centralized Referral Team will contact your insurance carrier to obtain pre-certification or prior authorization.    You are strongly encouraged to contact your insurance carrier to verify that your procedure/test has been approved and is a COVERED benefit.  Although the [facility] Centralized Referral Team does its due diligence, the insurance carrier gives the disclaimer that \"Although the procedure is authorized, this does not guarantee payment.\"    Ultimately the patient is responsible for payment.   Thank you for your understanding in this matter.  Paperwork Completion:  If you require FMLA or disability paperwork for your recovery, please make sure to either drop it off or have it faxed to our office at [phone]. Be sure the form has your name and date of birth on it.  The form will be faxed to our Forms Department and they will complete it for you.  There is a 25$ fee for all forms that need to be filled out.  Please be aware there is a 10-14 day turnaround time.  You will need to sign a release of information (KAY) form if your paperwork does not come with one.  You may call the Forms Department with any questions at [phone].  Their fax number is [phone].

## 2024-03-07 NOTE — PROGRESS NOTES
[facility] in [place]  Neurology     Nils Joseph Patient Status:  No patient class for patient encounter    10/10/1945 MRN [id number]   Location [unfilled] [facility] Alex Case MD          Subjective:  Nils Joseph is a(n) 78 year old male s/p lumbar spine surgery in 2016 who is being followed for polyneuropathy by Dr. Lassiter. He developed radicular pain and right foot drop, after which he had the surgery. Radicular pain resolved. A few months or later, he developed a slowly progressive course of clumsiness of the feet. He started catching his toe when walking, first on the right, and then on the left. Initially had a lot of shooting pain from below his knees down to his toes, bilaterally, it would come and go, worse at night. Has been on gabapentin. Has numbness in his foot that comes and goes. Has had extensive work up including EMG, LP, bone marrow biopsy, nerve biopsy. Labs showed MGUS. He was started on IVIG, and was on it for 2 sets of infusions. Developed a rash.  Gets cramps in hands, and trigger finger. Buttoning shirts can be difficult.   Interim  Since has started wearing bilateral AFO's. Falling less. Saw NSGY and spinal surgery was not recommended. Taking gabapentin 300mg TID. Drops things more from his hands. No tingling or numbness in hands.   Interim  Since last visit, I started patient on prednisone 60mg on 23, and 50mg after two weeks, for the last 2 weeks. Denies further elevated BP's. Sleep is minimally worse.   Interim  Patient reports symptoms are the same. Has not noticed any increase in foot strength. Patient currently on 50 mg prednisone. Has insomnia and weight gain. Was on prednisone 60mg x 2 weeks, and now for past 5 weeks has been on 50mg.  Interim  Patient received Rituxan starting end of November, and has been on prednisone 15mg for several weeks now (tapered from 40mg on 10/17/23, initially 60mg. Does not feel feet are stronger. Has  chronic cramping in the hands, along with numbness, has been going on for a 2 years. Has noted decreased dexterity in the fingers, slowly progressing. He is taking gabapentin 300mg TID. IVIG got 1 infusion over one day, followed by another infusion 1 month later, followed by rash, and stopping the treatment.       Rituxan 11/21/23- 12/12/23    Data review:  Component      Latest Ref Rn 9/22/2023   Glucose      70 - 99 mg/dL 92    Sodium      136 - 145 mmol/L 138    Potassium      3.5 - 5.1 mmol/L 4.1    Chloride      98 - 112 mmol/L 105    Carbon Dioxide, Total      21.0 - 32.0 mmol/L 30.0    ANION GAP      0 - 18 mmol/L 3    BUN      7 - 18 mg/dL 27 (H)    CREATININE      0.70 - 1.30 mg/dL 1.51 (H)    CALCIUM      8.5 - 10.1 mg/dL 9.7    CALCULATED OSMOLALITY      275 - 295 mOsm/kg 291    EGFR      >=60 mL/min/1.73m2 47 (L)    AST (SGOT)      15 - 37 U/L 23    ALT (SGPT)      16 - 61 U/L 42    ALKALINE PHOSPHATASE      45 - 117 U/L 61    Total Bilirubin      0.1 - 2.0 mg/dL 0.6    PROTEIN, TOTAL      6.4 - 8.2 g/dL 6.4    Albumin      3.4 - 5.0 g/dL 3.2 (L)    Globulin      2.8 - 4.4 g/dL 3.2    A/G Ratio      1.0 - 2.0  1.0    Patient Fasting for CMP? Yes       Component      Latest Ref Rng 4/20/2023 9/22/2023   WBC      4.0 - 11.0 x10(3) uL 6.5  8.6    RBC      3.80 - 5.80 x10(6)uL 4.14  4.13    Hemoglobin      13.0 - 17.5 g/dL 13.1  14.0    Hematocrit      39.0 - 53.0 % 40.7  41.8    Platelet Count      150.0 - 450.0 10(3)uL 199.0  167.0    MCV      80.0 - 100.0 fL 98.3  101.2 (H)    MCH      26.0 - 34.0 pg 31.6  33.9    MCHC      31.0 - 37.0 g/dL 32.2  33.5    RDW      % 13.5  16.6    Prelim Neutrophil Abs      1.50 - 7.70 x10 (3) uL 4.08  5.30    Neutrophils Absolute      1.50 - 7.70 x10(3) uL 4.08  5.30    Lymphocytes Absolute      1.00 - 4.00 x10(3) uL 1.56  2.58           EMG 4/2023  Conclusion:   There is electrophysiologic evidence of a marked sensory axonal polyneuropathy.   In additional, there are  superimposed bilateral L4-S1 lumbar radiculopathies, with active denervation changes.      MRI c spine 6/2023  MPRESSION:   1. 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   Recommend ENT consultation and ultrasound for further assessment.   2.  Severe right neural foraminal stenosis at C3-C4 due to stable right uncinate and facet   hypertrophy.   3. Severe bilateral foraminal stenosis at C6-C7 due to uncinate and facet hypertrophy, superimposed   on a mild broad-based posterior disc osteophyte complex and posterior ligamentous and facet   hypertrophy, resulting in mild spinal canal stenosis.   4.  Moderate-to-severe right and mild/moderate left neuroforaminal stenosis at C5-C6 secondary to   uncinate and facet hypertrophy, coupled with a stable mild posterior disc bulge with a prominent   annular disc fissure flattening the anterior thecal sac.   5.  NO moderate-to-severe central canal stenosis. NO pathologic cervical cord signal.   6.  Stable hypoplastic appearance of the RIGHT vertebral artery.         MRI lumbar 3/2023  CONCLUSION:       1. Interval lumbar fusion changes noted spanning L3-L5.  Metallic susceptibility artifact from posterior fusion hardware limits evaluation of surrounding structures.  Interbody fusion devices are seen at L3-4 and L4-5.  There are changes of right L3   hemilaminotomy and bilateral L4 hemilaminotomy.      2. The interval worsening of degenerative changes at the L2-3 level resulting in new moderate spinal canal stenosis with bilateral subarticular zone stenosis at L2-3.  There is also new mild bilateral neural foraminal stenosis at L2-3.      3. Interval worsening of facet arthropathy at L5-S1 results in worsening of moderate right and mild-to-moderate left neural foraminal stenosis at L5-S1.      4. There is improvement of now mild  bilateral neural foraminal stenosis at the fused L4-5 level.       Component      Latest Ref Rng 2/23/2023   FOLATE (FOLIC ACID), SERUM      >=8.7 ng/mL 19.6    Vitamin B12      193 - 986 pg/mL 312    VITAMIN B1 (THIAMINE), WHOLE B      70 - 180 nmol/L 148          1/2021   Monoclonal spike in the gamma region. . . .   IMMUNOFIXATION       Monoclonal IgM kappa. If clinically indicated, 24 hour urine monoclonal . . .   KAPPA FREE LIGHT CHAIN      0.330 - 1.940 mg/dL 2.449 (H)   LAMBDA FREE LIGHT CHAIN      0.571 - 2.630 mg/dL 1.604   KAPPA/LAMBDA FLC RATIO      0.26 - 1.65 1.53   M-Jaden      <=0.00 g/dL 0.36 (H)      Component      Latest Ref Rng & Units 2/22/2019   Total Protein CSF      15.0 - 45.0 mg/dL 34.9   Glucose CSF      40 - 70 mg/dL 56     Component      Latest Ref Rng & Units 12/26/2022 9/23/2019 3/6/2019 2/14/2018   HEMOGLOBIN A1c      4.0 - 6.0 %       Vitamin B12      193 - 986 pg/mL    359   TSH      0.358 - 3.740 mIU/mL 2.210        Component      Latest Ref Rng & Units 7/22/2017   HEMOGLOBIN A1c      4.0 - 6.0 % 6.1 (H)   Vitamin B12      193 - 986 pg/mL    TSH      0.358 - 3.740 mIU/mL        Pathology report:  Per report     Nerve biopsy:  ... The density large myelinated axons reduced with only about 30% of the expected number of large myelinated axons remaining.  At least at the level of the jose sections unmyelinated axons appear relatively preserved.  There is no significant variation in axonal density within or between fascicles.  The remaining myelin sheaths are appropriate in thickness and the diameter of the accompanying axons.  No distinct populations of thinly myelinated axons are seen.\"     Comment: The peripheral nerve biopsy shows axonal loss as described in detail above.  Beyond that there are not changes that would suggest a specific underlying etiology.  In particular there is no evidence of any inflammatory changes or blood vessel abnormalities.      EMG 2019      EMG       This  is an abnormal study.  There is electrophysiologic evidence to suggest a mixed type demyelinating and axonal large fiber polyneuropathy.  In addition, there is evidence for subacute denervation in the left lower extremity, suggestive of, but not diagnostic for a superimposed lower lumbosacral radiculopathy.  There is no evidence to suggest a myopathy.     Clinical comment:   The presence of severely reduced amplitude in the left lower extremity with prolonged F waves, as well as significantly prolonged distal motor latencies and partial conduction block at a non-traditional entrapment sites in the left upper extremity may suggest an acquired primary demyelinating neuropathy; clinical correlation, serologic and CSF studies are recommended.        MRI cervical 2020  FINDINGS:     Alignment:Grade 1 anterolisthesis of C3 on C4 and C4 on C5.   Vertebral body heights:Normal.   Cervical cord:Normal signal and morphology.   Cerebellar tonsils.No tonsillar ectopia.   Bone marrow signal:Overall bone marrow signal is normal except for Modic type II endplate   degenerative changes at C6-C7   Prevertebral soft tissues:Absent right vertebral artery flow void. Left maxillary sinus mucosal   thickening.   C2-C3:  Normal.   C3-C4:  Right uncovertebral osteophytes and severe right facet joint hypertrophy causing severe   right neural foraminal stenosis. Left facet joint hypertrophy causing moderate left neural foraminal   stenosis. No central canal stenosis.   C4-C5:  Severe bilateral facet joint hypertrophy and bilateral uncovertebral osteophytes causing   severe bilateral neural foraminal stenosis. No central canal stenosis.   C5-C6:  Severe right facet joint hypertrophy and central disc osteophyte complex causing moderate   right neural foraminal stenosis. Patent left neural foramen. No central canal stenosis.   C6-C7:  Central disc osteophyte complex compressing the ventral thecal sac without cord compression   or signal cord  abnormality. Mild central canal stenosis. Severe bilateral neural foraminal stenosis.   C7-T1:  Normal.   Impression   IMPRESSION:     1. Severe right neural foraminal stenosis at C3-C4.   2. Severe bilateral neural foraminal stenosis at C4-C5.   3. Moderate right neural foraminal stenosis at C5-C6.   4. Absence right vertebral flow void suggesting occluded or hypoplastic right vertebral artery.   Correlation with duplex imaging of the carotid arteries recommended.       MRI L spine 8/206  L1-L2: Mild facet hypertrophy. No spinal canal or foraminal narrowing.       L2-L3: Mild diffuse disc bulge with a superimposed right foraminal/extraforaminal disc protrusion. Mild facet hypertrophy. Mild ligamentum flavum thickening. No spinal canal stenosis. Mild bilateral foraminal narrowing.       L3-L4: Mild to moderate diffuse disc bulge with a superimposed disc osteophyte complex extending into the far left lateral space. There is a superimposed broad-based central disc protrusion. Moderate facet hypertrophy. Moderate ligamentum flavum   thickening. Moderate to severe spinal canal stenosis. Bilateral subarticular zone narrowing. Moderate to severe right and moderate left foraminal narrowing.       L4-5: There is uncovering of the disc with a mild to moderate diffuse disc bulge. There is severe facet hypertrophy with facet joint effusions bilaterally. Moderate ligamentum flavum thickening. Severe spinal canal stenosis. Bilateral subarticular zone   narrowing. Severe left and moderate to severe right foraminal narrowing.       L5-S1: Mild diffuse disc bulge with superimposed small to moderate size central disc protrusion. No spinal canal narrowing. Moderate to severe facet hypertrophy. Moderate bilateral foraminal narrowing.       Impression   CONCLUSION:         #1. Moderate to severe multilevel degenerative changes in the lumbar spine most notable at L3-L4, L4-5, and L5-S1. At L3-L4, there is moderate to severe spinal canal  stenosis. At L4-5, there is severe spinal canal stenosis. Please see above for further   details.        PAST MEDICAL HISTORY:   Past Medical History:   Diagnosis Date    Asthma (HCC)     Atypical mole frequent body Nevi    Back problem     low back pain, s/p fusion of L3,4,5.  back still stiff    Belching     Benign thyroid cyst     BPH (benign prostatic hyperplasia)     Cataract 2015    s/p sugery    Constipation     Depression     Disorder of prostate BPH (I'm a 76 year old male)    Flatulence/gas pain/belching     Frequent urination 2 x's / night    Frequent use of laxatives     Hearing impairment     Slight    Hemorrhoids very slight    Hemorrhoids, internal 02/29/2012    High cholesterol     Elevated     Insomnia 10/22/2010    Irregular bowel habits     Itch of skin unknown cause    Leg swelling ankles at PM    Neuropathy     R leg    Obesity     slightly overweight    Osteoarthritis 2010    Poor balance     uses cane    Rotator cuff sprain 10/22/2010    Seborrheic keratosis 10/22/2010    Shoulder joint pain 10/22/2010    Sinusitis     Unspecified disorder of thyroid     hypothyroid    Visual impairment     glasses reading       PAST SURGICAL HISTORY:   Past Surgical History:   Procedure Laterality Date    APPENDECTOMY      ARTHROSCOPY OF JOINT UNLISTED Right     knee    BACK SURGERY  9/12/16    triple fusion of L3-L4-L5    BIOPSY  07/26/2019    Bone Marrow    BIOPSY  06/25/2019    right sural nerve biopsy    CARPAL TUNNEL RELEASE Bilateral     CATARACT Bilateral August 2015    IOL'S    COLONOSCOPY      HEMORRHOIDECTOMY  ~ 2005    ORTHOPEDIC SURG (PBP)      LT ELBOW - ulnar nerve release    OTHER  02/2019    Lumbar puncture    OTHER SURGICAL HISTORY      Shoulder Joint Replacement 7/21/16, Sinus surgery Feb. '14,    OTHER SURGICAL HISTORY      I&D of abscess in axilla area 7/3    SIGMOIDOSCOPY,DIAGNOSTIC  1980's    SINUS SURGERY    2/14/2014    SPINE SURGERY PROCEDURE UNLISTED      L3-4-5     TONSILLECTOMY         Family history: Reviewed. No changes since last encounter    Social history- Etoh 2 cocktails before dinner, a glass of wine with dinner    ALLERGIES:   Allergies   Allergen Reactions    Immune Globulin RASH       MEDICATIONS: EMR reviewed   Current Outpatient Medications:     predniSONE 10 MG Oral Tab, Take 1 tablet (10 mg total) by mouth daily., Disp: 30 tablet, Rfl: 0    predniSONE 5 MG Oral Tab, In one month, approximately 4/8/24, start prednisone 5mg daily, Disp: 30 tablet, Rfl: 2    doxycycline 100 MG Oral Cap, Take 1 capsule (100 mg total) by mouth 2 (two) times daily., Disp: , Rfl:     famotidine 40 MG Oral Tab, Take 1 tablet (40 mg total) by mouth daily., Disp: , Rfl:     BRINZOLAMIDE-BRIMONIDINE OP, Apply 1 drop to eye in the morning, at noon, and at bedtime. Right eye, Disp: , Rfl:     predniSONE 5 MG Oral Tab, Take 1 tablet (5 mg total) by mouth daily. In addition to 10mg tablet, for total 15mg daily., Disp: 30 tablet, Rfl: 0    LEVOTHYROXINE 100 MCG Oral Tab, Take 1 tablet (100 mcg total) by mouth once daily. Overdue for labs, please complete them., Disp: 90 tablet, Rfl: 0    predniSONE 10 MG Oral Tab, Take 1 tablet (10 mg total) by mouth daily., Disp: 30 tablet, Rfl: 0    acidophilus-pectin Oral Cap, Take 1 capsule by mouth daily., Disp: , Rfl:     valACYclovir 1 G Oral Tab, Take 0.5 tablets (500 mg total) by mouth daily., Disp: , Rfl:     gabapentin 300 MG Oral Cap, Take 1 capsule (300 mg total) by mouth 3 (three) times daily., Disp: 270 capsule, Rfl: 3    docusate sodium 100 MG Oral Cap, Take 1 capsule (100 mg total) by mouth 2 (two) times daily. (Patient taking differently: Take 1 capsule (100 mg total) by mouth 2 (two) times daily as needed.), Disp: 30 capsule, Rfl: 1    Glucosamine-Chondroitin (GLUCOSAMINE CHONDR COMPLEX OR), Take by mouth as needed., Disp: , Rfl:     cyanocobalamin 1000 MCG Oral Tab, Take 1 tablet (1,000 mcg total) by mouth daily., Disp: , Rfl:      ALPRAZolam 0.25 MG Oral Tab, Take 1 tablet (0.25 mg total) by mouth nightly as needed. (Patient taking differently: Take 1 tablet (0.25 mg total) by mouth nightly as needed. Takes nightly), Disp: 90 tablet, Rfl: 0    fluticasone propionate 50 MCG/ACT Nasal Suspension, 1 spray by Each Nare route 2 (two) times daily as needed for Rhinitis., Disp: 48 g, Rfl: 1    traZODone 100 MG Oral Tab, Take 1 tablet (100 mg total) by mouth nightly as needed for Sleep. (Patient taking differently: Take 1 tablet (100 mg total) by mouth nightly as needed for Sleep. Pt takes as needed), Disp: 90 tablet, Rfl: 3    Beclomethasone Diprop HFA 40 MCG/ACT Inhalation Aerosol, Breath Activated, Inhale 2 puffs into the lungs 2 (two) times daily as needed., Disp: 2 each, Rfl: 0    senna-docusate 8.6-50 MG Oral Tab, Take 1 tablet by mouth daily. (Patient taking differently: Take 1 tablet by mouth daily as needed.), Disp: 30 tablet, Rfl: 0    erythromycin 5 MG/GM Ophthalmic Ointment, Place 1 Application  into both eyes nightly. Uses 3-4 x weekly before bed. States prescribed mainly for moisture, Disp: , Rfl:     tamsulosin (FLOMAX) cap, Take 1 capsule (0.4 mg total) by mouth daily., Disp: 90 capsule, Rfl: 3    finasteride 5 MG Oral Tab, Take 1 tablet (5 mg total) by mouth daily., Disp: 90 tablet, Rfl: 3    atorvastatin 10 MG Oral Tab, Take 1 tablet (10 mg total) by mouth daily., Disp: , Rfl:     CALCIUM OR, Take by mouth daily.  , Disp: , Rfl:     Albuterol Sulfate  (90 BASE) MCG/ACT Inhalation Aero Soln, Inhale 2 puffs into the lungs every 6 (six) hours as needed for Wheezing., Disp: , Rfl:     Multiple Vitamin (MULTI-VITAMIN OR), Take 1 tablet by mouth daily.  , Disp: , Rfl:     REVIEW OF SYSTEMS:  General: denies any fever or chills.     Eye: no pain or redness;  Ear, mouth, throat: no hearing change, no throat pain or soreness;  Respiratory: Denies: Difficulty Breathing, Chronic Cough and Wheezing.  Cardiovascular: NO Chest Pain and  Palpitations.   GI: no abdominal pain, no nausea or diarrhea;  : no incontinence;  Neurological:  See history; relevant items discussed in the history.  Psychiatric: no depression, no suicidal attempt,   Musculoskeletal:  NO current complaint,  Endo: no cold intolerance, appetite is normal, no weight change;  Skin: warm, no rash, no allergy , no skin bruise or abnormal bleeding,     Objective:  Blood pressure 138/82, pulse 73, resp. rate 14, height 72\", weight 222 lb (100.7 kg), SpO2 96%.  Body mass index is 30.11 kg/m². Vitals reviewed.  Physical Exam:  General Exam:  Appearance: well developed,  nurished , in no acute distress,   Pink Conjunctiva;  Neck: supple, no bruits;  Cardiac: S1/S2 RRR  Lungs: CTA B/L;  Musculoskeletal: no joint tenderness, others see neuro exam;  Extremities:  no pitting edema, no cyanosis or skin rash,  pulse is present,  Neurologic Examination  Mental Status  Is intact for age, and Language is intact, no slurred speech; calm, no acute stress, pleasant,   CN is normal from II to XII, visual field is full, EOMI, pupil symmetrical, light reflexes are present, fundus exam is normal. Face symmetrical with normal sensation, hearing normal, shoulder shrugging normal.   Motor:  NO drift. R DF 1, L DF 3-, R PF 4, L PF 4, bilateral EHL 1, DI bilaterally in hands 4+, o/w 5/5   Sensory:  vibration present at the R ankle, and present on the L toe for 1 sec, position sense needs large excursion in both toes  DTRs  Unable to obtain   No Babinski sign,   COORDINATION: Normal FTN and HTS tests,   GAIT:  Has steppage gait and slightly wide based  Special tests:     Labs:  Lab Results   Component Value Date     (H) 01/29/2024    BUN 24 (H) 01/29/2024    CREATSERUM 1.43 (H) 01/29/2024    BUNCREA 17.0 02/28/2022    ANIONGAP 6 01/29/2024    GFRAA 70 07/23/2021    GFRNAA 60 07/23/2021    CA 9.6 01/29/2024     01/29/2024    K 3.8 01/29/2024     01/29/2024    CO2 28.0 01/29/2024    OSMOCALC  298 (H) 01/29/2024         Imaging:  See above    Assessment/Plan:   Encounter Diagnoses   Name Primary?    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  Lower prednisone to 10mg x 1 month, then 5mg daily x 2 months, then write an update to Dr. Solis  No further Rituxan infusions  Obtain tertiary care opinion [facility], referral given  Continue follow up with Dr. Gutierrez for MGUS  Continue gabapentin 300mg BID      Requested Prescriptions     Signed Prescriptions Disp Refills    predniSONE 10 MG Oral Tab 30 tablet 0     Sig: Take 1 tablet (10 mg total) by mouth daily.    predniSONE 5 MG Oral Tab 30 tablet 2     Sig: In one month, approximately 4/8/24, start prednisone 5mg daily          We discussed in depth regarding the diagnosis, prognosis, treatment. All questions and concerns were addressed. 35 minutes were spent on this encounter, which included obtaining and reviewing history, examining the patient, reviewing and interpreting results, building a treatment plan, discussing treatment options, discussing medication instructions, educating the patient/family/caregiver, and completing documentation.       Kendal Solis,   Neurology and Neuromuscular medicine  [facility]

## 2024-03-08 ENCOUNTER — OFFICE VISIT (OUTPATIENT)
Dept: INTERNAL MEDICINE CLINIC | Facility: CLINIC | Age: 79
End: 2024-03-08
Payer: MEDICARE

## 2024-03-08 ENCOUNTER — OFFICE VISIT (OUTPATIENT)
Dept: PHYSICAL THERAPY | Facility: HOSPITAL | Age: 79
End: 2024-03-08
Attending: INTERNAL MEDICINE
Payer: MEDICARE

## 2024-03-08 VITALS
DIASTOLIC BLOOD PRESSURE: 74 MMHG | WEIGHT: 222 LBS | BODY MASS INDEX: 30.07 KG/M2 | HEIGHT: 72 IN | TEMPERATURE: 97 F | OXYGEN SATURATION: 96 % | HEART RATE: 86 BPM | SYSTOLIC BLOOD PRESSURE: 122 MMHG | RESPIRATION RATE: 14 BRPM

## 2024-03-08 DIAGNOSIS — Q89.2 THYROGLOSSAL DUCT CYST: ICD-10-CM

## 2024-03-08 DIAGNOSIS — D47.2 MGUS (MONOCLONAL GAMMOPATHY OF UNKNOWN SIGNIFICANCE): ICD-10-CM

## 2024-03-08 DIAGNOSIS — J33.9 NASAL POLYPOSIS: ICD-10-CM

## 2024-03-08 DIAGNOSIS — F41.9 ANXIETY: ICD-10-CM

## 2024-03-08 DIAGNOSIS — E03.4 HYPOTHYROIDISM DUE TO ACQUIRED ATROPHY OF THYROID: ICD-10-CM

## 2024-03-08 DIAGNOSIS — J32.4 CHRONIC PANSINUSITIS: ICD-10-CM

## 2024-03-08 DIAGNOSIS — Z01.810 PREOP CARDIOVASCULAR EXAM: Primary | ICD-10-CM

## 2024-03-08 DIAGNOSIS — J45.30 MILD PERSISTENT ASTHMA WITHOUT COMPLICATION (HCC): ICD-10-CM

## 2024-03-08 PROCEDURE — 99214 OFFICE O/P EST MOD 30 MIN: CPT | Performed by: INTERNAL MEDICINE

## 2024-03-08 PROCEDURE — 97110 THERAPEUTIC EXERCISES: CPT

## 2024-03-08 RX ORDER — LEVOTHYROXINE SODIUM 0.1 MG/1
100 TABLET ORAL
Qty: 90 TABLET | Refills: 3 | Status: SHIPPED | OUTPATIENT
Start: 2024-03-08

## 2024-03-08 RX ORDER — TRAZODONE HYDROCHLORIDE 100 MG/1
100 TABLET ORAL NIGHTLY PRN
Qty: 90 TABLET | Refills: 3 | Status: SHIPPED | OUTPATIENT
Start: 2024-03-08

## 2024-03-08 RX ORDER — FLUTICASONE PROPIONATE 50 MCG
1 SPRAY, SUSPENSION (ML) NASAL 2 TIMES DAILY PRN
Qty: 3 EACH | Refills: 3 | Status: SHIPPED | OUTPATIENT
Start: 2024-03-08

## 2024-03-08 RX ORDER — ALPRAZOLAM 0.25 MG/1
0.25 TABLET ORAL NIGHTLY PRN
Qty: 90 TABLET | Refills: 1 | Status: SHIPPED | OUTPATIENT
Start: 2024-03-08

## 2024-03-08 NOTE — H&P
[address]    History and Physical    Nils Joseph Patient Status:  No patient class for patient encounter    10/10/1945 MRN [id number]   Location [address] Attending No att. providers found   Hosp Day # 0 PCP Alex Case MD     Date:  3/8/2024    HPI:     Chief Complaint   Patient presents with    Pre-Op Exam     On 2024 for  Excision Thyroglossal Duct Cyst with Dr. Howard Cueto     Fall     2024 injured Upper Back- Ribcage area     HPI  Nils Joseph is a 78 year old male with carotid stenosis, MGUS with neuropathy, asthma, depression, hypothyroidism, who presents for cardiac risk assessment and a pre-operative physical exam. Patient is to have thyroglossal duct cyst, to be done by Dr. Howard Cueto on 3/20/2024.      HPI:   Pt presented with thyroid nodule. Incidental finding on MRI. He had thyroid ultrasound on 23. No history of thyroid cancer. No history of radiation exposure.   He reports that he is in his normal state of health.    He denies cp or altamirano    He has had an ECHO in 2021 and was favorable     Conclusions:     1. Left ventricle: The cavity size was normal. Wall thickness was normal.      Systolic function was normal. The estimated ejection fraction was 60-65%.      Doppler parameters are consistent with abnormal left ventricular      relaxation - grade 1 diastolic dysfunction.   2. Aorta: Ascending aorta diameter was 3.5cm. Aortic root was 3.9cm at the      sinus of Valsalva.   3. Aortic root: The aortic root was dilated within the upper limits of      normal.   4. Mitral valve: Mildly calcified annulus.   5. Left atrium: The left atrium was mildly dilated.   6. Pulmonary arteries: Systolic pressure could not be accurately estimated.     Impressions:  No previous study was available for comparison.   *   He has not had new or recurrent cardiac sxs    PAST MEDICAL, SOCIAL, FAMILY  HISTORIES REVIEWED WITH PT    Current Outpatient Medications   Medication Sig Dispense Refill    ALPRAZolam 0.25 MG Oral Tab Take 1 tablet (0.25 mg total) by mouth nightly as needed. 90 tablet 1    Beclomethasone Diprop HFA 40 MCG/ACT Inhalation Aerosol, Breath Activated Inhale 2 puffs into the lungs 2 (two) times daily as needed. 1 each 3    fluticasone propionate 50 MCG/ACT Nasal Suspension 1 spray by Each Nare route 2 (two) times daily as needed for Rhinitis. 3 each 3    levothyroxine 100 MCG Oral Tab Take 1 tablet (100 mcg total) by mouth once daily. Overdue for labs, please complete them. 90 tablet 3    traZODone 100 MG Oral Tab Take 1 tablet (100 mg total) by mouth nightly as needed for Sleep. 90 tablet 3    predniSONE 10 MG Oral Tab Take 1 tablet (10 mg total) by mouth daily. 30 tablet 0    predniSONE 5 MG Oral Tab In one month, approximately 4/8/24, start prednisone 5mg daily 30 tablet 2    doxycycline 100 MG Oral Cap Take 1 capsule (100 mg total) by mouth 2 (two) times daily.      famotidine 40 MG Oral Tab Take 1 tablet (40 mg total) by mouth daily.      BRINZOLAMIDE-BRIMONIDINE OP Apply 1 drop to eye in the morning, at noon, and at bedtime. Right eye      predniSONE 5 MG Oral Tab Take 1 tablet (5 mg total) by mouth daily. In addition to 10mg tablet, for total 15mg daily. 30 tablet 0    acidophilus-pectin Oral Cap Take 1 capsule by mouth daily.      valACYclovir 1 G Oral Tab Take 0.5 tablets (500 mg total) by mouth daily.      gabapentin 300 MG Oral Cap Take 1 capsule (300 mg total) by mouth 3 (three) times daily. 270 capsule 3    docusate sodium 100 MG Oral Cap Take 1 capsule (100 mg total) by mouth 2 (two) times daily. (Patient taking differently: Take 1 capsule (100 mg total) by mouth 2 (two) times daily as needed.) 30 capsule 1    Glucosamine-Chondroitin (GLUCOSAMINE CHONDR COMPLEX OR) Take by mouth as needed.      cyanocobalamin 1000 MCG Oral Tab Take 1 tablet (1,000 mcg total) by mouth daily.       senna-docusate 8.6-50 MG Oral Tab Take 1 tablet by mouth daily. (Patient taking differently: Take 1 tablet by mouth daily as needed.) 30 tablet 0    erythromycin 5 MG/GM Ophthalmic Ointment Place 1 Application  into both eyes nightly. Uses 3-4 x weekly before bed. States prescribed mainly for moisture      tamsulosin (FLOMAX) cap Take 1 capsule (0.4 mg total) by mouth daily. 90 capsule 3    finasteride 5 MG Oral Tab Take 1 tablet (5 mg total) by mouth daily. 90 tablet 3    atorvastatin 10 MG Oral Tab Take 1 tablet (10 mg total) by mouth daily.      CALCIUM OR Take by mouth daily.        Albuterol Sulfate  (90 BASE) MCG/ACT Inhalation Aero Soln Inhale 2 puffs into the lungs every 6 (six) hours as needed for Wheezing.      Multiple Vitamin (MULTI-VITAMIN OR) Take 1 tablet by mouth daily.          Allergies:   Allergies   Allergen Reactions    Immune Globulin RASH      Past Medical History:   Diagnosis Date    Asthma (HCC)     Atypical mole frequent body Nevi    Back problem     low back pain, s/p fusion of L3,4,5.  back still stiff    Belching     Benign thyroid cyst     BPH (benign prostatic hyperplasia)     Cataract 2015    s/p sugery    Constipation     Depression     Disorder of prostate BPH (I'm a 76 year old male)    Flatulence/gas pain/belching     Frequent urination 2 x's / night    Frequent use of laxatives     Hearing impairment     Slight    Hemorrhoids very slight    Hemorrhoids, internal 02/29/2012    High cholesterol     Elevated     Insomnia 10/22/2010    Irregular bowel habits     Itch of skin unknown cause    Leg swelling ankles at PM    Neuropathy     R leg    Obesity     slightly overweight    Osteoarthritis 2010    Poor balance     uses cane    Rotator cuff sprain 10/22/2010    Seborrheic keratosis 10/22/2010    Shoulder joint pain 10/22/2010    Sinusitis     Unspecified disorder of thyroid     hypothyroid    Visual impairment     glasses reading      Past Surgical History:    Procedure Laterality Date    APPENDECTOMY      ARTHROSCOPY OF JOINT UNLISTED Right     knee    BACK SURGERY  16    triple fusion of L3-L4-L5    BIOPSY  2019    Bone Marrow    BIOPSY  2019    right sural nerve biopsy    CARPAL TUNNEL RELEASE Bilateral     CATARACT Bilateral 2015    IOL'S    COLONOSCOPY      HEMORRHOIDECTOMY  ~     ORTHOPEDIC SURG (PBP)      LT ELBOW - ulnar nerve release    OTHER  2019    Lumbar puncture    OTHER SURGICAL HISTORY      Shoulder Joint Replacement 16, Sinus surgery ,    OTHER SURGICAL HISTORY      I&D of abscess in axilla area 7/3    SIGMOIDOSCOPY,DIAGNOSTIC      SINUS SURGERY    2014    SPINE SURGERY PROCEDURE UNLISTED      L3-4-5    TONSILLECTOMY        Family History   Problem Relation Age of Onset    Breast Cancer Mother     Cancer Mother         breast cancer- late in life    Heart Disorder Mother         cardiac issues related to aging    Heart Disorder Father         partially blocked carotid arteries    Hypertension Father     Cancer Father         ? cancer?    Lipids Father     Hypertension Brother     Hypertension Brother       Social History:   Social History     Socioeconomic History    Marital status:    Tobacco Use    Smoking status: Former     Packs/day: 1.50     Years: 32.00     Additional pack years: 0.00     Total pack years: 48.00     Types: Cigarettes, Pipe     Quit date: 3/1/1998     Years since quittin.0    Smokeless tobacco: Never    Tobacco comments:     Quit many years ago   Vaping Use    Vaping Use: Never used   Substance and Sexual Activity    Alcohol use: Yes     Alcohol/week: 11.0 - 18.0 standard drinks of alcohol     Types: 6 - 8 Glasses of wine, 1 - 2 Cans of beer, 4 - 8 Shots of liquor per week     Comment: 1-2 drinks daily    Drug use: No   Other Topics Concern    Caffeine Concern Yes     Comment: 2-3 cups a day    Exercise Yes     Comment: physical therapy      Occ: retired. :  yes. Children: yes.   Exercise: once per week,  twice per week.  Diet: watches fats closely and watches sugar closely     REVIEW OF SYSTEMS:   comprehensive 10point review of systems was completed.     Pertinent positives and negatives noted in the HPI.      EXAM:   /74   Pulse 86   Temp 97.3 °F (36.3 °C)   Resp 14   Ht 6' (1.829 m)   Wt 222 lb (100.7 kg)   SpO2 96%   BMI 30.11 kg/m²   GENERAL: well developed, well nourished,in no apparent distress  SKIN: no rashes,no suspicious lesions  HEENT: atraumatic, normocephalic  EYES:PERRLA, EOMI  NECK: supple,no adenopathy,no bruits  LUNGS: clear to auscultation  CARDIO: RRR without murmur  GI: good BS's,no masses, HSM or tenderness  MUSCULOSKELETAL: back is not tender  EXTREMITIES: no cyanosis, clubbing or edema  NEURO: Oriented times three,motor and sensory are grossly intact    ASSESSMENT AND PLAN:   Nils Joseph is a 78 year old male with carotid stenosis, MGUS with neuropathy, asthma, depression, hypothyroidism, who presents for cardiac risk assessment and a pre-operative physical exam. Patient is to have thyroglossal duct cyst, to be done by Dr. Howard Cueto on 3/20/2024    According to the ACC/AHA guidelines, the patient does not have a history of acute coronary syndrome. His estimated perioperative risk for major adverse cardiac event is low based on combined clinical and surgical risks.  He is therefore cleared with low cardiac risk.  He will not require further cardiovascular testing.    History     Past Medical History:   Diagnosis Date    Asthma (HCC)     Atypical mole frequent body Nevi    Back problem     low back pain, s/p fusion of L3,4,5.  back still stiff    Belching     Benign thyroid cyst     BPH (benign prostatic hyperplasia)     Cataract 2015    s/p sugery    Constipation     Depression     Disorder of prostate BPH (I'm a 76 year old male)    Flatulence/gas pain/belching     Frequent urination 2 x's / night    Frequent use of  laxatives     Hearing impairment     Slight    Hemorrhoids very slight    Hemorrhoids, internal 02/29/2012    High cholesterol     Elevated     Insomnia 10/22/2010    Irregular bowel habits     Itch of skin unknown cause    Leg swelling ankles at PM    Neuropathy     R leg    Obesity     slightly overweight    Osteoarthritis 2010    Poor balance     uses cane    Rotator cuff sprain 10/22/2010    Seborrheic keratosis 10/22/2010    Shoulder joint pain 10/22/2010    Sinusitis     Unspecified disorder of thyroid     hypothyroid    Visual impairment     glasses reading     Past Surgical History:   Procedure Laterality Date    APPENDECTOMY      ARTHROSCOPY OF JOINT UNLISTED Right     knee    BACK SURGERY  9/12/16    triple fusion of L3-L4-L5    BIOPSY  07/26/2019    Bone Marrow    BIOPSY  06/25/2019    right sural nerve biopsy    CARPAL TUNNEL RELEASE Bilateral     CATARACT Bilateral August 2015    IOL'S    COLONOSCOPY      HEMORRHOIDECTOMY  ~ 2005    ORTHOPEDIC SURG (PBP)      LT ELBOW - ulnar nerve release    OTHER  02/2019    Lumbar puncture    OTHER SURGICAL HISTORY      Shoulder Joint Replacement 7/21/16, Sinus surgery Feb. '14,    OTHER SURGICAL HISTORY      I&D of abscess in axilla area 7/3    SIGMOIDOSCOPY,DIAGNOSTIC  1980's    SINUS SURGERY    2/14/2014    SPINE SURGERY PROCEDURE UNLISTED      L3-4-5    TONSILLECTOMY       Family History   Problem Relation Age of Onset    Breast Cancer Mother     Cancer Mother         breast cancer- late in life    Heart Disorder Mother         cardiac issues related to aging    Heart Disorder Father         partially blocked carotid arteries    Hypertension Father     Cancer Father         ? cancer?    Lipids Father     Hypertension Brother     Hypertension Brother      Social History:  Social History     Socioeconomic History    Marital status:    Tobacco Use    Smoking status: Former     Packs/day: 1.50     Years: 32.00     Additional pack years: 0.00     Total  pack years: 48.00     Types: Cigarettes, Pipe     Quit date: 3/1/1998     Years since quittin.0    Smokeless tobacco: Never    Tobacco comments:     Quit many years ago   Vaping Use    Vaping Use: Never used   Substance and Sexual Activity    Alcohol use: Yes     Alcohol/week: 11.0 - 18.0 standard drinks of alcohol     Types: 6 - 8 Glasses of wine, 1 - 2 Cans of beer, 4 - 8 Shots of liquor per week     Comment: 1-2 drinks daily    Drug use: No   Other Topics Concern    Caffeine Concern Yes     Comment: 2-3 cups a day    Exercise Yes     Comment: physical therapy     Allergies/Medications:   Allergies:   Allergies   Allergen Reactions    Immune Globulin RASH     (Not in a hospital admission)          Physical Exam:   Vital Signs:  Blood pressure 122/74, pulse 86, temperature 97.3 °F (36.3 °C), resp. rate 14, height 6' (1.829 m), weight 222 lb (100.7 kg), SpO2 96%.        Results:     Lab Results   Component Value Date    WBC 8.6 2023    HGB 14.0 2023    HCT 41.8 2023    .0 2023    CREATSERUM 1.43 (H) 2024    BUN 24 (H) 2024     2024    K 3.8 2024     2024    CO2 28.0 2024     (H) 2024    CA 9.6 2024    ALB 3.5 2024    ALKPHO 62 2024    BILT 0.6 2024    TP 6.3 (L) 2024    AST 25 2024    ALT  2024      Comment:      Due to  backorder we are temporarily unable to offer hospital-based ALT testing at [facility].   If urgently needed, please order ALT test code [id number].   The new order will need a new venipuncture and will be sent to [facility] for testing.   The expected turnaround time will be within 24 hours.     PTT 29.5 2019    INR 0.94 2019    TSH 2.550 2024    PSA 1.46 2018    ESRML 17 2023    CRP <0.29 2023     2017    B12 623 2023     No results found.              Alex Case MD  3/8/2024

## 2024-03-08 NOTE — PROGRESS NOTES
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 provider found  Date of Evaluation:    08/29/2023    Precautions:  Fall Risk Next MD visit:   none scheduled  Date of Surgery: n/a   Insurance Primary/Secondary: MEDICARE / [employer] [place] INDEMNITY     # Auth Visits: 20 + 10 per POC            Subjective: Had a fall in early February. Fell backwards and hit the edge of the sill. Feels that he may have broken his rib. Slowly getting better. Pain with sneezing/coughing  Falls down about once per month but trips and stumbles about 3-4x/week. Most falls happen with turning or if needs to take a quick step. Falls to the side or to the back  Performing dual tasks increases chance of falling. Had has appointment with Dr Solis yesterday. There is no significant improvement but no decline either. He was encouraged to continue PT. Has been diligent about doing his home exercises. Appointment with Dr Case later today; will discuss his fall and rib pain as well.    Pain: 3-4/10 rib pain with twisting, sneezing, coughing      Objective:   Ambulates with B AFO's and cane, steppage gait      Assessment: No changes since last session; has been staying diligent with home exercise program and feels that he is stronger in the back/hip area. Balance continues to be an issue with frequent falls and stumbling/catching. Instructed to remain consistent with home exercises and general strengthening. Patient will benefit from a few sessions of PT for his balance.       Goals: (to be met in 10+ 10 + 10 visits)   Pt will demonstrate improved SLS to >5 seconds CALOS to promote safety and decrease risk of falls on uneven surfaces such as grass. Progressing.   Pt will perform TUG in <15 seconds with least restrictive AD, demonstrating improved gait speed for improved participation in ADL such as community ambulation  Progressing.  Pt will perform 4-Item DGI with score of 9/12 or greater with least restrictive AD to demonstrate ability to ambulate safely in crowded and busy environments. Progressing  Pt will be able to squat to  light objects around the house without loss of stability. Progressing  Pt will improve functional hip strength to demonstrate ability to ascend/descend 1 flight of stairs reciprocally with the use of handrail. Goal met.   Pt will be independent and compliant with comprehensive HEP to maintain progress achieved in PT Goal met.         Plan: balance re-ed, including Biodex, postural control, reactive balance, dual tasks  Date: 9/20/23  Tx#: 6/16 Date: 9/25/23  Tx#: 7/16 Date: 9/27/23  Tx#: 8/16 Date:10/2/2023   Tx#:9/16 Date:10/4/2023  Tx#:10/16 Date:10/9/2023   Tx#:11/16 Date: 10/11/2023   Tx#:12/16 Date:10/16/2023   Tx#:13/16 Date:10/23/2023   Tx#:14/20 Date:10/30/2023   Tx#:15/20 Date:11/01/2023   Tx#:16/20 Date:11/06/2023   Tx#:17/20 Date:11/08/2023   Tx#:18/20 Date:11/13/2023   Tx#:19/20 Date:12/6/2023   Tx#:20/20 Date:12/13/2023   Tx#:21/30 Date:12/18/2023   Tx#:22/30 Date:12/20/2023   Tx#:23/30 Date:1/3/2024  Tx#:24/30 Date:1/17/2024  Tx#:25/30 Date:1/19/2024  Tx#:26/30 Date:1/23/2024  Tx#:27/30 Date:3/8/2024  Tx#:28/30   Manual: 25'  Prone with one pillow under:  Deep STM lumbar paraspinals and QL B  Manual quad stretch in prone B  Manual modified hip flexors stretch EOB; STM to anterior thigh Manual: 25'  Prone with one pillow under:  Deep STM lumbar paraspinals and QL B  Manual quad stretch in prone B  Manual modified hip flexors stretch EOB; STM to anterior thigh Manual: 15'  Prone with one pillow under:  Deep STM lumbar paraspinals and QL B  Manual quad stretch in prone B  Manual modified hip flexors stretch EOB; STM to anterior thigh   ThereX:  10 lumbar flex seated at edge of table  10x towel curls B  2x10 fwd lunge holding bar, out of AFOs  Goblet squats  Neuro re-ed:  NMES to ESTEFANIA  anterior tib 400 usec, 100hz, 95 mA  Practived walking 2 laps in p bars  2x10 DF assisted  1x heel walking  2x10 lunges to airex  4x3 hurdles Neuro re-ed:  NMES to R anterior tib 400 usec, 100hz, 95 mA  2x10 DF assisted  2x10 goblet squats  1x heel walking  10x step up to 6' R LE, 10 x step over step R LE  Therex:  PN see above, objective measures taken    Mod joão position for passive hip flexor stretch 2x30\"  10x fig 4 SL bridge Neuro re-ed:  NMES to R anterior tib 400 usec, 100hz, 95 mA  2x10 DF assisted  15 bosu lunges on R  2x10 step ups to 6\"  15x D1 PND hip flex on airex, R Neuro re-ed:  10x 3-cone tap in mod SLS on airex, B  Tandem walk in p bars light HHA x2  20 x marches on sanddune   Manual:  SL for flex rotate mob GR III, QL release following, B x 8 min  STM L posterior hip  Manual: 18'  Deep STM R posterior hip   Deep STM R ITB/TFL   SL for flex rotate mob GR III, QL release following, B   Manual R piriformis stretch   Manual: 15'  B quad stretch in prone  SL for flex rotate mob GR III, QL release following, B   Manual R piriformis stretch  Prone thoracic spine PA mobs Gr III  Prone central PA mobs L5/S1 Gr III Manual: 15'  B quad stretch in prone  SL for flex rotate mob GR III, QL release following, B   Manual R piriformis stretch  Prone thoracic spine PA mobs Gr III  Prone central PA mobs L5/S1 Gr III Manual: 15'  B quad stretch in prone  SL for flex rotate mob GR III, QL release following, B   Manual R piriformis stretch  Prone thoracic spine PA mobs Gr III  Prone central PA mobs L5/S1 Gr III Manual:     B quad stretch in prone  B piriformis stretch --   Manual: 15'  B quad stretch in prone  SL for flex rotate mob GR III, QL release following, B   Manual R piriformis stretch  Prone thoracic spine PA mobs Gr III  Prone central PA mobs L5/S1 Gr III Manual: 8'  B piriformis stretch  L hamstring stretch  KT application left knee -- Manual: 12'  B piriformis stretch  B quad stretch in prone  Prone  thoracic spine PA mobs Gr III  Prone central PA mobs L5/S1 Gr III   Manual: 5'  B piriformis stretch  B quad stretch in prone --   TE TE TE - -   - Gait; walking with hurricaine and SBA                 NU step L5 5'  Bridge 2 x 10   Clam shells 2 x 10, manual resistance NU step L5 5'  Bridge 2 x 10   Education on log rolling  Neuro re-ed:  10x sciatic nerve glide with ankle floss, B  Manual:  SL for flex rotate mob GR III, QL release following.x8 min Therex:  Seated sciatica nerve glide x 10 R LE Neuro re-ed:  10x sciatic N glide with adductor bias and floss on R Neuro re-ed:  10x sciatic N glide with adductor bias and floss on R 15x bridges  Neuro re-ed:  Attempted to discriminate between 3 different textures (soft, sand paper, and poky) R legx4 min Therex:  2x10 donkey kicks, 3 lbs B  2x15 lateral bosu lunges, HHA  15 squat to row, 30 lbs, CGA TE  Bridge x 15  Clams with manual resistance x 10 L/R  Sit to stand x 5; hands on upper thighs  Leg press with AFO's off  Double leg press 3 black cords x 30 TE  Bridge x 15  Clams with manual resistance x 12 L/R  Leg press with AFO's ON  Double leg press 3 black cords 2 x 25 TE  Bridge x 20  LTR x 10  LTR with legs crossed for ITB bias x 10 L/R  R clam shells with manual resistance 2 x 15  Hkly PPT with DKTC x 8 L/R  Double leg press 3 black cords 1 x 30 TE  Bridge x 20  LTR x 10  LTR with legs crossed for ITB bias x 10 L/R  R clam shells with manual resistance 2 x 15  Hkly PPT with DKTC x 8 L/R  Sit to stand without UE assist x 5, SBA for blocking at the knees; 2HHA to return to sitting TE  Leg press with AFO's ON  Double leg press 3 black cords 2 x 25  Single leg press 2 black cords 1 x 30 L/R  Sit to stand without UE assist x 5, SBA for blocking at the knees; 2HHA to return to sitting  Partial squats into chair 2HHA 1 x 10, 1 x 7  Red cord lateral steps x 30 L/R 2HHA TE  Leg press with AFO's ON  Double leg press 3 black cords 2 x 25  Single leg press 2 black cords 1 x  30 L/R  Sit to stand without UE assist x 5, SBA for blocking at the knees; 2HHA to return to sitting  Partial squats into chair 2HHA 1 x 10, 1 x 7  Red cord lateral steps x 30 L/R 2HHA TE AFO's on  NU step L5 6'  LTR\"s x 20   Bridge x 15  SB Bridge x 15  Quadruped arm raises x 10 L/R  Quadruped leg raises x 8 L/R  Partial squats into chair 2HHA 1 x 10, 1 x 7  Red cord lateral steps x 30 L/R 2HHA  Inclined forward   heel raises x 8 reps  90 deg squats 2HHA, wide SUSY 2 x 5  Lateral lunges with hold x 5 L/R 2HHA TE AFO's on  NU step L5 6'  LTR\"s x 20   B Fig 4 stretch  B FADDIR stretch  B hamstring stretch  Bridge x 15  SB Bridge x 15  6\" FSU's 1HHA x 10 L/R  Partial squats into chair 2HHA 1 x 10, 1 x 7  Red cord lateral steps x 30 L/R 2HHA  Inclined forward   heel raises x 10 reps   TE AFO's on 20'  NU step L5 6'  LTR\"s x 20   B Fig 4 stretch  B FADDIR stretch  B hamstring stretch  Bridge x 15  SB Bridge x 15  PPT with marching x 15 L/R  Sit to stand from an elevated surface 2 x 10  Red cord lateral steps x 30 L/R 2HHA   TE 30'  SB rolls DKTC  SB rolls LTR  L quad sets 10 x 10 sec, x 2  L/R SLR 2 x 10  SAQ's 2 x 10  Bridge 2 x 10  SB Bridge x 15  PPT with marching x 15 L/R TE 35'  NU step L6 5'  SB rolls LTR  SB rolls DKTC  SB bridge 2 x 12  Bridge with ADD ball 2 x 12  PPT with toe taps 2 x 12 L/R  Quadruped camel cat  X 10  Bird dog x 5 L/R CGA for safety  Red XB lateral steps x 30 L/R  Chair hovers x 12  Lateral lunges with hold x 10 L/R TE 30'  NU step L6 5'  SB rolls LTR  SB rolls DKTC  SB bridge 2 x 12  Quadruped camel cat  X 10  Bird dog x 5 L/R CGA for safety  Red XB lateral steps x 30 L/R  Sit to stand from an elevated seat position x 8 with focus on independent stabilization in standing   Forward lunge hold, sustained, 60 sec L/R  DLS on Airex x 3 (longest hold: 50 sec) TE 40'  SB rolls LTR  SB rolls DKTC  SB bridge x 15  Bridge x 15  Open book B  Clam shells BTB x 20 L/R  Prone quad stretch with a  strap  Prone hip extension x 10 L/R  Camel Cat  Bird Dog  Lateral steps at the counter Grey TB  Squats at the counter  Tandem stance at the counter, modified  DLS wide SUSY at the counter TE 30'  Tandem stance B  Forward steps in II bars  Lateral steps in II bars  DLS on Airex  Madhavi step overs forward  Madhavi step overs lateral  Squats at the counter  Lateral steps at the counter, Alvarez TB     Re-ed: 15'  Quadruped neutral spine/TrA recruitment/manual cuing  Camel cat with manual assist for movement coordination  Quadruped posterior rocking  Quadruped alternate arm raises x 7 L/R Re-ed: 15'  Quadruped neutral spine/TrA recruitment/manual cuing  Camel cat with minimal manual assist for movement coordination  Quadruped posterior rocking  Quadruped alternate arm raises x 7 L/R  TrA in hook-lying  TrA/PPT with alternate hip/knee flexion, slow  TrA/PPT with lateral knee fall outs, slow Re-ed: 25'  Quadruped neutral spine/TrA recruitment/manual cuing  Camel cat with minimal manual assist for movement coordination  Quadruped posterior rocking  Quadruped alternate arm raises x 7 L/R  Quadruped modified hip raises 2 x 5 L/R  TrA in hook-lying  TrA/PPT with alternate hip/knee flexion, slow  TrA/PPT with lateral knee fall outs, slow Therex:  10x sit to stand with exaggerated WS fwd and CGA  Tennis ball roll out firm pressure x2 min R Manual:  SL for flex rotate mob GR III, QL release following.x8 min    Therex:  2x10 SLR B  10 x bridges 4\" holds Therex:  2x10 knee ankle/knee ankle B  15x dead bugs in shelf position   10x fig 4 bridges SL    Manual:  SL for flex rotate mob GR III, QL release following.x8 min   Manual:  SL for flex rotate mob GR III, QL release following, Prone CPA to L3-L5 GR III.x10 min    Therex:  2x8 fwd lunges to bosu Therex:  Towel  toe curls, R LE x10    Manual:  SL for flex rotate mob GR III, QL release following, B Manual:  SL for flex rotate mob GR III, QL release following, B x 8  min    Therex:    2x10 bridges with airex under one foot, B  2x10 SL raise on mat  15x SL hip/ankle hip abd, B Single leg press 1 black, 1 grey and 1 yellow cords x 20 L/R  B heel raises on leg press with knees extended, 1 black cord; 2 x 12    Re-ed: AFO's on  DLS on Airex SBA/CGA  DLS on the ground SBA  Madhavi step over with w/s forward x 15 L/R HHA  Cone taps x 15 L/R 1HHA     Single leg press 1 black, 1 grey and 1 yellow cords x 20 L/R    Re-ed: AFO's on  DLS on Airex SBA/CGA  DLS on the ground SBA  Madhavi step over with w/s forward x 15 L/R HHA  Cone taps x 15 L/R 1HHA Single leg press 2 black cords 1 x 30 L/R    Re-ed: AFO's on  DLS on Airex SBA/CGA  DLS on the ground SBA  Madhavi step over with w/s forward x 15 L/R HHA  Cone taps x 15 L/R 1HHA  Forward lunge unsupported with hands hovering and SBA; 7 x 5 sec L/R Partial squats into chair 2HHA x 10  Red cord lateral steps x 20 L/R 2HHA    Re-ed: AFO's on  DLS on Airex SBA 3 x 30 sec  DLS on the ground SBA  Tandem stance 2 x 20 sec L/R finger touch B  Cone taps x 15 L/R 1HHA  Forward lunge unsupported with hands hovering and SBA; 7 x 5 sec L/R Re-ed: AFO's on  DLS on Airex SBA 3 x 30 sec  DLS on the ground SBA  Tandem stance 2 x 20 sec L/R finger touch B  Cone taps x 15 L/R 1HHA  Forward lunge unsupported with hands hovering and SBA; 7 x 5 sec L/R Re-ed: AFO's on  DLS on Airex SBA 3 x 30 sec  DLS on the ground SBA  Tandem stance 2 x 20 sec L/R finger touch B  Cone taps x 15 L/R 1HHA  Forward lunge unsupported with hands hovering and SBA; 7 x 5 sec L/R   Re-ed: AFO's on  DLS on Airex SBA 3 x 30 sec  Tandem stance 2 x 20 sec L/R finger touch B  Cone taps x 15 L/R 1HHA  Forward lunge unsupported with hands hovering and SBA; 7 x 5 sec L/R  DLS on Airex with forward reach x 5 L/R   Double leg press 3 black cords 1 x 30  Single leg press 2 black cords 1 x 30 L/R    Re-ed: AFO's on  DLS on Airex SBA 3 x 30 sec  Tandem stance 2 x 20 sec L/R finger touch B  Cone taps x  15 L/R 1HHA  Tandem walk 2HHA  DLS on Airex with forward reach x 5 L/R Re-ed: AFO's on 10'  DLS on Airex SBA 3 x 30 sec  Tandem stance 2 x 20 sec L/R finger touch B  Cone taps x 15 L/R 1HHA  Tandem walk 2HHA  DLS on Airex with forward reach x 10 L/R  Lunge stance with A/P w/s x 5 L/R HHA   Re-ed: AFO's on 8'  DLS on Airex SBA 3 x 30 sec  Tandem stance 2 x 20 sec L/R finger touch B  Cone taps x 15 L/R 1HHA  Tandem walk 2HHA      HEP: Access Code: [email or web address]  Access Code: [email or web address]  URL: [email or web address]  Date: 01/23/2024  Prepared by: Juanita Koehler    Exercises  - Supine Hip and Knee Flexion AROM with Swiss Ball  - 1 x daily - 7 x weekly - 3 sets - 10 reps  - Supine Lower Trunk Rotation with Swiss Ball  - 1 x daily - 7 x weekly - 3 sets - 10 reps  - Supine Figure 4 Piriformis Stretch  - 1 x daily - 7 x weekly - 1 sets - 3 reps - 30-60 sec hold  - Supine Bridge  - 1 x daily - 7 x weekly - 2-3 sets - 10 reps  - Bridge with Heels on Swiss Ball  - 1 x daily - 7 x weekly - 2-3 sets - 10 reps  - Sidelying Open Book Thoracic Lumbar Rotation and Extension  - 1 x daily - 7 x weekly - 1 sets - 10 reps  - Clamshell with Resistance  - 1 x daily - 7 x weekly - 3 sets - 10 reps  - Prone Quadriceps Stretch with Strap  - 1 x daily - 7 x weekly - 1 sets - 3 reps - 30-60 sec hold  - Prone Hip Extension  - 1 x daily - 7 x weekly - 1-2 sets - 10 reps  - Cat Cow  - 1 x daily - 7 x weekly - 2 sets - 10 reps  - Bird Dog  - 1 x daily - 7 x weekly - 1-2 sets - 10 reps  - Side Stepping with Counter Support  - 1 x daily - 7 x weekly - 3 sets - 10 reps  - Mini Squat with Counter Support  - 1 x daily - 7 x weekly - 1-2 sets - 10 reps  - Tandem Stance with Support  - 1 x daily - 7 x weekly - 1 sets - 3 reps - 30 sec hold      Charges: therex x 2 30', man x 0 Total Timed Treatment: 30 min  Total Treatment Time: 45 min

## 2024-03-08 NOTE — H&P (VIEW-ONLY)
[address]    History and Physical    Nils Joseph Patient Status:  No patient class for patient encounter    10/10/1945 MRN [id number]   Location [address] Attending No att. providers found   Hosp Day # 0 PCP Alex Case MD     Date:  3/8/2024    HPI:     Chief Complaint   Patient presents with    Pre-Op Exam     On 2024 for  Excision Thyroglossal Duct Cyst with Dr. Howard Cueto     Fall     2024 injured Upper Back- Ribcage area     HPI  Nils Joseph is a 78 year old male with carotid stenosis, MGUS with neuropathy, asthma, depression, hypothyroidism, who presents for cardiac risk assessment and a pre-operative physical exam. Patient is to have thyroglossal duct cyst, to be done by Dr. Howard Cueto on 3/20/2024.      HPI:   Pt presented with thyroid nodule. Incidental finding on MRI. He had thyroid ultrasound on 23. No history of thyroid cancer. No history of radiation exposure.   He reports that he is in his normal state of health.    He denies cp or altamirano    He has had an ECHO in 2021 and was favorable     Conclusions:     1. Left ventricle: The cavity size was normal. Wall thickness was normal.      Systolic function was normal. The estimated ejection fraction was 60-65%.      Doppler parameters are consistent with abnormal left ventricular      relaxation - grade 1 diastolic dysfunction.   2. Aorta: Ascending aorta diameter was 3.5cm. Aortic root was 3.9cm at the      sinus of Valsalva.   3. Aortic root: The aortic root was dilated within the upper limits of      normal.   4. Mitral valve: Mildly calcified annulus.   5. Left atrium: The left atrium was mildly dilated.   6. Pulmonary arteries: Systolic pressure could not be accurately estimated.     Impressions:  No previous study was available for comparison.   *   He has not had new or recurrent cardiac sxs    PAST MEDICAL, SOCIAL, FAMILY  HISTORIES REVIEWED WITH PT    Current Outpatient Medications   Medication Sig Dispense Refill    ALPRAZolam 0.25 MG Oral Tab Take 1 tablet (0.25 mg total) by mouth nightly as needed. 90 tablet 1    Beclomethasone Diprop HFA 40 MCG/ACT Inhalation Aerosol, Breath Activated Inhale 2 puffs into the lungs 2 (two) times daily as needed. 1 each 3    fluticasone propionate 50 MCG/ACT Nasal Suspension 1 spray by Each Nare route 2 (two) times daily as needed for Rhinitis. 3 each 3    levothyroxine 100 MCG Oral Tab Take 1 tablet (100 mcg total) by mouth once daily. Overdue for labs, please complete them. 90 tablet 3    traZODone 100 MG Oral Tab Take 1 tablet (100 mg total) by mouth nightly as needed for Sleep. 90 tablet 3    predniSONE 10 MG Oral Tab Take 1 tablet (10 mg total) by mouth daily. 30 tablet 0    predniSONE 5 MG Oral Tab In one month, approximately 4/8/24, start prednisone 5mg daily 30 tablet 2    doxycycline 100 MG Oral Cap Take 1 capsule (100 mg total) by mouth 2 (two) times daily.      famotidine 40 MG Oral Tab Take 1 tablet (40 mg total) by mouth daily.      BRINZOLAMIDE-BRIMONIDINE OP Apply 1 drop to eye in the morning, at noon, and at bedtime. Right eye      predniSONE 5 MG Oral Tab Take 1 tablet (5 mg total) by mouth daily. In addition to 10mg tablet, for total 15mg daily. 30 tablet 0    acidophilus-pectin Oral Cap Take 1 capsule by mouth daily.      valACYclovir 1 G Oral Tab Take 0.5 tablets (500 mg total) by mouth daily.      gabapentin 300 MG Oral Cap Take 1 capsule (300 mg total) by mouth 3 (three) times daily. 270 capsule 3    docusate sodium 100 MG Oral Cap Take 1 capsule (100 mg total) by mouth 2 (two) times daily. (Patient taking differently: Take 1 capsule (100 mg total) by mouth 2 (two) times daily as needed.) 30 capsule 1    Glucosamine-Chondroitin (GLUCOSAMINE CHONDR COMPLEX OR) Take by mouth as needed.      cyanocobalamin 1000 MCG Oral Tab Take 1 tablet (1,000 mcg total) by mouth daily.       senna-docusate 8.6-50 MG Oral Tab Take 1 tablet by mouth daily. (Patient taking differently: Take 1 tablet by mouth daily as needed.) 30 tablet 0    erythromycin 5 MG/GM Ophthalmic Ointment Place 1 Application  into both eyes nightly. Uses 3-4 x weekly before bed. States prescribed mainly for moisture      tamsulosin (FLOMAX) cap Take 1 capsule (0.4 mg total) by mouth daily. 90 capsule 3    finasteride 5 MG Oral Tab Take 1 tablet (5 mg total) by mouth daily. 90 tablet 3    atorvastatin 10 MG Oral Tab Take 1 tablet (10 mg total) by mouth daily.      CALCIUM OR Take by mouth daily.        Albuterol Sulfate  (90 BASE) MCG/ACT Inhalation Aero Soln Inhale 2 puffs into the lungs every 6 (six) hours as needed for Wheezing.      Multiple Vitamin (MULTI-VITAMIN OR) Take 1 tablet by mouth daily.          Allergies:   Allergies   Allergen Reactions    Immune Globulin RASH      Past Medical History:   Diagnosis Date    Asthma (HCC)     Atypical mole frequent body Nevi    Back problem     low back pain, s/p fusion of L3,4,5.  back still stiff    Belching     Benign thyroid cyst     BPH (benign prostatic hyperplasia)     Cataract 2015    s/p sugery    Constipation     Depression     Disorder of prostate BPH (I'm a 76 year old male)    Flatulence/gas pain/belching     Frequent urination 2 x's / night    Frequent use of laxatives     Hearing impairment     Slight    Hemorrhoids very slight    Hemorrhoids, internal 02/29/2012    High cholesterol     Elevated     Insomnia 10/22/2010    Irregular bowel habits     Itch of skin unknown cause    Leg swelling ankles at PM    Neuropathy     R leg    Obesity     slightly overweight    Osteoarthritis 2010    Poor balance     uses cane    Rotator cuff sprain 10/22/2010    Seborrheic keratosis 10/22/2010    Shoulder joint pain 10/22/2010    Sinusitis     Unspecified disorder of thyroid     hypothyroid    Visual impairment     glasses reading      Past Surgical History:    Procedure Laterality Date    APPENDECTOMY      ARTHROSCOPY OF JOINT UNLISTED Right     knee    BACK SURGERY  16    triple fusion of L3-L4-L5    BIOPSY  2019    Bone Marrow    BIOPSY  2019    right sural nerve biopsy    CARPAL TUNNEL RELEASE Bilateral     CATARACT Bilateral 2015    IOL'S    COLONOSCOPY      HEMORRHOIDECTOMY  ~     ORTHOPEDIC SURG (PBP)      LT ELBOW - ulnar nerve release    OTHER  2019    Lumbar puncture    OTHER SURGICAL HISTORY      Shoulder Joint Replacement 16, Sinus surgery ,    OTHER SURGICAL HISTORY      I&D of abscess in axilla area 7/3    SIGMOIDOSCOPY,DIAGNOSTIC      SINUS SURGERY    2014    SPINE SURGERY PROCEDURE UNLISTED      L3-4-5    TONSILLECTOMY        Family History   Problem Relation Age of Onset    Breast Cancer Mother     Cancer Mother         breast cancer- late in life    Heart Disorder Mother         cardiac issues related to aging    Heart Disorder Father         partially blocked carotid arteries    Hypertension Father     Cancer Father         ? cancer?    Lipids Father     Hypertension Brother     Hypertension Brother       Social History:   Social History     Socioeconomic History    Marital status:    Tobacco Use    Smoking status: Former     Packs/day: 1.50     Years: 32.00     Additional pack years: 0.00     Total pack years: 48.00     Types: Cigarettes, Pipe     Quit date: 3/1/1998     Years since quittin.0    Smokeless tobacco: Never    Tobacco comments:     Quit many years ago   Vaping Use    Vaping Use: Never used   Substance and Sexual Activity    Alcohol use: Yes     Alcohol/week: 11.0 - 18.0 standard drinks of alcohol     Types: 6 - 8 Glasses of wine, 1 - 2 Cans of beer, 4 - 8 Shots of liquor per week     Comment: 1-2 drinks daily    Drug use: No   Other Topics Concern    Caffeine Concern Yes     Comment: 2-3 cups a day    Exercise Yes     Comment: physical therapy      Occ: retired. :  yes. Children: yes.   Exercise: once per week,  twice per week.  Diet: watches fats closely and watches sugar closely     REVIEW OF SYSTEMS:   comprehensive 10point review of systems was completed.     Pertinent positives and negatives noted in the HPI.      EXAM:   /74   Pulse 86   Temp 97.3 °F (36.3 °C)   Resp 14   Ht 6' (1.829 m)   Wt 222 lb (100.7 kg)   SpO2 96%   BMI 30.11 kg/m²   GENERAL: well developed, well nourished,in no apparent distress  SKIN: no rashes,no suspicious lesions  HEENT: atraumatic, normocephalic  EYES:PERRLA, EOMI  NECK: supple,no adenopathy,no bruits  LUNGS: clear to auscultation  CARDIO: RRR without murmur  GI: good BS's,no masses, HSM or tenderness  MUSCULOSKELETAL: back is not tender  EXTREMITIES: no cyanosis, clubbing or edema  NEURO: Oriented times three,motor and sensory are grossly intact    ASSESSMENT AND PLAN:   Nils Joseph is a 78 year old male with carotid stenosis, MGUS with neuropathy, asthma, depression, hypothyroidism, who presents for cardiac risk assessment and a pre-operative physical exam. Patient is to have thyroglossal duct cyst, to be done by Dr. Howard Cueto on 3/20/2024    According to the ACC/AHA guidelines, the patient does not have a history of acute coronary syndrome. His estimated perioperative risk for major adverse cardiac event is low based on combined clinical and surgical risks.  He is therefore cleared with low cardiac risk.  He will not require further cardiovascular testing.    History     Past Medical History:   Diagnosis Date    Asthma (HCC)     Atypical mole frequent body Nevi    Back problem     low back pain, s/p fusion of L3,4,5.  back still stiff    Belching     Benign thyroid cyst     BPH (benign prostatic hyperplasia)     Cataract 2015    s/p sugery    Constipation     Depression     Disorder of prostate BPH (I'm a 76 year old male)    Flatulence/gas pain/belching     Frequent urination 2 x's / night    Frequent use of  laxatives     Hearing impairment     Slight    Hemorrhoids very slight    Hemorrhoids, internal 02/29/2012    High cholesterol     Elevated     Insomnia 10/22/2010    Irregular bowel habits     Itch of skin unknown cause    Leg swelling ankles at PM    Neuropathy     R leg    Obesity     slightly overweight    Osteoarthritis 2010    Poor balance     uses cane    Rotator cuff sprain 10/22/2010    Seborrheic keratosis 10/22/2010    Shoulder joint pain 10/22/2010    Sinusitis     Unspecified disorder of thyroid     hypothyroid    Visual impairment     glasses reading     Past Surgical History:   Procedure Laterality Date    APPENDECTOMY      ARTHROSCOPY OF JOINT UNLISTED Right     knee    BACK SURGERY  9/12/16    triple fusion of L3-L4-L5    BIOPSY  07/26/2019    Bone Marrow    BIOPSY  06/25/2019    right sural nerve biopsy    CARPAL TUNNEL RELEASE Bilateral     CATARACT Bilateral August 2015    IOL'S    COLONOSCOPY      HEMORRHOIDECTOMY  ~ 2005    ORTHOPEDIC SURG (PBP)      LT ELBOW - ulnar nerve release    OTHER  02/2019    Lumbar puncture    OTHER SURGICAL HISTORY      Shoulder Joint Replacement 7/21/16, Sinus surgery Feb. '14,    OTHER SURGICAL HISTORY      I&D of abscess in axilla area 7/3    SIGMOIDOSCOPY,DIAGNOSTIC  1980's    SINUS SURGERY    2/14/2014    SPINE SURGERY PROCEDURE UNLISTED      L3-4-5    TONSILLECTOMY       Family History   Problem Relation Age of Onset    Breast Cancer Mother     Cancer Mother         breast cancer- late in life    Heart Disorder Mother         cardiac issues related to aging    Heart Disorder Father         partially blocked carotid arteries    Hypertension Father     Cancer Father         ? cancer?    Lipids Father     Hypertension Brother     Hypertension Brother      Social History:  Social History     Socioeconomic History    Marital status:    Tobacco Use    Smoking status: Former     Packs/day: 1.50     Years: 32.00     Additional pack years: 0.00     Total  pack years: 48.00     Types: Cigarettes, Pipe     Quit date: 3/1/1998     Years since quittin.0    Smokeless tobacco: Never    Tobacco comments:     Quit many years ago   Vaping Use    Vaping Use: Never used   Substance and Sexual Activity    Alcohol use: Yes     Alcohol/week: 11.0 - 18.0 standard drinks of alcohol     Types: 6 - 8 Glasses of wine, 1 - 2 Cans of beer, 4 - 8 Shots of liquor per week     Comment: 1-2 drinks daily    Drug use: No   Other Topics Concern    Caffeine Concern Yes     Comment: 2-3 cups a day    Exercise Yes     Comment: physical therapy     Allergies/Medications:   Allergies:   Allergies   Allergen Reactions    Immune Globulin RASH     (Not in a hospital admission)          Physical Exam:   Vital Signs:  Blood pressure 122/74, pulse 86, temperature 97.3 °F (36.3 °C), resp. rate 14, height 6' (1.829 m), weight 222 lb (100.7 kg), SpO2 96%.        Results:     Lab Results   Component Value Date    WBC 8.6 2023    HGB 14.0 2023    HCT 41.8 2023    .0 2023    CREATSERUM 1.43 (H) 2024    BUN 24 (H) 2024     2024    K 3.8 2024     2024    CO2 28.0 2024     (H) 2024    CA 9.6 2024    ALB 3.5 2024    ALKPHO 62 2024    BILT 0.6 2024    TP 6.3 (L) 2024    AST 25 2024    ALT  2024      Comment:      Due to  backorder we are temporarily unable to offer hospital-based ALT testing at [facility].   If urgently needed, please order ALT test code [id number].   The new order will need a new venipuncture and will be sent to [facility] for testing.   The expected turnaround time will be within 24 hours.     PTT 29.5 2019    INR 0.94 2019    TSH 2.550 2024    PSA 1.46 2018    ESRML 17 2023    CRP <0.29 2023     2017    B12 623 2023     No results found.              Alex Case MD  3/8/2024

## 2024-03-12 ENCOUNTER — OFFICE VISIT (OUTPATIENT)
Dept: PHYSICAL THERAPY | Facility: HOSPITAL | Age: 79
End: 2024-03-12
Attending: INTERNAL MEDICINE
Payer: MEDICARE

## 2024-03-12 PROCEDURE — 97110 THERAPEUTIC EXERCISES: CPT

## 2024-03-12 PROCEDURE — 97112 NEUROMUSCULAR REEDUCATION: CPT

## 2024-03-12 NOTE — PROGRESS NOTES
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 provider found  Date of Evaluation:    08/29/2023    Precautions:  Fall Risk Next MD visit:   none scheduled  Date of Surgery: n/a   Insurance Primary/Secondary: MEDICARE / [employer] [place] INDEMNITY     # Auth Visits: 20 + 10 per POC            Subjective: Rib pain is getting better every day.     Pain: 2-3/10 rib pain      Objective:   Ambulates with B AFO's and cane, steppage gait      Assessment: Frequent loss of balance posterior with balance work in the parallel bars. Practiced postural control with Biodex; patient found visual feedback very helpful in terms of understanding how correction of his positioning helps him to stay more centered. Discussed adaptive strategies with turns and gait initiation to improve safety and stability with those activities.       Goals: (to be met in 10+ 10 + 10 visits)   Pt will demonstrate improved SLS to >5 seconds CALOS to promote safety and decrease risk of falls on uneven surfaces such as grass. Progressing.   Pt will perform TUG in <15 seconds with least restrictive AD, demonstrating improved gait speed for improved participation in ADL such as community ambulation Progressing.  Pt will perform 4-Item DGI with score of 9/12 or greater with least restrictive AD to demonstrate ability to ambulate safely in crowded and busy environments. Progressing  Pt will be able to squat to  light objects around the house without loss of stability. Progressing  Pt will improve functional hip strength to demonstrate ability to ascend/descend 1 flight of stairs reciprocally with the use of handrail. Goal met.   Pt will be independent and compliant with comprehensive HEP to maintain progress achieved in PT Goal met.         Plan: balance re-ed, including Biodex, postural control, reactive balance, dual tasks  Date:  9/20/23  Tx#: 6/16 Date: 9/25/23  Tx#: 7/16 Date: 9/27/23  Tx#: 8/16 Date:10/2/2023   Tx#:9/16 Date:10/4/2023  Tx#:10/16 Date:10/9/2023   Tx#:11/16 Date: 10/11/2023   Tx#:12/16 Date:10/16/2023   Tx#:13/16 Date:10/23/2023   Tx#:14/20 Date:10/30/2023   Tx#:15/20 Date:11/01/2023   Tx#:16/20 Date:11/06/2023   Tx#:17/20 Date:11/08/2023   Tx#:18/20 Date:11/13/2023   Tx#:19/20 Date:12/6/2023   Tx#:20/20 Date:12/13/2023   Tx#:21/30 Date:12/18/2023   Tx#:22/30 Date:12/20/2023   Tx#:23/30 Date:1/3/2024  Tx#:24/30 Date:1/17/2024  Tx#:25/30 Date:1/19/2024  Tx#:26/30 Date:1/23/2024  Tx#:27/30 Date:3/8/2024  Tx#:28/30 Date:3/12/2024  Tx#:29/30   Manual: 25'  Prone with one pillow under:  Deep STM lumbar paraspinals and QL B  Manual quad stretch in prone B  Manual modified hip flexors stretch EOB; STM to anterior thigh Manual: 25'  Prone with one pillow under:  Deep STM lumbar paraspinals and QL B  Manual quad stretch in prone B  Manual modified hip flexors stretch EOB; STM to anterior thigh Manual: 15'  Prone with one pillow under:  Deep STM lumbar paraspinals and QL B  Manual quad stretch in prone B  Manual modified hip flexors stretch EOB; STM to anterior thigh   ThereX:  10 lumbar flex seated at edge of table  10x towel curls B  2x10 fwd lunge holding bar, out of AFOs  Goblet squats  Neuro re-ed:  NMES to R anterior tib 400 usec, 100hz, 95 mA  Practived walking 2 laps in p bars  2x10 DF assisted  1x heel walking  2x10 lunges to airex  4x3 hurdles Neuro re-ed:  NMES to R anterior tib 400 usec, 100hz, 95 mA  2x10 DF assisted  2x10 goblet squats  1x heel walking  10x step up to 6' R LE, 10 x step over step R LE  Therex:  PN see above, objective measures taken    Mod joão position for passive hip flexor stretch 2x30\"  10x fig 4 SL bridge Neuro re-ed:  NMES to R anterior tib 400 usec, 100hz, 95 mA  2x10 DF assisted  15 bosu lunges on R  2x10 step ups to 6\"  15x D1 PND hip flex on airex, R Neuro re-ed:  10x 3-cone tap in mod SLS  on airex, B  Tandem walk in p bars light HHA x2  20 x marches on sanddune   Manual:  SL for flex rotate mob GR III, QL release following, B x 8 min  STM L posterior hip  Manual: 18'  Deep STM R posterior hip   Deep STM R ITB/TFL   SL for flex rotate mob GR III, QL release following, B   Manual R piriformis stretch   Manual: 15'  B quad stretch in prone  SL for flex rotate mob GR III, QL release following, B   Manual R piriformis stretch  Prone thoracic spine PA mobs Gr III  Prone central PA mobs L5/S1 Gr III Manual: 15'  B quad stretch in prone  SL for flex rotate mob GR III, QL release following, B   Manual R piriformis stretch  Prone thoracic spine PA mobs Gr III  Prone central PA mobs L5/S1 Gr III Manual: 15'  B quad stretch in prone  SL for flex rotate mob GR III, QL release following, B   Manual R piriformis stretch  Prone thoracic spine PA mobs Gr III  Prone central PA mobs L5/S1 Gr III Manual:     B quad stretch in prone  B piriformis stretch --   Manual: 15'  B quad stretch in prone  SL for flex rotate mob GR III, QL release following, B   Manual R piriformis stretch  Prone thoracic spine PA mobs Gr III  Prone central PA mobs L5/S1 Gr III Manual: 8'  B piriformis stretch  L hamstring stretch  KT application left knee -- Manual: 12'  B piriformis stretch  B quad stretch in prone  Prone thoracic spine PA mobs Gr III  Prone central PA mobs L5/S1 Gr III   Manual: 5'  B piriformis stretch  B quad stretch in prone -- Re-ed: 15'  Biodex postural control, steady platform  Biodex limits of stability, steady platform     TE TE TE - -   - Gait; walking with hurricaine and SBA                  NU step L5 5'  Bridge 2 x 10   Clam shells 2 x 10, manual resistance NU step L5 5'  Bridge 2 x 10   Education on log rolling  Neuro re-ed:  10x sciatic nerve glide with ankle floss, B  Manual:  SL for flex rotate mob GR III, QL release following.x8 min Therex:  Seated sciatica nerve glide x 10 R LE Neuro re-ed:  10x sciatic N  glide with adductor bias and floss on R Neuro re-ed:  10x sciatic N glide with adductor bias and floss on R 15x bridges  Neuro re-ed:  Attempted to discriminate between 3 different textures (soft, sand paper, and poky) R legx4 min Therex:  2x10 donkey kicks, 3 lbs B  2x15 lateral bosu lunges, HHA  15 squat to row, 30 lbs, CGA TE  Bridge x 15  Clams with manual resistance x 10 L/R  Sit to stand x 5; hands on upper thighs  Leg press with AFO's off  Double leg press 3 black cords x 30 TE  Bridge x 15  Clams with manual resistance x 12 L/R  Leg press with AFO's ON  Double leg press 3 black cords 2 x 25 TE  Bridge x 20  LTR x 10  LTR with legs crossed for ITB bias x 10 L/R  R clam shells with manual resistance 2 x 15  Hkly PPT with DKTC x 8 L/R  Double leg press 3 black cords 1 x 30 TE  Bridge x 20  LTR x 10  LTR with legs crossed for ITB bias x 10 L/R  R clam shells with manual resistance 2 x 15  Hkly PPT with DKTC x 8 L/R  Sit to stand without UE assist x 5, SBA for blocking at the knees; 2HHA to return to sitting TE  Leg press with AFO's ON  Double leg press 3 black cords 2 x 25  Single leg press 2 black cords 1 x 30 L/R  Sit to stand without UE assist x 5, SBA for blocking at the knees; 2HHA to return to sitting  Partial squats into chair 2HHA 1 x 10, 1 x 7  Red cord lateral steps x 30 L/R 2HHA TE  Leg press with AFO's ON  Double leg press 3 black cords 2 x 25  Single leg press 2 black cords 1 x 30 L/R  Sit to stand without UE assist x 5, SBA for blocking at the knees; 2HHA to return to sitting  Partial squats into chair 2HHA 1 x 10, 1 x 7  Red cord lateral steps x 30 L/R 2HHA TE AFO's on  NU step L5 6'  LTR\"s x 20   Bridge x 15  SB Bridge x 15  Quadruped arm raises x 10 L/R  Quadruped leg raises x 8 L/R  Partial squats into chair 2HHA 1 x 10, 1 x 7  Red cord lateral steps x 30 L/R 2HHA  Inclined forward   heel raises x 8 reps  90 deg squats 2HHA, wide SUSY 2 x 5  Lateral lunges with hold x 5 L/R 2HHA TE AFO's on  NU  step L5 6'  LTR\"s x 20   B Fig 4 stretch  B FADDIR stretch  B hamstring stretch  Bridge x 15  SB Bridge x 15  6\" FSU's 1HHA x 10 L/R  Partial squats into chair 2HHA 1 x 10, 1 x 7  Red cord lateral steps x 30 L/R 2HHA  Inclined forward   heel raises x 10 reps   TE AFO's on 20'  NU step L5 6'  LTR\"s x 20   B Fig 4 stretch  B FADDIR stretch  B hamstring stretch  Bridge x 15  SB Bridge x 15  PPT with marching x 15 L/R  Sit to stand from an elevated surface 2 x 10  Red cord lateral steps x 30 L/R 2HHA   TE 30'  SB rolls DKTC  SB rolls LTR  L quad sets 10 x 10 sec, x 2  L/R SLR 2 x 10  SAQ's 2 x 10  Bridge 2 x 10  SB Bridge x 15  PPT with marching x 15 L/R TE 35'  NU step L6 5'  SB rolls LTR  SB rolls DKTC  SB bridge 2 x 12  Bridge with ADD ball 2 x 12  PPT with toe taps 2 x 12 L/R  Quadruped camel cat  X 10  Bird dog x 5 L/R CGA for safety  Red XB lateral steps x 30 L/R  Chair hovers x 12  Lateral lunges with hold x 10 L/R TE 30'  NU step L6 5'  SB rolls LTR  SB rolls DKTC  SB bridge 2 x 12  Quadruped camel cat  X 10  Bird dog x 5 L/R CGA for safety  Red XB lateral steps x 30 L/R  Sit to stand from an elevated seat position x 8 with focus on independent stabilization in standing   Forward lunge hold, sustained, 60 sec L/R  DLS on Airex x 3 (longest hold: 50 sec) TE 40'  SB rolls LTR  SB rolls DKTC  SB bridge x 15  Bridge x 15  Open book B  Clam shells BTB x 20 L/R  Prone quad stretch with a strap  Prone hip extension x 10 L/R  Camel Cat  Bird Dog  Lateral steps at the counter Grey TB  Squats at the counter  Tandem stance at the counter, modified  DLS wide SUSY at the counter TE 30'  Tandem stance B  Forward steps in II bars  Lateral steps in II bars  DLS on Airex  Madhavi step overs forward  Madhavi step overs lateral  Squats at the counter  Lateral steps at the counter, Grey TB   NU step: L6 5'  Tandem stance B  Forward steps in II bars  Lateral steps in II bars  DLS on Airex  Madhavi step overs forward  Madhavi step overs  lateral  Left/Right turns in II bars  Side reaching in II bars with lumbar/thoracic rotation  Forward reaching in II bars         Re-ed: 15'  Quadruped neutral spine/TrA recruitment/manual cuing  Camel cat with manual assist for movement coordination  Quadruped posterior rocking  Quadruped alternate arm raises x 7 L/R Re-ed: 15'  Quadruped neutral spine/TrA recruitment/manual cuing  Camel cat with minimal manual assist for movement coordination  Quadruped posterior rocking  Quadruped alternate arm raises x 7 L/R  TrA in hook-lying  TrA/PPT with alternate hip/knee flexion, slow  TrA/PPT with lateral knee fall outs, slow Re-ed: 25'  Quadruped neutral spine/TrA recruitment/manual cuing  Camel cat with minimal manual assist for movement coordination  Quadruped posterior rocking  Quadruped alternate arm raises x 7 L/R  Quadruped modified hip raises 2 x 5 L/R  TrA in hook-lying  TrA/PPT with alternate hip/knee flexion, slow  TrA/PPT with lateral knee fall outs, slow Therex:  10x sit to stand with exaggerated WS fwd and CGA  Tennis ball roll out firm pressure x2 min R Manual:  SL for flex rotate mob GR III, QL release following.x8 min    Therex:  2x10 SLR B  10 x bridges 4\" holds Therex:  2x10 knee ankle/knee ankle B  15x dead bugs in shelf position   10x fig 4 bridges SL    Manual:  SL for flex rotate mob GR III, QL release following.x8 min   Manual:  SL for flex rotate mob GR III, QL release following, Prone CPA to L3-L5 GR III.x10 min    Therex:  2x8 fwd lunges to bosu Therex:  Towel  toe curls, R LE x10    Manual:  SL for flex rotate mob GR III, QL release following, B Manual:  SL for flex rotate mob GR III, QL release following, B x 8 min    Therex:    2x10 bridges with airex under one foot, B  2x10 SL raise on mat  15x SL hip/ankle hip abd, B Single leg press 1 black, 1 grey and 1 yellow cords x 20 L/R  B heel raises on leg press with knees extended, 1 black cord; 2 x 12    Re-ed: AFO's on  DLS on Airex SBA/CGA  DLS  on the ground SBA  Madhavi step over with w/s forward x 15 L/R HHA  Cone taps x 15 L/R 1HHA     Single leg press 1 black, 1 grey and 1 yellow cords x 20 L/R    Re-ed: AFO's on  DLS on Airex SBA/CGA  DLS on the ground SBA  Madhavi step over with w/s forward x 15 L/R HHA  Cone taps x 15 L/R 1HHA Single leg press 2 black cords 1 x 30 L/R    Re-ed: AFO's on  DLS on Airex SBA/CGA  DLS on the ground SBA  Madhavi step over with w/s forward x 15 L/R HHA  Cone taps x 15 L/R 1HHA  Forward lunge unsupported with hands hovering and SBA; 7 x 5 sec L/R Partial squats into chair 2HHA x 10  Red cord lateral steps x 20 L/R 2HHA    Re-ed: AFO's on  DLS on Airex SBA 3 x 30 sec  DLS on the ground SBA  Tandem stance 2 x 20 sec L/R finger touch B  Cone taps x 15 L/R 1HHA  Forward lunge unsupported with hands hovering and SBA; 7 x 5 sec L/R Re-ed: AFO's on  DLS on Airex SBA 3 x 30 sec  DLS on the ground SBA  Tandem stance 2 x 20 sec L/R finger touch B  Cone taps x 15 L/R 1HHA  Forward lunge unsupported with hands hovering and SBA; 7 x 5 sec L/R Re-ed: AFO's on  DLS on Airex SBA 3 x 30 sec  DLS on the ground SBA  Tandem stance 2 x 20 sec L/R finger touch B  Cone taps x 15 L/R 1HHA  Forward lunge unsupported with hands hovering and SBA; 7 x 5 sec L/R   Re-ed: AFO's on  DLS on Airex SBA 3 x 30 sec  Tandem stance 2 x 20 sec L/R finger touch B  Cone taps x 15 L/R 1HHA  Forward lunge unsupported with hands hovering and SBA; 7 x 5 sec L/R  DLS on Airex with forward reach x 5 L/R   Double leg press 3 black cords 1 x 30  Single leg press 2 black cords 1 x 30 L/R    Re-ed: AFO's on  DLS on Airex SBA 3 x 30 sec  Tandem stance 2 x 20 sec L/R finger touch B  Cone taps x 15 L/R 1HHA  Tandem walk 2HHA  DLS on Airex with forward reach x 5 L/R Re-ed: AFO's on 10'  DLS on Airex SBA 3 x 30 sec  Tandem stance 2 x 20 sec L/R finger touch B  Cone taps x 15 L/R 1HHA  Tandem walk 2HHA  DLS on Airex with forward reach x 10 L/R  Lunge stance with A/P w/s x 5 L/R HHA    Re-ed: AFO's on 8'  DLS on Airex SBA 3 x 30 sec  Tandem stance 2 x 20 sec L/R finger touch B  Cone taps x 15 L/R 1HHA  Tandem walk 2HHA       HEP: Access Code: [email or web address]  Access Code: [email or web address]  URL: [email or web address]  Date: 01/23/2024  Prepared by: Juanita Koehler    Exercises  - Supine Hip and Knee Flexion AROM with Swiss Ball  - 1 x daily - 7 x weekly - 3 sets - 10 reps  - Supine Lower Trunk Rotation with Swiss Ball  - 1 x daily - 7 x weekly - 3 sets - 10 reps  - Supine Figure 4 Piriformis Stretch  - 1 x daily - 7 x weekly - 1 sets - 3 reps - 30-60 sec hold  - Supine Bridge  - 1 x daily - 7 x weekly - 2-3 sets - 10 reps  - Bridge with Heels on Swiss Ball  - 1 x daily - 7 x weekly - 2-3 sets - 10 reps  - Sidelying Open Book Thoracic Lumbar Rotation and Extension  - 1 x daily - 7 x weekly - 1 sets - 10 reps  - Clamshell with Resistance  - 1 x daily - 7 x weekly - 3 sets - 10 reps  - Prone Quadriceps Stretch with Strap  - 1 x daily - 7 x weekly - 1 sets - 3 reps - 30-60 sec hold  - Prone Hip Extension  - 1 x daily - 7 x weekly - 1-2 sets - 10 reps  - Cat Cow  - 1 x daily - 7 x weekly - 2 sets - 10 reps  - Bird Dog  - 1 x daily - 7 x weekly - 1-2 sets - 10 reps  - Side Stepping with Counter Support  - 1 x daily - 7 x weekly - 3 sets - 10 reps  - Mini Squat with Counter Support  - 1 x daily - 7 x weekly - 1-2 sets - 10 reps  - Tandem Stance with Support  - 1 x daily - 7 x weekly - 1 sets - 3 reps - 30 sec hold      Charges: therex x 2 30', re-ed x 1 Total Timed Treatment: 40 min  Total Treatment Time: 45 min

## 2024-03-15 ENCOUNTER — OFFICE VISIT (OUTPATIENT)
Dept: PHYSICAL THERAPY | Facility: HOSPITAL | Age: 79
End: 2024-03-15
Attending: INTERNAL MEDICINE
Payer: MEDICARE

## 2024-03-15 PROCEDURE — 97112 NEUROMUSCULAR REEDUCATION: CPT

## 2024-03-15 PROCEDURE — 97110 THERAPEUTIC EXERCISES: CPT

## 2024-03-15 NOTE — PROGRESS NOTES
Progress Summary  Pt has attended 30 visits in Physical Therapy.     Subjective: Patient reports feeling pretty good today, with no lower back pain or soreness. States that doing the prescribed back and hip stretches at home alleviates nicely his lower back symptoms; he practices HEP regularly at home, once or twice per day. Feels some improvement in strength around the hips and trunk. No changes with lower leg/foot symptoms. Reports frequent stumbling a few times per week; usually able to recover his balance. Falls about once per month. Last fall resulted in left rib cage contusion that has been gradually healing and getting better. His falls usually happen with gait initiation, especially if needs to walk while turning or if carries something while walking. He falls to the back or sides.       Pain: 2-3/10 rib pain    Assessment: Patient will stay consistent with regular stretching and strengthening home exercise program for his lower back and hips as it helps him to minimize stiffness and soreness in those regions. Continued skilled physical therapy for 6-8 visits is recommended to address postural control, dynamic and reactive balance impairments due to continued frequent episodes of stumbling that result in near falls a few times per week and in actual falls to the back or sides about once per month. Will focus on training postural control to reduce loss of balance posterior, on dynamic balance and balance with dual tasks and on strategies to minimize risk of falling at home.     Objective:   Physical Exam:  Posture/Observation: Wearing B AFO's; flexed slightly at the knees; swayed back with shoulders posterior to the hips contributing to posterior lean            Postural Control:  4-Stage Balance Test:   - Feet together: 30 sec   - Modified Tandem:  20 sec   - Tandem Stance: unable sec Fall Risk: yes   - SLS: R 0 sec, L 0 sec Fall Risk: yes  [Full tandem stance <10 sec indicates increased risk of  falling]  Age appropriate norms for SLS: 60-70 y/o mean = 27.0 sec      70-78 y/o mean = 17.2 sec      80-98 y/o mean = 8.5 sec     Functional Mobility:  30 sec sit<>stand: 1   Fall Risk: yes  [Below average score indicates high risk for falls; norms by age > or = to...   60-63 y/o Men: 14, Women: 12   65-70 y/o Men: 12, Women: 11   70-75 y/o Men: 12, Women: 10   75-78 y/o Men: 11, Women: 10   80-83 y/o Men: 10, Women: 9   85-90 y/o Men: 8, Women: 8   90-95 y/o Men 7, Women: 4]    Gait: pt ambulates on level ground with assistive device of SPC and B AFO's, decreased foot clearance B, decreased arm swing, difficulty turning, and steppage gait    TUG (AD, time): 14 sec, SPC  Fall Risk: yes  [Performance exceeding the upper limit of confidence intervals are considered increased risk for falls; Edward, 2006...   60-70 y/o mean 8.1s (7.1-9.0s)   70-78 y/o mean 9.2s (8.2-10.2s)   80-98 y/o mean 11.3s (10.0-12.7s)]    4 Item Dynamic Gait Index Score: 1/12 Fall Risk: yes  [Scores of 10 or less indicate increased risk for falls]  Gait level surface: 1  Grading: Dameon the lowest category that applies.  (3)  Normal: Walks 20’, no assistive devices, good speed, no evidence of imbalance, normal gait pattern.  (2)  Mild Impairment: Walks 20’, uses assistive devices, slower speed, mild gait deviations.  (1)  Moderate Impairment: Walks 20’, slow speed, abnormal gait pattern, evidence of imbalance.  (0)  Severe Impairment: Cannot walk 20’ without assistance, severe gait deviations or imbalance.     Change in gait speed: 1  Grading: Dameon the lowest category that applies.  (3)  Normal: Able to smoothly change walking speed without loss of balance or gait deviation. Shows a significant difference in walking speed between normal, fast and slow speeds.   (2)  Mild Impairment: Is able to change speed but demonstrates mild gait deviations, or no gait deviations but unable to achieve a significant change in velocity, or uses an assistive  device.   (1)  Moderate Impairment: Makes only minor adjustments to walking speed, or accomplishes a change in speed with significant gait deviations, or changes speed but has significant gait deviations, or changes speed but loses balance but is able to recover and continue walking.  (0)  Severe Impairment: Cannot change speeds, or loses balance and has to reach for wall or be caught.    Gait with horizontal head turns: 1  (3)  Normal: Performs head turns smoothly with no change in gait.  (2)  Mild Impairment: Preforms head turns smoothly with slight change in gait velocity, i.e., minor disruption to smooth gait path or uses walking aid.  (1) Moderate Impairment: Preforms head turns with moderate change in gait velocity, slows down, staggers but recovers, can continue to walk.  (0)  Severe Impairment: Preform task with severe disruption of gait, i.e., staggers outside 15” path, loses balance, stops, reaches for wall.    Gait with vertical head turns: 1  Grading: Dameon the lowest category that applies.  (3) Normal: Preforms head turns smoothly with no change in gait.  (2) Mild Impairment: Preforms head turns smoothly with slight change in gait velocity, i.e., minor disruption to smooth gait path or uses walking aid.  (1) Moderate Impairment: Preforms head turns with moderate change in gait velocity, slows down, staggers but recovers, can continue to walk.  (0) Severe Impairment: Preforms task with severe disruption of gait, i.e., staggers outside 15” path, loses balance, stops, reaches for wall.      Goals: (to be met in 10+ 10 + 10 + 6/8visits)   Pt will demonstrate improved SLS to >5 seconds CALOS to promote safety and decrease risk of falls on uneven surfaces such as grass. Progressing.   Pt will report reduced frequency of loss of balance posterior/to the side by 30%. New  Pt will demonstrate improved postural control to allow for improved body centering with gait and daily activities. New  Pt will perform TUG in  <15 seconds with least restrictive AD, demonstrating improved gait speed for improved participation in ADL such as community ambulation. Met  Pt will perform 4-Item DGI with score of 9/12 or greater with least restrictive AD to demonstrate ability to ambulate safely in crowded and busy environments. Progressing  Pt will be able to squat to  light objects around the house without loss of stability. Progressing  Pt will improve functional hip strength to demonstrate ability to ascend/descend 1 flight of stairs reciprocally with the use of handrail. Goal met.   Pt will be independent and compliant with comprehensive HEP to maintain progress achieved in PT Goal met.     Rehab Potential: fair    Plan: Continue skilled Physical Therapy 1-2 x/week or a total of 6-8 visits over a 90 day period. Treatment will include: Treatment will include: Manual therapy: soft tissue and joint mobilizations to restore normal joint mechanics and decrease pain; Therapeutic exercises including ROM, strengthening, stretching program; Neuromuscular re-education for proprioceptive/balance training, pelvic and core stabilization; Pt. education for posture, body mechanics, and ergonomics, Modalities as needed (including heat/cold and e-stim for pain relief), HEP instruction and progression       Patient/Family/Caregiver was advised of these findings, precautions, and treatment options and has agreed to actively participate in planning and for this course of care.    Thank you for your referral. If you have any questions, please contact me at Dept: [phone].    Sincerely,  Electronically signed by therapist: Juanita Koehler, PT    Physician's certification required: Yes  Please co-sign or sign and return this letter via fax as soon as possible to [phone].   I certify the need for these services furnished under this plan of treatment and while under my care.    X___________________________________________________  Date____________________    Certification From: 3/15/2024  To:6/13/2024                     Date: 9/20/23  Tx#: 6/16 Date: 9/25/23  Tx#: 7/16 Date: 9/27/23  Tx#: 8/16 Date:10/2/2023   Tx#:9/16 Date:10/4/2023  Tx#:10/16 Date:10/9/2023   Tx#:11/16 Date: 10/11/2023   Tx#:12/16 Date:10/16/2023   Tx#:13/16 Date:10/23/2023   Tx#:14/20 Date:10/30/2023   Tx#:15/20 Date:11/01/2023   Tx#:16/20 Date:11/06/2023   Tx#:17/20 Date:11/08/2023   Tx#:18/20 Date:11/13/2023   Tx#:19/20 Date:12/6/2023   Tx#:20/20 Date:12/13/2023   Tx#:21/30 Date:12/18/2023   Tx#:22/30 Date:12/20/2023   Tx#:23/30 Date:1/3/2024  Tx#:24/30 Date:1/17/2024  Tx#:25/30 Date:1/19/2024  Tx#:26/30 Date:1/23/2024  Tx#:27/30 Date:3/8/2024  Tx#:28/30 Date:3/12/2024  Tx#:29/30 Date:3/15/2024  Tx#:30/30   Manual: 25'  Prone with one pillow under:  Deep STM lumbar paraspinals and QL B  Manual quad stretch in prone B  Manual modified hip flexors stretch EOB; STM to anterior thigh Manual: 25'  Prone with one pillow under:  Deep STM lumbar paraspinals and QL B  Manual quad stretch in prone B  Manual modified hip flexors stretch EOB; STM to anterior thigh Manual: 15'  Prone with one pillow under:  Deep STM lumbar paraspinals and QL B  Manual quad stretch in prone B  Manual modified hip flexors stretch EOB; STM to anterior thigh   ThereX:  10 lumbar flex seated at edge of table  10x towel curls B  2x10 fwd lunge holding bar, out of AFOs  Goblet squats  Neuro re-ed:  NMES to R anterior tib 400 usec, 100hz, 95 mA  Practived walking 2 laps in p bars  2x10 DF assisted  1x heel walking  2x10 lunges to airex  4x3 hurdles Neuro re-ed:  NMES to R anterior tib 400 usec, 100hz, 95 mA  2x10 DF assisted  2x10 goblet squats  1x heel walking  10x step up to 6' R LE, 10 x step over step R LE  Therex:  PN see above, objective measures taken    Mod joão position for passive hip flexor stretch 2x30\"  10x fig 4 SL bridge Neuro re-ed:  NMES to R anterior tib 400 usec, 100hz, 95 mA  2x10 DF  assisted  15 bosu lunges on R  2x10 step ups to 6\"  15x D1 PND hip flex on airex, R Neuro re-ed:  10x 3-cone tap in mod SLS on airex, B  Tandem walk in p bars light HHA x2  20 x marches on sanddune   Manual:  SL for flex rotate mob GR III, QL release following, B x 8 min  STM L posterior hip  Manual: 18'  Deep STM R posterior hip   Deep STM R ITB/TFL   SL for flex rotate mob GR III, QL release following, B   Manual R piriformis stretch   Manual: 15'  B quad stretch in prone  SL for flex rotate mob GR III, QL release following, B   Manual R piriformis stretch  Prone thoracic spine PA mobs Gr III  Prone central PA mobs L5/S1 Gr III Manual: 15'  B quad stretch in prone  SL for flex rotate mob GR III, QL release following, B   Manual R piriformis stretch  Prone thoracic spine PA mobs Gr III  Prone central PA mobs L5/S1 Gr III Manual: 15'  B quad stretch in prone  SL for flex rotate mob GR III, QL release following, B   Manual R piriformis stretch  Prone thoracic spine PA mobs Gr III  Prone central PA mobs L5/S1 Gr III Manual:     B quad stretch in prone  B piriformis stretch --   Manual: 15'  B quad stretch in prone  SL for flex rotate mob GR III, QL release following, B   Manual R piriformis stretch  Prone thoracic spine PA mobs Gr III  Prone central PA mobs L5/S1 Gr III Manual: 8'  B piriformis stretch  L hamstring stretch  KT application left knee -- Manual: 12'  B piriformis stretch  B quad stretch in prone  Prone thoracic spine PA mobs Gr III  Prone central PA mobs L5/S1 Gr III   Manual: 5'  B piriformis stretch  B quad stretch in prone -- Re-ed: 15'  Biodex postural control, steady platform  Biodex limits of stability, steady platform   Re-ed: 15'  Biodex postural control, steady platform  Biodex limits of stability, steady platform   TE TE TE - -   - Gait; walking with hurricaine and SBA                   NU step L5 5'  Bridge 2 x 10   Clam shells 2 x 10, manual resistance NU step L5 5'  Bridge 2 x 10   Education  on log rolling  Neuro re-ed:  10x sciatic nerve glide with ankle floss, B  Manual:  SL for flex rotate mob GR III, QL release following.x8 min Therex:  Seated sciatica nerve glide x 10 R LE Neuro re-ed:  10x sciatic N glide with adductor bias and floss on R Neuro re-ed:  10x sciatic N glide with adductor bias and floss on R 15x bridges  Neuro re-ed:  Attempted to discriminate between 3 different textures (soft, sand paper, and poky) R legx4 min Therex:  2x10 donkey kicks, 3 lbs B  2x15 lateral bosu lunges, HHA  15 squat to row, 30 lbs, CGA TE  Bridge x 15  Clams with manual resistance x 10 L/R  Sit to stand x 5; hands on upper thighs  Leg press with AFO's off  Double leg press 3 black cords x 30 TE  Bridge x 15  Clams with manual resistance x 12 L/R  Leg press with AFO's ON  Double leg press 3 black cords 2 x 25 TE  Bridge x 20  LTR x 10  LTR with legs crossed for ITB bias x 10 L/R  R clam shells with manual resistance 2 x 15  Hkly PPT with DKTC x 8 L/R  Double leg press 3 black cords 1 x 30 TE  Bridge x 20  LTR x 10  LTR with legs crossed for ITB bias x 10 L/R  R clam shells with manual resistance 2 x 15  Hkly PPT with DKTC x 8 L/R  Sit to stand without UE assist x 5, SBA for blocking at the knees; 2HHA to return to sitting TE  Leg press with AFO's ON  Double leg press 3 black cords 2 x 25  Single leg press 2 black cords 1 x 30 L/R  Sit to stand without UE assist x 5, SBA for blocking at the knees; 2HHA to return to sitting  Partial squats into chair 2HHA 1 x 10, 1 x 7  Red cord lateral steps x 30 L/R 2HHA TE  Leg press with AFO's ON  Double leg press 3 black cords 2 x 25  Single leg press 2 black cords 1 x 30 L/R  Sit to stand without UE assist x 5, SBA for blocking at the knees; 2HHA to return to sitting  Partial squats into chair 2HHA 1 x 10, 1 x 7  Red cord lateral steps x 30 L/R 2HHA TE AFO's on  NU step L5 6'  LTR\"s x 20   Bridge x 15  SB Bridge x 15  Quadruped arm raises x 10 L/R  Quadruped leg raises x 8  L/R  Partial squats into chair 2HHA 1 x 10, 1 x 7  Red cord lateral steps x 30 L/R 2HHA  Inclined forward   heel raises x 8 reps  90 deg squats 2HHA, wide SUSY 2 x 5  Lateral lunges with hold x 5 L/R 2HHA TE AFO's on  NU step L5 6'  LTR\"s x 20   B Fig 4 stretch  B FADDIR stretch  B hamstring stretch  Bridge x 15  SB Bridge x 15  6\" FSU's 1HHA x 10 L/R  Partial squats into chair 2HHA 1 x 10, 1 x 7  Red cord lateral steps x 30 L/R 2HHA  Inclined forward   heel raises x 10 reps   TE AFO's on 20'  NU step L5 6'  LTR\"s x 20   B Fig 4 stretch  B FADDIR stretch  B hamstring stretch  Bridge x 15  SB Bridge x 15  PPT with marching x 15 L/R  Sit to stand from an elevated surface 2 x 10  Red cord lateral steps x 30 L/R 2HHA   TE 30'  SB rolls DKTC  SB rolls LTR  L quad sets 10 x 10 sec, x 2  L/R SLR 2 x 10  SAQ's 2 x 10  Bridge 2 x 10  SB Bridge x 15  PPT with marching x 15 L/R TE 35'  NU step L6 5'  SB rolls LTR  SB rolls DKTC  SB bridge 2 x 12  Bridge with ADD ball 2 x 12  PPT with toe taps 2 x 12 L/R  Quadruped camel cat  X 10  Bird dog x 5 L/R CGA for safety  Red XB lateral steps x 30 L/R  Chair hovers x 12  Lateral lunges with hold x 10 L/R TE 30'  NU step L6 5'  SB rolls LTR  SB rolls DKTC  SB bridge 2 x 12  Quadruped camel cat  X 10  Bird dog x 5 L/R CGA for safety  Red XB lateral steps x 30 L/R  Sit to stand from an elevated seat position x 8 with focus on independent stabilization in standing   Forward lunge hold, sustained, 60 sec L/R  DLS on Airex x 3 (longest hold: 50 sec) TE 40'  SB rolls LTR  SB rolls DKTC  SB bridge x 15  Bridge x 15  Open book B  Clam shells BTB x 20 L/R  Prone quad stretch with a strap  Prone hip extension x 10 L/R  Camel Cat  Bird Dog  Lateral steps at the counter Grey TB  Squats at the counter  Tandem stance at the counter, modified  DLS wide USSY at the counter TE 30'  Tandem stance B  Forward steps in II bars  Lateral steps in II bars  DLS on Airex  Madhavi step overs forward  Madhavi step  overs lateral  Squats at the counter  Lateral steps at the counter, Grey TB   NU step: L6 5'  Tandem stance B  Forward steps in II bars  Lateral steps in II bars  DLS on Airex  Madhavi step overs forward  Madhavi step overs lateral  Left/Right turns in II bars  Side reaching in II bars with lumbar/thoracic rotation  Forward reaching in II bars       NU step: L6 5'  Tandem stance B    Forward steps in II bars, arms crossed (leans into the left railing ~ 2 times per length)  Forward steps in II bars holding a ball (leans into L/R railing with nearly every step; improves with practice)  Lateral steps in II bars (leans posterior   DLS on Airex  Madhavi step overs forward  Madhavi step overs lateral  Left/Right turns in II bars  Side reaching in II bars with lumbar/thoracic rotation  Forward reaching in II bars   Re-ed: 15'  Quadruped neutral spine/TrA recruitment/manual cuing  Camel cat with manual assist for movement coordination  Quadruped posterior rocking  Quadruped alternate arm raises x 7 L/R Re-ed: 15'  Quadruped neutral spine/TrA recruitment/manual cuing  Camel cat with minimal manual assist for movement coordination  Quadruped posterior rocking  Quadruped alternate arm raises x 7 L/R  TrA in hook-lying  TrA/PPT with alternate hip/knee flexion, slow  TrA/PPT with lateral knee fall outs, slow Re-ed: 25'  Quadruped neutral spine/TrA recruitment/manual cuing  Camel cat with minimal manual assist for movement coordination  Quadruped posterior rocking  Quadruped alternate arm raises x 7 L/R  Quadruped modified hip raises 2 x 5 L/R  TrA in hook-lying  TrA/PPT with alternate hip/knee flexion, slow  TrA/PPT with lateral knee fall outs, slow Therex:  10x sit to stand with exaggerated WS fwd and CGA  Tennis ball roll out firm pressure x2 min R Manual:  SL for flex rotate mob GR III, QL release following.x8 min    Therex:  2x10 SLR B  10 x bridges 4\" holds Therex:  2x10 knee ankle/knee ankle B  15x dead bugs in shelf position    10x fig 4 bridges SL    Manual:  SL for flex rotate mob GR III, QL release following.x8 min   Manual:  SL for flex rotate mob GR III, QL release following, Prone CPA to L3-L5 GR III.x10 min    Therex:  2x8 fwd lunges to bosu Therex:  Towel  toe curls, R LE x10    Manual:  SL for flex rotate mob GR III, QL release following, B Manual:  SL for flex rotate mob GR III, QL release following, B x 8 min    Therex:    2x10 bridges with airex under one foot, B  2x10 SL raise on mat  15x SL hip/ankle hip abd, B Single leg press 1 black, 1 grey and 1 yellow cords x 20 L/R  B heel raises on leg press with knees extended, 1 black cord; 2 x 12    Re-ed: AFO's on  DLS on Airex SBA/CGA  DLS on the ground SBA  Madhavi step over with w/s forward x 15 L/R HHA  Cone taps x 15 L/R 1HHA     Single leg press 1 black, 1 grey and 1 yellow cords x 20 L/R    Re-ed: AFO's on  DLS on Airex SBA/CGA  DLS on the ground SBA  Madhavi step over with w/s forward x 15 L/R HHA  Cone taps x 15 L/R 1HHA Single leg press 2 black cords 1 x 30 L/R    Re-ed: AFO's on  DLS on Airex SBA/CGA  DLS on the ground SBA  Madhavi step over with w/s forward x 15 L/R HHA  Cone taps x 15 L/R 1HHA  Forward lunge unsupported with hands hovering and SBA; 7 x 5 sec L/R Partial squats into chair 2HHA x 10  Red cord lateral steps x 20 L/R 2HHA    Re-ed: AFO's on  DLS on Airex SBA 3 x 30 sec  DLS on the ground SBA  Tandem stance 2 x 20 sec L/R finger touch B  Cone taps x 15 L/R 1HHA  Forward lunge unsupported with hands hovering and SBA; 7 x 5 sec L/R Re-ed: AFO's on  DLS on Airex SBA 3 x 30 sec  DLS on the ground SBA  Tandem stance 2 x 20 sec L/R finger touch B  Cone taps x 15 L/R 1HHA  Forward lunge unsupported with hands hovering and SBA; 7 x 5 sec L/R Re-ed: AFO's on  DLS on Airex SBA 3 x 30 sec  DLS on the ground SBA  Tandem stance 2 x 20 sec L/R finger touch B  Cone taps x 15 L/R 1HHA  Forward lunge unsupported with hands hovering and SBA; 7 x 5 sec L/R   Re-ed: AFO's  on  DLS on Airex SBA 3 x 30 sec  Tandem stance 2 x 20 sec L/R finger touch B  Cone taps x 15 L/R 1HHA  Forward lunge unsupported with hands hovering and SBA; 7 x 5 sec L/R  DLS on Airex with forward reach x 5 L/R   Double leg press 3 black cords 1 x 30  Single leg press 2 black cords 1 x 30 L/R    Re-ed: AFO's on  DLS on Airex SBA 3 x 30 sec  Tandem stance 2 x 20 sec L/R finger touch B  Cone taps x 15 L/R 1HHA  Tandem walk 2HHA  DLS on Airex with forward reach x 5 L/R Re-ed: AFO's on 10'  DLS on Airex SBA 3 x 30 sec  Tandem stance 2 x 20 sec L/R finger touch B  Cone taps x 15 L/R 1HHA  Tandem walk 2HHA  DLS on Airex with forward reach x 10 L/R  Lunge stance with A/P w/s x 5 L/R HHA   Re-ed: AFO's on 8'  DLS on Airex SBA 3 x 30 sec  Tandem stance 2 x 20 sec L/R finger touch B  Cone taps x 15 L/R 1HHA  Tandem walk 2HHA        HEP: Access Code: [email or web address]  Access Code: [email or web address]  URL: [email or web address]  Date: 01/23/2024  Prepared by: Juanita Koehler    Exercises  - Supine Hip and Knee Flexion AROM with Swiss Ball  - 1 x daily - 7 x weekly - 3 sets - 10 reps  - Supine Lower Trunk Rotation with Swiss Ball  - 1 x daily - 7 x weekly - 3 sets - 10 reps  - Supine Figure 4 Piriformis Stretch  - 1 x daily - 7 x weekly - 1 sets - 3 reps - 30-60 sec hold  - Supine Bridge  - 1 x daily - 7 x weekly - 2-3 sets - 10 reps  - Bridge with Heels on Swiss Ball  - 1 x daily - 7 x weekly - 2-3 sets - 10 reps  - Sidelying Open Book Thoracic Lumbar Rotation and Extension  - 1 x daily - 7 x weekly - 1 sets - 10 reps  - Clamshell with Resistance  - 1 x daily - 7 x weekly - 3 sets - 10 reps  - Prone Quadriceps Stretch with Strap  - 1 x daily - 7 x weekly - 1 sets - 3 reps - 30-60 sec hold  - Prone Hip Extension  - 1 x daily - 7 x weekly - 1-2 sets - 10 reps  - Cat Cow  - 1 x daily - 7 x weekly - 2 sets - 10 reps  - Bird Dog  - 1 x daily - 7 x weekly - 1-2 sets - 10 reps  - Side Stepping with Counter Support  - 1 x daily - 7 x weekly  - 3 sets - 10 reps  - Mini Squat with Counter Support  - 1 x daily - 7 x weekly - 1-2 sets - 10 reps  - Tandem Stance with Support  - 1 x daily - 7 x weekly - 1 sets - 3 reps - 30 sec hold      Charges: therex x 2 30', re-ed x 1 Total Timed Treatment: 40 min  Total Treatment Time: 45 min

## 2024-03-19 ENCOUNTER — OFFICE VISIT (OUTPATIENT)
Dept: PHYSICAL THERAPY | Facility: HOSPITAL | Age: 79
End: 2024-03-19
Attending: INTERNAL MEDICINE
Payer: MEDICARE

## 2024-03-19 PROCEDURE — 97110 THERAPEUTIC EXERCISES: CPT

## 2024-03-19 PROCEDURE — 97112 NEUROMUSCULAR REEDUCATION: CPT

## 2024-03-20 ENCOUNTER — ANESTHESIA (OUTPATIENT)
Dept: SURGERY | Facility: HOSPITAL | Age: 79
End: 2024-03-20
Payer: MEDICARE

## 2024-03-20 ENCOUNTER — HOSPITAL ENCOUNTER (OUTPATIENT)
Facility: HOSPITAL | Age: 79
Setting detail: HOSPITAL OUTPATIENT SURGERY
Discharge: HOME | End: 2024-03-20
Attending: OTOLARYNGOLOGY | Admitting: OTOLARYNGOLOGY
Payer: MEDICARE

## 2024-03-20 ENCOUNTER — ANESTHESIA EVENT (OUTPATIENT)
Dept: SURGERY | Facility: HOSPITAL | Age: 79
End: 2024-03-20
Payer: MEDICARE

## 2024-03-20 VITALS
WEIGHT: 219.63 LBS | DIASTOLIC BLOOD PRESSURE: 92 MMHG | RESPIRATION RATE: 20 BRPM | OXYGEN SATURATION: 95 % | TEMPERATURE: 97 F | BODY MASS INDEX: 29.11 KG/M2 | HEIGHT: 73 IN | HEART RATE: 87 BPM | SYSTOLIC BLOOD PRESSURE: 140 MMHG

## 2024-03-20 DIAGNOSIS — Q89.2 THYROGLOSSAL DUCT CYST: Primary | ICD-10-CM

## 2024-03-20 PROCEDURE — 0WB60ZZ EXCISION OF NECK, OPEN APPROACH: ICD-10-PCS | Performed by: OTOLARYNGOLOGY

## 2024-03-20 PROCEDURE — 88304 TISSUE EXAM BY PATHOLOGIST: CPT | Performed by: OTOLARYNGOLOGY

## 2024-03-20 RX ORDER — SODIUM CHLORIDE, SODIUM LACTATE, POTASSIUM CHLORIDE, CALCIUM CHLORIDE 600; 310; 30; 20 MG/100ML; MG/100ML; MG/100ML; MG/100ML
INJECTION, SOLUTION INTRAVENOUS CONTINUOUS
Status: DISCONTINUED | OUTPATIENT
Start: 2024-03-20 | End: 2024-03-20

## 2024-03-20 RX ORDER — HYDROMORPHONE HYDROCHLORIDE 1 MG/ML
0.6 INJECTION, SOLUTION INTRAMUSCULAR; INTRAVENOUS; SUBCUTANEOUS EVERY 5 MIN PRN
Status: DISCONTINUED | OUTPATIENT
Start: 2024-03-20 | End: 2024-03-20

## 2024-03-20 RX ORDER — EPHEDRINE SULFATE 50 MG/ML
INJECTION INTRAVENOUS AS NEEDED
Status: DISCONTINUED | OUTPATIENT
Start: 2024-03-20 | End: 2024-03-20 | Stop reason: SURG

## 2024-03-20 RX ORDER — ACETAMINOPHEN 500 MG
1000 TABLET ORAL ONCE AS NEEDED
Status: COMPLETED | OUTPATIENT
Start: 2024-03-20 | End: 2024-03-20

## 2024-03-20 RX ORDER — ACETAMINOPHEN 500 MG
1000 TABLET ORAL ONCE
Status: DISCONTINUED | OUTPATIENT
Start: 2024-03-20 | End: 2024-03-20 | Stop reason: HOSPADM

## 2024-03-20 RX ORDER — METRONIDAZOLE 500 MG/100ML
500 INJECTION, SOLUTION INTRAVENOUS ONCE
Status: DISCONTINUED | OUTPATIENT
Start: 2024-03-20 | End: 2024-03-20 | Stop reason: HOSPADM

## 2024-03-20 RX ORDER — LIDOCAINE HYDROCHLORIDE 10 MG/ML
INJECTION, SOLUTION EPIDURAL; INFILTRATION; INTRACAUDAL; PERINEURAL AS NEEDED
Status: DISCONTINUED | OUTPATIENT
Start: 2024-03-20 | End: 2024-03-20 | Stop reason: SURG

## 2024-03-20 RX ORDER — NALOXONE HYDROCHLORIDE 0.4 MG/ML
0.08 INJECTION, SOLUTION INTRAMUSCULAR; INTRAVENOUS; SUBCUTANEOUS AS NEEDED
Status: DISCONTINUED | OUTPATIENT
Start: 2024-03-20 | End: 2024-03-20

## 2024-03-20 RX ORDER — ONDANSETRON 2 MG/ML
4 INJECTION INTRAMUSCULAR; INTRAVENOUS EVERY 6 HOURS PRN
Status: DISCONTINUED | OUTPATIENT
Start: 2024-03-20 | End: 2024-03-20

## 2024-03-20 RX ORDER — HYDROCODONE BITARTRATE AND ACETAMINOPHEN 5; 325 MG/1; MG/1
1 TABLET ORAL ONCE AS NEEDED
Status: COMPLETED | OUTPATIENT
Start: 2024-03-20 | End: 2024-03-20

## 2024-03-20 RX ORDER — HYDROMORPHONE HYDROCHLORIDE 1 MG/ML
0.2 INJECTION, SOLUTION INTRAMUSCULAR; INTRAVENOUS; SUBCUTANEOUS EVERY 5 MIN PRN
Status: DISCONTINUED | OUTPATIENT
Start: 2024-03-20 | End: 2024-03-20

## 2024-03-20 RX ORDER — ONDANSETRON 2 MG/ML
INJECTION INTRAMUSCULAR; INTRAVENOUS AS NEEDED
Status: DISCONTINUED | OUTPATIENT
Start: 2024-03-20 | End: 2024-03-20 | Stop reason: SURG

## 2024-03-20 RX ORDER — LIDOCAINE HYDROCHLORIDE AND EPINEPHRINE 10; 10 MG/ML; UG/ML
INJECTION, SOLUTION INFILTRATION; PERINEURAL AS NEEDED
Status: DISCONTINUED | OUTPATIENT
Start: 2024-03-20 | End: 2024-03-20 | Stop reason: HOSPADM

## 2024-03-20 RX ORDER — METOCLOPRAMIDE HYDROCHLORIDE 5 MG/ML
10 INJECTION INTRAMUSCULAR; INTRAVENOUS EVERY 8 HOURS PRN
Status: DISCONTINUED | OUTPATIENT
Start: 2024-03-20 | End: 2024-03-20

## 2024-03-20 RX ORDER — HYDROCODONE BITARTRATE AND ACETAMINOPHEN 7.5; 325 MG/1; MG/1
1 TABLET ORAL EVERY 4 HOURS PRN
Qty: 15 TABLET | Refills: 0 | Status: SHIPPED | OUTPATIENT
Start: 2024-03-20

## 2024-03-20 RX ORDER — HYDROCODONE BITARTRATE AND ACETAMINOPHEN 5; 325 MG/1; MG/1
2 TABLET ORAL ONCE AS NEEDED
Status: COMPLETED | OUTPATIENT
Start: 2024-03-20 | End: 2024-03-20

## 2024-03-20 RX ORDER — ROCURONIUM BROMIDE 10 MG/ML
INJECTION, SOLUTION INTRAVENOUS AS NEEDED
Status: DISCONTINUED | OUTPATIENT
Start: 2024-03-20 | End: 2024-03-20 | Stop reason: SURG

## 2024-03-20 RX ORDER — DEXAMETHASONE SODIUM PHOSPHATE 4 MG/ML
VIAL (ML) INJECTION AS NEEDED
Status: DISCONTINUED | OUTPATIENT
Start: 2024-03-20 | End: 2024-03-20 | Stop reason: SURG

## 2024-03-20 RX ORDER — CEFAZOLIN SODIUM/WATER 2 G/20 ML
2 SYRINGE (ML) INTRAVENOUS ONCE
Status: COMPLETED | OUTPATIENT
Start: 2024-03-20 | End: 2024-03-20

## 2024-03-20 RX ORDER — HYDROMORPHONE HYDROCHLORIDE 1 MG/ML
0.4 INJECTION, SOLUTION INTRAMUSCULAR; INTRAVENOUS; SUBCUTANEOUS EVERY 5 MIN PRN
Status: DISCONTINUED | OUTPATIENT
Start: 2024-03-20 | End: 2024-03-20

## 2024-03-20 RX ADMIN — SODIUM CHLORIDE, SODIUM LACTATE, POTASSIUM CHLORIDE, CALCIUM CHLORIDE: 600; 310; 30; 20 INJECTION, SOLUTION INTRAVENOUS at 14:24:00

## 2024-03-20 RX ADMIN — CEFAZOLIN SODIUM/WATER 2 G: 2 G/20 ML SYRINGE (ML) INTRAVENOUS at 12:46:00

## 2024-03-20 RX ADMIN — LIDOCAINE HYDROCHLORIDE 50 MG: 10 INJECTION, SOLUTION EPIDURAL; INFILTRATION; INTRACAUDAL; PERINEURAL at 12:38:00

## 2024-03-20 RX ADMIN — DEXAMETHASONE SODIUM PHOSPHATE 8 MG: 4 MG/ML VIAL (ML) INJECTION at 12:42:00

## 2024-03-20 RX ADMIN — ONDANSETRON 4 MG: 2 INJECTION INTRAMUSCULAR; INTRAVENOUS at 14:07:00

## 2024-03-20 RX ADMIN — SODIUM CHLORIDE, SODIUM LACTATE, POTASSIUM CHLORIDE, CALCIUM CHLORIDE: 600; 310; 30; 20 INJECTION, SOLUTION INTRAVENOUS at 12:33:00

## 2024-03-20 RX ADMIN — ROCURONIUM BROMIDE 40 MG: 10 INJECTION, SOLUTION INTRAVENOUS at 12:38:00

## 2024-03-20 RX ADMIN — EPHEDRINE SULFATE 10 MG: 50 INJECTION INTRAVENOUS at 12:52:00

## 2024-03-20 RX ADMIN — EPHEDRINE SULFATE 5 MG: 50 INJECTION INTRAVENOUS at 13:21:00

## 2024-03-20 NOTE — DISCHARGE INSTRUCTIONS
Instructions after Surgery  Recovery  Within the first day after surgery, it is common to have a small amount of oozing from the wound edge.  If bleeding develops, apply firm pressure with a gauze pad for 10-15 minutes.  Keep the area dry for 48 hours after surgery.  Afterwards, you may let water run over the area but do not soak or scrub vigorously.  Do not bathe or shower for a 48 hours after surgery.  If sutures were used, dried blood and crusts may be removed with hydrogen peroxide on a Q tip twice a day.  Apply antibiotic ointment afterwards at least two times a day for seven days.  If steristrips were used, you may wash the area and get them wet.  They will peel and curl at the edges and some may fall off.  Do not allow the wound site to undergo drying or crusting.  Do not use ice on the wound unless specifically instructed.  Do not place makeup or any other skin care products over the wound until the sutures have been removed and you are given clearance to do so by your physician.  It is important to use an SPF 30 or greater sunblock on the wound for 6 months after surgery to avoid sun damage.        Diet  Start with clear liquids.  You may advance your diet as tolerated.  You can eat and drink whatever you like.      Activity   Initially, start with light activity only.   No travel or long trips for two weeks after surgery.  Heavy lifting, straining and should be avoided during the first two weeks.  Thereafter, as you improve, you may gradually increase your level of activity.  You may return to work or school when you feel better. Do not be surprised if you tire more easily than usual; that is your body’s way of telling you that you should slow down.        Medications  Resume the medications you were taking prior to surgery.  You will be given a prescription for a pain medication to take at home.  Directions will be on the bottles.  Pain following surgery is usually mild and readily controlled with  medication.  Do not be afraid to take a pain pill if you are uncomfortable, especially when going to bed at night or awakening in the morning.      Concerns  Bleeding.  A small amount of bleeding may be normal.  Please call the office or come to the Emergency Room should you develop brisk, new bleeding, which does not stop after a few minutes of holding pressure.  Fever.  Any temperature above 101 degrees should be treated with acetaminophen (Tylenol).  If the temperature does not respond to Tylenol, please call the office.   Redness.  If the area becomes red and warm to touch or wound edges separate or the sutures dislodge, please call the office.  Swelling and Pain. Some swelling is expected even 24 to 48 hours after surgery, If swelling increases after that time or if you experience increasing pain, please call the office    Follow Up  If you have sutures, this should be removed in 5 to 7 days after surgery.  Any pathology reports are typically available by a week after your surgery. Please call the office at [phone] to schedule these appointments with Dr. Cueto, if not already arranged.

## 2024-03-20 NOTE — ANESTHESIA PREPROCEDURE EVALUATION
PRE-OP EVALUATION    Patient Name: Nils Joseph    Admit Diagnosis: NECK MASS    Pre-op Diagnosis: NECK MASS    EXCISION THYROGLOSSAL DUCT CYST WITH NERVE MONITOR    Anesthesia Procedure: EXCISION THYROGLOSSAL DUCT CYST WITH NERVE MONITOR    Surgeon(s) and Role:     * Howard Cueto MD - Primary     * Chon Marquez MD - Assisting Surgeon    Pre-op vitals reviewed.        Body mass index is 28.63 kg/m².    Current medications reviewed.  Hospital Medications:   acetaminophen (Tylenol Extra Strength) tab 1,000 mg  1,000 mg Oral Once    lactated ringers infusion   Intravenous Continuous    ceFAZolin (Ancef) 2 g in 20mL IV syringe premix  2 g Intravenous Once    And    metRONIDAZOLE in sodium chloride 0.79% (Flagyl) 5 mg/mL IVPB premix 500 mg  500 mg Intravenous Once       Outpatient Medications:     Medications Prior to Admission   Medication Sig Dispense Refill Last Dose    doxycycline 100 MG Oral Cap Take 1 capsule (100 mg total) by mouth 2 (two) times daily.       famotidine 40 MG Oral Tab Take 1 tablet (40 mg total) by mouth daily.       BRINZOLAMIDE-BRIMONIDINE OP Apply 1 drop to eye in the morning, at noon, and at bedtime. Right eye       predniSONE 5 MG Oral Tab Take 1 tablet (5 mg total) by mouth daily. In addition to 10mg tablet, for total 15mg daily. 30 tablet 0     valACYclovir 1 G Oral Tab Take 0.5 tablets (500 mg total) by mouth daily.       gabapentin 300 MG Oral Cap Take 1 capsule (300 mg total) by mouth 3 (three) times daily. 270 capsule 3     docusate sodium 100 MG Oral Cap Take 1 capsule (100 mg total) by mouth 2 (two) times daily. (Patient taking differently: Take 1 capsule (100 mg total) by mouth 2 (two) times daily as needed.) 30 capsule 1     Glucosamine-Chondroitin (GLUCOSAMINE CHONDR COMPLEX OR) Take by mouth as needed.       cyanocobalamin 1000 MCG Oral Tab Take 1 tablet (1,000 mcg total) by mouth daily.       senna-docusate 8.6-50 MG Oral Tab Take 1 tablet by mouth daily. (Patient taking  differently: Take 1 tablet by mouth daily as needed.) 30 tablet 0     erythromycin 5 MG/GM Ophthalmic Ointment Place 1 Application  into both eyes nightly. Uses 3-4 x weekly before bed. States prescribed mainly for moisture       tamsulosin (FLOMAX) cap Take 1 capsule (0.4 mg total) by mouth daily. 90 capsule 3     finasteride 5 MG Oral Tab Take 1 tablet (5 mg total) by mouth daily. 90 tablet 3     atorvastatin 10 MG Oral Tab Take 1 tablet (10 mg total) by mouth daily.       Albuterol Sulfate  (90 BASE) MCG/ACT Inhalation Aero Soln Inhale 2 puffs into the lungs every 6 (six) hours as needed for Wheezing.       Multiple Vitamin (MULTI-VITAMIN OR) Take 1 tablet by mouth daily.         ALPRAZolam 0.25 MG Oral Tab Take 1 tablet (0.25 mg total) by mouth nightly as needed. 90 tablet 1     Beclomethasone Diprop HFA 40 MCG/ACT Inhalation Aerosol, Breath Activated Inhale 2 puffs into the lungs 2 (two) times daily as needed. 1 each 3     fluticasone propionate 50 MCG/ACT Nasal Suspension 1 spray by Each Nare route 2 (two) times daily as needed for Rhinitis. 3 each 3     levothyroxine 100 MCG Oral Tab Take 1 tablet (100 mcg total) by mouth once daily. Overdue for labs, please complete them. 90 tablet 3     traZODone 100 MG Oral Tab Take 1 tablet (100 mg total) by mouth nightly as needed for Sleep. 90 tablet 3     predniSONE 10 MG Oral Tab Take 1 tablet (10 mg total) by mouth daily. 30 tablet 0     predniSONE 5 MG Oral Tab In one month, approximately 4/8/24, start prednisone 5mg daily 30 tablet 2        Allergies: Immune globulin      Anesthesia Evaluation        Anesthetic Complications           GI/Hepatic/Renal                                 Cardiovascular    Negative cardiovascular ROS.        MET: >4         (+) hyperlipidemia                    (-) angina     (-) MONTERO         Endo/Other           (+) hypothyroidism                       Pulmonary      (+) asthma                     Neuro/Psych      (+)  depression           (+) neuromuscular disease                     Past Surgical History:   Procedure Laterality Date    APPENDECTOMY      ARTHROSCOPY OF JOINT UNLISTED Right     knee    BACK SURGERY  16    triple fusion of L3-L4-L5    BIOPSY  2019    Bone Marrow    BIOPSY  2019    right sural nerve biopsy    CARPAL TUNNEL RELEASE Bilateral     CATARACT Bilateral 2015    IOL'S    COLONOSCOPY      HEMORRHOIDECTOMY  ~     ORTHOPEDIC SURG (PBP)      LT ELBOW - ulnar nerve release    OTHER  2019    Lumbar puncture    OTHER SURGICAL HISTORY      Shoulder Joint Replacement 16, Sinus surgery ,    OTHER SURGICAL HISTORY      I&D of abscess in axilla area 7/3    SIGMOIDOSCOPY,DIAGNOSTIC      SINUS SURGERY    2014    SPINE SURGERY PROCEDURE UNLISTED      L3-4-5    TONSILLECTOMY       Social History     Socioeconomic History    Marital status:    Tobacco Use    Smoking status: Former     Packs/day: 1.50     Years: 32.00     Additional pack years: 0.00     Total pack years: 48.00     Types: Cigarettes, Pipe     Quit date: 3/1/1998     Years since quittin.0    Smokeless tobacco: Never    Tobacco comments:     Quit many years ago   Vaping Use    Vaping Use: Never used   Substance and Sexual Activity    Alcohol use: Yes     Alcohol/week: 11.0 - 18.0 standard drinks of alcohol     Types: 6 - 8 Glasses of wine, 1 - 2 Cans of beer, 4 - 8 Shots of liquor per week     Comment: 1-2 drinks daily    Drug use: No   Other Topics Concern    Caffeine Concern Yes     Comment: 2-3 cups a day    Exercise Yes     Comment: physical therapy     History   Drug Use No     Available pre-op labs reviewed.     Lab Results   Component Value Date     2024    K 3.8 2024     2024    CO2 28.0 2024    BUN 24 (H) 2024    CREATSERUM 1.43 (H) 2024     (H) 2024    CA 9.6 2024            Airway      Mallampati: II  Mouth opening: >3  FB  TM distance: 4 - 6 cm  Neck ROM: full Cardiovascular    Cardiovascular exam normal.         Dental    Dentition appears grossly intact         Pulmonary    Pulmonary exam normal.                 Other findings              ASA: 2   Plan: general  NPO status verified and     Post-procedure pain management plan discussed with surgeon and patient.    Comment: Discussed risks including PONV, sore throat, dental damage, cardiac and respiratory complications. All questions answered.  Plan/risks discussed with: patient  Use of blood product(s) discussed with: patient              Present on Admission:  **None**

## 2024-03-20 NOTE — PROGRESS NOTES
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 Sees the difference in his stability when corrects his posture/positioning but this requires a lot of effort.    Pain: 2-3/10 rib pain      Objective:   Ambulates with B AFO's and cane, steppage gait      Assessment: Demonstrates improved stability with forward steps while holding a ball and with turning while holding a ball; both activities performed in II bars. Lateral steps are still limited by frequent LOB posterior. Demonstrates improved ability to keep his weight forward by flexing the knees if performs more static activities, such as reaching forward or to the side.     Goals: (to be met in 10+ 10 + 10 + 6/8visits)   Pt will demonstrate improved SLS to >5 seconds CALOS to promote safety and decrease risk of falls on uneven surfaces such as grass. Progressing.   Pt will report reduced frequency of loss of balance posterior/to the side by 30%. New  Pt will demonstrate improved postural control to allow for improved body centering with gait and daily activities. New  Pt will perform TUG in <15 seconds with least restrictive AD, demonstrating improved gait speed for improved participation in ADL such as community ambulation. Met  Pt will perform 4-Item DGI with score of 9/12 or greater with least restrictive AD to demonstrate ability to ambulate safely in crowded and busy environments. Progressing  Pt will be able to squat to  light objects around the house without loss of stability. Progressing  Pt will improve functional hip strength to demonstrate  ability to ascend/descend 1 flight of stairs reciprocally with the use of handrail. Goal met.   Pt will be independent and compliant with comprehensive HEP to maintain progress achieved in PT Goal met.         Plan: balance re-ed, including Biodex, postural control, reactive balance, dual tasks    Date: 9/20/23  Tx#: 6/16 Date: 9/25/23  Tx#: 7/16 Date: 9/27/23  Tx#: 8/16 Date:10/2/2023   Tx#:9/16 Date:10/4/2023  Tx#:10/16 Date:10/9/2023   Tx#:11/16 Date: 10/11/2023   Tx#:12/16 Date:10/16/2023   Tx#:13/16 Date:10/23/2023   Tx#:14/20 Date:10/30/2023   Tx#:15/20 Date:11/01/2023   Tx#:16/20 Date:11/06/2023   Tx#:17/20 Date:11/08/2023   Tx#:18/20 Date:11/13/2023   Tx#:19/20 Date:12/6/2023   Tx#:20/20 Date:12/13/2023   Tx#:21/30 Date:12/18/2023   Tx#:22/30 Date:12/20/2023   Tx#:23/30 Date:1/3/2024  Tx#:24/30 Date:1/17/2024  Tx#:25/30 Date:1/19/2024  Tx#:26/30 Date:1/23/2024  Tx#:27/30 Date:3/8/2024  Tx#:28/30 Date:3/12/2024  Tx#:29/30 Date:3/15/2024  Tx#:30/30 Date:3/19/2024  Tx#:31   Manual: 25'  Prone with one pillow under:  Deep STM lumbar paraspinals and QL B  Manual quad stretch in prone B  Manual modified hip flexors stretch EOB; STM to anterior thigh Manual: 25'  Prone with one pillow under:  Deep STM lumbar paraspinals and QL B  Manual quad stretch in prone B  Manual modified hip flexors stretch EOB; STM to anterior thigh Manual: 15'  Prone with one pillow under:  Deep STM lumbar paraspinals and QL B  Manual quad stretch in prone B  Manual modified hip flexors stretch EOB; STM to anterior thigh   ThereX:  10 lumbar flex seated at edge of table  10x towel curls B  2x10 fwd lunge holding bar, out of AFOs  Goblet squats  Neuro re-ed:  NMES to R anterior tib 400 usec, 100hz, 95 mA  Practived walking 2 laps in p bars  2x10 DF assisted  1x heel walking  2x10 lunges to airex  4x3 hurdles Neuro re-ed:  NMES to R anterior tib 400 usec, 100hz, 95 mA  2x10 DF assisted  2x10 goblet squats  1x heel walking  10x step up to 6' R  LE, 10 x step over step R LE  Therex:  PN see above, objective measures taken    Mod joão position for passive hip flexor stretch 2x30\"  10x fig 4 SL bridge Neuro re-ed:  NMES to R anterior tib 400 usec, 100hz, 95 mA  2x10 DF assisted  15 bosu lunges on R  2x10 step ups to 6\"  15x D1 PND hip flex on airex, R Neuro re-ed:  10x 3-cone tap in mod SLS on airex, B  Tandem walk in p bars light HHA x2  20 x marches on sanddune   Manual:  SL for flex rotate mob GR III, QL release following, B x 8 min  STM L posterior hip  Manual: 18'  Deep STM R posterior hip   Deep STM R ITB/TFL   SL for flex rotate mob GR III, QL release following, B   Manual R piriformis stretch   Manual: 15'  B quad stretch in prone  SL for flex rotate mob GR III, QL release following, B   Manual R piriformis stretch  Prone thoracic spine PA mobs Gr III  Prone central PA mobs L5/S1 Gr III Manual: 15'  B quad stretch in prone  SL for flex rotate mob GR III, QL release following, B   Manual R piriformis stretch  Prone thoracic spine PA mobs Gr III  Prone central PA mobs L5/S1 Gr III Manual: 15'  B quad stretch in prone  SL for flex rotate mob GR III, QL release following, B   Manual R piriformis stretch  Prone thoracic spine PA mobs Gr III  Prone central PA mobs L5/S1 Gr III Manual:     B quad stretch in prone  B piriformis stretch --   Manual: 15'  B quad stretch in prone  SL for flex rotate mob GR III, QL release following, B   Manual R piriformis stretch  Prone thoracic spine PA mobs Gr III  Prone central PA mobs L5/S1 Gr III Manual: 8'  B piriformis stretch  L hamstring stretch  KT application left knee -- Manual: 12'  B piriformis stretch  B quad stretch in prone  Prone thoracic spine PA mobs Gr III  Prone central PA mobs L5/S1 Gr III   Manual: 5'  B piriformis stretch  B quad stretch in prone -- Re-ed: 15'  Biodex postural control, steady platform  Biodex limits of stability, steady platform   Re-ed: 15'  Biodex postural control, steady  platform  Biodex limits of stability, steady platform Re-ed: 15'  Biodex postural control, steady platform  Biodex limits of stability, steady platform   TE TE TE - -   - Gait; walking with hurricaine and SBA                    NU step L5 5'  Bridge 2 x 10   Clam shells 2 x 10, manual resistance NU step L5 5'  Bridge 2 x 10   Education on log rolling  Neuro re-ed:  10x sciatic nerve glide with ankle floss, B  Manual:  SL for flex rotate mob GR III, QL release following.x8 min Therex:  Seated sciatica nerve glide x 10 R LE Neuro re-ed:  10x sciatic N glide with adductor bias and floss on R Neuro re-ed:  10x sciatic N glide with adductor bias and floss on R 15x bridges  Neuro re-ed:  Attempted to discriminate between 3 different textures (soft, sand paper, and poky) R legx4 min Therex:  2x10 donkey kicks, 3 lbs B  2x15 lateral bosu lunges, HHA  15 squat to row, 30 lbs, CGA TE  Bridge x 15  Clams with manual resistance x 10 L/R  Sit to stand x 5; hands on upper thighs  Leg press with AFO's off  Double leg press 3 black cords x 30 TE  Bridge x 15  Clams with manual resistance x 12 L/R  Leg press with AFO's ON  Double leg press 3 black cords 2 x 25 TE  Bridge x 20  LTR x 10  LTR with legs crossed for ITB bias x 10 L/R  R clam shells with manual resistance 2 x 15  Hkly PPT with DKTC x 8 L/R  Double leg press 3 black cords 1 x 30 TE  Bridge x 20  LTR x 10  LTR with legs crossed for ITB bias x 10 L/R  R clam shells with manual resistance 2 x 15  Hkly PPT with DKTC x 8 L/R  Sit to stand without UE assist x 5, SBA for blocking at the knees; 2HHA to return to sitting TE  Leg press with AFO's ON  Double leg press 3 black cords 2 x 25  Single leg press 2 black cords 1 x 30 L/R  Sit to stand without UE assist x 5, SBA for blocking at the knees; 2HHA to return to sitting  Partial squats into chair 2HHA 1 x 10, 1 x 7  Red cord lateral steps x 30 L/R 2HHA TE  Leg press with AFO's ON  Double leg press 3 black cords 2 x 25  Single leg  press 2 black cords 1 x 30 L/R  Sit to stand without UE assist x 5, SBA for blocking at the knees; 2HHA to return to sitting  Partial squats into chair 2HHA 1 x 10, 1 x 7  Red cord lateral steps x 30 L/R 2HHA TE AFO's on  NU step L5 6'  LTR\"s x 20   Bridge x 15  SB Bridge x 15  Quadruped arm raises x 10 L/R  Quadruped leg raises x 8 L/R  Partial squats into chair 2HHA 1 x 10, 1 x 7  Red cord lateral steps x 30 L/R 2HHA  Inclined forward   heel raises x 8 reps  90 deg squats 2HHA, wide SUSY 2 x 5  Lateral lunges with hold x 5 L/R 2HHA TE AFO's on  NU step L5 6'  LTR\"s x 20   B Fig 4 stretch  B FADDIR stretch  B hamstring stretch  Bridge x 15  SB Bridge x 15  6\" FSU's 1HHA x 10 L/R  Partial squats into chair 2HHA 1 x 10, 1 x 7  Red cord lateral steps x 30 L/R 2HHA  Inclined forward   heel raises x 10 reps   TE AFO's on 20'  NU step L5 6'  LTR\"s x 20   B Fig 4 stretch  B FADDIR stretch  B hamstring stretch  Bridge x 15  SB Bridge x 15  PPT with marching x 15 L/R  Sit to stand from an elevated surface 2 x 10  Red cord lateral steps x 30 L/R 2HHA   TE 30'  SB rolls DKTC  SB rolls LTR  L quad sets 10 x 10 sec, x 2  L/R SLR 2 x 10  SAQ's 2 x 10  Bridge 2 x 10  SB Bridge x 15  PPT with marching x 15 L/R TE 35'  NU step L6 5'  SB rolls LTR  SB rolls DKTC  SB bridge 2 x 12  Bridge with ADD ball 2 x 12  PPT with toe taps 2 x 12 L/R  Quadruped camel cat  X 10  Bird dog x 5 L/R CGA for safety  Red XB lateral steps x 30 L/R  Chair hovers x 12  Lateral lunges with hold x 10 L/R TE 30'  NU step L6 5'  SB rolls LTR  SB rolls DKTC  SB bridge 2 x 12  Quadruped camel cat  X 10  Bird dog x 5 L/R CGA for safety  Red XB lateral steps x 30 L/R  Sit to stand from an elevated seat position x 8 with focus on independent stabilization in standing   Forward lunge hold, sustained, 60 sec L/R  DLS on Airex x 3 (longest hold: 50 sec) TE 40'  SB rolls LTR  SB rolls DKTC  SB bridge x 15  Bridge x 15  Open book B  Clam shells BTB x 20 L/R  Prone quad  stretch with a strap  Prone hip extension x 10 L/R  Camel Cat  Bird Dog  Lateral steps at the counter Grey TB  Squats at the counter  Tandem stance at the counter, modified  DLS wide SUSY at the counter TE 30'  Tandem stance B  Forward steps in II bars  Lateral steps in II bars  DLS on Airex  Hedy step overs forward  Hedy step overs lateral  Squats at the counter  Lateral steps at the counter, Grey TB   NU step: L6 5'  Tandem stance B  Forward steps in II bars  Lateral steps in II bars  DLS on Airex  Hedy step overs forward  Hedy step overs lateral  Left/Right turns in II bars  Side reaching in II bars with lumbar/thoracic rotation  Forward reaching in II bars       NU step: L6 5'  Tandem stance B    Forward steps in II bars, arms crossed (leans into the left railing ~ 2 times per length)  Forward steps in II bars holding a ball (leans into L/R railing with nearly every step; improves with practice)  Lateral steps in II bars (leans posterior   DLS on Airex  Hedy step overs forward  Hedy step overs lateral  Left/Right turns in II bars  Side reaching in II bars with lumbar/thoracic rotation  Forward reaching in II bars NU step: L6 5'  Tandem stance B    Forward steps in II bars, arms crossed   Forward steps in II bars holding a ball   Lateral steps in II bars    DLS on Airex  Hedy step overs forward  Single hedy step over, progression to 1 finger touch to 0HHA  Hedy step overs lateral  Single hedy lateral step overs progression to 1 finger touch to 0HHA  Left/Right turns in II bars  Side reaching in II bars with lumbar/thoracic rotation  Forward reaching in II bars   Re-ed: 15'  Quadruped neutral spine/TrA recruitment/manual cuing  Camel cat with manual assist for movement coordination  Quadruped posterior rocking  Quadruped alternate arm raises x 7 L/R Re-ed: 15'  Quadruped neutral spine/TrA recruitment/manual cuing  Camel cat with minimal manual assist for movement coordination  Quadruped  posterior rocking  Quadruped alternate arm raises x 7 L/R  TrA in hook-lying  TrA/PPT with alternate hip/knee flexion, slow  TrA/PPT with lateral knee fall outs, slow Re-ed: 25'  Quadruped neutral spine/TrA recruitment/manual cuing  Camel cat with minimal manual assist for movement coordination  Quadruped posterior rocking  Quadruped alternate arm raises x 7 L/R  Quadruped modified hip raises 2 x 5 L/R  TrA in hook-lying  TrA/PPT with alternate hip/knee flexion, slow  TrA/PPT with lateral knee fall outs, slow Therex:  10x sit to stand with exaggerated WS fwd and CGA  Tennis ball roll out firm pressure x2 min R Manual:  SL for flex rotate mob GR III, QL release following.x8 min    Therex:  2x10 SLR B  10 x bridges 4\" holds Therex:  2x10 knee ankle/knee ankle B  15x dead bugs in shelf position   10x fig 4 bridges SL    Manual:  SL for flex rotate mob GR III, QL release following.x8 min   Manual:  SL for flex rotate mob GR III, QL release following, Prone CPA to L3-L5 GR III.x10 min    Therex:  2x8 fwd lunges to bosu Therex:  Towel  toe curls, R LE x10    Manual:  SL for flex rotate mob GR III, QL release following, B Manual:  SL for flex rotate mob GR III, QL release following, B x 8 min    Therex:    2x10 bridges with airex under one foot, B  2x10 SL raise on mat  15x SL hip/ankle hip abd, B Single leg press 1 black, 1 grey and 1 yellow cords x 20 L/R  B heel raises on leg press with knees extended, 1 black cord; 2 x 12    Re-ed: AFO's on  DLS on Airex SBA/CGA  DLS on the ground SBA  Madhavi step over with w/s forward x 15 L/R HHA  Cone taps x 15 L/R 1HHA     Single leg press 1 black, 1 grey and 1 yellow cords x 20 L/R    Re-ed: AFO's on  DLS on Airex SBA/CGA  DLS on the ground SBA  Madhavi step over with w/s forward x 15 L/R HHA  Cone taps x 15 L/R 1HHA Single leg press 2 black cords 1 x 30 L/R    Re-ed: AFO's on  DLS on Airex SBA/CGA  DLS on the ground SBA  Madhavi step over with w/s forward x 15 L/R HHA  Cone taps x  15 L/R 1HHA  Forward lunge unsupported with hands hovering and SBA; 7 x 5 sec L/R Partial squats into chair 2HHA x 10  Red cord lateral steps x 20 L/R 2HHA    Re-ed: AFO's on  DLS on Airex SBA 3 x 30 sec  DLS on the ground SBA  Tandem stance 2 x 20 sec L/R finger touch B  Cone taps x 15 L/R 1HHA  Forward lunge unsupported with hands hovering and SBA; 7 x 5 sec L/R Re-ed: AFO's on  DLS on Airex SBA 3 x 30 sec  DLS on the ground SBA  Tandem stance 2 x 20 sec L/R finger touch B  Cone taps x 15 L/R 1HHA  Forward lunge unsupported with hands hovering and SBA; 7 x 5 sec L/R Re-ed: AFO's on  DLS on Airex SBA 3 x 30 sec  DLS on the ground SBA  Tandem stance 2 x 20 sec L/R finger touch B  Cone taps x 15 L/R 1HHA  Forward lunge unsupported with hands hovering and SBA; 7 x 5 sec L/R   Re-ed: AFO's on  DLS on Airex SBA 3 x 30 sec  Tandem stance 2 x 20 sec L/R finger touch B  Cone taps x 15 L/R 1HHA  Forward lunge unsupported with hands hovering and SBA; 7 x 5 sec L/R  DLS on Airex with forward reach x 5 L/R   Double leg press 3 black cords 1 x 30  Single leg press 2 black cords 1 x 30 L/R    Re-ed: AFO's on  DLS on Airex SBA 3 x 30 sec  Tandem stance 2 x 20 sec L/R finger touch B  Cone taps x 15 L/R 1HHA  Tandem walk 2HHA  DLS on Airex with forward reach x 5 L/R Re-ed: AFO's on 10'  DLS on Airex SBA 3 x 30 sec  Tandem stance 2 x 20 sec L/R finger touch B  Cone taps x 15 L/R 1HHA  Tandem walk 2HHA  DLS on Airex with forward reach x 10 L/R  Lunge stance with A/P w/s x 5 L/R HHA   Re-ed: AFO's on 8'  DLS on Airex SBA 3 x 30 sec  Tandem stance 2 x 20 sec L/R finger touch B  Cone taps x 15 L/R 1HHA  Tandem walk 2HHA         HEP: Access Code: [email or web address]  Access Code: [email or web address]  URL: [email or web address]  Date: 01/23/2024  Prepared by: Juanita Koehler    Exercises  - Supine Hip and Knee Flexion AROM with Swiss Ball  - 1 x daily - 7 x weekly - 3 sets - 10 reps  - Supine Lower Trunk Rotation with Swiss Ball  - 1 x daily - 7 x  weekly - 3 sets - 10 reps  - Supine Figure 4 Piriformis Stretch  - 1 x daily - 7 x weekly - 1 sets - 3 reps - 30-60 sec hold  - Supine Bridge  - 1 x daily - 7 x weekly - 2-3 sets - 10 reps  - Bridge with Heels on Swiss Ball  - 1 x daily - 7 x weekly - 2-3 sets - 10 reps  - Sidelying Open Book Thoracic Lumbar Rotation and Extension  - 1 x daily - 7 x weekly - 1 sets - 10 reps  - Clamshell with Resistance  - 1 x daily - 7 x weekly - 3 sets - 10 reps  - Prone Quadriceps Stretch with Strap  - 1 x daily - 7 x weekly - 1 sets - 3 reps - 30-60 sec hold  - Prone Hip Extension  - 1 x daily - 7 x weekly - 1-2 sets - 10 reps  - Cat Cow  - 1 x daily - 7 x weekly - 2 sets - 10 reps  - Bird Dog  - 1 x daily - 7 x weekly - 1-2 sets - 10 reps  - Side Stepping with Counter Support  - 1 x daily - 7 x weekly - 3 sets - 10 reps  - Mini Squat with Counter Support  - 1 x daily - 7 x weekly - 1-2 sets - 10 reps  - Tandem Stance with Support  - 1 x daily - 7 x weekly - 1 sets - 3 reps - 30 sec hold      Charges: therex x 2 30', re-ed x 1 Total Timed Treatment: 40 min  Total Treatment Time: 45 min

## 2024-03-20 NOTE — OPERATIVE REPORT
OPERATIVE REPORT      PREOPERATIVE DIAGNOSIS: Thyroglossal duct cyst.    POSTOPERATIVE DIAGNOSIS: Thyroglossal duct cyst.    PROCEDURE PERFORMED: Sistrunk procedure for excision of thyroglossal duct cyst.    ASSISTANT: Chon Marquez MD.    ANESTHESIA: General.     ESTIMATED BLOOD LOSS: 15 mL.     COMPLICATIONS: None.     INDICATIONS: The patient is a 78 year old male with a history of a neck mass that was noted on a MRI cervical spine.   Options were discussed including observation versus excision. After hearing risks and benefits patient opted for surgery. Consents were signed for the procedure.    PROCEDURE: The patient was brought to the operating room on 04/16/2015. After induction of anesthesia, the patient was positioned correctly for the procedure. A time-out was called. A skin incision was marked just below the thyroglossal duct cyst. A good skin crease was noted right below the level of the hyoid, and this was then infiltrated with 1% lidocaine with 1:100,000 epinephrine. The patient was then prepped and draped in sterile fashion.    Incision was made in the midline approximately 2.5 cm in length. This incision was carried through until platysma was identified. Platysma was incised. Strap muscles were identified. Strap muscles were  in the midline. Blunt dissection was performed and the cyst was located just to the left of the midline. It was found to be very close to the hyoid bone itself. The cyst was circumferentially dissected and then was found to be pedicled up to the central portion of the hyoid. The cyst was followed up all the way to the hyoid bone. The suprahyoid musculature was then released using a combination of electrocautery Bovie and the bipolar. The bipolar was used laterally so as to avoid any injury to the hypoglossal nerves. A Chrisney was then used to release the rest of the musculature. The hyoid bone was then grasped with an Allis clamp and retracted anteriorly. Pockets were  created inferiorly below the hyoid bone, and a bone cutter was then used to incise the hyoid bone on both sides. The hyoid bone was then released from its underlying attachments and a small cuff of the tongue base was also taken during this process. Once this had been performed, hemostasis was then obtained in the wound bed. Bipolar judicious cautery with bipolar was used for this purpose.    The wound was copiously irrigated and suctioned. No additional bleeding was noted. A small amount of Surgicel was placed into the wound bed, and a Penrose drain was inserted. The strap muscles were then closed in the midline using 3-0 Vicryl sutures. Platysma was closed with 3 interrupted Vicryl. The skin was then closed with a 4-0 Prolene suture. At the completion of the procedure, the patient was turned back over to anesthesia. There were no complications noted during this procedure.

## 2024-03-20 NOTE — INTERVAL H&P NOTE
Pre-op Diagnosis: NECK MASS    The above referenced H&P was reviewed by Howard Cueto MD on 3/20/2024, the patient was examined and no significant changes have occurred in the patient's condition since the H&P was performed.  I discussed with the patient and/or legal representative the potential benefits, risks and side effects of this procedure; the likelihood of the patient achieving goals; and potential problems that might occur during recuperation.  I discussed reasonable alternatives to the procedure, including risks, benefits and side effects related to the alternatives and risks related to not receiving this procedure.  We will proceed with procedure as planned.

## 2024-03-20 NOTE — ANESTHESIA POSTPROCEDURE EVALUATION
[facility]    Nils Joseph Patient Status:  Hospital Outpatient Surgery   Age/Gender 78 year old male MRN [id number]   Location [facility] POST ANESTHESIA CARE UNIT Attending Howard Cueto MD   Hosp Day # 0 PCP Alex Case MD       Anesthesia Post-op Note    EXCISION THYROGLOSSAL DUCT CYST    Procedure Summary       Date: 03/20/24 Room / Location:  MAIN OR 03 / [facility] MAIN OR    Anesthesia Start: 1233 Anesthesia Stop: 1453    Procedure: EXCISION THYROGLOSSAL DUCT CYST (Neck) Diagnosis: (NECK MASS)    Surgeons: Howard Cueto MD Anesthesiologist: Kathya Bansal MD    Anesthesia Type: general ASA Status: 2            Anesthesia Type: general    Vitals Value Taken Time   /90 03/20/24    Temp 97.1 °F (36.2 °C) 03/20/24 1453   Pulse 99 03/20/24    Resp 12 03/20/24    SpO2 99 % 03/20/24    Vitals shown include unfiled device data.    Patient Location: PACU    Anesthesia Type: general    Airway Patency: patent    Postop Pain Control: adequate    Mental Status: preanesthetic baseline    Nausea/Vomiting: none    Cardiopulmonary/Hydration status: stable euvolemic    Complications: no apparent anesthesia related complications    Postop vital signs: stable    Dental Exam: Unchanged from Preop    Patient to be discharged from PACU when criteria met.

## 2024-03-20 NOTE — ANESTHESIA PROCEDURE NOTES
Airway  Date/Time: 3/20/2024 12:40 PM  Urgency: elective      General Information and Staff    Patient location during procedure: OR  Anesthesiologist: Kathya Bansal MD  Performed: anesthesiologist   Performed by: Kathya Bansal MD  Authorized by: Kathya Bansal MD      Indications and Patient Condition  Indications for airway management: anesthesia  Sedation level: deep  Preoxygenated: yes  Patient position: sniffing  Mask difficulty assessment: 1 - vent by mask    Final Airway Details  Final airway type: endotracheal airway      Successful airway: ETT  Cuffed: yes   Successful intubation technique: direct laryngoscopy  Endotracheal tube insertion site: oral  Blade: GlideScope  Blade size: #3  ETT size (mm): 8.0    Cormack-Lehane Classification: grade I - full view of glottis  Placement verified by: capnometry   Measured from: lips  ETT to lips (cm): 23  Number of attempts at approach: 1

## 2024-03-22 ENCOUNTER — APPOINTMENT (OUTPATIENT)
Dept: PHYSICAL THERAPY | Facility: HOSPITAL | Age: 79
End: 2024-03-22
Attending: INTERNAL MEDICINE
Payer: MEDICARE

## 2024-03-26 ENCOUNTER — APPOINTMENT (OUTPATIENT)
Dept: PHYSICAL THERAPY | Facility: HOSPITAL | Age: 79
End: 2024-03-26
Attending: INTERNAL MEDICINE
Payer: MEDICARE

## 2024-03-27 ENCOUNTER — APPOINTMENT (OUTPATIENT)
Dept: PHYSICAL THERAPY | Facility: HOSPITAL | Age: 79
End: 2024-03-27
Attending: INTERNAL MEDICINE
Payer: MEDICARE

## 2024-03-29 ENCOUNTER — APPOINTMENT (OUTPATIENT)
Dept: PHYSICAL THERAPY | Facility: HOSPITAL | Age: 79
End: 2024-03-29
Attending: INTERNAL MEDICINE
Payer: MEDICARE

## 2024-04-09 ENCOUNTER — OFFICE VISIT (OUTPATIENT)
Dept: PHYSICAL THERAPY | Facility: HOSPITAL | Age: 79
End: 2024-04-09
Attending: INTERNAL MEDICINE
Payer: MEDICARE

## 2024-04-09 PROCEDURE — 97110 THERAPEUTIC EXERCISES: CPT

## 2024-04-09 NOTE — PROGRESS NOTES
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 Was advised to wait for 2 weeks prior to return to PT. Feels a bit \"wobbly\"; has been more sedentary since the surgery. No particular restrictions; advised to take it slowly.    Pain: Occasonal L medial knee pain such as when turning over in bed      Objective:   Ambulates with B AFO's and cane, steppage gait      Assessment: Improved ability to maintain power stance. Challenged patient further by the addition of a weighted ball while walking. Needs to focus with hedy step over to avoid excessive weight-shift posterior.     Goals: (to be met in 10+ 10 + 10 + 6/8visits)   Pt will demonstrate improved SLS to >5 seconds CALOS to promote safety and decrease risk of falls on uneven surfaces such as grass. Progressing.   Pt will report reduced frequency of loss of balance posterior/to the side by 30%. New  Pt will demonstrate improved postural control to allow for improved body centering with gait and daily activities. New  Pt will perform TUG in <15 seconds with least restrictive AD, demonstrating improved gait speed for improved participation in ADL such as community ambulation. Met  Pt will perform 4-Item DGI with score of 9/12 or greater with least restrictive AD to demonstrate ability to ambulate safely in crowded and busy environments. Progressing  Pt will be able to squat to  light objects around the house without loss of stability. Progressing  Pt will improve functional hip strength to demonstrate ability to ascend/descend 1 flight of stairs  reciprocally with the use of handrail. Goal met.   Pt will be independent and compliant with comprehensive HEP to maintain progress achieved in PT Goal met.         Plan: balance re-ed, including Biodex, postural control, reactive balance, dual tasks    Date: 9/20/23  Tx#: 6/16 Date: 9/25/23  Tx#: 7/16 Date: 9/27/23  Tx#: 8/16 Date:10/2/2023   Tx#:9/16 Date:10/4/2023  Tx#:10/16 Date:10/9/2023   Tx#:11/16 Date: 10/11/2023   Tx#:12/16 Date:10/16/2023   Tx#:13/16 Date:10/23/2023   Tx#:14/20 Date:10/30/2023   Tx#:15/20 Date:11/01/2023   Tx#:16/20 Date:11/06/2023   Tx#:17/20 Date:11/08/2023   Tx#:18/20 Date:11/13/2023   Tx#:19/20 Date:12/6/2023   Tx#:20/20 Date:12/13/2023   Tx#:21/30 Date:12/18/2023   Tx#:22/30 Date:12/20/2023   Tx#:23/30 Date:1/3/2024  Tx#:24/30 Date:1/17/2024  Tx#:25/30 Date:1/19/2024  Tx#:26/30 Date:1/23/2024  Tx#:27/30 Date:3/8/2024  Tx#:28/30 Date:3/12/2024  Tx#:29/30 Date:3/15/2024  Tx#:30/30 Date:3/19/2024  Tx#:31 Date:4/9/2024  Tx#:32   Manual: 25'  Prone with one pillow under:  Deep STM lumbar paraspinals and QL B  Manual quad stretch in prone B  Manual modified hip flexors stretch EOB; STM to anterior thigh Manual: 25'  Prone with one pillow under:  Deep STM lumbar paraspinals and QL B  Manual quad stretch in prone B  Manual modified hip flexors stretch EOB; STM to anterior thigh Manual: 15'  Prone with one pillow under:  Deep STM lumbar paraspinals and QL B  Manual quad stretch in prone B  Manual modified hip flexors stretch EOB; STM to anterior thigh   ThereX:  10 lumbar flex seated at edge of table  10x towel curls B  2x10 fwd lunge holding bar, out of AFOs  Goblet squats  Neuro re-ed:  NMES to R anterior tib 400 usec, 100hz, 95 mA  Practived walking 2 laps in p bars  2x10 DF assisted  1x heel walking  2x10 lunges to airex  4x3 hurdles Neuro re-ed:  NMES to R anterior tib 400 usec, 100hz, 95 mA  2x10 DF assisted  2x10 goblet squats  1x heel walking  10x step up to 6' R LE, 10 x step over  step R LE  Therex:  PN see above, objective measures taken    Mod joão position for passive hip flexor stretch 2x30\"  10x fig 4 SL bridge Neuro re-ed:  NMES to R anterior tib 400 usec, 100hz, 95 mA  2x10 DF assisted  15 bosu lunges on R  2x10 step ups to 6\"  15x D1 PND hip flex on airex, R Neuro re-ed:  10x 3-cone tap in mod SLS on airex, B  Tandem walk in p bars light HHA x2  20 x marches on sanddune   Manual:  SL for flex rotate mob GR III, QL release following, B x 8 min  STM L posterior hip  Manual: 18'  Deep STM R posterior hip   Deep STM R ITB/TFL   SL for flex rotate mob GR III, QL release following, B   Manual R piriformis stretch   Manual: 15'  B quad stretch in prone  SL for flex rotate mob GR III, QL release following, B   Manual R piriformis stretch  Prone thoracic spine PA mobs Gr III  Prone central PA mobs L5/S1 Gr III Manual: 15'  B quad stretch in prone  SL for flex rotate mob GR III, QL release following, B   Manual R piriformis stretch  Prone thoracic spine PA mobs Gr III  Prone central PA mobs L5/S1 Gr III Manual: 15'  B quad stretch in prone  SL for flex rotate mob GR III, QL release following, B   Manual R piriformis stretch  Prone thoracic spine PA mobs Gr III  Prone central PA mobs L5/S1 Gr III Manual:     B quad stretch in prone  B piriformis stretch --   Manual: 15'  B quad stretch in prone  SL for flex rotate mob GR III, QL release following, B   Manual R piriformis stretch  Prone thoracic spine PA mobs Gr III  Prone central PA mobs L5/S1 Gr III Manual: 8'  B piriformis stretch  L hamstring stretch  KT application left knee -- Manual: 12'  B piriformis stretch  B quad stretch in prone  Prone thoracic spine PA mobs Gr III  Prone central PA mobs L5/S1 Gr III   Manual: 5'  B piriformis stretch  B quad stretch in prone -- Re-ed: 15'  Biodex postural control, steady platform  Biodex limits of stability, steady platform   Re-ed: 15'  Biodex postural control, steady platform  Biodex limits of  stability, steady platform Re-ed: 15'  Biodex postural control, steady platform  Biodex limits of stability, steady platform --   TE TE TE - -   - Gait; walking with hurricaine and SBA                  TE: 40   NU step L5 5'  Bridge 2 x 10   Clam shells 2 x 10, manual resistance NU step L5 5'  Bridge 2 x 10   Education on log rolling  Neuro re-ed:  10x sciatic nerve glide with ankle floss, B  Manual:  SL for flex rotate mob GR III, QL release following.x8 min Therex:  Seated sciatica nerve glide x 10 R LE Neuro re-ed:  10x sciatic N glide with adductor bias and floss on R Neuro re-ed:  10x sciatic N glide with adductor bias and floss on R 15x bridges  Neuro re-ed:  Attempted to discriminate between 3 different textures (soft, sand paper, and poky) R legx4 min Therex:  2x10 donkey kicks, 3 lbs B  2x15 lateral bosu lunges, HHA  15 squat to row, 30 lbs, CGA TE  Bridge x 15  Clams with manual resistance x 10 L/R  Sit to stand x 5; hands on upper thighs  Leg press with AFO's off  Double leg press 3 black cords x 30 TE  Bridge x 15  Clams with manual resistance x 12 L/R  Leg press with AFO's ON  Double leg press 3 black cords 2 x 25 TE  Bridge x 20  LTR x 10  LTR with legs crossed for ITB bias x 10 L/R  R clam shells with manual resistance 2 x 15  Hkly PPT with DKTC x 8 L/R  Double leg press 3 black cords 1 x 30 TE  Bridge x 20  LTR x 10  LTR with legs crossed for ITB bias x 10 L/R  R clam shells with manual resistance 2 x 15  Hkly PPT with DKTC x 8 L/R  Sit to stand without UE assist x 5, SBA for blocking at the knees; 2HHA to return to sitting TE  Leg press with AFO's ON  Double leg press 3 black cords 2 x 25  Single leg press 2 black cords 1 x 30 L/R  Sit to stand without UE assist x 5, SBA for blocking at the knees; 2HHA to return to sitting  Partial squats into chair 2HHA 1 x 10, 1 x 7  Red cord lateral steps x 30 L/R 2HHA TE  Leg press with AFO's ON  Double leg press 3 black cords 2 x 25  Single leg press 2 black  cords 1 x 30 L/R  Sit to stand without UE assist x 5, SBA for blocking at the knees; 2HHA to return to sitting  Partial squats into chair 2HHA 1 x 10, 1 x 7  Red cord lateral steps x 30 L/R 2HHA TE AFO's on  NU step L5 6'  LTR\"s x 20   Bridge x 15  SB Bridge x 15  Quadruped arm raises x 10 L/R  Quadruped leg raises x 8 L/R  Partial squats into chair 2HHA 1 x 10, 1 x 7  Red cord lateral steps x 30 L/R 2HHA  Inclined forward   heel raises x 8 reps  90 deg squats 2HHA, wide SUSY 2 x 5  Lateral lunges with hold x 5 L/R 2HHA TE AFO's on  NU step L5 6'  LTR\"s x 20   B Fig 4 stretch  B FADDIR stretch  B hamstring stretch  Bridge x 15  SB Bridge x 15  6\" FSU's 1HHA x 10 L/R  Partial squats into chair 2HHA 1 x 10, 1 x 7  Red cord lateral steps x 30 L/R 2HHA  Inclined forward   heel raises x 10 reps   TE AFO's on 20'  NU step L5 6'  LTR\"s x 20   B Fig 4 stretch  B FADDIR stretch  B hamstring stretch  Bridge x 15  SB Bridge x 15  PPT with marching x 15 L/R  Sit to stand from an elevated surface 2 x 10  Red cord lateral steps x 30 L/R 2HHA   TE 30'  SB rolls DKTC  SB rolls LTR  L quad sets 10 x 10 sec, x 2  L/R SLR 2 x 10  SAQ's 2 x 10  Bridge 2 x 10  SB Bridge x 15  PPT with marching x 15 L/R TE 35'  NU step L6 5'  SB rolls LTR  SB rolls DKTC  SB bridge 2 x 12  Bridge with ADD ball 2 x 12  PPT with toe taps 2 x 12 L/R  Quadruped camel cat  X 10  Bird dog x 5 L/R CGA for safety  Red XB lateral steps x 30 L/R  Chair hovers x 12  Lateral lunges with hold x 10 L/R TE 30'  NU step L6 5'  SB rolls LTR  SB rolls DKTC  SB bridge 2 x 12  Quadruped camel cat  X 10  Bird dog x 5 L/R CGA for safety  Red XB lateral steps x 30 L/R  Sit to stand from an elevated seat position x 8 with focus on independent stabilization in standing   Forward lunge hold, sustained, 60 sec L/R  DLS on Airex x 3 (longest hold: 50 sec) TE 40'  SB rolls LTR  SB rolls DKTC  SB bridge x 15  Bridge x 15  Open book B  Clam shells BTB x 20 L/R  Prone quad stretch with a  strap  Prone hip extension x 10 L/R  Camel Cat  Bird Dog  Lateral steps at the counter Grey TB  Squats at the counter  Tandem stance at the counter, modified  DLS wide SUSY at the counter TE 30'  Tandem stance B  Forward steps in II bars  Lateral steps in II bars  DLS on Airex  Hedy step overs forward  Hedy step overs lateral  Squats at the counter  Lateral steps at the counter, Grey TB   NU step: L6 5'  Tandem stance B  Forward steps in II bars  Lateral steps in II bars  DLS on Airex  Hedy step overs forward  Hedy step overs lateral  Left/Right turns in II bars  Side reaching in II bars with lumbar/thoracic rotation  Forward reaching in II bars       NU step: L6 5'  Tandem stance B    Forward steps in II bars, arms crossed (leans into the left railing ~ 2 times per length)  Forward steps in II bars holding a ball (leans into L/R railing with nearly every step; improves with practice)  Lateral steps in II bars (leans posterior   DLS on Airex  Hedy step overs forward  Hedy step overs lateral  Left/Right turns in II bars  Side reaching in II bars with lumbar/thoracic rotation  Forward reaching in II bars NU step: L6 5'  Tandem stance B    Forward steps in II bars, arms crossed   Forward steps in II bars holding a ball   Lateral steps in II bars    DLS on Airex  Hedy step overs forward  Single hedy step over, progression to 1 finger touch to 0HHA  Hedy step overs lateral  Single hedy lateral step overs progression to 1 finger touch to 0HHA  Left/Right turns in II bars  Side reaching in II bars with lumbar/thoracic rotation  Forward reaching in II bars NU step: L6 5'  B hamstring stretch  B piriformis stretch  Bridge x 15  Bridge with SB x 10  Lateral steps in II bars  Lateral steps in II bars with FitBall hold  Lateral steps in II bars with weighted ball (3kg)  Lateral steps in II bars with GXB, 1 finger hold for balance  Forward steps 0HHA  Forward steps with FitBall carry  Hedy step overs with  1HHA assist to finger assist. Had 3 episodes of LOB posterior requiring 2HHA hold for recovery   Re-ed: 15'  Quadruped neutral spine/TrA recruitment/manual cuing  Camel cat with manual assist for movement coordination  Quadruped posterior rocking  Quadruped alternate arm raises x 7 L/R Re-ed: 15'  Quadruped neutral spine/TrA recruitment/manual cuing  Camel cat with minimal manual assist for movement coordination  Quadruped posterior rocking  Quadruped alternate arm raises x 7 L/R  TrA in hook-lying  TrA/PPT with alternate hip/knee flexion, slow  TrA/PPT with lateral knee fall outs, slow Re-ed: 25'  Quadruped neutral spine/TrA recruitment/manual cuing  Camel cat with minimal manual assist for movement coordination  Quadruped posterior rocking  Quadruped alternate arm raises x 7 L/R  Quadruped modified hip raises 2 x 5 L/R  TrA in hook-lying  TrA/PPT with alternate hip/knee flexion, slow  TrA/PPT with lateral knee fall outs, slow Therex:  10x sit to stand with exaggerated WS fwd and CGA  Tennis ball roll out firm pressure x2 min R Manual:  SL for flex rotate mob GR III, QL release following.x8 min    Therex:  2x10 SLR B  10 x bridges 4\" holds Therex:  2x10 knee ankle/knee ankle B  15x dead bugs in shelf position   10x fig 4 bridges SL    Manual:  SL for flex rotate mob GR III, QL release following.x8 min   Manual:  SL for flex rotate mob GR III, QL release following, Prone CPA to L3-L5 GR III.x10 min    Therex:  2x8 fwd lunges to bosu Therex:  Towel  toe curls, R LE x10    Manual:  SL for flex rotate mob GR III, QL release following, B Manual:  SL for flex rotate mob GR III, QL release following, B x 8 min    Therex:    2x10 bridges with airex under one foot, B  2x10 SL raise on mat  15x SL hip/ankle hip abd, B Single leg press 1 black, 1 grey and 1 yellow cords x 20 L/R  B heel raises on leg press with knees extended, 1 black cord; 2 x 12    Re-ed: AFO's on  DLS on Airex SBA/CGA  DLS on the ground SBA  Madhavi step  over with w/s forward x 15 L/R HHA  Cone taps x 15 L/R 1HHA     Single leg press 1 black, 1 grey and 1 yellow cords x 20 L/R    Re-ed: AFO's on  DLS on Airex SBA/CGA  DLS on the ground SBA  Madhavi step over with w/s forward x 15 L/R HHA  Cone taps x 15 L/R 1HHA Single leg press 2 black cords 1 x 30 L/R    Re-ed: AFO's on  DLS on Airex SBA/CGA  DLS on the ground SBA  Madhavi step over with w/s forward x 15 L/R HHA  Cone taps x 15 L/R 1HHA  Forward lunge unsupported with hands hovering and SBA; 7 x 5 sec L/R Partial squats into chair 2HHA x 10  Red cord lateral steps x 20 L/R 2HHA    Re-ed: AFO's on  DLS on Airex SBA 3 x 30 sec  DLS on the ground SBA  Tandem stance 2 x 20 sec L/R finger touch B  Cone taps x 15 L/R 1HHA  Forward lunge unsupported with hands hovering and SBA; 7 x 5 sec L/R Re-ed: AFO's on  DLS on Airex SBA 3 x 30 sec  DLS on the ground SBA  Tandem stance 2 x 20 sec L/R finger touch B  Cone taps x 15 L/R 1HHA  Forward lunge unsupported with hands hovering and SBA; 7 x 5 sec L/R Re-ed: AFO's on  DLS on Airex SBA 3 x 30 sec  DLS on the ground SBA  Tandem stance 2 x 20 sec L/R finger touch B  Cone taps x 15 L/R 1HHA  Forward lunge unsupported with hands hovering and SBA; 7 x 5 sec L/R   Re-ed: AFO's on  DLS on Airex SBA 3 x 30 sec  Tandem stance 2 x 20 sec L/R finger touch B  Cone taps x 15 L/R 1HHA  Forward lunge unsupported with hands hovering and SBA; 7 x 5 sec L/R  DLS on Airex with forward reach x 5 L/R   Double leg press 3 black cords 1 x 30  Single leg press 2 black cords 1 x 30 L/R    Re-ed: AFO's on  DLS on Airex SBA 3 x 30 sec  Tandem stance 2 x 20 sec L/R finger touch B  Cone taps x 15 L/R 1HHA  Tandem walk 2HHA  DLS on Airex with forward reach x 5 L/R Re-ed: AFO's on 10'  DLS on Airex SBA 3 x 30 sec  Tandem stance 2 x 20 sec L/R finger touch B  Cone taps x 15 L/R 1HHA  Tandem walk 2HHA  DLS on Airex with forward reach x 10 L/R  Lunge stance with A/P w/s x 5 L/R HHA   Re-ed: AFO's on 8'  DLS on  Airex SBA 3 x 30 sec  Tandem stance 2 x 20 sec L/R finger touch B  Cone taps x 15 L/R 1HHA  Tandem walk 2HHA          HEP: Access Code: [email or web address]  Access Code: [email or web address]  URL: [email or web address]  Date: 01/23/2024  Prepared by: Juanita Koehler    Exercises  - Supine Hip and Knee Flexion AROM with Swiss Ball  - 1 x daily - 7 x weekly - 3 sets - 10 reps  - Supine Lower Trunk Rotation with Swiss Ball  - 1 x daily - 7 x weekly - 3 sets - 10 reps  - Supine Figure 4 Piriformis Stretch  - 1 x daily - 7 x weekly - 1 sets - 3 reps - 30-60 sec hold  - Supine Bridge  - 1 x daily - 7 x weekly - 2-3 sets - 10 reps  - Bridge with Heels on Swiss Ball  - 1 x daily - 7 x weekly - 2-3 sets - 10 reps  - Sidelying Open Book Thoracic Lumbar Rotation and Extension  - 1 x daily - 7 x weekly - 1 sets - 10 reps  - Clamshell with Resistance  - 1 x daily - 7 x weekly - 3 sets - 10 reps  - Prone Quadriceps Stretch with Strap  - 1 x daily - 7 x weekly - 1 sets - 3 reps - 30-60 sec hold  - Prone Hip Extension  - 1 x daily - 7 x weekly - 1-2 sets - 10 reps  - Cat Cow  - 1 x daily - 7 x weekly - 2 sets - 10 reps  - Bird Dog  - 1 x daily - 7 x weekly - 1-2 sets - 10 reps  - Side Stepping with Counter Support  - 1 x daily - 7 x weekly - 3 sets - 10 reps  - Mini Squat with Counter Support  - 1 x daily - 7 x weekly - 1-2 sets - 10 reps  - Tandem Stance with Support  - 1 x daily - 7 x weekly - 1 sets - 3 reps - 30 sec hold      Charges: therex x 3 40' Total Timed Treatment: 40 min  Total Treatment Time: 45 min

## 2024-04-16 ENCOUNTER — TELEPHONE (OUTPATIENT)
Dept: PHYSICAL THERAPY | Facility: HOSPITAL | Age: 79
End: 2024-04-16

## 2024-04-16 ENCOUNTER — OFFICE VISIT (OUTPATIENT)
Dept: PHYSICAL THERAPY | Facility: HOSPITAL | Age: 79
End: 2024-04-16
Attending: INTERNAL MEDICINE
Payer: MEDICARE

## 2024-04-16 PROCEDURE — 97110 THERAPEUTIC EXERCISES: CPT

## 2024-04-16 PROCEDURE — 97140 MANUAL THERAPY 1/> REGIONS: CPT

## 2024-04-16 NOTE — PROGRESS NOTES
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 Healing from his thyroid surgery. Has been having increased pain  and stiffness in his lower back and hips, especially in the mornings.     Pain: Occasonal L medial knee pain such as when turning over in bed      Objective:   Ambulates with B AFO's and cane, steppage gait  Quad length: 100 in prone B  Piriformis length: min-mod limitations, very stiff      Assessment: Added manual stretching to hips and CPA to lumbar spine in prone; noted increased stiffness in the hips; recommended on longer stretching times at home. Improved ability to maintain power stance. Needs to focus with hedy step over to avoid excessive weight-shift posterior. Added postural balance on [place] and alternate cone taps while on foam block; HHA required with these activities.     Goals: (to be met in 10+ 10 + 10 + 6/8visits)   Pt will demonstrate improved SLS to >5 seconds CALOS to promote safety and decrease risk of falls on uneven surfaces such as grass. Progressing.   Pt will report reduced frequency of loss of balance posterior/to the side by 30%. New  Pt will demonstrate improved postural control to allow for improved body centering with gait and daily activities. New  Pt will perform TUG in <15 seconds with least restrictive AD, demonstrating improved gait speed for improved participation in ADL such as community ambulation. Met  Pt will perform 4-Item DGI with score of 9/12 or greater with least restrictive AD to demonstrate ability to ambulate safely in crowded and busy environments. Progressing  Pt will  be able to squat to  light objects around the house without loss of stability. Progressing  Pt will improve functional hip strength to demonstrate ability to ascend/descend 1 flight of stairs reciprocally with the use of handrail. Goal met.   Pt will be independent and compliant with comprehensive HEP to maintain progress achieved in PT Goal met.         Plan: balance re-ed, including Biodex, postural control, reactive balance, dual tasks    Date: 9/20/23  Tx#: 6/16 Date: 9/25/23  Tx#: 7/16 Date: 9/27/23  Tx#: 8/16 Date:10/2/2023   Tx#:9/16 Date:10/4/2023  Tx#:10/16 Date:10/9/2023   Tx#:11/16 Date: 10/11/2023   Tx#:12/16 Date:10/16/2023   Tx#:13/16 Date:10/23/2023   Tx#:14/20 Date:10/30/2023   Tx#:15/20 Date:11/01/2023   Tx#:16/20 Date:11/06/2023   Tx#:17/20 Date:11/08/2023   Tx#:18/20 Date:11/13/2023   Tx#:19/20 Date:12/6/2023   Tx#:20/20 Date:12/13/2023   Tx#:21/30 Date:12/18/2023   Tx#:22/30 Date:12/20/2023   Tx#:23/30 Date:1/3/2024  Tx#:24/30 Date:1/17/2024  Tx#:25/30 Date:1/19/2024  Tx#:26/30 Date:1/23/2024  Tx#:27/30 Date:3/8/2024  Tx#:28/30 Date:3/12/2024  Tx#:29/30 Date:3/15/2024  Tx#:30/30 Date:3/19/2024  Tx#:31 Date:4/9/2024  Tx#:32 Date:4/16/2024  Tx#:33   Manual: 25'  Prone with one pillow under:  Deep STM lumbar paraspinals and QL B  Manual quad stretch in prone B  Manual modified hip flexors stretch EOB; STM to anterior thigh Manual: 25'  Prone with one pillow under:  Deep STM lumbar paraspinals and QL B  Manual quad stretch in prone B  Manual modified hip flexors stretch EOB; STM to anterior thigh Manual: 15'  Prone with one pillow under:  Deep STM lumbar paraspinals and QL B  Manual quad stretch in prone B  Manual modified hip flexors stretch EOB; STM to anterior thigh   ThereX:  10 lumbar flex seated at edge of table  10x towel curls B  2x10 fwd lunge holding bar, out of AFOs  Goblet squats  Neuro re-ed:  NMES to R anterior tib 400 usec, 100hz, 95 mA  Practived walking 2 laps in p bars  2x10  DF assisted  1x heel walking  2x10 lunges to airex  4x3 hurdles Neuro re-ed:  NMES to R anterior tib 400 usec, 100hz, 95 mA  2x10 DF assisted  2x10 goblet squats  1x heel walking  10x step up to 6' R LE, 10 x step over step R LE  Therex:  PN see above, objective measures taken    Mod joão position for passive hip flexor stretch 2x30\"  10x fig 4 SL bridge Neuro re-ed:  NMES to R anterior tib 400 usec, 100hz, 95 mA  2x10 DF assisted  15 bosu lunges on R  2x10 step ups to 6\"  15x D1 PND hip flex on airex, R Neuro re-ed:  10x 3-cone tap in mod SLS on airex, B  Tandem walk in p bars light HHA x2  20 x marches on [place]   Manual:  SL for flex rotate mob GR III, QL release following, B x 8 min  STM L posterior hip  Manual: 18'  Deep STM R posterior hip   Deep STM R ITB/TFL   SL for flex rotate mob GR III, QL release following, B   Manual R piriformis stretch   Manual: 15'  B quad stretch in prone  SL for flex rotate mob GR III, QL release following, B   Manual R piriformis stretch  Prone thoracic spine PA mobs Gr III  Prone central PA mobs L5/S1 Gr III Manual: 15'  B quad stretch in prone  SL for flex rotate mob GR III, QL release following, B   Manual R piriformis stretch  Prone thoracic spine PA mobs Gr III  Prone central PA mobs L5/S1 Gr III Manual: 15'  B quad stretch in prone  SL for flex rotate mob GR III, QL release following, B   Manual R piriformis stretch  Prone thoracic spine PA mobs Gr III  Prone central PA mobs L5/S1 Gr III Manual:     B quad stretch in prone  B piriformis stretch --   Manual: 15'  B quad stretch in prone  SL for flex rotate mob GR III, QL release following, B   Manual R piriformis stretch  Prone thoracic spine PA mobs Gr III  Prone central PA mobs L5/S1 Gr III Manual: 8'  B piriformis stretch  L hamstring stretch  KT application left knee -- Manual: 12'  B piriformis stretch  B quad stretch in prone  Prone thoracic spine PA mobs Gr III  Prone central PA mobs L5/S1 Gr III   Manual: 5'  B  piriformis stretch  B quad stretch in prone -- Re-ed: 15'  Biodex postural control, steady platform  Biodex limits of stability, steady platform   Re-ed: 15'  Biodex postural control, steady platform  Biodex limits of stability, steady platform Re-ed: 15'  Biodex postural control, steady platform  Biodex limits of stability, steady platform -- Manual: 10'  B piriformis stretch  B quad stretch in prone  CPA in prone to lumbar spine, Gr III   TE TE TE - -   - Gait; walking with hurricaine and SBA                  TE: 40    NU step L5 5'  Bridge 2 x 10   Clam shells 2 x 10, manual resistance NU step L5 5'  Bridge 2 x 10   Education on log rolling  Neuro re-ed:  10x sciatic nerve glide with ankle floss, B  Manual:  SL for flex rotate mob GR III, QL release following.x8 min Therex:  Seated sciatica nerve glide x 10 R LE Neuro re-ed:  10x sciatic N glide with adductor bias and floss on R Neuro re-ed:  10x sciatic N glide with adductor bias and floss on R 15x bridges  Neuro re-ed:  Attempted to discriminate between 3 different textures (soft, sand paper, and poky) R legx4 min Therex:  2x10 donkey kicks, 3 lbs B  2x15 lateral bosu lunges, HHA  15 squat to row, 30 lbs, CGA TE  Bridge x 15  Clams with manual resistance x 10 L/R  Sit to stand x 5; hands on upper thighs  Leg press with AFO's off  Double leg press 3 black cords x 30 TE  Bridge x 15  Clams with manual resistance x 12 L/R  Leg press with AFO's ON  Double leg press 3 black cords 2 x 25 TE  Bridge x 20  LTR x 10  LTR with legs crossed for ITB bias x 10 L/R  R clam shells with manual resistance 2 x 15  Hkly PPT with DKTC x 8 L/R  Double leg press 3 black cords 1 x 30 TE  Bridge x 20  LTR x 10  LTR with legs crossed for ITB bias x 10 L/R  R clam shells with manual resistance 2 x 15  Hkly PPT with DKTC x 8 L/R  Sit to stand without UE assist x 5, SBA for blocking at the knees; 2HHA to return to sitting TE  Leg press with AFO's ON  Double leg press 3 black cords 2 x  25  Single leg press 2 black cords 1 x 30 L/R  Sit to stand without UE assist x 5, SBA for blocking at the knees; 2HHA to return to sitting  Partial squats into chair 2HHA 1 x 10, 1 x 7  Red cord lateral steps x 30 L/R 2HHA TE  Leg press with AFO's ON  Double leg press 3 black cords 2 x 25  Single leg press 2 black cords 1 x 30 L/R  Sit to stand without UE assist x 5, SBA for blocking at the knees; 2HHA to return to sitting  Partial squats into chair 2HHA 1 x 10, 1 x 7  Red cord lateral steps x 30 L/R 2HHA TE AFO's on  NU step L5 6'  LTR\"s x 20   Bridge x 15  SB Bridge x 15  Quadruped arm raises x 10 L/R  Quadruped leg raises x 8 L/R  Partial squats into chair 2HHA 1 x 10, 1 x 7  Red cord lateral steps x 30 L/R 2HHA  Inclined forward   heel raises x 8 reps  90 deg squats 2HHA, wide SUSY 2 x 5  Lateral lunges with hold x 5 L/R 2HHA TE AFO's on  NU step L5 6'  LTR\"s x 20   B Fig 4 stretch  B FADDIR stretch  B hamstring stretch  Bridge x 15  SB Bridge x 15  6\" FSU's 1HHA x 10 L/R  Partial squats into chair 2HHA 1 x 10, 1 x 7  Red cord lateral steps x 30 L/R 2HHA  Inclined forward   heel raises x 10 reps   TE AFO's on 20'  NU step L5 6'  LTR\"s x 20   B Fig 4 stretch  B FADDIR stretch  B hamstring stretch  Bridge x 15  SB Bridge x 15  PPT with marching x 15 L/R  Sit to stand from an elevated surface 2 x 10  Red cord lateral steps x 30 L/R 2HHA   TE 30'  SB rolls DKTC  SB rolls LTR  L quad sets 10 x 10 sec, x 2  L/R SLR 2 x 10  SAQ's 2 x 10  Bridge 2 x 10  SB Bridge x 15  PPT with marching x 15 L/R TE 35'  NU step L6 5'  SB rolls LTR  SB rolls DKTC  SB bridge 2 x 12  Bridge with ADD ball 2 x 12  PPT with toe taps 2 x 12 L/R  Quadruped camel cat  X 10  Bird dog x 5 L/R CGA for safety  Red XB lateral steps x 30 L/R  Chair hovers x 12  Lateral lunges with hold x 10 L/R TE 30'  NU step L6 5'  SB rolls LTR  SB rolls DKTC  SB bridge 2 x 12  Quadruped camel cat  X 10  Bird dog x 5 L/R CGA for safety  Red XB lateral steps x 30  L/R  Sit to stand from an elevated seat position x 8 with focus on independent stabilization in standing   Forward lunge hold, sustained, 60 sec L/R  DLS on Airex x 3 (longest hold: 50 sec) TE 40'  SB rolls LTR  SB rolls DKTC  SB bridge x 15  Bridge x 15  Open book B  Clam shells BTB x 20 L/R  Prone quad stretch with a strap  Prone hip extension x 10 L/R  Camel Cat  Bird Dog  Lateral steps at the counter Grey TB  Squats at the counter  Tandem stance at the counter, modified  DLS wide SUSY at the counter TE 30'  Tandem stance B  Forward steps in II bars  Lateral steps in II bars  DLS on Airex  Hedy step overs forward  Hedy step overs lateral  Squats at the counter  Lateral steps at the counter, Grey TB   NU step: L6 5'  Tandem stance B  Forward steps in II bars  Lateral steps in II bars  DLS on Airex  Hedy step overs forward  Hedy step overs lateral  Left/Right turns in II bars  Side reaching in II bars with lumbar/thoracic rotation  Forward reaching in II bars       NU step: L6 5'  Tandem stance B    Forward steps in II bars, arms crossed (leans into the left railing ~ 2 times per length)  Forward steps in II bars holding a ball (leans into L/R railing with nearly every step; improves with practice)  Lateral steps in II bars (leans posterior   DLS on Airex  Hedy step overs forward  Hedy step overs lateral  Left/Right turns in II bars  Side reaching in II bars with lumbar/thoracic rotation  Forward reaching in II bars NU step: L6 5'  Tandem stance B    Forward steps in II bars, arms crossed   Forward steps in II bars holding a ball   Lateral steps in II bars    DLS on Airex  Hedy step overs forward  Single hedy step over, progression to 1 finger touch to 0HHA  Hedy step overs lateral  Single hedy lateral step overs progression to 1 finger touch to 0HHA  Left/Right turns in II bars  Side reaching in II bars with lumbar/thoracic rotation  Forward reaching in II bars NU step: L6 5'  B hamstring  stretch  B piriformis stretch  Bridge x 15  Bridge with SB x 10  Lateral steps in II bars  Lateral steps in II bars with FitBall hold  Lateral steps in II bars with weighted ball (3kg)  Lateral steps in II bars with GXB, 1 finger hold for balance  Forward steps 0HHA  Forward steps with FitBall carry  Madhavi step overs with 1HHA assist to finger assist. Had 3 episodes of LOB posterior requiring 2HHA hold for recovery NU step: L6 5'  B hamstring stretch  B piriformis stretch  Bridge x 15  Bridge with SB x 10  Lateral steps in II bars  Lateral steps in II bars with FitBall hold    Lateral steps in II bars with GXB, 1 finger hold for balance  Madhavi step overs with 1HHA assist to finger assist. Forward and lateral  [place] weight shifts  [place] alternate knee flexion  [place] FSU's  DLS on Airex with cone taps   Re-ed: 15'  Quadruped neutral spine/TrA recruitment/manual cuing  Camel cat with manual assist for movement coordination  Quadruped posterior rocking  Quadruped alternate arm raises x 7 L/R Re-ed: 15'  Quadruped neutral spine/TrA recruitment/manual cuing  Camel cat with minimal manual assist for movement coordination  Quadruped posterior rocking  Quadruped alternate arm raises x 7 L/R  TrA in hook-lying  TrA/PPT with alternate hip/knee flexion, slow  TrA/PPT with lateral knee fall outs, slow Re-ed: 25'  Quadruped neutral spine/TrA recruitment/manual cuing  Camel cat with minimal manual assist for movement coordination  Quadruped posterior rocking  Quadruped alternate arm raises x 7 L/R  Quadruped modified hip raises 2 x 5 L/R  TrA in hook-lying  TrA/PPT with alternate hip/knee flexion, slow  TrA/PPT with lateral knee fall outs, slow Therex:  10x sit to stand with exaggerated WS fwd and CGA  Tennis ball roll out firm pressure x2 min R Manual:  SL for flex rotate mob GR III, QL release following.x8 min    Therex:  2x10 SLR B  10 x bridges 4\" holds Therex:  2x10 knee ankle/knee ankle B  15x dead bugs in  shelf position   10x fig 4 bridges SL    Manual:  SL for flex rotate mob GR III, QL release following.x8 min   Manual:  SL for flex rotate mob GR III, QL release following, Prone CPA to L3-L5 GR III.x10 min    Therex:  2x8 fwd lunges to bosu Therex:  Towel  toe curls, R LE x10    Manual:  SL for flex rotate mob GR III, QL release following, B Manual:  SL for flex rotate mob GR III, QL release following, B x 8 min    Therex:    2x10 bridges with airex under one foot, B  2x10 SL raise on mat  15x SL hip/ankle hip abd, B Single leg press 1 black, 1 grey and 1 yellow cords x 20 L/R  B heel raises on leg press with knees extended, 1 black cord; 2 x 12    Re-ed: AFO's on  DLS on Airex SBA/CGA  DLS on the ground SBA  Madhavi step over with w/s forward x 15 L/R HHA  Cone taps x 15 L/R 1HHA     Single leg press 1 black, 1 grey and 1 yellow cords x 20 L/R    Re-ed: AFO's on  DLS on Airex SBA/CGA  DLS on the ground SBA  Madhavi step over with w/s forward x 15 L/R HHA  Cone taps x 15 L/R 1HHA Single leg press 2 black cords 1 x 30 L/R    Re-ed: AFO's on  DLS on Airex SBA/CGA  DLS on the ground SBA  Madhavi step over with w/s forward x 15 L/R HHA  Cone taps x 15 L/R 1HHA  Forward lunge unsupported with hands hovering and SBA; 7 x 5 sec L/R Partial squats into chair 2HHA x 10  Red cord lateral steps x 20 L/R 2HHA    Re-ed: AFO's on  DLS on Airex SBA 3 x 30 sec  DLS on the ground SBA  Tandem stance 2 x 20 sec L/R finger touch B  Cone taps x 15 L/R 1HHA  Forward lunge unsupported with hands hovering and SBA; 7 x 5 sec L/R Re-ed: AFO's on  DLS on Airex SBA 3 x 30 sec  DLS on the ground SBA  Tandem stance 2 x 20 sec L/R finger touch B  Cone taps x 15 L/R 1HHA  Forward lunge unsupported with hands hovering and SBA; 7 x 5 sec L/R Re-ed: AFO's on  DLS on Airex SBA 3 x 30 sec  DLS on the ground SBA  Tandem stance 2 x 20 sec L/R finger touch B  Cone taps x 15 L/R 1HHA  Forward lunge unsupported with hands hovering and SBA; 7 x 5 sec L/R    Re-ed: AFO's on  DLS on Airex SBA 3 x 30 sec  Tandem stance 2 x 20 sec L/R finger touch B  Cone taps x 15 L/R 1HHA  Forward lunge unsupported with hands hovering and SBA; 7 x 5 sec L/R  DLS on Airex with forward reach x 5 L/R   Double leg press 3 black cords 1 x 30  Single leg press 2 black cords 1 x 30 L/R    Re-ed: AFO's on  DLS on Airex SBA 3 x 30 sec  Tandem stance 2 x 20 sec L/R finger touch B  Cone taps x 15 L/R 1HHA  Tandem walk 2HHA  DLS on Airex with forward reach x 5 L/R Re-ed: AFO's on 10'  DLS on Airex SBA 3 x 30 sec  Tandem stance 2 x 20 sec L/R finger touch B  Cone taps x 15 L/R 1HHA  Tandem walk 2HHA  DLS on Airex with forward reach x 10 L/R  Lunge stance with A/P w/s x 5 L/R HHA   Re-ed: AFO's on 8'  DLS on Airex SBA 3 x 30 sec  Tandem stance 2 x 20 sec L/R finger touch B  Cone taps x 15 L/R 1HHA  Tandem walk 2HHA           HEP: Access Code: [email or web address]  Access Code: [email or web address]  URL: [email or web address]  Date: 01/23/2024  Prepared by: Juanita Koehler    Exercises  - Supine Hip and Knee Flexion AROM with Swiss Ball  - 1 x daily - 7 x weekly - 3 sets - 10 reps  - Supine Lower Trunk Rotation with Swiss Ball  - 1 x daily - 7 x weekly - 3 sets - 10 reps  - Supine Figure 4 Piriformis Stretch  - 1 x daily - 7 x weekly - 1 sets - 3 reps - 30-60 sec hold  - Supine Bridge  - 1 x daily - 7 x weekly - 2-3 sets - 10 reps  - Bridge with Heels on Swiss Ball  - 1 x daily - 7 x weekly - 2-3 sets - 10 reps  - Sidelying Open Book Thoracic Lumbar Rotation and Extension  - 1 x daily - 7 x weekly - 1 sets - 10 reps  - Clamshell with Resistance  - 1 x daily - 7 x weekly - 3 sets - 10 reps  - Prone Quadriceps Stretch with Strap  - 1 x daily - 7 x weekly - 1 sets - 3 reps - 30-60 sec hold  - Prone Hip Extension  - 1 x daily - 7 x weekly - 1-2 sets - 10 reps  - Cat Cow  - 1 x daily - 7 x weekly - 2 sets - 10 reps  - Bird Dog  - 1 x daily - 7 x weekly - 1-2 sets - 10 reps  - Side Stepping with Counter Support  - 1 x  daily - 7 x weekly - 3 sets - 10 reps  - Mini Squat with Counter Support  - 1 x daily - 7 x weekly - 1-2 sets - 10 reps  - Tandem Stance with Support  - 1 x daily - 7 x weekly - 1 sets - 3 reps - 30 sec hold      Charges: therex x 2 20', man x 1 10 Total Timed Treatment: 40 min  Total Treatment Time: 45 min

## 2024-04-30 ENCOUNTER — OFFICE VISIT (OUTPATIENT)
Dept: PHYSICAL THERAPY | Facility: HOSPITAL | Age: 79
End: 2024-04-30
Attending: INTERNAL MEDICINE
Payer: MEDICARE

## 2024-04-30 PROCEDURE — 97140 MANUAL THERAPY 1/> REGIONS: CPT

## 2024-04-30 PROCEDURE — 97110 THERAPEUTIC EXERCISES: CPT

## 2024-04-30 NOTE — PROGRESS NOTES
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 Returned from [place] on Sunday. No falls during his trip but a couple of stumbles.     Pain: 7/10    Objective:   Ambulates with B AFO's and cane, steppage gait  Quad length: 100 in prone B  Piriformis length: min-mod limitations, very stiff      Assessment: Presents with increased stiffness in lumbar spine mobility and in hip flexibility with 7/10 pain this morning. Had excellent response to manual therapy and stretching interventions with his symptoms improved almost back to the baseline. Patient will resume his regular stretching program at home.      Goals: (to be met in 10+ 10 + 10 + 6/8visits)   Pt will demonstrate improved SLS to >5 seconds CALOS to promote safety and decrease risk of falls on uneven surfaces such as grass. Progressing.   Pt will report reduced frequency of loss of balance posterior/to the side by 30%. New  Pt will demonstrate improved postural control to allow for improved body centering with gait and daily activities. New  Pt will perform TUG in <15 seconds with least restrictive AD, demonstrating improved gait speed for improved participation in ADL such as community ambulation. Met  Pt will perform 4-Item DGI with score of 9/12 or greater with least restrictive AD to demonstrate ability to ambulate safely in crowded and busy environments. Progressing  Pt will be able to squat to  light objects around the house without loss of stability. Progressing  Pt will  improve functional hip strength to demonstrate ability to ascend/descend 1 flight of stairs reciprocally with the use of handrail. Goal met.   Pt will be independent and compliant with comprehensive HEP to maintain progress achieved in PT Goal met.         Plan: balance re-ed, including Biodex, postural control, reactive balance, dual tasks    Date: 9/20/23  Tx#: 6/16 Date: 9/25/23  Tx#: 7/16 Date: 9/27/23  Tx#: 8/16 Date:10/2/2023   Tx#:9/16 Date:10/4/2023  Tx#:10/16 Date:10/9/2023   Tx#:11/16 Date: 10/11/2023   Tx#:12/16 Date:10/16/2023   Tx#:13/16 Date:10/23/2023   Tx#:14/20 Date:10/30/2023   Tx#:15/20 Date:11/01/2023   Tx#:16/20 Date:11/06/2023   Tx#:17/20 Date:11/08/2023   Tx#:18/20 Date:11/13/2023   Tx#:19/20 Date:12/6/2023   Tx#:20/20 Date:12/13/2023   Tx#:21/30 Date:12/18/2023   Tx#:22/30 Date:12/20/2023   Tx#:23/30 Date:1/3/2024  Tx#:24/30 Date:1/17/2024  Tx#:25/30 Date:1/19/2024  Tx#:26/30 Date:1/23/2024  Tx#:27/30 Date:3/8/2024  Tx#:28/30 Date:3/12/2024  Tx#:29/30 Date:3/15/2024  Tx#:30/30 Date:3/19/2024  Tx#:31 Date:4/9/2024  Tx#:32 Date:4/16/2024  Tx#:33 Date:4/30/2024  Tx#:34   Manual: 25'  Prone with one pillow under:  Deep STM lumbar paraspinals and QL B  Manual quad stretch in prone B  Manual modified hip flexors stretch EOB; STM to anterior thigh Manual: 25'  Prone with one pillow under:  Deep STM lumbar paraspinals and QL B  Manual quad stretch in prone B  Manual modified hip flexors stretch EOB; STM to anterior thigh Manual: 15'  Prone with one pillow under:  Deep STM lumbar paraspinals and QL B  Manual quad stretch in prone B  Manual modified hip flexors stretch EOB; STM to anterior thigh   ThereX:  10 lumbar flex seated at edge of table  10x towel curls B  2x10 fwd lunge holding bar, out of AFOs  Goblet squats  Neuro re-ed:  NMES to R anterior tib 400 usec, 100hz, 95 mA  Practived walking 2 laps in p bars  2x10 DF assisted  1x heel walking  2x10 lunges to airex  4x3 hurdles Neuro re-ed:  NMES  to R anterior tib 400 usec, 100hz, 95 mA  2x10 DF assisted  2x10 goblet squats  1x heel walking  10x step up to 6' R LE, 10 x step over step R LE  Therex:  PN see above, objective measures taken    Mod joão position for passive hip flexor stretch 2x30\"  10x fig 4 SL bridge Neuro re-ed:  NMES to R anterior tib 400 usec, 100hz, 95 mA  2x10 DF assisted  15 bosu lunges on R  2x10 step ups to 6\"  15x D1 PND hip flex on airex, R Neuro re-ed:  10x 3-cone tap in mod SLS on airex, B  Tandem walk in p bars light HHA x2  20 x marches on sanddune   Manual:  SL for flex rotate mob GR III, QL release following, B x 8 min  STM L posterior hip  Manual: 18'  Deep STM R posterior hip   Deep STM R ITB/TFL   SL for flex rotate mob GR III, QL release following, B   Manual R piriformis stretch   Manual: 15'  B quad stretch in prone  SL for flex rotate mob GR III, QL release following, B   Manual R piriformis stretch  Prone thoracic spine PA mobs Gr III  Prone central PA mobs L5/S1 Gr III Manual: 15'  B quad stretch in prone  SL for flex rotate mob GR III, QL release following, B   Manual R piriformis stretch  Prone thoracic spine PA mobs Gr III  Prone central PA mobs L5/S1 Gr III Manual: 15'  B quad stretch in prone  SL for flex rotate mob GR III, QL release following, B   Manual R piriformis stretch  Prone thoracic spine PA mobs Gr III  Prone central PA mobs L5/S1 Gr III Manual:     B quad stretch in prone  B piriformis stretch --   Manual: 15'  B quad stretch in prone  SL for flex rotate mob GR III, QL release following, B   Manual R piriformis stretch  Prone thoracic spine PA mobs Gr III  Prone central PA mobs L5/S1 Gr III Manual: 8'  B piriformis stretch  L hamstring stretch  KT application left knee -- Manual: 12'  B piriformis stretch  B quad stretch in prone  Prone thoracic spine PA mobs Gr III  Prone central PA mobs L5/S1 Gr III   Manual: 5'  B piriformis stretch  B quad stretch in prone -- Re-ed: 15'  Biodex postural control,  steady platform  Biodex limits of stability, steady platform   Re-ed: 15'  Biodex postural control, steady platform  Biodex limits of stability, steady platform Re-ed: 15'  Biodex postural control, steady platform  Biodex limits of stability, steady platform -- Manual: 10'  B piriformis stretch  B quad stretch in prone  CPA in prone to lumbar spine, Gr III TE 20  NU step L6 5'  SB LTR  SB DKTC  Bridge 2 x 10  Bridge with SB 2 x 10  Clam shells  x 20 L/R  B hamstring stretch with a strap   TE TE TE - -   - Gait; walking with hurricaine and SBA                  TE: 40     NU step L5 5'  Bridge 2 x 10   Clam shells 2 x 10, manual resistance NU step L5 5'  Bridge 2 x 10   Education on log rolling  Neuro re-ed:  10x sciatic nerve glide with ankle floss, B  Manual:  SL for flex rotate mob GR III, QL release following.x8 min Therex:  Seated sciatica nerve glide x 10 R LE Neuro re-ed:  10x sciatic N glide with adductor bias and floss on R Neuro re-ed:  10x sciatic N glide with adductor bias and floss on R 15x bridges  Neuro re-ed:  Attempted to discriminate between 3 different textures (soft, sand paper, and poky) R legx4 min Therex:  2x10 donkey kicks, 3 lbs B  2x15 lateral bosu lunges, HHA  15 squat to row, 30 lbs, CGA TE  Bridge x 15  Clams with manual resistance x 10 L/R  Sit to stand x 5; hands on upper thighs  Leg press with AFO's off  Double leg press 3 black cords x 30 TE  Bridge x 15  Clams with manual resistance x 12 L/R  Leg press with AFO's ON  Double leg press 3 black cords 2 x 25 TE  Bridge x 20  LTR x 10  LTR with legs crossed for ITB bias x 10 L/R  R clam shells with manual resistance 2 x 15  Hkly PPT with DKTC x 8 L/R  Double leg press 3 black cords 1 x 30 TE  Bridge x 20  LTR x 10  LTR with legs crossed for ITB bias x 10 L/R  R clam shells with manual resistance 2 x 15  Hkly PPT with DKTC x 8 L/R  Sit to stand without UE assist x 5, SBA for blocking at the knees; 2HHA to return to sitting TE  Leg press  with AFO's ON  Double leg press 3 black cords 2 x 25  Single leg press 2 black cords 1 x 30 L/R  Sit to stand without UE assist x 5, SBA for blocking at the knees; 2HHA to return to sitting  Partial squats into chair 2HHA 1 x 10, 1 x 7  Red cord lateral steps x 30 L/R 2HHA TE  Leg press with AFO's ON  Double leg press 3 black cords 2 x 25  Single leg press 2 black cords 1 x 30 L/R  Sit to stand without UE assist x 5, SBA for blocking at the knees; 2HHA to return to sitting  Partial squats into chair 2HHA 1 x 10, 1 x 7  Red cord lateral steps x 30 L/R 2HHA TE AFO's on  NU step L5 6'  LTR\"s x 20   Bridge x 15  SB Bridge x 15  Quadruped arm raises x 10 L/R  Quadruped leg raises x 8 L/R  Partial squats into chair 2HHA 1 x 10, 1 x 7  Red cord lateral steps x 30 L/R 2HHA  Inclined forward   heel raises x 8 reps  90 deg squats 2HHA, wide SUSY 2 x 5  Lateral lunges with hold x 5 L/R 2HHA TE AFO's on  NU step L5 6'  LTR\"s x 20   B Fig 4 stretch  B FADDIR stretch  B hamstring stretch  Bridge x 15  SB Bridge x 15  6\" FSU's 1HHA x 10 L/R  Partial squats into chair 2HHA 1 x 10, 1 x 7  Red cord lateral steps x 30 L/R 2HHA  Inclined forward   heel raises x 10 reps   TE AFO's on 20'  NU step L5 6'  LTR\"s x 20   B Fig 4 stretch  B FADDIR stretch  B hamstring stretch  Bridge x 15  SB Bridge x 15  PPT with marching x 15 L/R  Sit to stand from an elevated surface 2 x 10  Red cord lateral steps x 30 L/R 2HHA   TE 30'  SB rolls DKTC  SB rolls LTR  L quad sets 10 x 10 sec, x 2  L/R SLR 2 x 10  SAQ's 2 x 10  Bridge 2 x 10  SB Bridge x 15  PPT with marching x 15 L/R TE 35'  NU step L6 5'  SB rolls LTR  SB rolls DKTC  SB bridge 2 x 12  Bridge with ADD ball 2 x 12  PPT with toe taps 2 x 12 L/R  Quadruped camel cat  X 10  Bird dog x 5 L/R CGA for safety  Red XB lateral steps x 30 L/R  Chair hovers x 12  Lateral lunges with hold x 10 L/R TE 30'  NU step L6 5'  SB rolls LTR  SB rolls DKTC  SB bridge 2 x 12  Quadruped camel cat  X 10  Bird dog x 5  L/R CGA for safety  Red XB lateral steps x 30 L/R  Sit to stand from an elevated seat position x 8 with focus on independent stabilization in standing   Forward lunge hold, sustained, 60 sec L/R  DLS on Airex x 3 (longest hold: 50 sec) TE 40'  SB rolls LTR  SB rolls DKTC  SB bridge x 15  Bridge x 15  Open book B  Clam shells BTB x 20 L/R  Prone quad stretch with a strap  Prone hip extension x 10 L/R  Camel Cat  Bird Dog  Lateral steps at the counter Grey TB  Squats at the counter  Tandem stance at the counter, modified  DLS wide SUSY at the counter TE 30'  Tandem stance B  Forward steps in II bars  Lateral steps in II bars  DLS on Airex  Hedy step overs forward  Hedy step overs lateral  Squats at the counter  Lateral steps at the counter, Grey TB   NU step: L6 5'  Tandem stance B  Forward steps in II bars  Lateral steps in II bars  DLS on Airex  Hedy step overs forward  Hedy step overs lateral  Left/Right turns in II bars  Side reaching in II bars with lumbar/thoracic rotation  Forward reaching in II bars       NU step: L6 5'  Tandem stance B    Forward steps in II bars, arms crossed (leans into the left railing ~ 2 times per length)  Forward steps in II bars holding a ball (leans into L/R railing with nearly every step; improves with practice)  Lateral steps in II bars (leans posterior   DLS on Airex  Hedy step overs forward  Hedy step overs lateral  Left/Right turns in II bars  Side reaching in II bars with lumbar/thoracic rotation  Forward reaching in II bars NU step: L6 5'  Tandem stance B    Forward steps in II bars, arms crossed   Forward steps in II bars holding a ball   Lateral steps in II bars    DLS on Airex  Hedy step overs forward  Single hedy step over, progression to 1 finger touch to 0HHA  Hedy step overs lateral  Single hedy lateral step overs progression to 1 finger touch to 0HHA  Left/Right turns in II bars  Side reaching in II bars with lumbar/thoracic rotation  Forward reaching  in II bars NU step: L6 5'  B hamstring stretch  B piriformis stretch  Bridge x 15  Bridge with SB x 10  Lateral steps in II bars  Lateral steps in II bars with FitBall hold  Lateral steps in II bars with weighted ball (3kg)  Lateral steps in II bars with GXB, 1 finger hold for balance  Forward steps 0HHA  Forward steps with FitBall carry  Madhavi step overs with 1HHA assist to finger assist. Had 3 episodes of LOB posterior requiring 2HHA hold for recovery NU step: L6 5'  B hamstring stretch  B piriformis stretch  Bridge x 15  Bridge with SB x 10  Lateral steps in II bars  Lateral steps in II bars with FitBall hold    Lateral steps in II bars with GXB, 1 finger hold for balance  Madhavi step overs with 1HHA assist to finger assist. Forward and lateral  [place] weight shifts  [place] alternate knee flexion  [place] FSU's  DLS on Airex with cone taps Manual: 25'  Lumbar rotational mobilization in sdly B  Lumbar PA glides in sdly Gr III B  Prone thoracic spine mobilization Gr III  SMT to lumbar paraspinals B  Prone quad stretch B  Prone FADDIR stretch B     Re-ed: 15'  Quadruped neutral spine/TrA recruitment/manual cuing  Camel cat with manual assist for movement coordination  Quadruped posterior rocking  Quadruped alternate arm raises x 7 L/R Re-ed: 15'  Quadruped neutral spine/TrA recruitment/manual cuing  Camel cat with minimal manual assist for movement coordination  Quadruped posterior rocking  Quadruped alternate arm raises x 7 L/R  TrA in hook-lying  TrA/PPT with alternate hip/knee flexion, slow  TrA/PPT with lateral knee fall outs, slow Re-ed: 25'  Quadruped neutral spine/TrA recruitment/manual cuing  Camel cat with minimal manual assist for movement coordination  Quadruped posterior rocking  Quadruped alternate arm raises x 7 L/R  Quadruped modified hip raises 2 x 5 L/R  TrA in hook-lying  TrA/PPT with alternate hip/knee flexion, slow  TrA/PPT with lateral knee fall outs, slow Therex:  10x sit to stand  with exaggerated WS fwd and CGA  Tennis ball roll out firm pressure x2 min R Manual:  SL for flex rotate mob GR III, QL release following.x8 min    Therex:  2x10 SLR B  10 x bridges 4\" holds Therex:  2x10 knee ankle/knee ankle B  15x dead bugs in shelf position   10x fig 4 bridges SL    Manual:  SL for flex rotate mob GR III, QL release following.x8 min   Manual:  SL for flex rotate mob GR III, QL release following, Prone CPA to L3-L5 GR III.x10 min    Therex:  2x8 fwd lunges to bosu Therex:  Towel  toe curls, R LE x10    Manual:  SL for flex rotate mob GR III, QL release following, B Manual:  SL for flex rotate mob GR III, QL release following, B x 8 min    Therex:    2x10 bridges with airex under one foot, B  2x10 SL raise on mat  15x SL hip/ankle hip abd, B Single leg press 1 black, 1 grey and 1 yellow cords x 20 L/R  B heel raises on leg press with knees extended, 1 black cord; 2 x 12    Re-ed: AFO's on  DLS on Airex SBA/CGA  DLS on the ground SBA  Madhavi step over with w/s forward x 15 L/R HHA  Cone taps x 15 L/R 1HHA     Single leg press 1 black, 1 grey and 1 yellow cords x 20 L/R    Re-ed: AFO's on  DLS on Airex SBA/CGA  DLS on the ground SBA  Madhavi step over with w/s forward x 15 L/R HHA  Cone taps x 15 L/R 1HHA Single leg press 2 black cords 1 x 30 L/R    Re-ed: AFO's on  DLS on Airex SBA/CGA  DLS on the ground SBA  Madhavi step over with w/s forward x 15 L/R HHA  Cone taps x 15 L/R 1HHA  Forward lunge unsupported with hands hovering and SBA; 7 x 5 sec L/R Partial squats into chair 2HHA x 10  Red cord lateral steps x 20 L/R 2HHA    Re-ed: AFO's on  DLS on Airex SBA 3 x 30 sec  DLS on the ground SBA  Tandem stance 2 x 20 sec L/R finger touch B  Cone taps x 15 L/R 1HHA  Forward lunge unsupported with hands hovering and SBA; 7 x 5 sec L/R Re-ed: AFO's on  DLS on Airex SBA 3 x 30 sec  DLS on the ground SBA  Tandem stance 2 x 20 sec L/R finger touch B  Cone taps x 15 L/R 1HHA  Forward lunge unsupported with  hands hovering and SBA; 7 x 5 sec L/R Re-ed: AFO's on  DLS on Airex SBA 3 x 30 sec  DLS on the ground SBA  Tandem stance 2 x 20 sec L/R finger touch B  Cone taps x 15 L/R 1HHA  Forward lunge unsupported with hands hovering and SBA; 7 x 5 sec L/R   Re-ed: AFO's on  DLS on Airex SBA 3 x 30 sec  Tandem stance 2 x 20 sec L/R finger touch B  Cone taps x 15 L/R 1HHA  Forward lunge unsupported with hands hovering and SBA; 7 x 5 sec L/R  DLS on Airex with forward reach x 5 L/R   Double leg press 3 black cords 1 x 30  Single leg press 2 black cords 1 x 30 L/R    Re-ed: AFO's on  DLS on Airex SBA 3 x 30 sec  Tandem stance 2 x 20 sec L/R finger touch B  Cone taps x 15 L/R 1HHA  Tandem walk 2HHA  DLS on Airex with forward reach x 5 L/R Re-ed: AFO's on 10'  DLS on Airex SBA 3 x 30 sec  Tandem stance 2 x 20 sec L/R finger touch B  Cone taps x 15 L/R 1HHA  Tandem walk 2HHA  DLS on Airex with forward reach x 10 L/R  Lunge stance with A/P w/s x 5 L/R HHA   Re-ed: AFO's on 8'  DLS on Airex SBA 3 x 30 sec  Tandem stance 2 x 20 sec L/R finger touch B  Cone taps x 15 L/R 1HHA  Tandem walk 2HHA            HEP: Access Code: [email or web address]  Access Code: [email or web address]  URL: [email or web address]  Date: 01/23/2024  Prepared by: Juanita Koehler    Exercises  - Supine Hip and Knee Flexion AROM with Swiss Ball  - 1 x daily - 7 x weekly - 3 sets - 10 reps  - Supine Lower Trunk Rotation with Swiss Ball  - 1 x daily - 7 x weekly - 3 sets - 10 reps  - Supine Figure 4 Piriformis Stretch  - 1 x daily - 7 x weekly - 1 sets - 3 reps - 30-60 sec hold  - Supine Bridge  - 1 x daily - 7 x weekly - 2-3 sets - 10 reps  - Bridge with Heels on Swiss Ball  - 1 x daily - 7 x weekly - 2-3 sets - 10 reps  - Sidelying Open Book Thoracic Lumbar Rotation and Extension  - 1 x daily - 7 x weekly - 1 sets - 10 reps  - Clamshell with Resistance  - 1 x daily - 7 x weekly - 3 sets - 10 reps  - Prone Quadriceps Stretch with Strap  - 1 x daily - 7 x weekly - 1 sets - 3 reps  - 30-60 sec hold  - Prone Hip Extension  - 1 x daily - 7 x weekly - 1-2 sets - 10 reps  - Cat Cow  - 1 x daily - 7 x weekly - 2 sets - 10 reps  - Bird Dog  - 1 x daily - 7 x weekly - 1-2 sets - 10 reps  - Side Stepping with Counter Support  - 1 x daily - 7 x weekly - 3 sets - 10 reps  - Mini Squat with Counter Support  - 1 x daily - 7 x weekly - 1-2 sets - 10 reps  - Tandem Stance with Support  - 1 x daily - 7 x weekly - 1 sets - 3 reps - 30 sec hold      Charges: therex x 1 20', man x 2 25 Total Timed Treatment: 45 min  Total Treatment Time: 45 min

## 2024-05-02 ENCOUNTER — OFFICE VISIT (OUTPATIENT)
Dept: PODIATRY CLINIC | Facility: CLINIC | Age: 79
End: 2024-05-02

## 2024-05-02 DIAGNOSIS — L60.0 INGROWN NAIL: ICD-10-CM

## 2024-05-02 DIAGNOSIS — M20.42 HAMMER TOES OF BOTH FEET: ICD-10-CM

## 2024-05-02 DIAGNOSIS — G63 NEUROPATHY ASSOCIATED WITH MGUS (HCC): ICD-10-CM

## 2024-05-02 DIAGNOSIS — M20.41 HAMMER TOES OF BOTH FEET: ICD-10-CM

## 2024-05-02 DIAGNOSIS — D47.2 NEUROPATHY ASSOCIATED WITH MGUS (HCC): ICD-10-CM

## 2024-05-02 DIAGNOSIS — D47.2 MGUS (MONOCLONAL GAMMOPATHY OF UNKNOWN SIGNIFICANCE): ICD-10-CM

## 2024-05-02 DIAGNOSIS — B35.1 ONYCHOMYCOSIS: Primary | ICD-10-CM

## 2024-05-02 PROCEDURE — 99213 OFFICE O/P EST LOW 20 MIN: CPT | Performed by: STUDENT IN AN ORGANIZED HEALTH CARE EDUCATION/TRAINING PROGRAM

## 2024-05-02 NOTE — PROGRESS NOTES
[facility]  Progress Note    Nils Joseph is a 78 year old male.   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  He has elongated and thickened nails he has difficulty trimming his own.  His nails do become incurvated by the end of his stretch between visits.  They do cause some pain from rubbing in his shoes.  He denies acute signs of infection or other concerns.  He continues to walk with AFOs and complete physical therapy.  No other complaints are mentioned.      Allergies: Immune globulin   Current Outpatient Medications   Medication Sig Dispense Refill    HYDROcodone-acetaminophen 7.5-325 MG Oral Tab Take 1 tablet by mouth every 4 (four) hours as needed. 15 tablet 0    ALPRAZolam 0.25 MG Oral Tab Take 1 tablet (0.25 mg total) by mouth nightly as needed. 90 tablet 1    Beclomethasone Diprop HFA 40 MCG/ACT Inhalation Aerosol, Breath Activated Inhale 2 puffs into the lungs 2 (two) times daily as needed. 1 each 3    fluticasone propionate 50 MCG/ACT Nasal Suspension 1 spray by Each Nare route 2 (two) times daily as needed for Rhinitis. 3 each 3    levothyroxine 100 MCG Oral Tab Take 1 tablet (100 mcg total) by mouth once daily. Overdue for labs, please complete them. 90 tablet 3    traZODone 100 MG Oral Tab Take 1 tablet (100 mg total) by mouth nightly as needed for Sleep. 90 tablet 3    predniSONE 10 MG Oral Tab Take 1 tablet (10 mg total) by mouth daily. 30 tablet 0    predniSONE 5 MG Oral Tab In one month, approximately 4/8/24, start prednisone 5mg daily 30 tablet 2    doxycycline 100 MG Oral Cap Take 1 capsule (100 mg total) by mouth 2 (two) times daily.      famotidine 40 MG Oral Tab Take 1 tablet (40 mg total) by mouth daily.      BRINZOLAMIDE-BRIMONIDINE OP Apply 1 drop to eye in the morning,  at noon, and at bedtime. Right eye      predniSONE 5 MG Oral Tab Take 1 tablet (5 mg total) by mouth daily. In addition to 10mg tablet, for total 15mg daily. 30 tablet 0    valACYclovir 1 G Oral Tab Take 0.5 tablets (500 mg total) by mouth daily.      gabapentin 300 MG Oral Cap Take 1 capsule (300 mg total) by mouth 3 (three) times daily. 270 capsule 3    docusate sodium 100 MG Oral Cap Take 1 capsule (100 mg total) by mouth 2 (two) times daily. (Patient taking differently: Take 1 capsule (100 mg total) by mouth 2 (two) times daily as needed.) 30 capsule 1    Glucosamine-Chondroitin (GLUCOSAMINE CHONDR COMPLEX OR) Take by mouth as needed.      cyanocobalamin 1000 MCG Oral Tab Take 1 tablet (1,000 mcg total) by mouth daily.      senna-docusate 8.6-50 MG Oral Tab Take 1 tablet by mouth daily. (Patient taking differently: Take 1 tablet by mouth daily as needed.) 30 tablet 0    erythromycin 5 MG/GM Ophthalmic Ointment Place 1 Application  into both eyes nightly. Uses 3-4 x weekly before bed. States prescribed mainly for moisture      tamsulosin (FLOMAX) cap Take 1 capsule (0.4 mg total) by mouth daily. 90 capsule 3    finasteride 5 MG Oral Tab Take 1 tablet (5 mg total) by mouth daily. 90 tablet 3    atorvastatin 10 MG Oral Tab Take 1 tablet (10 mg total) by mouth daily.      Albuterol Sulfate  (90 BASE) MCG/ACT Inhalation Aero Soln Inhale 2 puffs into the lungs every 6 (six) hours as needed for Wheezing.        Past Medical History:    Asthma (HCC)    Atypical mole    Back problem    low back pain, s/p fusion of L3,4,5.  back still stiff    Belching    Benign thyroid cyst    BPH (benign prostatic hyperplasia)    Cataract    s/p sugery    Constipation    Depression    Disorder of prostate    Flatulence/gas pain/belching    Frequent urination    Frequent use of laxatives    Hearing impairment    Slight    Hemorrhoids    Hemorrhoids, internal    High cholesterol    Elevated     Insomnia    Irregular bowel  habits    Itch of skin    Leg swelling    Neuropathy    R leg    Obesity    slightly overweight    Osteoarthritis    Poor balance    uses cane    Rotator cuff sprain    Seborrheic keratosis    Shoulder joint pain    Sinusitis    Unspecified disorder of thyroid    hypothyroid    Visual impairment    glasses reading      Past Surgical History:   Procedure Laterality Date    Appendectomy      Arthroscopy of joint unlisted Right     knee    Back surgery  15    triple fusion of L3-L4-L5    Biopsy  2019    Bone Marrow    Biopsy  2019    right sural nerve biopsy    Carpal tunnel release Bilateral     Cataract Bilateral 2015    IOL'S    Colonoscopy      Hemorrhoidectomy  ~     Orthopedic surg (pbp)      LT ELBOW - ulnar nerve release    Other  2019    Lumbar puncture    Other surgical history      Shoulder Joint Replacement 16, Sinus surgery ,    Other surgical history      I&D of abscess in axilla area 7/3    Sigmoidoscopy,diagnostic      Sinus surgery    2014    Spine surgery procedure unlisted      L3-4-5    Tonsillectomy        Family History   Problem Relation Age of Onset    Breast Cancer Mother     Cancer Mother         breast cancer- late in life    Heart Disorder Mother         cardiac issues related to aging    Heart Disorder Father         partially blocked carotid arteries    Hypertension Father     Cancer Father         ? cancer?    Lipids Father     Hypertension Brother     Hypertension Brother       Social History     Socioeconomic History    Marital status:    Tobacco Use    Smoking status: Former     Current packs/day: 0.00     Average packs/day: 1.5 packs/day for 32.0 years (48.0 ttl pk-yrs)     Types: Cigarettes, Pipe     Start date: 3/1/1966     Quit date: 3/1/1998     Years since quittin.1    Smokeless tobacco: Never    Tobacco comments:     Quit many years ago   Vaping Use    Vaping status: Never Used   Substance and Sexual Activity     Alcohol use: Yes     Alcohol/week: 11.0 - 18.0 standard drinks of alcohol     Types: 6 - 8 Glasses of wine, 1 - 2 Cans of beer, 4 - 8 Shots of liquor per week     Comment: 1-2 drinks daily    Drug use: No   Other Topics Concern    Caffeine Concern Yes     Comment: 2-3 cups a day    Exercise Yes     Comment: physical therapy           REVIEW OF SYSTEMS:     Today reviewed systems as documented below  GENERAL HEALTH: feels well otherwise  SKIN: denies any unusual skin lesions or rashes  RESPIRATORY: denies shortness of breath with exertion  CARDIOVASCULAR: denies chest pain on exertion  GI: denies abdominal pain and denies heartburn  NEURO: denies headaches  MUSCULO: History of arthritis      EXAM:   There were no vitals taken for this visit.  GENERAL: well developed, well nourished, in no apparent distress  EXTREMITIES:   1. Integument: Normal skin temperature and turgor.  Nails x10 are elongated, thickened, dystrophic, subungual debris.  Lateral borders of hallux nails are incurvated.  2. Vascular: Dorsalis pedis two out of four bilateral and posterior tibial pulses two out of   four bilateral, capillary refill normal.  A little bit of pitting edema around ankle bilaterally.   3. Musculoskeletal: All muscle groups are graded 5 out of 5 in the foot and ankle.  Compartments soft and compressible.   4. Neurological: Normal sharp dull sensation; reflexes normal.  Decreased protective sensation noted to lower extremities.        ASSESSMENT AND PLAN:   Diagnoses and all orders for this visit:    Onychomycosis    Ingrown nail    MGUS (monoclonal gammopathy of unknown significance)    Neuropathy associated with MGUS (HCC)    Hammer toes of both feet            Plan:     -Patient examined, chart history reviewed.  -Discussed importance of proper pedal hygiene, regular foot checks.  -Sharply debrided nails x10 with a sterile nail nipper achieving a 20% reduction in thickness and length, without incident. Nails further  smoothed with sachin. Slant back performed to ingrown nails.   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  -Educated patient on acute signs of infection advised patient to seek immediate medical attention if symptoms arise.    The patient indicates understanding of these issues and agrees to the plan.    RTC 2 months.    Alli Campbell DPM

## 2024-05-07 ENCOUNTER — OFFICE VISIT (OUTPATIENT)
Dept: PHYSICAL THERAPY | Facility: HOSPITAL | Age: 79
End: 2024-05-07
Attending: INTERNAL MEDICINE
Payer: MEDICARE

## 2024-05-07 PROCEDURE — 97110 THERAPEUTIC EXERCISES: CPT

## 2024-05-07 NOTE — PROGRESS NOTES
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 Lower back feels good. Had one fall over the past week.     Pain: 2-3/10 in the hips    Objective:   Ambulates with B AFO's and cane, steppage gait  Quad length: 100 in prone B  Piriformis length: min-mod limitations, very stiff      Assessment: Continued with balance re-ed in the II bars. Patient demonstrates improved postural control and improved ability and endurance with the power stance. Added postural stabilization while pulling on the therabands; able to do well with static tension but loses balance nearly immediately with the decreased or alternating tension. Challenged patient by the addition of an obstacle course; completed with one hand assist and tried to rely less on UE support which contributed to increased posterior weight-shift and frequent need to \"catch\" himself.    Goals: (to be met in 10+ 10 + 10 + 6/8visits)   Pt will demonstrate improved SLS to >5 seconds CALOS to promote safety and decrease risk of falls on uneven surfaces such as grass. Progressing.   Pt will report reduced frequency of loss of balance posterior/to the side by 30%. New  Pt will demonstrate improved postural control to allow for improved body centering with gait and daily activities. New  Pt will perform TUG in <15 seconds with least restrictive AD, demonstrating improved gait speed for improved participation in ADL such as community ambulation. Met  Pt will perform 4-Item DGI with score of 9/12 or greater with least restrictive AD to demonstrate ability to ambulate safely in  crowded and busy environments. Progressing  Pt will be able to squat to  light objects around the house without loss of stability. Progressing  Pt will improve functional hip strength to demonstrate ability to ascend/descend 1 flight of stairs reciprocally with the use of handrail. Goal met.   Pt will be independent and compliant with comprehensive HEP to maintain progress achieved in PT Goal met.         Plan: balance re-ed, including Biodex, postural control, reactive balance, dual tasks    Date: 9/20/23  Tx#: 6/16 Date: 9/25/23  Tx#: 7/16 Date: 9/27/23  Tx#: 8/16 Date:10/2/2023   Tx#:9/16 Date:10/4/2023  Tx#:10/16 Date:10/9/2023   Tx#:11/16 Date: 10/11/2023   Tx#:12/16 Date:10/16/2023   Tx#:13/16 Date:10/23/2023   Tx#:14/20 Date:10/30/2023   Tx#:15/20 Date:11/01/2023   Tx#:16/20 Date:11/06/2023   Tx#:17/20 Date:11/08/2023   Tx#:18/20 Date:11/13/2023   Tx#:19/20 Date:12/6/2023   Tx#:20/20 Date:12/13/2023   Tx#:21/30 Date:12/18/2023   Tx#:22/30 Date:12/20/2023   Tx#:23/30 Date:1/3/2024  Tx#:24/30 Date:1/17/2024  Tx#:25/30 Date:1/19/2024  Tx#:26/30 Date:1/23/2024  Tx#:27/30 Date:3/8/2024  Tx#:28/30 Date:3/12/2024  Tx#:29/30 Date:3/15/2024  Tx#:30/30 Date:3/19/2024  Tx#:31 Date:4/9/2024  Tx#:32 Date:4/16/2024  Tx#:33 Date:4/30/2024  Tx#:34 5/7/2024  Tx#: 35   Manual: 25'  Prone with one pillow under:  Deep STM lumbar paraspinals and QL B  Manual quad stretch in prone B  Manual modified hip flexors stretch EOB; STM to anterior thigh Manual: 25'  Prone with one pillow under:  Deep STM lumbar paraspinals and QL B  Manual quad stretch in prone B  Manual modified hip flexors stretch EOB; STM to anterior thigh Manual: 15'  Prone with one pillow under:  Deep STM lumbar paraspinals and QL B  Manual quad stretch in prone B  Manual modified hip flexors stretch EOB; STM to anterior thigh   ThereX:  10 lumbar flex seated at edge of table  10x towel curls B  2x10 fwd lunge holding bar, out of AFOs  Goblet squats  Neuro  re-ed:  NMES to R anterior tib 400 usec, 100hz, 95 mA  Practived walking 2 laps in p bars  2x10 DF assisted  1x heel walking  2x10 lunges to airex  4x3 hurdles Neuro re-ed:  NMES to R anterior tib 400 usec, 100hz, 95 mA  2x10 DF assisted  2x10 goblet squats  1x heel walking  10x step up to 6' R LE, 10 x step over step R LE  Therex:  PN see above, objective measures taken    Mod joão position for passive hip flexor stretch 2x30\"  10x fig 4 SL bridge Neuro re-ed:  NMES to R anterior tib 400 usec, 100hz, 95 mA  2x10 DF assisted  15 bosu lunges on R  2x10 step ups to 6\"  15x D1 PND hip flex on airex, R Neuro re-ed:  10x 3-cone tap in mod SLS on airex, B  Tandem walk in p bars light HHA x2  20 x marches on sanddune   Manual:  SL for flex rotate mob GR III, QL release following, B x 8 min  STM L posterior hip  Manual: 18'  Deep STM R posterior hip   Deep STM R ITB/TFL   SL for flex rotate mob GR III, QL release following, B   Manual R piriformis stretch   Manual: 15'  B quad stretch in prone  SL for flex rotate mob GR III, QL release following, B   Manual R piriformis stretch  Prone thoracic spine PA mobs Gr III  Prone central PA mobs L5/S1 Gr III Manual: 15'  B quad stretch in prone  SL for flex rotate mob GR III, QL release following, B   Manual R piriformis stretch  Prone thoracic spine PA mobs Gr III  Prone central PA mobs L5/S1 Gr III Manual: 15'  B quad stretch in prone  SL for flex rotate mob GR III, QL release following, B   Manual R piriformis stretch  Prone thoracic spine PA mobs Gr III  Prone central PA mobs L5/S1 Gr III Manual:     B quad stretch in prone  B piriformis stretch --   Manual: 15'  B quad stretch in prone  SL for flex rotate mob GR III, QL release following, B   Manual R piriformis stretch  Prone thoracic spine PA mobs Gr III  Prone central PA mobs L5/S1 Gr III Manual: 8'  B piriformis stretch  L hamstring stretch  KT application left knee -- Manual: 12'  B piriformis stretch  B quad stretch in  prone  Prone thoracic spine PA mobs Gr III  Prone central PA mobs L5/S1 Gr III   Manual: 5'  B piriformis stretch  B quad stretch in prone -- Re-ed: 15'  Biodex postural control, steady platform  Biodex limits of stability, steady platform   Re-ed: 15'  Biodex postural control, steady platform  Biodex limits of stability, steady platform Re-ed: 15'  Biodex postural control, steady platform  Biodex limits of stability, steady platform -- Manual: 10'  B piriformis stretch  B quad stretch in prone  CPA in prone to lumbar spine, Gr III TE 20  NU step L6 5'  SB LTR  SB DKTC  Bridge 2 x 10  Bridge with SB 2 x 10  Clam shells  x 20 L/R  B hamstring stretch with a strap TE   NU step L6 5'  II bars:   Lateral steps  Forward/posterior steps  DLS on Airex 45/60/35 sec  Single hedy step over   TE TE TE - -   - Gait; walking with hurricaine and SBA                  TE: 40   6\" step FSU's 1HHA   NU step L5 5'  Bridge 2 x 10   Clam shells 2 x 10, manual resistance NU step L5 5'  Bridge 2 x 10   Education on log rolling  Neuro re-ed:  10x sciatic nerve glide with ankle floss, B  Manual:  SL for flex rotate mob GR III, QL release following.x8 min Therex:  Seated sciatica nerve glide x 10 R LE Neuro re-ed:  10x sciatic N glide with adductor bias and floss on R Neuro re-ed:  10x sciatic N glide with adductor bias and floss on R 15x bridges  Neuro re-ed:  Attempted to discriminate between 3 different textures (soft, sand paper, and poky) R legx4 min Therex:  2x10 donkey kicks, 3 lbs B  2x15 lateral bosu lunges, HHA  15 squat to row, 30 lbs, CGA TE  Bridge x 15  Clams with manual resistance x 10 L/R  Sit to stand x 5; hands on upper thighs  Leg press with AFO's off  Double leg press 3 black cords x 30 TE  Bridge x 15  Clams with manual resistance x 12 L/R  Leg press with AFO's ON  Double leg press 3 black cords 2 x 25 TE  Bridge x 20  LTR x 10  LTR with legs crossed for ITB bias x 10 L/R  R clam shells with manual resistance 2 x  15  Hkly PPT with DKTC x 8 L/R  Double leg press 3 black cords 1 x 30 TE  Bridge x 20  LTR x 10  LTR with legs crossed for ITB bias x 10 L/R  R clam shells with manual resistance 2 x 15  Hkly PPT with DKTC x 8 L/R  Sit to stand without UE assist x 5, SBA for blocking at the knees; 2HHA to return to sitting TE  Leg press with AFO's ON  Double leg press 3 black cords 2 x 25  Single leg press 2 black cords 1 x 30 L/R  Sit to stand without UE assist x 5, SBA for blocking at the knees; 2HHA to return to sitting  Partial squats into chair 2HHA 1 x 10, 1 x 7  Red cord lateral steps x 30 L/R 2HHA TE  Leg press with AFO's ON  Double leg press 3 black cords 2 x 25  Single leg press 2 black cords 1 x 30 L/R  Sit to stand without UE assist x 5, SBA for blocking at the knees; 2HHA to return to sitting  Partial squats into chair 2HHA 1 x 10, 1 x 7  Red cord lateral steps x 30 L/R 2HHA TE AFO's on  NU step L5 6'  LTR\"s x 20   Bridge x 15  SB Bridge x 15  Quadruped arm raises x 10 L/R  Quadruped leg raises x 8 L/R  Partial squats into chair 2HHA 1 x 10, 1 x 7  Red cord lateral steps x 30 L/R 2HHA  Inclined forward   heel raises x 8 reps  90 deg squats 2HHA, wide SUSY 2 x 5  Lateral lunges with hold x 5 L/R 2HHA TE AFO's on  NU step L5 6'  LTR\"s x 20   B Fig 4 stretch  B FADDIR stretch  B hamstring stretch  Bridge x 15  SB Bridge x 15  6\" FSU's 1HHA x 10 L/R  Partial squats into chair 2HHA 1 x 10, 1 x 7  Red cord lateral steps x 30 L/R 2HHA  Inclined forward   heel raises x 10 reps   TE AFO's on 20'  NU step L5 6'  LTR\"s x 20   B Fig 4 stretch  B FADDIR stretch  B hamstring stretch  Bridge x 15  SB Bridge x 15  PPT with marching x 15 L/R  Sit to stand from an elevated surface 2 x 10  Red cord lateral steps x 30 L/R 2HHA   TE 30'  SB rolls DKTC  SB rolls LTR  L quad sets 10 x 10 sec, x 2  L/R SLR 2 x 10  SAQ's 2 x 10  Bridge 2 x 10  SB Bridge x 15  PPT with marching x 15 L/R TE 35'  NU step L6 5'  SB rolls LTR  SB rolls DKTC  SB  bridge 2 x 12  Bridge with ADD ball 2 x 12  PPT with toe taps 2 x 12 L/R  Quadruped camel cat  X 10  Bird dog x 5 L/R CGA for safety  Red XB lateral steps x 30 L/R  Chair hovers x 12  Lateral lunges with hold x 10 L/R TE 30'  NU step L6 5'  SB rolls LTR  SB rolls DKTC  SB bridge 2 x 12  Quadruped camel cat  X 10  Bird dog x 5 L/R CGA for safety  Red XB lateral steps x 30 L/R  Sit to stand from an elevated seat position x 8 with focus on independent stabilization in standing   Forward lunge hold, sustained, 60 sec L/R  DLS on Airex x 3 (longest hold: 50 sec) TE 40'  SB rolls LTR  SB rolls DKTC  SB bridge x 15  Bridge x 15  Open book B  Clam shells BTB x 20 L/R  Prone quad stretch with a strap  Prone hip extension x 10 L/R  Camel Cat  Bird Dog  Lateral steps at the counter Grey TB  Squats at the counter  Tandem stance at the counter, modified  DLS wide SUSY at the counter TE 30'  Tandem stance B  Forward steps in II bars  Lateral steps in II bars  DLS on Airex  Madhavi step overs forward  Madhavi step overs lateral  Squats at the counter  Lateral steps at the counter, Grey TB   NU step: L6 5'  Tandem stance B  Forward steps in II bars  Lateral steps in II bars  DLS on Airex  Madhavi step overs forward  Madhavi step overs lateral  Left/Right turns in II bars  Side reaching in II bars with lumbar/thoracic rotation  Forward reaching in II bars       NU step: L6 5'  Tandem stance B    Forward steps in II bars, arms crossed (leans into the left railing ~ 2 times per length)  Forward steps in II bars holding a ball (leans into L/R railing with nearly every step; improves with practice)  Lateral steps in II bars (leans posterior   DLS on Airex  Madhavi step overs forward  Madhavi step overs lateral  Left/Right turns in II bars  Side reaching in II bars with lumbar/thoracic rotation  Forward reaching in II bars NU step: L6 5'  Tandem stance B    Forward steps in II bars, arms crossed   Forward steps in II bars holding a ball    Lateral steps in II bars    DLS on Airex  Hedy step overs forward  Single hedy step over, progression to 1 finger touch to 0HHA  Hedy step overs lateral  Single hedy lateral step overs progression to 1 finger touch to 0HHA  Left/Right turns in II bars  Side reaching in II bars with lumbar/thoracic rotation  Forward reaching in II bars NU step: L6 5'  B hamstring stretch  B piriformis stretch  Bridge x 15  Bridge with SB x 10  Lateral steps in II bars  Lateral steps in II bars with FitBall hold  Lateral steps in II bars with weighted ball (3kg)  Lateral steps in II bars with GXB, 1 finger hold for balance  Forward steps 0HHA  Forward steps with FitBall carry  Hedy step overs with 1HHA assist to finger assist. Had 3 episodes of LOB posterior requiring 2HHA hold for recovery NU step: L6 5'  B hamstring stretch  B piriformis stretch  Bridge x 15  Bridge with SB x 10  Lateral steps in II bars  Lateral steps in II bars with FitBall hold    Lateral steps in II bars with GXB, 1 finger hold for balance  Hedy step overs with 1HHA assist to finger assist. Forward and lateral  [place] weight shifts  [place] alternate knee flexion  [place] FSU's  DLS on Airex with cone taps Manual: 25'  Lumbar rotational mobilization in sdly B  Lumbar PA glides in sdly Gr III B  Prone thoracic spine mobilization Gr III  SMT to lumbar paraspinals B  Prone quad stretch B  Prone FADDIR stretch B   Power squat position holding a ball: reaching F/L/R  Step downs off Airex, 1HHA  Power stance with BTB rows, alternate rows, alternating tension with bilateral rows  Standing quad stretch  Obstacle course: hurdles, 6\" step, foam blocks  Red TB lateral steps 0HHA, frequent leaning back   Re-ed: 15'  Quadruped neutral spine/TrA recruitment/manual cuing  Camel cat with manual assist for movement coordination  Quadruped posterior rocking  Quadruped alternate arm raises x 7 L/R Re-ed: 15'  Quadruped neutral spine/TrA recruitment/manual  cuing  Camel cat with minimal manual assist for movement coordination  Quadruped posterior rocking  Quadruped alternate arm raises x 7 L/R  TrA in hook-lying  TrA/PPT with alternate hip/knee flexion, slow  TrA/PPT with lateral knee fall outs, slow Re-ed: 25'  Quadruped neutral spine/TrA recruitment/manual cuing  Camel cat with minimal manual assist for movement coordination  Quadruped posterior rocking  Quadruped alternate arm raises x 7 L/R  Quadruped modified hip raises 2 x 5 L/R  TrA in hook-lying  TrA/PPT with alternate hip/knee flexion, slow  TrA/PPT with lateral knee fall outs, slow Therex:  10x sit to stand with exaggerated WS fwd and CGA  Tennis ball roll out firm pressure x2 min R Manual:  SL for flex rotate mob GR III, QL release following.x8 min    Therex:  2x10 SLR B  10 x bridges 4\" holds Therex:  2x10 knee ankle/knee ankle B  15x dead bugs in shelf position   10x fig 4 bridges SL    Manual:  SL for flex rotate mob GR III, QL release following.x8 min   Manual:  SL for flex rotate mob GR III, QL release following, Prone CPA to L3-L5 GR III.x10 min    Therex:  2x8 fwd lunges to bosu Therex:  Towel  toe curls, R LE x10    Manual:  SL for flex rotate mob GR III, QL release following, B Manual:  SL for flex rotate mob GR III, QL release following, B x 8 min    Therex:    2x10 bridges with airex under one foot, B  2x10 SL raise on mat  15x SL hip/ankle hip abd, B Single leg press 1 black, 1 grey and 1 yellow cords x 20 L/R  B heel raises on leg press with knees extended, 1 black cord; 2 x 12    Re-ed: AFO's on  DLS on Airex SBA/CGA  DLS on the ground SBA  Madhavi step over with w/s forward x 15 L/R HHA  Cone taps x 15 L/R 1HHA     Single leg press 1 black, 1 grey and 1 yellow cords x 20 L/R    Re-ed: AFO's on  DLS on Airex SBA/CGA  DLS on the ground SBA  Madhavi step over with w/s forward x 15 L/R HHA  Cone taps x 15 L/R 1HHA Single leg press 2 black cords 1 x 30 L/R    Re-ed: AFO's on  DLS on Airex  SBA/CGA  DLS on the ground SBA  Madhavi step over with w/s forward x 15 L/R HHA  Cone taps x 15 L/R 1HHA  Forward lunge unsupported with hands hovering and SBA; 7 x 5 sec L/R Partial squats into chair 2HHA x 10  Red cord lateral steps x 20 L/R 2HHA    Re-ed: AFO's on  DLS on Airex SBA 3 x 30 sec  DLS on the ground SBA  Tandem stance 2 x 20 sec L/R finger touch B  Cone taps x 15 L/R 1HHA  Forward lunge unsupported with hands hovering and SBA; 7 x 5 sec L/R Re-ed: AFO's on  DLS on Airex SBA 3 x 30 sec  DLS on the ground SBA  Tandem stance 2 x 20 sec L/R finger touch B  Cone taps x 15 L/R 1HHA  Forward lunge unsupported with hands hovering and SBA; 7 x 5 sec L/R Re-ed: AFO's on  DLS on Airex SBA 3 x 30 sec  DLS on the ground SBA  Tandem stance 2 x 20 sec L/R finger touch B  Cone taps x 15 L/R 1HHA  Forward lunge unsupported with hands hovering and SBA; 7 x 5 sec L/R   Re-ed: AFO's on  DLS on Airex SBA 3 x 30 sec  Tandem stance 2 x 20 sec L/R finger touch B  Cone taps x 15 L/R 1HHA  Forward lunge unsupported with hands hovering and SBA; 7 x 5 sec L/R  DLS on Airex with forward reach x 5 L/R   Double leg press 3 black cords 1 x 30  Single leg press 2 black cords 1 x 30 L/R    Re-ed: AFO's on  DLS on Airex SBA 3 x 30 sec  Tandem stance 2 x 20 sec L/R finger touch B  Cone taps x 15 L/R 1HHA  Tandem walk 2HHA  DLS on Airex with forward reach x 5 L/R Re-ed: AFO's on 10'  DLS on Airex SBA 3 x 30 sec  Tandem stance 2 x 20 sec L/R finger touch B  Cone taps x 15 L/R 1HHA  Tandem walk 2HHA  DLS on Airex with forward reach x 10 L/R  Lunge stance with A/P w/s x 5 L/R HHA   Re-ed: AFO's on 8'  DLS on Airex SBA 3 x 30 sec  Tandem stance 2 x 20 sec L/R finger touch B  Cone taps x 15 L/R 1HHA  Tandem walk 2HHA             HEP: Access Code: [email or web address]  Access Code: [email or web address]  URL: [email or web address]  Date: 01/23/2024  Prepared by: Juanita Koehler    Exercises  - Supine Hip and Knee Flexion AROM with Swiss Ball  - 1 x daily - 7  x weekly - 3 sets - 10 reps  - Supine Lower Trunk Rotation with Swiss Ball  - 1 x daily - 7 x weekly - 3 sets - 10 reps  - Supine Figure 4 Piriformis Stretch  - 1 x daily - 7 x weekly - 1 sets - 3 reps - 30-60 sec hold  - Supine Bridge  - 1 x daily - 7 x weekly - 2-3 sets - 10 reps  - Bridge with Heels on Swiss Ball  - 1 x daily - 7 x weekly - 2-3 sets - 10 reps  - Sidelying Open Book Thoracic Lumbar Rotation and Extension  - 1 x daily - 7 x weekly - 1 sets - 10 reps  - Clamshell with Resistance  - 1 x daily - 7 x weekly - 3 sets - 10 reps  - Prone Quadriceps Stretch with Strap  - 1 x daily - 7 x weekly - 1 sets - 3 reps - 30-60 sec hold  - Prone Hip Extension  - 1 x daily - 7 x weekly - 1-2 sets - 10 reps  - Cat Cow  - 1 x daily - 7 x weekly - 2 sets - 10 reps  - Bird Dog  - 1 x daily - 7 x weekly - 1-2 sets - 10 reps  - Side Stepping with Counter Support  - 1 x daily - 7 x weekly - 3 sets - 10 reps  - Mini Squat with Counter Support  - 1 x daily - 7 x weekly - 1-2 sets - 10 reps  - Tandem Stance with Support  - 1 x daily - 7 x weekly - 1 sets - 3 reps - 30 sec hold      Charges: therex x 3 45 Total Timed Treatment: 45 min  Total Treatment Time: 45 min

## 2024-05-13 NOTE — PROGRESS NOTES
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 Did decompression/traction of lumbar spine at his chiropractor and that felt good on his lower back.     Pain: 2-3/10 in the hips    Objective:   Ambulates with B AFO's and cane, steppage gait  Quad length: 100 in prone B  Piriformis length: min-mod limitations, very stiff      Assessment: Demonstrated more stability and fewer episodes of LOB posterior with standing exercises and an obstacle course. When did lose balance, was able to recover it by using one hand on the railing. Pt feels ready to continue with HEP once completes this set of PT appointments.    Goals: (to be met in 10+ 10 + 10 + 6/8visits)   Pt will demonstrate improved SLS to >5 seconds CALOS to promote safety and decrease risk of falls on uneven surfaces such as grass. Progressing.   Pt will report reduced frequency of loss of balance posterior/to the side by 30%. New  Pt will demonstrate improved postural control to allow for improved body centering with gait and daily activities. New  Pt will perform TUG in <15 seconds with least restrictive AD, demonstrating improved gait speed for improved participation in ADL such as community ambulation. Met  Pt will perform 4-Item DGI with score of 9/12 or greater with least restrictive AD to demonstrate ability to ambulate safely in crowded and busy environments. Progressing  Pt will be able to squat to  light objects around the house without loss of stability. Progressing  Pt will improve functional hip strength to demonstrate ability to ascend/descend 1 flight of stairs  reciprocally with the use of handrail. Goal met.   Pt will be independent and compliant with comprehensive HEP to maintain progress achieved in PT Goal met.         Plan: balance re-ed, including Biodex, postural control, reactive balance, dual tasks    Date: 9/20/23  Tx#: 6/16 Date: 9/25/23  Tx#: 7/16 Date: 9/27/23  Tx#: 8/16 Date:10/2/2023   Tx#:9/16 Date:10/4/2023  Tx#:10/16 Date:10/9/2023   Tx#:11/16 Date: 10/11/2023   Tx#:12/16 Date:10/16/2023   Tx#:13/16 Date:10/23/2023   Tx#:14/20 Date:10/30/2023   Tx#:15/20 Date:11/01/2023   Tx#:16/20 Date:11/06/2023   Tx#:17/20 Date:11/08/2023   Tx#:18/20 Date:11/13/2023   Tx#:19/20 Date:12/6/2023   Tx#:20/20 Date:12/13/2023   Tx#:21/30 Date:12/18/2023   Tx#:22/30 Date:12/20/2023   Tx#:23/30 Date:1/3/2024  Tx#:24/30 Date:1/17/2024  Tx#:25/30 Date:1/19/2024  Tx#:26/30 Date:1/23/2024  Tx#:27/30 Date:3/8/2024  Tx#:28/30 Date:3/12/2024  Tx#:29/30 Date:3/15/2024  Tx#:30/30 Date:3/19/2024  Tx#:31 Date:4/9/2024  Tx#:32 Date:4/16/2024  Tx#:33 Date:4/30/2024  Tx#:34 5/7/2024  Tx#: 35 5/14/2024  Tx#: 36   Manual: 25'  Prone with one pillow under:  Deep STM lumbar paraspinals and QL B  Manual quad stretch in prone B  Manual modified hip flexors stretch EOB; STM to anterior thigh Manual: 25'  Prone with one pillow under:  Deep STM lumbar paraspinals and QL B  Manual quad stretch in prone B  Manual modified hip flexors stretch EOB; STM to anterior thigh Manual: 15'  Prone with one pillow under:  Deep STM lumbar paraspinals and QL B  Manual quad stretch in prone B  Manual modified hip flexors stretch EOB; STM to anterior thigh   ThereX:  10 lumbar flex seated at edge of table  10x towel curls B  2x10 fwd lunge holding bar, out of AFOs  Goblet squats  Neuro re-ed:  NMES to R anterior tib 400 usec, 100hz, 95 mA  Practived walking 2 laps in p bars  2x10 DF assisted  1x heel walking  2x10 lunges to airex  4x3 hurdles Neuro re-ed:  NMES to R anterior tib 400 usec, 100hz, 95 mA  2x10 DF  assisted  2x10 goblet squats  1x heel walking  10x step up to 6' R LE, 10 x step over step R LE  Therex:  PN see above, objective measures taken    Mod joão position for passive hip flexor stretch 2x30\"  10x fig 4 SL bridge Neuro re-ed:  NMES to R anterior tib 400 usec, 100hz, 95 mA  2x10 DF assisted  15 bosu lunges on R  2x10 step ups to 6\"  15x D1 PND hip flex on airex, R Neuro re-ed:  10x 3-cone tap in mod SLS on airex, B  Tandem walk in p bars light HHA x2  20 x marches on sand[place]une   Manual:  SL for flex rotate mob GR III, QL release following, B x 8 min  STM L posterior hip  Manual: 18'  Deep STM R posterior hip   Deep STM R ITB/TFL   SL for flex rotate mob GR III, QL release following, B   Manual R piriformis stretch   Manual: 15'  B quad stretch in prone  SL for flex rotate mob GR III, QL release following, B   Manual R piriformis stretch  Prone thoracic spine PA mobs Gr III  Prone central PA mobs L5/S1 Gr III Manual: 15'  B quad stretch in prone  SL for flex rotate mob GR III, QL release following, B   Manual R piriformis stretch  Prone thoracic spine PA mobs Gr III  Prone central PA mobs L5/S1 Gr III Manual: 15'  B quad stretch in prone  SL for flex rotate mob GR III, QL release following, B   Manual R piriformis stretch  Prone thoracic spine PA mobs Gr III  Prone central PA mobs L5/S1 Gr III Manual:     B quad stretch in prone  B piriformis stretch --   Manual: 15'  B quad stretch in prone  SL for flex rotate mob GR III, QL release following, B   Manual R piriformis stretch  Prone thoracic spine PA mobs Gr III  Prone central PA mobs L5/S1 Gr III Manual: 8'  B piriformis stretch  L hamstring stretch  KT application left knee -- Manual: 12'  B piriformis stretch  B quad stretch in prone  Prone thoracic spine PA mobs Gr III  Prone central PA mobs L5/S1 Gr III   Manual: 5'  B piriformis stretch  B quad stretch in prone -- Re-ed: 15'  Biodex postural control, steady platform  Biodex limits of stability,  steady platform   Re-ed: 15'  Biodex postural control, steady platform  Biodex limits of stability, steady platform Re-ed: 15'  Biodex postural control, steady platform  Biodex limits of stability, steady platform -- Manual: 10'  B piriformis stretch  B quad stretch in prone  CPA in prone to lumbar spine, Gr III TE 20  NU step L6 5'  SB LTR  SB DKTC  Bridge 2 x 10  Bridge with SB 2 x 10  Clam shells  x 20 L/R  B hamstring stretch with a strap TE   NU step L6 5'  II bars:   Lateral steps  Forward/posterior steps  DLS on Airex 45/60/35 sec  Single hedy step over TE  NU step L6 5'  YTB hip extension x 15 L/R (cuing on knee extension)  YTB hip ABD 2 x 15 L/R  Lateral steps  Forward/posterior steps  DLS on Airex 45/60/35 sec  Single hedy step over     TE TE TE - -   - Gait; walking with hurricaine and SBA                  TE: 40   6\" step FSU's 1HHA 6\" step FSU's 1HHA   NU step L5 5'  Bridge 2 x 10   Clam shells 2 x 10, manual resistance NU step L5 5'  Bridge 2 x 10   Education on log rolling  Neuro re-ed:  10x sciatic nerve glide with ankle floss, B  Manual:  SL for flex rotate mob GR III, QL release following.x8 min Therex:  Seated sciatica nerve glide x 10 R LE Neuro re-ed:  10x sciatic N glide with adductor bias and floss on R Neuro re-ed:  10x sciatic N glide with adductor bias and floss on R 15x bridges  Neuro re-ed:  Attempted to discriminate between 3 different textures (soft, sand paper, and poky) R legx4 min Therex:  2x10 donkey kicks, 3 lbs B  2x15 lateral bosu lunges, HHA  15 squat to row, 30 lbs, CGA TE  Bridge x 15  Clams with manual resistance x 10 L/R  Sit to stand x 5; hands on upper thighs  Leg press with AFO's off  Double leg press 3 black cords x 30 TE  Bridge x 15  Clams with manual resistance x 12 L/R  Leg press with AFO's ON  Double leg press 3 black cords 2 x 25 TE  Bridge x 20  LTR x 10  LTR with legs crossed for ITB bias x 10 L/R  R clam shells with manual resistance 2 x 15  Hkly PPT with  DKTC x 8 L/R  Double leg press 3 black cords 1 x 30 TE  Bridge x 20  LTR x 10  LTR with legs crossed for ITB bias x 10 L/R  R clam shells with manual resistance 2 x 15  Hkly PPT with DKTC x 8 L/R  Sit to stand without UE assist x 5, SBA for blocking at the knees; 2HHA to return to sitting TE  Leg press with AFO's ON  Double leg press 3 black cords 2 x 25  Single leg press 2 black cords 1 x 30 L/R  Sit to stand without UE assist x 5, SBA for blocking at the knees; 2HHA to return to sitting  Partial squats into chair 2HHA 1 x 10, 1 x 7  Red cord lateral steps x 30 L/R 2HHA TE  Leg press with AFO's ON  Double leg press 3 black cords 2 x 25  Single leg press 2 black cords 1 x 30 L/R  Sit to stand without UE assist x 5, SBA for blocking at the knees; 2HHA to return to sitting  Partial squats into chair 2HHA 1 x 10, 1 x 7  Red cord lateral steps x 30 L/R 2HHA TE AFO's on  NU step L5 6'  LTR\"s x 20   Bridge x 15  SB Bridge x 15  Quadruped arm raises x 10 L/R  Quadruped leg raises x 8 L/R  Partial squats into chair 2HHA 1 x 10, 1 x 7  Red cord lateral steps x 30 L/R 2HHA  Inclined forward   heel raises x 8 reps  90 deg squats 2HHA, wide SUSY 2 x 5  Lateral lunges with hold x 5 L/R 2HHA TE AFO's on  NU step L5 6'  LTR\"s x 20   B Fig 4 stretch  B FADDIR stretch  B hamstring stretch  Bridge x 15  SB Bridge x 15  6\" FSU's 1HHA x 10 L/R  Partial squats into chair 2HHA 1 x 10, 1 x 7  Red cord lateral steps x 30 L/R 2HHA  Inclined forward   heel raises x 10 reps   TE AFO's on 20'  NU step L5 6'  LTR\"s x 20   B Fig 4 stretch  B FADDIR stretch  B hamstring stretch  Bridge x 15  SB Bridge x 15  PPT with marching x 15 L/R  Sit to stand from an elevated surface 2 x 10  Red cord lateral steps x 30 L/R 2HHA   TE 30'  SB rolls DKTC  SB rolls LTR  L quad sets 10 x 10 sec, x 2  L/R SLR 2 x 10  SAQ's 2 x 10  Bridge 2 x 10  SB Bridge x 15  PPT with marching x 15 L/R TE 35'  NU step L6 5'  SB rolls LTR  SB rolls DKTC  SB bridge 2 x 12  Bridge  with ADD ball 2 x 12  PPT with toe taps 2 x 12 L/R  Quadruped camel cat  X 10  Bird dog x 5 L/R CGA for safety  Red XB lateral steps x 30 L/R  Chair hovers x 12  Lateral lunges with hold x 10 L/R TE 30'  NU step L6 5'  SB rolls LTR  SB rolls DKTC  SB bridge 2 x 12  Quadruped camel cat  X 10  Bird dog x 5 L/R CGA for safety  Red XB lateral steps x 30 L/R  Sit to stand from an elevated seat position x 8 with focus on independent stabilization in standing   Forward lunge hold, sustained, 60 sec L/R  DLS on Airex x 3 (longest hold: 50 sec) TE 40'  SB rolls LTR  SB rolls DKTC  SB bridge x 15  Bridge x 15  Open book B  Clam shells BTB x 20 L/R  Prone quad stretch with a strap  Prone hip extension x 10 L/R  Camel Cat  Bird Dog  Lateral steps at the counter Grey TB  Squats at the counter  Tandem stance at the counter, modified  DLS wide SUSY at the counter TE 30'  Tandem stance B  Forward steps in II bars  Lateral steps in II bars  DLS on Airex  Madhavi step overs forward  Madhavi step overs lateral  Squats at the counter  Lateral steps at the counter, Grey TB   NU step: L6 5'  Tandem stance B  Forward steps in II bars  Lateral steps in II bars  DLS on Airex  Madhavi step overs forward  Madhavi step overs lateral  Left/Right turns in II bars  Side reaching in II bars with lumbar/thoracic rotation  Forward reaching in II bars       NU step: L6 5'  Tandem stance B    Forward steps in II bars, arms crossed (leans into the left railing ~ 2 times per length)  Forward steps in II bars holding a ball (leans into L/R railing with nearly every step; improves with practice)  Lateral steps in II bars (leans posterior   DLS on Airex  Madhavi step overs forward  Madhavi step overs lateral  Left/Right turns in II bars  Side reaching in II bars with lumbar/thoracic rotation  Forward reaching in II bars NU step: L6 5'  Tandem stance B    Forward steps in II bars, arms crossed   Forward steps in II bars holding a ball   Lateral steps in II bars     DLS on Airex  Hedy step overs forward  Single hedy step over, progression to 1 finger touch to 0HHA  Hedy step overs lateral  Single hedy lateral step overs progression to 1 finger touch to 0HHA  Left/Right turns in II bars  Side reaching in II bars with lumbar/thoracic rotation  Forward reaching in II bars NU step: L6 5'  B hamstring stretch  B piriformis stretch  Bridge x 15  Bridge with SB x 10  Lateral steps in II bars  Lateral steps in II bars with FitBall hold  Lateral steps in II bars with weighted ball (3kg)  Lateral steps in II bars with GXB, 1 finger hold for balance  Forward steps 0HHA  Forward steps with FitBall carry  Hedy step overs with 1HHA assist to finger assist. Had 3 episodes of LOB posterior requiring 2HHA hold for recovery NU step: L6 5'  B hamstring stretch  B piriformis stretch  Bridge x 15  Bridge with SB x 10  Lateral steps in II bars  Lateral steps in II bars with FitBall hold    Lateral steps in II bars with GXB, 1 finger hold for balance  Hedy step overs with 1HHA assist to finger assist. Forward and lateral  [place] weight shifts  [place] alternate knee flexion  [place] FSU's  DLS on Airex with cone taps Manual: 25'  Lumbar rotational mobilization in sdly B  Lumbar PA glides in sdly Gr III B  Prone thoracic spine mobilization Gr III  SMT to lumbar paraspinals B  Prone quad stretch B  Prone FADDIR stretch B   Power squat position holding a ball: reaching F/L/R  Step downs off Airex, 1HHA  Power stance with BTB rows, alternate rows, alternating tension with bilateral rows  Standing quad stretch  Obstacle course: hurdles, 6\" step, foam blocks  Red TB lateral steps 0HHA, frequent leaning back Power squat position holding a ball: reaching F/L/R  Step downs off Airex, 1HHA  Power stance with BTB rows, alternate rows, alternating tension with bilateral rows  Power squat: scapular retraction with shoulder ER GTB  Obstacle course: hurdles, 6\" step, foam blocks  Red TB  lateral steps 0HHA, frequent leaning back   Re-ed: 15'  Quadruped neutral spine/TrA recruitment/manual cuing  Camel cat with manual assist for movement coordination  Quadruped posterior rocking  Quadruped alternate arm raises x 7 L/R Re-ed: 15'  Quadruped neutral spine/TrA recruitment/manual cuing  Camel cat with minimal manual assist for movement coordination  Quadruped posterior rocking  Quadruped alternate arm raises x 7 L/R  TrA in hook-lying  TrA/PPT with alternate hip/knee flexion, slow  TrA/PPT with lateral knee fall outs, slow Re-ed: 25'  Quadruped neutral spine/TrA recruitment/manual cuing  Camel cat with minimal manual assist for movement coordination  Quadruped posterior rocking  Quadruped alternate arm raises x 7 L/R  Quadruped modified hip raises 2 x 5 L/R  TrA in hook-lying  TrA/PPT with alternate hip/knee flexion, slow  TrA/PPT with lateral knee fall outs, slow Therex:  10x sit to stand with exaggerated WS fwd and CGA  Tennis ball roll out firm pressure x2 min R Manual:  SL for flex rotate mob GR III, QL release following.x8 min    Therex:  2x10 SLR B  10 x bridges 4\" holds Therex:  2x10 knee ankle/knee ankle B  15x dead bugs in shelf position   10x fig 4 bridges SL    Manual:  SL for flex rotate mob GR III, QL release following.x8 min   Manual:  SL for flex rotate mob GR III, QL release following, Prone CPA to L3-L5 GR III.x10 min    Therex:  2x8 fwd lunges to bosu Therex:  Towel  toe curls, R LE x10    Manual:  SL for flex rotate mob GR III, QL release following, B Manual:  SL for flex rotate mob GR III, QL release following, B x 8 min    Therex:    2x10 bridges with airex under one foot, B  2x10 SL raise on mat  15x SL hip/ankle hip abd, B Single leg press 1 black, 1 grey and 1 yellow cords x 20 L/R  B heel raises on leg press with knees extended, 1 black cord; 2 x 12    Re-ed: AFO's on  DLS on Airex SBA/CGA  DLS on the ground SBA  Madhavi step over with w/s forward x 15 L/R HHA  Cone taps x 15 L/R  1HHA     Single leg press 1 black, 1 grey and 1 yellow cords x 20 L/R    Re-ed: AFO's on  DLS on Airex SBA/CGA  DLS on the ground SBA  Madhavi step over with w/s forward x 15 L/R HHA  Cone taps x 15 L/R 1HHA Single leg press 2 black cords 1 x 30 L/R    Re-ed: AFO's on  DLS on Airex SBA/CGA  DLS on the ground SBA  Madhavi step over with w/s forward x 15 L/R HHA  Cone taps x 15 L/R 1HHA  Forward lunge unsupported with hands hovering and SBA; 7 x 5 sec L/R Partial squats into chair 2HHA x 10  Red cord lateral steps x 20 L/R 2HHA    Re-ed: AFO's on  DLS on Airex SBA 3 x 30 sec  DLS on the ground SBA  Tandem stance 2 x 20 sec L/R finger touch B  Cone taps x 15 L/R 1HHA  Forward lunge unsupported with hands hovering and SBA; 7 x 5 sec L/R Re-ed: AFO's on  DLS on Airex SBA 3 x 30 sec  DLS on the ground SBA  Tandem stance 2 x 20 sec L/R finger touch B  Cone taps x 15 L/R 1HHA  Forward lunge unsupported with hands hovering and SBA; 7 x 5 sec L/R Re-ed: AFO's on  DLS on Airex SBA 3 x 30 sec  DLS on the ground SBA  Tandem stance 2 x 20 sec L/R finger touch B  Cone taps x 15 L/R 1HHA  Forward lunge unsupported with hands hovering and SBA; 7 x 5 sec L/R   Re-ed: AFO's on  DLS on Airex SBA 3 x 30 sec  Tandem stance 2 x 20 sec L/R finger touch B  Cone taps x 15 L/R 1HHA  Forward lunge unsupported with hands hovering and SBA; 7 x 5 sec L/R  DLS on Airex with forward reach x 5 L/R   Double leg press 3 black cords 1 x 30  Single leg press 2 black cords 1 x 30 L/R    Re-ed: AFO's on  DLS on Airex SBA 3 x 30 sec  Tandem stance 2 x 20 sec L/R finger touch B  Cone taps x 15 L/R 1HHA  Tandem walk 2HHA  DLS on Airex with forward reach x 5 L/R Re-ed: AFO's on 10'  DLS on Airex SBA 3 x 30 sec  Tandem stance 2 x 20 sec L/R finger touch B  Cone taps x 15 L/R 1HHA  Tandem walk 2HHA  DLS on Airex with forward reach x 10 L/R  Lunge stance with A/P w/s x 5 L/R HHA   Re-ed: AFO's on 8'  DLS on Airex SBA 3 x 30 sec  Tandem stance 2 x 20 sec L/R finger  touch B  Cone taps x 15 L/R 1HHA  Tandem walk 2HHA              HEP: Access Code: [email or web address]  Access Code: [email or web address]  URL: [email or web address]  Date: 01/23/2024  Prepared by: Juanita Koehler    Exercises  - Supine Hip and Knee Flexion AROM with Swiss Ball  - 1 x daily - 7 x weekly - 3 sets - 10 reps  - Supine Lower Trunk Rotation with Swiss Ball  - 1 x daily - 7 x weekly - 3 sets - 10 reps  - Supine Figure 4 Piriformis Stretch  - 1 x daily - 7 x weekly - 1 sets - 3 reps - 30-60 sec hold  - Supine Bridge  - 1 x daily - 7 x weekly - 2-3 sets - 10 reps  - Bridge with Heels on Swiss Ball  - 1 x daily - 7 x weekly - 2-3 sets - 10 reps  - Sidelying Open Book Thoracic Lumbar Rotation and Extension  - 1 x daily - 7 x weekly - 1 sets - 10 reps  - Clamshell with Resistance  - 1 x daily - 7 x weekly - 3 sets - 10 reps  - Prone Quadriceps Stretch with Strap  - 1 x daily - 7 x weekly - 1 sets - 3 reps - 30-60 sec hold  - Prone Hip Extension  - 1 x daily - 7 x weekly - 1-2 sets - 10 reps  - Cat Cow  - 1 x daily - 7 x weekly - 2 sets - 10 reps  - Bird Dog  - 1 x daily - 7 x weekly - 1-2 sets - 10 reps  - Side Stepping with Counter Support  - 1 x daily - 7 x weekly - 3 sets - 10 reps  - Mini Squat with Counter Support  - 1 x daily - 7 x weekly - 1-2 sets - 10 reps  - Tandem Stance with Support  - 1 x daily - 7 x weekly - 1 sets - 3 reps - 30 sec hold      Charges: therex x 3 45 Total Timed Treatment: 40 min  Total Treatment Time: 40 min

## 2024-05-14 ENCOUNTER — OFFICE VISIT (OUTPATIENT)
Dept: PHYSICAL THERAPY | Facility: HOSPITAL | Age: 79
End: 2024-05-14
Attending: INTERNAL MEDICINE

## 2024-05-14 PROCEDURE — 97110 THERAPEUTIC EXERCISES: CPT

## 2024-05-21 ENCOUNTER — OFFICE VISIT (OUTPATIENT)
Dept: PHYSICAL THERAPY | Facility: HOSPITAL | Age: 79
End: 2024-05-21
Attending: INTERNAL MEDICINE

## 2024-05-21 ENCOUNTER — LAB ENCOUNTER (OUTPATIENT)
Dept: LAB | Age: 79
End: 2024-05-21
Attending: STUDENT IN AN ORGANIZED HEALTH CARE EDUCATION/TRAINING PROGRAM

## 2024-05-21 DIAGNOSIS — M10.079 IDIOPATHIC GOUT OF FOOT, UNSPECIFIED CHRONICITY, UNSPECIFIED LATERALITY: ICD-10-CM

## 2024-05-21 LAB — URATE SERPL-MCNC: 3.8 MG/DL

## 2024-05-21 PROCEDURE — 84550 ASSAY OF BLOOD/URIC ACID: CPT

## 2024-05-21 PROCEDURE — 36415 COLL VENOUS BLD VENIPUNCTURE: CPT

## 2024-05-21 PROCEDURE — 97110 THERAPEUTIC EXERCISES: CPT

## 2024-05-21 PROCEDURE — 97140 MANUAL THERAPY 1/> REGIONS: CPT

## 2024-05-21 NOTE — PROGRESS NOTES
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 Sore in the anterior hips; especially with walking and getting out of bed. Similar to how he felt a couple of weeks ago when arrived to PT feeling very sore and stiff. Did a lot of sitting over the weekend attending his granddauther's graduation ceremony.     Pain: 2-3/10 in the hips    Objective:   Ambulates with B AFO's and cane, steppage gait  Quad length: 100 in prone B  Piriformis length: min-mod limitations, very stiff      Assessment: Added stretching for the hips and mobilization for lumbar spine to address increased stiffness in the hips today after prolonged sitting. Continued with balance work with focus on reduced UE assist and avoidance of LOB posterior. Pt feels ready to continue with HEP once completes this set of PT appointments.    Goals: (to be met in 10+ 10 + 10 + 6/8visits)   Pt will demonstrate improved SLS to >5 seconds CALOS to promote safety and decrease risk of falls on uneven surfaces such as grass. Progressing.   Pt will report reduced frequency of loss of balance posterior/to the side by 30%. New  Pt will demonstrate improved postural control to allow for improved body centering with gait and daily activities. New  Pt will perform TUG in <15 seconds with least restrictive AD, demonstrating improved gait speed for improved participation in ADL such as community ambulation. Met  Pt will perform 4-Item DGI with score of 9/12 or greater with least restrictive AD to demonstrate ability to ambulate safely in crowded and busy environments. Progressing  Pt will be  able to squat to  light objects around the house without loss of stability. Progressing  Pt will improve functional hip strength to demonstrate ability to ascend/descend 1 flight of stairs reciprocally with the use of handrail. Goal met.   Pt will be independent and compliant with comprehensive HEP to maintain progress achieved in PT Goal met.         Plan: prepare for discharge; balance re-ed, including Biodex, postural control, reactive balance, dual tasks    Date: 9/20/23  Tx#: 6/16 Date: 9/25/23  Tx#: 7/16 Date: 9/27/23  Tx#: 8/16 Date:10/2/2023   Tx#:9/16 Date:10/4/2023  Tx#:10/16 Date:10/9/2023   Tx#:11/16 Date: 10/11/2023   Tx#:12/16 Date:10/16/2023   Tx#:13/16 Date:10/23/2023   Tx#:14/20 Date:10/30/2023   Tx#:15/20 Date:11/01/2023   Tx#:16/20 Date:11/06/2023   Tx#:17/20 Date:11/08/2023   Tx#:18/20 Date:11/13/2023   Tx#:19/20 Date:12/6/2023   Tx#:20/20 Date:12/13/2023   Tx#:21/30 Date:12/18/2023   Tx#:22/30 Date:12/20/2023   Tx#:23/30 Date:1/3/2024  Tx#:24/30 Date:1/17/2024  Tx#:25/30 Date:1/19/2024  Tx#:26/30 Date:1/23/2024  Tx#:27/30 Date:3/8/2024  Tx#:28/30 Date:3/12/2024  Tx#:29/30 Date:3/15/2024  Tx#:30/30 Date:3/19/2024  Tx#:31 Date:4/9/2024  Tx#:32 Date:4/16/2024  Tx#:33 Date:4/30/2024  Tx#:34 5/7/2024  Tx#: 35 5/14/2024  Tx#: 36 5/21/2024  Tx#: 37   Manual: 25'  Prone with one pillow under:  Deep STM lumbar paraspinals and QL B  Manual quad stretch in prone B  Manual modified hip flexors stretch EOB; STM to anterior thigh Manual: 25'  Prone with one pillow under:  Deep STM lumbar paraspinals and QL B  Manual quad stretch in prone B  Manual modified hip flexors stretch EOB; STM to anterior thigh Manual: 15'  Prone with one pillow under:  Deep STM lumbar paraspinals and QL B  Manual quad stretch in prone B  Manual modified hip flexors stretch EOB; STM to anterior thigh   ThereX:  10 lumbar flex seated at edge of table  10x towel curls B  2x10 fwd lunge holding bar, out of AFOs  Goblet squats   Neuro re-ed:  NMES to R anterior tib 400 usec, 100hz, 95 mA  Practived walking 2 laps in p bars  2x10 DF assisted  1x heel walking  2x10 lunges to airex  4x3 hurdles Neuro re-ed:  NMES to R anterior tib 400 usec, 100hz, 95 mA  2x10 DF assisted  2x10 goblet squats  1x heel walking  10x step up to 6' R LE, 10 x step over step R LE  Therex:  PN see above, objective measures taken    Mod joão position for passive hip flexor stretch 2x30\"  10x fig 4 SL bridge Neuro re-ed:  NMES to R anterior tib 400 usec, 100hz, 95 mA  2x10 DF assisted  15 bosu lunges on R  2x10 step ups to 6\"  15x D1 PND hip flex on airex, R Neuro re-ed:  10x 3-cone tap in mod SLS on airex, B  Tandem walk in p bars light HHA x2  20 x marches on sanddune   Manual:  SL for flex rotate mob GR III, QL release following, B x 8 min  STM L posterior hip  Manual: 18'  Deep STM R posterior hip   Deep STM R ITB/TFL   SL for flex rotate mob GR III, QL release following, B   Manual R piriformis stretch   Manual: 15'  B quad stretch in prone  SL for flex rotate mob GR III, QL release following, B   Manual R piriformis stretch  Prone thoracic spine PA mobs Gr III  Prone central PA mobs L5/S1 Gr III Manual: 15'  B quad stretch in prone  SL for flex rotate mob GR III, QL release following, B   Manual R piriformis stretch  Prone thoracic spine PA mobs Gr III  Prone central PA mobs L5/S1 Gr III Manual: 15'  B quad stretch in prone  SL for flex rotate mob GR III, QL release following, B   Manual R piriformis stretch  Prone thoracic spine PA mobs Gr III  Prone central PA mobs L5/S1 Gr III Manual:     B quad stretch in prone  B piriformis stretch --   Manual: 15'  B quad stretch in prone  SL for flex rotate mob GR III, QL release following, B   Manual R piriformis stretch  Prone thoracic spine PA mobs Gr III  Prone central PA mobs L5/S1 Gr III Manual: 8'  B piriformis stretch  L hamstring stretch  KT application left knee -- Manual: 12'  B piriformis stretch  B quad  stretch in prone  Prone thoracic spine PA mobs Gr III  Prone central PA mobs L5/S1 Gr III   Manual: 5'  B piriformis stretch  B quad stretch in prone -- Re-ed: 15'  Biodex postural control, steady platform  Biodex limits of stability, steady platform   Re-ed: 15'  Biodex postural control, steady platform  Biodex limits of stability, steady platform Re-ed: 15'  Biodex postural control, steady platform  Biodex limits of stability, steady platform -- Manual: 10'  B piriformis stretch  B quad stretch in prone  CPA in prone to lumbar spine, Gr III TE 20  NU step L6 5'  SB LTR  SB DKTC  Bridge 2 x 10  Bridge with SB 2 x 10  Clam shells  x 20 L/R  B hamstring stretch with a strap TE   NU step L6 5'  II bars:   Lateral steps  Forward/posterior steps  DLS on Airex 45/60/35 sec  Single hedy step over TE  NU step L6 5'  YTB hip extension x 15 L/R (cuing on knee extension)  YTB hip ABD 2 x 15 L/R  Lateral steps  Forward/posterior steps  DLS on Airex 45/60/35 sec  Single hedy step over   TE 30'  NU step L6 5'  YTB posterior steps  YTB lateral steps  Lateral steps with weight ball 5#  Forward steps with weighted ball 5#  DLS on Airex 45/60/35 sec  Single hedy step over  Tandem stance B  Airex forward lunges   TE TE TE - -   - Gait; walking with hurricaine and SBA                  TE: 40   6\" step FSU's 1HHA 6\" step FSU's 1HHA    NU step L5 5'  Bridge 2 x 10   Clam shells 2 x 10, manual resistance NU step L5 5'  Bridge 2 x 10   Education on log rolling  Neuro re-ed:  10x sciatic nerve glide with ankle floss, B  Manual:  SL for flex rotate mob GR III, QL release following.x8 min Therex:  Seated sciatica nerve glide x 10 R LE Neuro re-ed:  10x sciatic N glide with adductor bias and floss on R Neuro re-ed:  10x sciatic N glide with adductor bias and floss on R 15x bridges  Neuro re-ed:  Attempted to discriminate between 3 different textures (soft, sand paper, and poky) R legx4 min Therex:  2x10 donkey kicks, 3 lbs B  2x15  lateral bosu lunges, HHA  15 squat to row, 30 lbs, CGA TE  Bridge x 15  Clams with manual resistance x 10 L/R  Sit to stand x 5; hands on upper thighs  Leg press with AFO's off  Double leg press 3 black cords x 30 TE  Bridge x 15  Clams with manual resistance x 12 L/R  Leg press with AFO's ON  Double leg press 3 black cords 2 x 25 TE  Bridge x 20  LTR x 10  LTR with legs crossed for ITB bias x 10 L/R  R clam shells with manual resistance 2 x 15  Hkly PPT with DKTC x 8 L/R  Double leg press 3 black cords 1 x 30 TE  Bridge x 20  LTR x 10  LTR with legs crossed for ITB bias x 10 L/R  R clam shells with manual resistance 2 x 15  Hkly PPT with DKTC x 8 L/R  Sit to stand without UE assist x 5, SBA for blocking at the knees; 2HHA to return to sitting TE  Leg press with AFO's ON  Double leg press 3 black cords 2 x 25  Single leg press 2 black cords 1 x 30 L/R  Sit to stand without UE assist x 5, SBA for blocking at the knees; 2HHA to return to sitting  Partial squats into chair 2HHA 1 x 10, 1 x 7  Red cord lateral steps x 30 L/R 2HHA TE  Leg press with AFO's ON  Double leg press 3 black cords 2 x 25  Single leg press 2 black cords 1 x 30 L/R  Sit to stand without UE assist x 5, SBA for blocking at the knees; 2HHA to return to sitting  Partial squats into chair 2HHA 1 x 10, 1 x 7  Red cord lateral steps x 30 L/R 2HHA TE AFO's on  NU step L5 6'  LTR\"s x 20   Bridge x 15  SB Bridge x 15  Quadruped arm raises x 10 L/R  Quadruped leg raises x 8 L/R  Partial squats into chair 2HHA 1 x 10, 1 x 7  Red cord lateral steps x 30 L/R 2HHA  Inclined forward   heel raises x 8 reps  90 deg squats 2HHA, wide SUSY 2 x 5  Lateral lunges with hold x 5 L/R 2HHA TE AFO's on  NU step L5 6'  LTR\"s x 20   B Fig 4 stretch  B FADDIR stretch  B hamstring stretch  Bridge x 15  SB Bridge x 15  6\" FSU's 1HHA x 10 L/R  Partial squats into chair 2HHA 1 x 10, 1 x 7  Red cord lateral steps x 30 L/R 2HHA  Inclined forward   heel raises x 10 reps   TE AFO's on  20'  NU step L5 6'  LTR\"s x 20   B Fig 4 stretch  B FADDIR stretch  B hamstring stretch  Bridge x 15  SB Bridge x 15  PPT with marching x 15 L/R  Sit to stand from an elevated surface 2 x 10  Red cord lateral steps x 30 L/R 2HHA   TE 30'  SB rolls DKTC  SB rolls LTR  L quad sets 10 x 10 sec, x 2  L/R SLR 2 x 10  SAQ's 2 x 10  Bridge 2 x 10  SB Bridge x 15  PPT with marching x 15 L/R TE 35'  NU step L6 5'  SB rolls LTR  SB rolls DKTC  SB bridge 2 x 12  Bridge with ADD ball 2 x 12  PPT with toe taps 2 x 12 L/R  Quadruped camel cat  X 10  Bird dog x 5 L/R CGA for safety  Red XB lateral steps x 30 L/R  Chair hovers x 12  Lateral lunges with hold x 10 L/R TE 30'  NU step L6 5'  SB rolls LTR  SB rolls DKTC  SB bridge 2 x 12  Quadruped camel cat  X 10  Bird dog x 5 L/R CGA for safety  Red XB lateral steps x 30 L/R  Sit to stand from an elevated seat position x 8 with focus on independent stabilization in standing   Forward lunge hold, sustained, 60 sec L/R  DLS on Airex x 3 (longest hold: 50 sec) TE 40'  SB rolls LTR  SB rolls DKTC  SB bridge x 15  Bridge x 15  Open book B  Clam shells BTB x 20 L/R  Prone quad stretch with a strap  Prone hip extension x 10 L/R  Camel Cat  Bird Dog  Lateral steps at the counter Grey TB  Squats at the counter  Tandem stance at the counter, modified  DLS wide SUSY at the counter TE 30'  Tandem stance B  Forward steps in II bars  Lateral steps in II bars  DLS on Airex  Madhavi step overs forward  Madhavi step overs lateral  Squats at the counter  Lateral steps at the counter, Grey TB   NU step: L6 5'  Tandem stance B  Forward steps in II bars  Lateral steps in II bars  DLS on Airex  Madhavi step overs forward  Madhavi step overs lateral  Left/Right turns in II bars  Side reaching in II bars with lumbar/thoracic rotation  Forward reaching in II bars       NU step: L6 5'  Tandem stance B    Forward steps in II bars, arms crossed (leans into the left railing ~ 2 times per length)  Forward steps in II  bars holding a ball (leans into L/R railing with nearly every step; improves with practice)  Lateral steps in II bars (leans posterior   DLS on Airex  Hedy step overs forward  Hedy step overs lateral  Left/Right turns in II bars  Side reaching in II bars with lumbar/thoracic rotation  Forward reaching in II bars NU step: L6 5'  Tandem stance B    Forward steps in II bars, arms crossed   Forward steps in II bars holding a ball   Lateral steps in II bars    DLS on Airex  Hedy step overs forward  Single hedy step over, progression to 1 finger touch to 0HHA  Hedy step overs lateral  Single hedy lateral step overs progression to 1 finger touch to 0HHA  Left/Right turns in II bars  Side reaching in II bars with lumbar/thoracic rotation  Forward reaching in II bars NU step: L6 5'  B hamstring stretch  B piriformis stretch  Bridge x 15  Bridge with SB x 10  Lateral steps in II bars  Lateral steps in II bars with FitBall hold  Lateral steps in II bars with weighted ball (3kg)  Lateral steps in II bars with GXB, 1 finger hold for balance  Forward steps 0HHA  Forward steps with FitBall carry  Hedy step overs with 1HHA assist to finger assist. Had 3 episodes of LOB posterior requiring 2HHA hold for recovery NU step: L6 5'  B hamstring stretch  B piriformis stretch  Bridge x 15  Bridge with SB x 10  Lateral steps in II bars  Lateral steps in II bars with FitBall hold    Lateral steps in II bars with GXB, 1 finger hold for balance  Hedy step overs with 1HHA assist to finger assist. Forward and lateral  [place] weight shifts  [place] alternate knee flexion  [place] FSU's  DLS on Airex with cone taps Manual: 25'  Lumbar rotational mobilization in sdly B  Lumbar PA glides in sdly Gr III B  Prone thoracic spine mobilization Gr III  SMT to lumbar paraspinals B  Prone quad stretch B  Prone FADDIR stretch B   Power squat position holding a ball: reaching F/L/R  Step downs off Airex, 1HHA  Power stance with BTB  rows, alternate rows, alternating tension with bilateral rows  Standing quad stretch  Obstacle course: hurdles, 6\" step, foam blocks  Red TB lateral steps 0HHA, frequent leaning back Power squat position holding a ball: reaching F/L/R  Step downs off Airex, 1HHA  Power stance with BTB rows, alternate rows, alternating tension with bilateral rows  Power squat: scapular retraction with shoulder ER GTB  Obstacle course: hurdles, 6\" step, foam blocks  Red TB lateral steps 0HHA, frequent leaning back Power stance with BTB rows, alternate rows, alternating tension with bilateral rows  Power squat: scapular retraction with shoulder ER GTB  Manual: 15'  Lumbar rotational mobilization in sdly B  Lumbar PA glides in sdly Gr III B  Prone thoracic spine mobilization Gr III  SMT to lumbar paraspinals B  Prone quad stretch B  Prone FADDIR stretch B   Re-ed: 15'  Quadruped neutral spine/TrA recruitment/manual cuing  Camel cat with manual assist for movement coordination  Quadruped posterior rocking  Quadruped alternate arm raises x 7 L/R Re-ed: 15'  Quadruped neutral spine/TrA recruitment/manual cuing  Camel cat with minimal manual assist for movement coordination  Quadruped posterior rocking  Quadruped alternate arm raises x 7 L/R  TrA in hook-lying  TrA/PPT with alternate hip/knee flexion, slow  TrA/PPT with lateral knee fall outs, slow Re-ed: 25'  Quadruped neutral spine/TrA recruitment/manual cuing  Camel cat with minimal manual assist for movement coordination  Quadruped posterior rocking  Quadruped alternate arm raises x 7 L/R  Quadruped modified hip raises 2 x 5 L/R  TrA in hook-lying  TrA/PPT with alternate hip/knee flexion, slow  TrA/PPT with lateral knee fall outs, slow Therex:  10x sit to stand with exaggerated WS fwd and CGA  Tennis ball roll out firm pressure x2 min R Manual:  SL for flex rotate mob GR III, QL release following.x8 min    Therex:  2x10 SLR B  10 x bridges 4\" holds Therex:  2x10 knee ankle/knee ankle  B  15x dead bugs in shelf position   10x fig 4 bridges SL    Manual:  SL for flex rotate mob GR III, QL release following.x8 min   Manual:  SL for flex rotate mob GR III, QL release following, Prone CPA to L3-L5 GR III.x10 min    Therex:  2x8 fwd lunges to bosu Therex:  Towel  toe curls, R LE x10    Manual:  SL for flex rotate mob GR III, QL release following, B Manual:  SL for flex rotate mob GR III, QL release following, B x 8 min    Therex:    2x10 bridges with airex under one foot, B  2x10 SL raise on mat  15x SL hip/ankle hip abd, B Single leg press 1 black, 1 grey and 1 yellow cords x 20 L/R  B heel raises on leg press with knees extended, 1 black cord; 2 x 12    Re-ed: AFO's on  DLS on Airex SBA/CGA  DLS on the ground SBA  Madhavi step over with w/s forward x 15 L/R HHA  Cone taps x 15 L/R 1HHA     Single leg press 1 black, 1 grey and 1 yellow cords x 20 L/R    Re-ed: AFO's on  DLS on Airex SBA/CGA  DLS on the ground SBA  Madhavi step over with w/s forward x 15 L/R HHA  Cone taps x 15 L/R 1HHA Single leg press 2 black cords 1 x 30 L/R    Re-ed: AFO's on  DLS on Airex SBA/CGA  DLS on the ground SBA  Madhavi step over with w/s forward x 15 L/R HHA  Cone taps x 15 L/R 1HHA  Forward lunge unsupported with hands hovering and SBA; 7 x 5 sec L/R Partial squats into chair 2HHA x 10  Red cord lateral steps x 20 L/R 2HHA    Re-ed: AFO's on  DLS on Airex SBA 3 x 30 sec  DLS on the ground SBA  Tandem stance 2 x 20 sec L/R finger touch B  Cone taps x 15 L/R 1HHA  Forward lunge unsupported with hands hovering and SBA; 7 x 5 sec L/R Re-ed: AFO's on  DLS on Airex SBA 3 x 30 sec  DLS on the ground SBA  Tandem stance 2 x 20 sec L/R finger touch B  Cone taps x 15 L/R 1HHA  Forward lunge unsupported with hands hovering and SBA; 7 x 5 sec L/R Re-ed: AFO's on  DLS on Airex SBA 3 x 30 sec  DLS on the ground SBA  Tandem stance 2 x 20 sec L/R finger touch B  Cone taps x 15 L/R 1HHA  Forward lunge unsupported with hands hovering and SBA;  7 x 5 sec L/R   Re-ed: AFO's on  DLS on Airex SBA 3 x 30 sec  Tandem stance 2 x 20 sec L/R finger touch B  Cone taps x 15 L/R 1HHA  Forward lunge unsupported with hands hovering and SBA; 7 x 5 sec L/R  DLS on Airex with forward reach x 5 L/R   Double leg press 3 black cords 1 x 30  Single leg press 2 black cords 1 x 30 L/R    Re-ed: AFO's on  DLS on Airex SBA 3 x 30 sec  Tandem stance 2 x 20 sec L/R finger touch B  Cone taps x 15 L/R 1HHA  Tandem walk 2HHA  DLS on Airex with forward reach x 5 L/R Re-ed: AFO's on 10'  DLS on Airex SBA 3 x 30 sec  Tandem stance 2 x 20 sec L/R finger touch B  Cone taps x 15 L/R 1HHA  Tandem walk 2HHA  DLS on Airex with forward reach x 10 L/R  Lunge stance with A/P w/s x 5 L/R HHA   Re-ed: AFO's on 8'  DLS on Airex SBA 3 x 30 sec  Tandem stance 2 x 20 sec L/R finger touch B  Cone taps x 15 L/R 1HHA  Tandem walk 2HHA               HEP: Access Code: [email or web address]  Access Code: [email or web address]  URL: [email or web address]  Date: 01/23/2024  Prepared by: Juanita Koehler    Exercises  - Supine Hip and Knee Flexion AROM with Swiss Ball  - 1 x daily - 7 x weekly - 3 sets - 10 reps  - Supine Lower Trunk Rotation with Swiss Ball  - 1 x daily - 7 x weekly - 3 sets - 10 reps  - Supine Figure 4 Piriformis Stretch  - 1 x daily - 7 x weekly - 1 sets - 3 reps - 30-60 sec hold  - Supine Bridge  - 1 x daily - 7 x weekly - 2-3 sets - 10 reps  - Bridge with Heels on Swiss Ball  - 1 x daily - 7 x weekly - 2-3 sets - 10 reps  - Sidelying Open Book Thoracic Lumbar Rotation and Extension  - 1 x daily - 7 x weekly - 1 sets - 10 reps  - Clamshell with Resistance  - 1 x daily - 7 x weekly - 3 sets - 10 reps  - Prone Quadriceps Stretch with Strap  - 1 x daily - 7 x weekly - 1 sets - 3 reps - 30-60 sec hold  - Prone Hip Extension  - 1 x daily - 7 x weekly - 1-2 sets - 10 reps  - Cat Cow  - 1 x daily - 7 x weekly - 2 sets - 10 reps  - Bird Dog  - 1 x daily - 7 x weekly - 1-2 sets - 10 reps  - Side Stepping with  Counter Support  - 1 x daily - 7 x weekly - 3 sets - 10 reps  - Mini Squat with Counter Support  - 1 x daily - 7 x weekly - 1-2 sets - 10 reps  - Tandem Stance with Support  - 1 x daily - 7 x weekly - 1 sets - 3 reps - 30 sec hold      Charges: therex x 2 30, man x 1 15 Total Timed Treatment: 45 min  Total Treatment Time: 45 min

## 2024-05-28 ENCOUNTER — OFFICE VISIT (OUTPATIENT)
Dept: PHYSICAL THERAPY | Facility: HOSPITAL | Age: 79
End: 2024-05-28
Attending: INTERNAL MEDICINE

## 2024-05-28 PROCEDURE — 97110 THERAPEUTIC EXERCISES: CPT

## 2024-05-28 NOTE — PROGRESS NOTES
Discharge Summary  Pt has attended 38 visits in Physical Therapy.     Subjective: Stretches in home exercise program consistently help to alleviate soreness in the lower back and hips. Scheduled to see a neuropathy specialist in late June and follows up with Dr. Solis in July. Feels ready to discontinue physical therapy for now and to continue with HEP.  Does not see any changes with his balance: reports frequent stumbling a few times per week; usually able to recover his balance. Falls about once per month. His falls usually happen with gait initiation, especially if needs to walk while turning or if carries something while walking. He falls to the back or sides. Re-started [facility] and stays consistent with HEP.    Assessment: Patient stays consistent with regular stretching and strengthening home exercise program for his lower back and hips as it helps him to minimize stiffness and soreness in those regions; he required additional manual therapy interventions for lower back and hips pain twice over the past few 4 weeks but that happened following prolonged travel and/or sitting. Nils demonstrates good improvement with postural control as measured with double leg stance time, feet together stance time and modified tandem stance. He is well aware of the strategies to minimize his fall risk and developed better ability to correct for swayback positioning that contributes to loss of balance posterior. Nils is discharged from physical therapy to continue with home exercise program (lower back, hips and balance) independently    Objective:   Physical Exam:  Posture/Observation: Wearing B AFO's; flexed slightly at the knees; swayed back with shoulders posterior to the hips contributing to posterior lean            Postural Control:  Double leg stance: 2 min 45 sec  4-Stage Balance Test:   - Feet together: 1 min 15 sec   - Modified Tandem:  45 sec   - Tandem Stance: unable sec Fall Risk: yes   - SLS: R 0 sec, L 0  sec Fall Risk: yes  [Full tandem stance <10 sec indicates increased risk of falling]  Age appropriate norms for SLS: 60-68 y/o mean = 27.0 sec      70-80 y/o mean = 17.2 sec      80-98 y/o mean = 8.5 sec     Functional Mobility:  30 sec sit<>stand: 1.  Able to complete x 5 with 2HHA to get up and  stabilize his position independently    Fall Risk: yes  [Below average score indicates high risk for falls; norms by age > or = to...   60-63 y/o Men: 14, Women: 12   65-68 y/o Men: 12, Women: 11   70-75 y/o Men: 12, Women: 10   75-80 y/o Men: 11, Women: 10   80-85 y/o Men: 10, Women: 9   85-90 y/o Men: 8, Women: 8   90-93 y/o Men 7, Women: 4]    Gait: pt ambulates on level ground with assistive device of SPC and B AFO's, decreased foot clearance B, decreased arm swing, difficulty turning, and steppage gait    TUG (AD, time): 13.5 sec, SPC  Fall Risk: yes  [Performance exceeding the upper limit of confidence intervals are considered increased risk for falls; Edward, 2006...   60-68 y/o mean 8.1s (7.1-9.0s)   70-80 y/o mean 9.2s (8.2-10.2s)   80-98 y/o mean 11.3s (10.0-12.7s)]    4 Item Dynamic Gait Index Score: 5/12 Fall Risk: yes  [Scores of 10 or less indicate increased risk for falls]  Gait level surface: 2  Grading: Dameon the lowest category that applies.  (3)  Normal: Walks 20’, no assistive devices, good speed, no evidence of imbalance, normal gait pattern.  (2)  Mild Impairment: Walks 20’, uses assistive devices, slower speed, mild gait deviations.  (1)  Moderate Impairment: Walks 20’, slow speed, abnormal gait pattern, evidence of imbalance.  (0)  Severe Impairment: Cannot walk 20’ without assistance, severe gait deviations or imbalance.     Change in gait speed: 1  Grading: Dameon the lowest category that applies.  (3)  Normal: Able to smoothly change walking speed without loss of balance or gait deviation. Shows a significant difference in walking speed between normal, fast and slow speeds.   (2)  Mild Impairment:  Is able to change speed but demonstrates mild gait deviations, or no gait deviations but unable to achieve a significant change in velocity, or uses an assistive device.   (1)  Moderate Impairment: Makes only minor adjustments to walking speed, or accomplishes a change in speed with significant gait deviations, or changes speed but has significant gait deviations, or changes speed but loses balance but is able to recover and continue walking.  (0)  Severe Impairment: Cannot change speeds, or loses balance and has to reach for wall or be caught.    Gait with horizontal head turns: 1  (3)  Normal: Performs head turns smoothly with no change in gait.  (2)  Mild Impairment: Preforms head turns smoothly with slight change in gait velocity, i.e., minor disruption to smooth gait path or uses walking aid.  (1) Moderate Impairment: Preforms head turns with moderate change in gait velocity, slows down, staggers but recovers, can continue to walk.  (0)  Severe Impairment: Preform task with severe disruption of gait, i.e., staggers outside 15” path, loses balance, stops, reaches for wall.    Gait with vertical head turns: 1  Grading: Dameon the lowest category that applies.  (3) Normal: Preforms head turns smoothly with no change in gait.  (2) Mild Impairment: Preforms head turns smoothly with slight change in gait velocity, i.e., minor disruption to smooth gait path or uses walking aid.  (1) Moderate Impairment: Preforms head turns with moderate change in gait velocity, slows down, staggers but recovers, can continue to walk.  (0) Severe Impairment: Preforms task with severe disruption of gait, i.e., staggers outside 15” path, loses balance, stops, reaches for wall.        Goals: (to be met in 10+ 10 + 10 + 6/8visits)   Pt will demonstrate improved SLS to >5 seconds CALOS to promote safety and decrease risk of falls on uneven surfaces such as grass. Not met  Pt will report reduced frequency of loss of balance posterior/to the side  by 30%. Not met  Pt will demonstrate improved postural control to allow for improved body centering with gait and daily activities. Met  Pt will perform TUG in <15 seconds with least restrictive AD, demonstrating improved gait speed for improved participation in ADL such as community ambulation. Met  Pt will perform 4-Item DGI with score of 9/12 or greater with least restrictive AD to demonstrate ability to ambulate safely in crowded and busy environments. Not met  Pt will be able to squat to  light objects around the house without loss of stability. Met  Pt will improve functional hip strength to demonstrate ability to ascend/descend 1 flight of stairs reciprocally with the use of handrail. Goal met.   Pt will be independent and compliant with comprehensive HEP to maintain progress achieved in PT Goal met.       Patient/Family/Caregiver was advised of these findings, precautions, and treatment options and has agreed to actively participate in planning and for this course of care.    Thank you for your referral. If you have any questions, please contact me at Dept: [phone].    Sincerely,  Electronically signed by therapist: Juanita Koehler, PT                        Date: 9/20/23  Tx#: 6/16 Date: 9/25/23  Tx#: 7/16 Date: 9/27/23  Tx#: 8/16 Date:10/2/2023   Tx#:9/16 Date:10/4/2023  Tx#:10/16 Date:10/9/2023   Tx#:11/16 Date: 10/11/2023   Tx#:12/16 Date:10/16/2023   Tx#:13/16 Date:10/23/2023   Tx#:14/20 Date:10/30/2023   Tx#:15/20 Date:11/01/2023   Tx#:16/20 Date:11/06/2023   Tx#:17/20 Date:11/08/2023   Tx#:18/20 Date:11/13/2023   Tx#:19/20 Date:12/6/2023   Tx#:20/20 Date:12/13/2023   Tx#:21/30 Date:12/18/2023   Tx#:22/30 Date:12/20/2023   Tx#:23/30 Date:1/3/2024  Tx#:24/30 Date:1/17/2024  Tx#:25/30 Date:1/19/2024  Tx#:26/30 Date:1/23/2024  Tx#:27/30 Date:3/8/2024  Tx#:28/30 Date:3/12/2024  Tx#:29/30 Date:3/15/2024  Tx#:30/30 Date:3/19/2024  Tx#:31 Date:4/9/2024  Tx#:32 Date:4/16/2024  Tx#:33  Date:4/30/2024  Tx#:34 5/7/2024  Tx#: 35 5/14/2024  Tx#: 36 5/21/2024  Tx#: 37 5/28/2024  Tx#: 38   Manual: 25'  Prone with one pillow under:  Deep STM lumbar paraspinals and QL B  Manual quad stretch in prone B  Manual modified hip flexors stretch EOB; STM to anterior thigh Manual: 25'  Prone with one pillow under:  Deep STM lumbar paraspinals and QL B  Manual quad stretch in prone B  Manual modified hip flexors stretch EOB; STM to anterior thigh Manual: 15'  Prone with one pillow under:  Deep STM lumbar paraspinals and QL B  Manual quad stretch in prone B  Manual modified hip flexors stretch EOB; STM to anterior thigh   ThereX:  10 lumbar flex seated at edge of table  10x towel curls B  2x10 fwd lunge holding bar, out of AFOs  Goblet squats  Neuro re-ed:  NMES to R anterior tib 400 usec, 100hz, 95 mA  Practived walking 2 laps in p bars  2x10 DF assisted  1x heel walking  2x10 lunges to airex  4x3 hurdles Neuro re-ed:  NMES to R anterior tib 400 usec, 100hz, 95 mA  2x10 DF assisted  2x10 goblet squats  1x heel walking  10x step up to 6' R LE, 10 x step over step R LE  Therex:  PN see above, objective measures taken    Mod joão position for passive hip flexor stretch 2x30\"  10x fig 4 SL bridge Neuro re-ed:  NMES to R anterior tib 400 usec, 100hz, 95 mA  2x10 DF assisted  15 bosu lunges on R  2x10 step ups to 6\"  15x D1 PND hip flex on airex, R Neuro re-ed:  10x 3-cone tap in mod SLS on airex, B  Tandem walk in p bars light HHA x2  20 x marches on sanddune   Manual:  SL for flex rotate mob GR III, QL release following, B x 8 min  STM L posterior hip  Manual: 18'  Deep STM R posterior hip   Deep STM R ITB/TFL   SL for flex rotate mob GR III, QL release following, B   Manual R piriformis stretch   Manual: 15'  B quad stretch in prone  SL for flex rotate mob GR III, QL release following, B   Manual R piriformis stretch  Prone thoracic spine PA mobs Gr III  Prone central PA mobs L5/S1 Gr III Manual: 15'  B quad stretch  in prone  SL for flex rotate mob GR III, QL release following, B   Manual R piriformis stretch  Prone thoracic spine PA mobs Gr III  Prone central PA mobs L5/S1 Gr III Manual: 15'  B quad stretch in prone  SL for flex rotate mob GR III, QL release following, B   Manual R piriformis stretch  Prone thoracic spine PA mobs Gr III  Prone central PA mobs L5/S1 Gr III Manual:     B quad stretch in prone  B piriformis stretch --   Manual: 15'  B quad stretch in prone  SL for flex rotate mob GR III, QL release following, B   Manual R piriformis stretch  Prone thoracic spine PA mobs Gr III  Prone central PA mobs L5/S1 Gr III Manual: 8'  B piriformis stretch  L hamstring stretch  KT application left knee -- Manual: 12'  B piriformis stretch  B quad stretch in prone  Prone thoracic spine PA mobs Gr III  Prone central PA mobs L5/S1 Gr III   Manual: 5'  B piriformis stretch  B quad stretch in prone -- Re-ed: 15'  Biodex postural control, steady platform  Biodex limits of stability, steady platform   Re-ed: 15'  Biodex postural control, steady platform  Biodex limits of stability, steady platform Re-ed: 15'  Biodex postural control, steady platform  Biodex limits of stability, steady platform -- Manual: 10'  B piriformis stretch  B quad stretch in prone  CPA in prone to lumbar spine, Gr III TE 20  NU step L6 5'  SB LTR  SB DKTC  Bridge 2 x 10  Bridge with SB 2 x 10  Clam shells  x 20 L/R  B hamstring stretch with a strap TE   NU step L6 5'  II bars:   Lateral steps  Forward/posterior steps  DLS on Airex 45/60/35 sec  Single hedy step over TE  NU step L6 5'  YTB hip extension x 15 L/R (cuing on knee extension)  YTB hip ABD 2 x 15 L/R  Lateral steps  Forward/posterior steps  DLS on Airex 45/60/35 sec  Single hedy step over   TE 30'  NU step L6 5'  YTB posterior steps  YTB lateral steps  Lateral steps with weight ball 5#  Forward steps with weighted ball 5#  DLS on Airex 45/60/35 sec  Single hedy step over  Tandem stance  B  Airex forward lunges TE 40'  NU step L6 5'  Re-assessment  DLS  Modified Tandem  Tandem  30 sec STS  TUG  Madhavi step overs  Power squat with ball hold  Lateral steps with weighted ball hold   TE TE TE - -   - Gait; walking with hurricaine and SBA                  TE: 40   6\" step FSU's 1HHA 6\" step FSU's 1HHA  6\" step FSU's 1HHA   NU step L5 5'  Bridge 2 x 10   Clam shells 2 x 10, manual resistance NU step L5 5'  Bridge 2 x 10   Education on log rolling  Neuro re-ed:  10x sciatic nerve glide with ankle floss, B  Manual:  SL for flex rotate mob GR III, QL release following.x8 min Therex:  Seated sciatica nerve glide x 10 R LE Neuro re-ed:  10x sciatic N glide with adductor bias and floss on R Neuro re-ed:  10x sciatic N glide with adductor bias and floss on R 15x bridges  Neuro re-ed:  Attempted to discriminate between 3 different textures (soft, sand paper, and poky) R legx4 min Therex:  2x10 donkey kicks, 3 lbs B  2x15 lateral bosu lunges, HHA  15 squat to row, 30 lbs, CGA TE  Bridge x 15  Clams with manual resistance x 10 L/R  Sit to stand x 5; hands on upper thighs  Leg press with AFO's off  Double leg press 3 black cords x 30 TE  Bridge x 15  Clams with manual resistance x 12 L/R  Leg press with AFO's ON  Double leg press 3 black cords 2 x 25 TE  Bridge x 20  LTR x 10  LTR with legs crossed for ITB bias x 10 L/R  R clam shells with manual resistance 2 x 15  Hkly PPT with DKTC x 8 L/R  Double leg press 3 black cords 1 x 30 TE  Bridge x 20  LTR x 10  LTR with legs crossed for ITB bias x 10 L/R  R clam shells with manual resistance 2 x 15  Hkly PPT with DKTC x 8 L/R  Sit to stand without UE assist x 5, SBA for blocking at the knees; 2HHA to return to sitting TE  Leg press with AFO's ON  Double leg press 3 black cords 2 x 25  Single leg press 2 black cords 1 x 30 L/R  Sit to stand without UE assist x 5, SBA for blocking at the knees; 2HHA to return to sitting  Partial squats into chair 2HHA 1 x 10, 1 x 7  Red  cord lateral steps x 30 L/R 2HHA TE  Leg press with AFO's ON  Double leg press 3 black cords 2 x 25  Single leg press 2 black cords 1 x 30 L/R  Sit to stand without UE assist x 5, SBA for blocking at the knees; 2HHA to return to sitting  Partial squats into chair 2HHA 1 x 10, 1 x 7  Red cord lateral steps x 30 L/R 2HHA TE AFO's on  NU step L5 6'  LTR\"s x 20   Bridge x 15  SB Bridge x 15  Quadruped arm raises x 10 L/R  Quadruped leg raises x 8 L/R  Partial squats into chair 2HHA 1 x 10, 1 x 7  Red cord lateral steps x 30 L/R 2HHA  Inclined forward   heel raises x 8 reps  90 deg squats 2HHA, wide SUSY 2 x 5  Lateral lunges with hold x 5 L/R 2HHA TE AFO's on  NU step L5 6'  LTR\"s x 20   B Fig 4 stretch  B FADDIR stretch  B hamstring stretch  Bridge x 15  SB Bridge x 15  6\" FSU's 1HHA x 10 L/R  Partial squats into chair 2HHA 1 x 10, 1 x 7  Red cord lateral steps x 30 L/R 2HHA  Inclined forward   heel raises x 10 reps   TE AFO's on 20'  NU step L5 6'  LTR\"s x 20   B Fig 4 stretch  B FADDIR stretch  B hamstring stretch  Bridge x 15  SB Bridge x 15  PPT with marching x 15 L/R  Sit to stand from an elevated surface 2 x 10  Red cord lateral steps x 30 L/R 2HHA   TE 30'  SB rolls DKTC  SB rolls LTR  L quad sets 10 x 10 sec, x 2  L/R SLR 2 x 10  SAQ's 2 x 10  Bridge 2 x 10  SB Bridge x 15  PPT with marching x 15 L/R TE 35'  NU step L6 5'  SB rolls LTR  SB rolls DKTC  SB bridge 2 x 12  Bridge with ADD ball 2 x 12  PPT with toe taps 2 x 12 L/R  Quadruped camel cat  X 10  Bird dog x 5 L/R CGA for safety  Red XB lateral steps x 30 L/R  Chair hovers x 12  Lateral lunges with hold x 10 L/R TE 30'  NU step L6 5'  SB rolls LTR  SB rolls DKTC  SB bridge 2 x 12  Quadruped camel cat  X 10  Bird dog x 5 L/R CGA for safety  Red XB lateral steps x 30 L/R  Sit to stand from an elevated seat position x 8 with focus on independent stabilization in standing   Forward lunge hold, sustained, 60 sec L/R  DLS on Airex x 3 (longest hold: 50 sec) TE  40'  SB rolls LTR  SB rolls DKTC  SB bridge x 15  Bridge x 15  Open book B  Clam shells BTB x 20 L/R  Prone quad stretch with a strap  Prone hip extension x 10 L/R  Camel Cat  Bird Dog  Lateral steps at the counter Grey TB  Squats at the counter  Tandem stance at the counter, modified  DLS wide SUSY at the counter TE 30'  Tandem stance B  Forward steps in II bars  Lateral steps in II bars  DLS on Airex  Hedy step overs forward  Hedy step overs lateral  Squats at the counter  Lateral steps at the counter, Grey TB   NU step: L6 5'  Tandem stance B  Forward steps in II bars  Lateral steps in II bars  DLS on Airex  Hedy step overs forward  Hedy step overs lateral  Left/Right turns in II bars  Side reaching in II bars with lumbar/thoracic rotation  Forward reaching in II bars       NU step: L6 5'  Tandem stance B    Forward steps in II bars, arms crossed (leans into the left railing ~ 2 times per length)  Forward steps in II bars holding a ball (leans into L/R railing with nearly every step; improves with practice)  Lateral steps in II bars (leans posterior   DLS on Airex  Hedy step overs forward  Hedy step overs lateral  Left/Right turns in II bars  Side reaching in II bars with lumbar/thoracic rotation  Forward reaching in II bars NU step: L6 5'  Tandem stance B    Forward steps in II bars, arms crossed   Forward steps in II bars holding a ball   Lateral steps in II bars    DLS on Airex  Hedy step overs forward  Single hedy step over, progression to 1 finger touch to 0HHA  Hedy step overs lateral  Single hedy lateral step overs progression to 1 finger touch to 0HHA  Left/Right turns in II bars  Side reaching in II bars with lumbar/thoracic rotation  Forward reaching in II bars NU step: L6 5'  B hamstring stretch  B piriformis stretch  Bridge x 15  Bridge with SB x 10  Lateral steps in II bars  Lateral steps in II bars with FitBall hold  Lateral steps in II bars with weighted ball (3kg)  Lateral steps  in II bars with GXB, 1 finger hold for balance  Forward steps 0HHA  Forward steps with FitBall carry  Madhavi step overs with 1HHA assist to finger assist. Had 3 episodes of LOB posterior requiring 2HHA hold for recovery NU step: L6 5'  B hamstring stretch  B piriformis stretch  Bridge x 15  Bridge with SB x 10  Lateral steps in II bars  Lateral steps in II bars with FitBall hold    Lateral steps in II bars with GXB, 1 finger hold for balance  Madhavi step overs with 1HHA assist to finger assist. Forward and lateral  [place] weight shifts  [place] alternate knee flexion  [place] FSU's  DLS on Airex with cone taps Manual: 25'  Lumbar rotational mobilization in sdly B  Lumbar PA glides in sdly Gr III B  Prone thoracic spine mobilization Gr III  SMT to lumbar paraspinals B  Prone quad stretch B  Prone FADDIR stretch B   Power squat position holding a ball: reaching F/L/R  Step downs off Airex, 1HHA  Power stance with BTB rows, alternate rows, alternating tension with bilateral rows  Standing quad stretch  Obstacle course: hurdles, 6\" step, foam blocks  Red TB lateral steps 0HHA, frequent leaning back Power squat position holding a ball: reaching F/L/R  Step downs off Airex, 1HHA  Power stance with BTB rows, alternate rows, alternating tension with bilateral rows  Power squat: scapular retraction with shoulder ER GTB  Obstacle course: hurdles, 6\" step, foam blocks  Red TB lateral steps 0HHA, frequent leaning back Power stance with BTB rows, alternate rows, alternating tension with bilateral rows  Power squat: scapular retraction with shoulder ER GTB  Manual: 15'  Lumbar rotational mobilization in sdly B  Lumbar PA glides in sdly Gr III B  Prone thoracic spine mobilization Gr III  SMT to lumbar paraspinals B  Prone quad stretch B  Prone FADDIR stretch B    Re-ed: 15'  Quadruped neutral spine/TrA recruitment/manual cuing  Camel cat with manual assist for movement coordination  Quadruped posterior rocking  Quadruped  alternate arm raises x 7 L/R Re-ed: 15'  Quadruped neutral spine/TrA recruitment/manual cuing  Camel cat with minimal manual assist for movement coordination  Quadruped posterior rocking  Quadruped alternate arm raises x 7 L/R  TrA in hook-lying  TrA/PPT with alternate hip/knee flexion, slow  TrA/PPT with lateral knee fall outs, slow Re-ed: 25'  Quadruped neutral spine/TrA recruitment/manual cuing  Camel cat with minimal manual assist for movement coordination  Quadruped posterior rocking  Quadruped alternate arm raises x 7 L/R  Quadruped modified hip raises 2 x 5 L/R  TrA in hook-lying  TrA/PPT with alternate hip/knee flexion, slow  TrA/PPT with lateral knee fall outs, slow Therex:  10x sit to stand with exaggerated WS fwd and CGA  Tennis ball roll out firm pressure x2 min R Manual:  SL for flex rotate mob GR III, QL release following.x8 min    Therex:  2x10 SLR B  10 x bridges 4\" holds Therex:  2x10 knee ankle/knee ankle B  15x dead bugs in shelf position   10x fig 4 bridges SL    Manual:  SL for flex rotate mob GR III, QL release following.x8 min   Manual:  SL for flex rotate mob GR III, QL release following, Prone CPA to L3-L5 GR III.x10 min    Therex:  2x8 fwd lunges to bosu Therex:  Towel  toe curls, R LE x10    Manual:  SL for flex rotate mob GR III, QL release following, B Manual:  SL for flex rotate mob GR III, QL release following, B x 8 min    Therex:    2x10 bridges with airex under one foot, B  2x10 SL raise on mat  15x SL hip/ankle hip abd, B Single leg press 1 black, 1 grey and 1 yellow cords x 20 L/R  B heel raises on leg press with knees extended, 1 black cord; 2 x 12    Re-ed: AFO's on  DLS on Airex SBA/CGA  DLS on the ground SBA  Madhavi step over with w/s forward x 15 L/R HHA  Cone taps x 15 L/R 1HHA     Single leg press 1 black, 1 grey and 1 yellow cords x 20 L/R    Re-ed: AFO's on  DLS on Airex SBA/CGA  DLS on the ground SBA  Madhavi step over with w/s forward x 15 L/R HHA  Cone taps x 15 L/R  1HHA Single leg press 2 black cords 1 x 30 L/R    Re-ed: AFO's on  DLS on Airex SBA/CGA  DLS on the ground SBA  Madhavi step over with w/s forward x 15 L/R HHA  Cone taps x 15 L/R 1HHA  Forward lunge unsupported with hands hovering and SBA; 7 x 5 sec L/R Partial squats into chair 2HHA x 10  Red cord lateral steps x 20 L/R 2HHA    Re-ed: AFO's on  DLS on Airex SBA 3 x 30 sec  DLS on the ground SBA  Tandem stance 2 x 20 sec L/R finger touch B  Cone taps x 15 L/R 1HHA  Forward lunge unsupported with hands hovering and SBA; 7 x 5 sec L/R Re-ed: AFO's on  DLS on Airex SBA 3 x 30 sec  DLS on the ground SBA  Tandem stance 2 x 20 sec L/R finger touch B  Cone taps x 15 L/R 1HHA  Forward lunge unsupported with hands hovering and SBA; 7 x 5 sec L/R Re-ed: AFO's on  DLS on Airex SBA 3 x 30 sec  DLS on the ground SBA  Tandem stance 2 x 20 sec L/R finger touch B  Cone taps x 15 L/R 1HHA  Forward lunge unsupported with hands hovering and SBA; 7 x 5 sec L/R   Re-ed: AFO's on  DLS on Airex SBA 3 x 30 sec  Tandem stance 2 x 20 sec L/R finger touch B  Cone taps x 15 L/R 1HHA  Forward lunge unsupported with hands hovering and SBA; 7 x 5 sec L/R  DLS on Airex with forward reach x 5 L/R   Double leg press 3 black cords 1 x 30  Single leg press 2 black cords 1 x 30 L/R    Re-ed: AFO's on  DLS on Airex SBA 3 x 30 sec  Tandem stance 2 x 20 sec L/R finger touch B  Cone taps x 15 L/R 1HHA  Tandem walk 2HHA  DLS on Airex with forward reach x 5 L/R Re-ed: AFO's on 10'  DLS on Airex SBA 3 x 30 sec  Tandem stance 2 x 20 sec L/R finger touch B  Cone taps x 15 L/R 1HHA  Tandem walk 2HHA  DLS on Airex with forward reach x 10 L/R  Lunge stance with A/P w/s x 5 L/R HHA   Re-ed: AFO's on 8'  DLS on Airex SBA 3 x 30 sec  Tandem stance 2 x 20 sec L/R finger touch B  Cone taps x 15 L/R 1HHA  Tandem walk 2HHA                HEP: Access Code: [email or web address]  Access Code: [email or web address]  URL: [email or web address]  Date: 01/23/2024  Prepared by: Juanita  Zaborina    Exercises  - Supine Hip and Knee Flexion AROM with Swiss Ball  - 1 x daily - 7 x weekly - 3 sets - 10 reps  - Supine Lower Trunk Rotation with Swiss Ball  - 1 x daily - 7 x weekly - 3 sets - 10 reps  - Supine Figure 4 Piriformis Stretch  - 1 x daily - 7 x weekly - 1 sets - 3 reps - 30-60 sec hold  - Supine Bridge  - 1 x daily - 7 x weekly - 2-3 sets - 10 reps  - Bridge with Heels on Swiss Ball  - 1 x daily - 7 x weekly - 2-3 sets - 10 reps  - Sidelying Open Book Thoracic Lumbar Rotation and Extension  - 1 x daily - 7 x weekly - 1 sets - 10 reps  - Clamshell with Resistance  - 1 x daily - 7 x weekly - 3 sets - 10 reps  - Prone Quadriceps Stretch with Strap  - 1 x daily - 7 x weekly - 1 sets - 3 reps - 30-60 sec hold  - Prone Hip Extension  - 1 x daily - 7 x weekly - 1-2 sets - 10 reps  - Cat Cow  - 1 x daily - 7 x weekly - 2 sets - 10 reps  - Bird Dog  - 1 x daily - 7 x weekly - 1-2 sets - 10 reps  - Side Stepping with Counter Support  - 1 x daily - 7 x weekly - 3 sets - 10 reps  - Mini Squat with Counter Support  - 1 x daily - 7 x weekly - 1-2 sets - 10 reps  - Tandem Stance with Support  - 1 x daily - 7 x weekly - 1 sets - 3 reps - 30 sec hold      Charges: therex x 3 45 Total Timed Treatment: 45 min  Total Treatment Time: 45 min

## 2024-06-14 ENCOUNTER — TELEPHONE (OUTPATIENT)
Dept: PHYSICAL THERAPY | Facility: HOSPITAL | Age: 79
End: 2024-06-14

## 2024-06-14 ENCOUNTER — TELEPHONE (OUTPATIENT)
Dept: INTERNAL MEDICINE CLINIC | Facility: CLINIC | Age: 79
End: 2024-06-14

## 2024-06-14 ENCOUNTER — HOSPITAL ENCOUNTER (OUTPATIENT)
Dept: GENERAL RADIOLOGY | Age: 79
Discharge: HOME | End: 2024-06-14
Attending: INTERNAL MEDICINE

## 2024-06-14 DIAGNOSIS — M25.552 PAIN OF LEFT HIP: ICD-10-CM

## 2024-06-14 DIAGNOSIS — M25.552 PAIN OF LEFT HIP: Primary | ICD-10-CM

## 2024-06-14 PROCEDURE — 73502 X-RAY EXAM HIP UNI 2-3 VIEWS: CPT | Performed by: INTERNAL MEDICINE

## 2024-06-14 NOTE — TELEPHONE ENCOUNTER
Returned patient's phone call. Reports a bad left hip pain episode this morning that was sufficiently strong and he nearly went to ER; gradually improving now. Requests a PT appointment to have it treated. Informed patient that he has been discharged from PT and will need new orders if PT is warranted. Recommended to contact his PCP regarding this left hip pain episode.

## 2024-06-14 NOTE — TELEPHONE ENCOUNTER
PT states that he is having an Emergency with his hands and she needs to speak with a Nurse - PT wishing not to disclose anymore information with  Rep.

## 2024-06-14 NOTE — TELEPHONE ENCOUNTER
Spoke with pt stated he woke with terrible pain in left hip, excruciating pain  When he is sitting or laying in couch it does not hurt much but when he walks or get up from sitting position it hurts a lot  Pt went to do decompression yesterday at chiropractor at 11 am but he was completely fine until this morning  Pt also requesting physical therapy orders at [place]  Pt is asking for either imaging or medication to help with pain    Per Dr. Case, order Medrol Dose pack and left hip xray  Per pt he is on tapering dose of prednisone for his neuropathy, refused Rx  Pt has left over norco from previous surgery will take that instead if needed  Orders placed for xray  Pt given CS number to schedule the test

## 2024-06-17 ENCOUNTER — OFFICE VISIT (OUTPATIENT)
Dept: PHYSICAL THERAPY | Facility: HOSPITAL | Age: 79
End: 2024-06-17
Attending: INTERNAL MEDICINE
Payer: MEDICARE

## 2024-06-17 DIAGNOSIS — M25.552 PAIN OF LEFT HIP: Primary | ICD-10-CM

## 2024-06-17 PROCEDURE — 97110 THERAPEUTIC EXERCISES: CPT

## 2024-06-17 PROCEDURE — 97162 PT EVAL MOD COMPLEX 30 MIN: CPT

## 2024-06-17 NOTE — PROGRESS NOTES
SPINE EVALUATION:     Diagnosis:   Pain of left hip (M25.552)         Referring Provider: Alex Case  Date of Evaluation:    6/17/2024    Precautions:   Fall risk Next MD visit:   none scheduled  Date of Surgery: n/a     PATIENT SUMMARY   Nils Joseph is a 78 year old male who presents to therapy today with complaints of acute onset of anterior left hip pain. He woke up with this pain on Friday morning. Pain was severe and he nearly went to emergency room for that. Reports that it was 9/10 pain with turning/twisting/getting up/taking step; felt like burning groin pain. Denies lower back pain or radiating symptoms. No falls or trauma. Hip X-rays came back with the signs of mild degenerative changes in both hips. Has taken Norco on Friday and Saturday; Naproxen on Sunday and no pain meds today. Feels that he is almost to the \"baseline\" lower back and bilateral hip pain. The day prior pain onset he had decompression for lumbar spine at the chiropractor; this was the 3rd time he had that done; same settings.   Pretty much back to normal today.   Pt describes pain level current 4/10, at best 2/10, at worst 9/10.   Current functional limitations include patient states that he \"nearly back to normal today\".     X-ray:L hip 6/14    FINDINGS:  Postoperative changes are partially visualized overlying the lower lumbar spine.  Mild degenerative changes are seen within the hips.  No acute displaced osseous fracture is identified.  Probable phleboliths overlying the pelvis.  Enthesopathy    is seen at the hamstring insertions and iliac crest.   Nils describes prior level of function active with family and social activities. Pt goals include reduced left hip pain; understanding its cause.  Past medical history was reviewed with Nils. Significant findings include L3-5 lumbar fusion  Past Medical History:    Asthma (HCC)    Atypical mole    Back problem    low back pain, s/p fusion of L3,4,5.  back still stiff     Belching    Benign thyroid cyst    BPH (benign prostatic hyperplasia)    Cataract    s/p sugery    Constipation    Depression    Disorder of prostate    Flatulence/gas pain/belching    Frequent urination    Frequent use of laxatives    Hearing impairment    Slight    Hemorrhoids    Hemorrhoids, internal    High cholesterol    Elevated     Insomnia    Irregular bowel habits    Itch of skin    Leg swelling    Neuropathy    R leg    Obesity    slightly overweight    Osteoarthritis    Poor balance    uses cane    Rotator cuff sprain    Seborrheic keratosis    Shoulder joint pain    Sinusitis    Unspecified disorder of thyroid    hypothyroid    Visual impairment    glasses reading       Pt denies diplopia, dysarthria, dysphasia, dizziness, drop attacks, bowel/bladder changes, saddle anesthesia, and CALOS LE N/T.    ASSESSMENT  Nils presents to physical therapy evaluation with primary c/o acute onset of left hip pain that has largely resolved by now. The results of the objective tests and measures show anterior hip pain provocation (mild degree) with end-range hip flexion, combined FL/ADD and with Scour test; noted limited left hip caudal glide with flexion; there is tenderness to palpation over left posterior hip structures.  Functional deficits include but are not limited to turning, twisting, getting up, taking step although have largely improved.  Signs and symptoms are consistent with diagnosis of left hip strain vs hip impingement. Patient is well known to me as he recently completed a course of physical therapy for idiopathic peripheral neuropathy and lumbar radiculopathy. Lower back and bilateral hip stiffness and soreness in the mornings are common for him and usually resolve with the morning exercise routine and these findings are different than prior but of mild intensity. Treatment today included long axis traction and hip joint mobilization for caudal glide that was tolerated very well. Patient was  encouraged to continue with regular stretches. Pt and PT discussed evaluation findings, pathology, POC and HEP.  Pt voiced understanding and performs HEP correctly without reported pain. Skilled Physical Therapy is medically necessary to address the above impairments and reach functional goals.     OBJECTIVE:   Observation/Posture: wearing bilateral AFO's  Neuro Screen: unremarkable    Lumbar AROM: (* denotes performed with pain) WNL, no pain    Accessory motion: limited caudal glide of left hip; stiffness with unilateral PA mobility assessment of lumbar spine (feels good and \"needed\" to the patient)  Palpation: tenderness to palpation left posterior hip; stiffness left and right lumbar paraspinals  AROM: (* denotes performed with pain)  Hip Knee   Flexion: R 100; L 95 (pain provocation with OP)  Abduction: R 15; L 15  ER: R 35; L 35  IR: R 30; L 25 Flexion: R 120; L 120  Extension: R 0; L 0          Flexibility:  Hip Flexor: R moderate limitations, L moderate limitations  Hamstrings: R moderate limitations; L moderate limitations  Piriformis: R moderate limitations; L moderate limitations  Quads: R moderate restrictions; L mod-major restrictions  Gastroc-soleus: R NT; L NT    Strength/MMT: (* denotes performed with pain)  Hip Knee   Flexion: R 5-/5; L 5-/5  Abduction: R 4/5; L 4/5  ER: R 5-/5; L 5-/5  IR: R 5-/5; L 5-/5 Flexion: R 5/5; L 5/5  Extension: R 5/5; L 5/5          Special tests:   SLR: (-) B  Slump: (-) B  FADDIR: (-) B  WENDY: (-) B  Scour: (+) L    Gait: pt ambulates on level ground with with assistive device of SPC and B AFO's, decreased foot clearance B, decreased arm swing, difficulty turning, and steppage gait.  Balance: SLS R NT, L NT    Today’s Treatment and Response: L long axis traction; L hip caudal glide via femur with lower leg on PT's shoulder; hip stretches review  Pt education was provided on exam findings, treatment diagnosis, treatment plan, expectations, and prognosis. Pt was also  provided recommendations for modalities as needed [ice/heat] and importance of remaining active  Patient was instructed in and issued a HEP for: to continue with previously issued HEP    Charges: PT Eval Moderate Complexity, TE x 1      Total Timed Treatment: 15 min     Total Treatment Time: 45 min     Based on clinical rationale and outcome measures, this evaluation involved Moderate Complexity decision making due to 1-2 personal factors/comorbidities, 3 body structures involved/activity limitations, and evolving symptoms including changing pain levels.  PLAN OF CARE:    Goals: (to be met in 8 visits)   Pt will have improved hip AROM Flex to 105 deg and ABD to 20 deg to be able to don/doff shoes and perform car transfers without difficulty   Pt will improve hip ABD and ER strength to 5-/5 to increase ease with standing and walking   Pt will be able to squat to  light objects around the house with <1/10 hip pain   Pt will be independent and compliant with comprehensive HEP to maintain progress achieved in PT       Frequency / Duration: Patient will be seen for 1-2 x/week or a total of 2-8 visits over a 90 day period. Treatment will include: Gait training, Manual Therapy, Neuromuscular Re-education, Therapeutic Activities, Therapeutic Exercise, and Home Exercise Program instruction    Education or treatment limitation: None  Rehab Potential:good      Patient/Family/Caregiver was advised of these findings, precautions, and treatment options and has agreed to actively participate in planning and for this course of care.    Thank you for your referral. Please co-sign or sign and return this letter via fax as soon as possible to [phone]. If you have any questions, please contact me at Dept: [phone]    Sincerely,  Electronically signed by therapist: Juanita Koehler, PT    Physician's certification required: Yes  I certify the need for these services furnished under this plan of treatment and while under  my care.    X___________________________________________________ Date____________________    Certification From: 6/17/2024  To:9/15/2024

## 2024-06-19 ENCOUNTER — OFFICE VISIT (OUTPATIENT)
Dept: PHYSICAL THERAPY | Facility: HOSPITAL | Age: 79
End: 2024-06-19
Attending: INTERNAL MEDICINE

## 2024-06-19 PROCEDURE — 97110 THERAPEUTIC EXERCISES: CPT

## 2024-06-19 PROCEDURE — 97140 MANUAL THERAPY 1/> REGIONS: CPT

## 2024-06-19 NOTE — PROGRESS NOTES
Diagnosis:   Pain of left hip (M25.552)      Referring Provider: Alex Case  Date of Evaluation:    6/17/2024    Precautions:  Fall Risk Next MD visit:   none scheduled  Date of Surgery: n/a   Insurance Primary/Secondary: MEDICARE / [employer]     # Auth Visits: 8 per POC            Subjective: He felt good on Monday and yesterday; no left hip pain and no pain medications required. This morning he woke up feeling good; completed his stretches and upon getting up from the bed he felt 2/10 left hip pain in the groin and lateral hip.     Pain: 2/10      Objective:   Stiffness and tenderness to palpation L hip flexors  Groin stiffness with quad stretch in prone  Limited L hip caudal glide; limited L hip PA glide  L hip flexion ROM with groin stiffness at the end-range  Active SLR: brief reproduction of left groin pain        Assessment: L hip presentation is most consistent with left hip flexors strain today. Patient notices reproduction of groin stiffness/soreness with quadriceps and hip flexors stretching, with end-range of hip flexion and with active straight leg raise. Treatment today focused on left hip caudal and PA mobilization, soft tissue mobilization to hip flexors, gentle stretching to quadriceps and hip flexors. All tolerated very well; no pain post-session. Reviewed home exercises and stretches. Patient will continue independently for the next 3-4 weeks and will return to PT if left groin pain persists.      Goals: (to be met in 8 visits)   Pt will have improved hip AROM Flex to 105 deg and ABD to 20 deg to be able to don/doff shoes and perform car transfers without difficulty   Pt will improve hip ABD and ER strength to 5-/5 to increase ease with standing and walking   Pt will be able to squat to  light objects around the house with <1/10 hip pain   Pt will be independent and compliant with comprehensive HEP to maintain progress achieved in PT     Plan: patient will continue with HEP for  the next 3-4 weeks; he will return to PT if pain persists  Date: 6/19/2024  TX#: 2 Date:                 TX#: 3/ Date:                 TX#: 4/ Date:                 TX#: 5/ Date:   Tx#: 6/   Manual:  Left long axis traction  L hip caudal glide via femur with lower leg on PT's shoulder  STM to L hip flexors  Prone L hip PA mobilization Gr III  Manual stretch to L quads in prone  Manual stretch to L hip flexors EOB       TE  Bridge  Bridge with SB  L clam shells  Fig 4 stretch  Pt education                      HEP: Pt to continue with previously issued HEP    Charges: man x 2, TE x 1       Total Timed Treatment: 40 min  Total Treatment Time: 40 min

## 2024-06-24 ENCOUNTER — TELEPHONE (OUTPATIENT)
Dept: NEUROLOGY | Facility: CLINIC | Age: 79
End: 2024-06-24

## 2024-07-10 ENCOUNTER — OFFICE VISIT (OUTPATIENT)
Dept: PODIATRY CLINIC | Facility: CLINIC | Age: 79
End: 2024-07-10

## 2024-07-10 DIAGNOSIS — M10.079 IDIOPATHIC GOUT OF FOOT, UNSPECIFIED CHRONICITY, UNSPECIFIED LATERALITY: ICD-10-CM

## 2024-07-10 DIAGNOSIS — L60.0 INGROWN NAIL: ICD-10-CM

## 2024-07-10 DIAGNOSIS — M20.41 HAMMER TOES OF BOTH FEET: ICD-10-CM

## 2024-07-10 DIAGNOSIS — D47.2 MGUS (MONOCLONAL GAMMOPATHY OF UNKNOWN SIGNIFICANCE): ICD-10-CM

## 2024-07-10 DIAGNOSIS — M20.42 HAMMER TOES OF BOTH FEET: ICD-10-CM

## 2024-07-10 DIAGNOSIS — D47.2 NEUROPATHY ASSOCIATED WITH MGUS (HCC): ICD-10-CM

## 2024-07-10 DIAGNOSIS — B35.1 ONYCHOMYCOSIS: Primary | ICD-10-CM

## 2024-07-10 DIAGNOSIS — G63 NEUROPATHY ASSOCIATED WITH MGUS (HCC): ICD-10-CM

## 2024-07-10 PROCEDURE — 99213 OFFICE O/P EST LOW 20 MIN: CPT | Performed by: STUDENT IN AN ORGANIZED HEALTH CARE EDUCATION/TRAINING PROGRAM

## 2024-07-13 NOTE — PROGRESS NOTES
[facility]iat[facility]  Progress Note    Nils Joseph is a 78 year old male.   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  He has elongated and thickened nails he has difficulty trimming his own.  His nails do become incurvated by the end of his stretch between visits.  They do cause some pain from rubbing in his shoes.  He denies acute signs of infection or other concerns.  He continues to walk with AFOs and complete physical therapy. He is following with neurology and [facility]. No other complaints are mentioned.      Allergies: Immune globulin   Current Outpatient Medications   Medication Sig Dispense Refill    HYDROcodone-acetaminophen 7.5-325 MG Oral Tab Take 1 tablet by mouth every 4 (four) hours as needed. 15 tablet 0    ALPRAZolam 0.25 MG Oral Tab Take 1 tablet (0.25 mg total) by mouth nightly as needed. 90 tablet 1    Beclomethasone Diprop HFA 40 MCG/ACT Inhalation Aerosol, Breath Activated Inhale 2 puffs into the lungs 2 (two) times daily as needed. 1 each 3    fluticasone propionate 50 MCG/ACT Nasal Suspension 1 spray by Each Nare route 2 (two) times daily as needed for Rhinitis. 3 each 3    levothyroxine 100 MCG Oral Tab Take 1 tablet (100 mcg total) by mouth once daily. Overdue for labs, please complete them. 90 tablet 3    traZODone 100 MG Oral Tab Take 1 tablet (100 mg total) by mouth nightly as needed for Sleep. 90 tablet 3    predniSONE 10 MG Oral Tab Take 1 tablet (10 mg total) by mouth daily. 30 tablet 0    predniSONE 5 MG Oral Tab In one month, approximately 4/8/24, start prednisone 5mg daily 30 tablet 2    doxycycline 100 MG Oral Cap Take 1 capsule (100 mg total) by mouth 2 (two) times daily.      famotidine 40 MG Oral Tab Take 1 tablet (40  mg total) by mouth daily.      BRINZOLAMIDE-BRIMONIDINE OP Apply 1 drop to eye in the morning, at noon, and at bedtime. Right eye      predniSONE 5 MG Oral Tab Take 1 tablet (5 mg total) by mouth daily. In addition to 10mg tablet, for total 15mg daily. 30 tablet 0    valACYclovir 1 G Oral Tab Take 0.5 tablets (500 mg total) by mouth daily.      gabapentin 300 MG Oral Cap Take 1 capsule (300 mg total) by mouth 3 (three) times daily. 270 capsule 3    docusate sodium 100 MG Oral Cap Take 1 capsule (100 mg total) by mouth 2 (two) times daily. (Patient taking differently: Take 1 capsule (100 mg total) by mouth 2 (two) times daily as needed.) 30 capsule 1    Glucosamine-Chondroitin (GLUCOSAMINE CHONDR COMPLEX OR) Take by mouth as needed.      cyanocobalamin 1000 MCG Oral Tab Take 1 tablet (1,000 mcg total) by mouth daily.      senna-docusate 8.6-50 MG Oral Tab Take 1 tablet by mouth daily. (Patient taking differently: Take 1 tablet by mouth daily as needed.) 30 tablet 0    erythromycin 5 MG/GM Ophthalmic Ointment Place 1 Application  into both eyes nightly. Uses 3-4 x weekly before bed. States prescribed mainly for moisture      tamsulosin (FLOMAX) cap Take 1 capsule (0.4 mg total) by mouth daily. 90 capsule 3    finasteride 5 MG Oral Tab Take 1 tablet (5 mg total) by mouth daily. 90 tablet 3    atorvastatin 10 MG Oral Tab Take 1 tablet (10 mg total) by mouth daily.      Albuterol Sulfate  (90 BASE) MCG/ACT Inhalation Aero Soln Inhale 2 puffs into the lungs every 6 (six) hours as needed for Wheezing.        Past Medical History:    Asthma (HCC)    Atypical mole    Back problem    low back pain, s/p fusion of L3,4,5.  back still stiff    Belching    Benign thyroid cyst    BPH (benign prostatic hyperplasia)    Cataract    s/p sugery    Constipation    Depression    Disorder of prostate    Flatulence/gas pain/belching    Frequent urination    Frequent use of laxatives    Hearing impairment    Slight    Hemorrhoids     Hemorrhoids, internal    High cholesterol    Elevated     Insomnia    Irregular bowel habits    Itch of skin    Leg swelling    Neuropathy    R leg    Obesity    slightly overweight    Osteoarthritis    Poor balance    uses cane    Rotator cuff sprain    Seborrheic keratosis    Shoulder joint pain    Sinusitis    Unspecified disorder of thyroid    hypothyroid    Visual impairment    glasses reading      Past Surgical History:   Procedure Laterality Date    Appendectomy      Arthroscopy of joint unlisted Right     knee    Back surgery  15    triple fusion of L3-L4-L5    Biopsy  2019    Bone Marrow    Biopsy  2019    right sural nerve biopsy    Carpal tunnel release Bilateral     Cataract Bilateral 2015    IOL'S    Colonoscopy      Hemorrhoidectomy  ~     Orthopedic surg (pbp)      LT ELBOW - ulnar nerve release    Other  2019    Lumbar puncture    Other surgical history      Shoulder Joint Replacement 15, Sinus surgery ,    Other surgical history      I&D of abscess in axilla area 7/3    Sigmoidoscopy,diagnostic      Sinus surgery    2014    Spine surgery procedure unlisted      L3-4-5    Tonsillectomy        Family History   Problem Relation Age of Onset    Breast Cancer Mother     Cancer Mother         breast cancer- late in life    Heart Disorder Mother         cardiac issues related to aging    Heart Disorder Father         partially blocked carotid arteries    Hypertension Father     Cancer Father         ? cancer?    Lipids Father     Hypertension Brother     Hypertension Brother       Social History     Socioeconomic History    Marital status:    Tobacco Use    Smoking status: Former     Current packs/day: 0.00     Average packs/day: 1.5 packs/day for 32.0 years (48.0 ttl pk-yrs)     Types: Cigarettes, Pipe     Start date: 3/1/1966     Quit date: 3/1/1998     Years since quittin.3    Smokeless tobacco: Never    Tobacco comments:      Quit many years ago   Vaping Use    Vaping status: Never Used   Substance and Sexual Activity    Alcohol use: Yes     Alcohol/week: 11.0 - 18.0 standard drinks of alcohol     Types: 6 - 8 Glasses of wine, 1 - 2 Cans of beer, 4 - 8 Shots of liquor per week     Comment: 1-2 drinks daily    Drug use: No   Other Topics Concern    Caffeine Concern Yes     Comment: 2-3 cups a day    Exercise Yes     Comment: physical therapy           REVIEW OF SYSTEMS:     Today reviewed systems as documented below  GENERAL HEALTH: feels well otherwise  SKIN: denies any unusual skin lesions or rashes  RESPIRATORY: denies shortness of breath with exertion  CARDIOVASCULAR: denies chest pain on exertion  GI: denies abdominal pain and denies heartburn  NEURO: denies headaches  MUSCULO: History of arthritis      EXAM:   There were no vitals taken for this visit.  GENERAL: well developed, well nourished, in no apparent distress  EXTREMITIES:   1. Integument: Normal skin temperature and turgor.  Nails x10 are elongated, thickened, dystrophic, subungual debris.  Lateral borders of hallux nails are incurvated.  2. Vascular: Dorsalis pedis two out of four bilateral and posterior tibial pulses two out of   four bilateral, capillary refill normal.  A little bit of pitting edema around ankle bilaterally.   3. Musculoskeletal: All muscle groups are graded 5 out of 5 in the foot and ankle.  Compartments soft and compressible.   4. Neurological: Normal sharp dull sensation; reflexes normal.  Decreased protective sensation noted to lower extremities.        ASSESSMENT AND PLAN:   Diagnoses and all orders for this visit:    Onychomycosis    Ingrown nail    MGUS (monoclonal gammopathy of unknown significance)    Neuropathy associated with MGUS (HCC)    Hammer toes of both feet    Idiopathic gout of foot, unspecified chronicity, unspecified laterality            Plan:     -Patient examined, chart history reviewed.  -Discussed importance of proper pedal  hygiene, regular foot checks.  -Sharply debrided nails x10 with a sterile nail nipper achieving a 20% reduction in thickness and length, without incident. Nails further smoothed with dremel. Slant back performed to ingrown nails.   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  -Prior uric acid WNL--no signs of gout clinically.  -Educated patient on acute signs of infection advised patient to seek immediate medical attention if symptoms arise.    The patient indicates understanding of these issues and agrees to the plan.    RTC 2 months.    Alli Campbell DPM

## 2024-07-26 ENCOUNTER — LAB ENCOUNTER (OUTPATIENT)
Dept: LAB | Age: 79
End: 2024-07-26
Attending: INTERNAL MEDICINE
Payer: MEDICARE

## 2024-07-26 DIAGNOSIS — R53.83 OTHER FATIGUE: ICD-10-CM

## 2024-07-26 LAB
BASOPHILS # BLD AUTO: 0.03 X10(3) UL (ref 0–0.2)
BASOPHILS NFR BLD AUTO: 0.6 %
EOSINOPHIL # BLD AUTO: 0.09 X10(3) UL (ref 0–0.7)
EOSINOPHIL NFR BLD AUTO: 1.7 %
ERYTHROCYTE [DISTWIDTH] IN BLOOD BY AUTOMATED COUNT: 12.8 %
HCT VFR BLD AUTO: 37.7 %
HGB BLD-MCNC: 13 G/DL
IMM GRANULOCYTES # BLD AUTO: 0.04 X10(3) UL (ref 0–1)
IMM GRANULOCYTES NFR BLD: 0.8 %
LYMPHOCYTES # BLD AUTO: 1.35 X10(3) UL (ref 1–4)
LYMPHOCYTES NFR BLD AUTO: 25.7 %
MCH RBC QN AUTO: 33.8 PG (ref 26–34)
MCHC RBC AUTO-ENTMCNC: 34.5 G/DL (ref 31–37)
MCV RBC AUTO: 97.9 FL
MONOCYTES # BLD AUTO: 0.76 X10(3) UL (ref 0.1–1)
MONOCYTES NFR BLD AUTO: 14.4 %
NEUTROPHILS # BLD AUTO: 2.99 X10 (3) UL (ref 1.5–7.7)
NEUTROPHILS # BLD AUTO: 2.99 X10(3) UL (ref 1.5–7.7)
NEUTROPHILS NFR BLD AUTO: 56.8 %
PLATELET # BLD AUTO: 258 10(3)UL (ref 150–450)
RBC # BLD AUTO: 3.85 X10(6)UL
TSI SER-ACNC: 2.17 MIU/ML (ref 0.55–4.78)
WBC # BLD AUTO: 5.3 X10(3) UL (ref 4–11)

## 2024-07-26 PROCEDURE — 85025 COMPLETE CBC W/AUTO DIFF WBC: CPT

## 2024-07-26 PROCEDURE — 84443 ASSAY THYROID STIM HORMONE: CPT

## 2024-07-26 PROCEDURE — 36415 COLL VENOUS BLD VENIPUNCTURE: CPT

## 2024-08-08 ENCOUNTER — MED REC SCAN ONLY (OUTPATIENT)
Dept: INTERNAL MEDICINE CLINIC | Facility: CLINIC | Age: 79
End: 2024-08-08

## 2024-09-10 ENCOUNTER — OFFICE VISIT (OUTPATIENT)
Dept: NEUROLOGY | Facility: CLINIC | Age: 79
End: 2024-09-10
Payer: MEDICARE

## 2024-09-10 VITALS
HEART RATE: 74 BPM | OXYGEN SATURATION: 94 % | BODY MASS INDEX: 29 KG/M2 | DIASTOLIC BLOOD PRESSURE: 62 MMHG | WEIGHT: 215 LBS | SYSTOLIC BLOOD PRESSURE: 124 MMHG

## 2024-09-10 DIAGNOSIS — G60.9 IDIOPATHIC PERIPHERAL NEUROPATHY: Primary | ICD-10-CM

## 2024-09-10 DIAGNOSIS — D47.2 NEUROPATHY ASSOCIATED WITH MGUS (HCC): ICD-10-CM

## 2024-09-10 DIAGNOSIS — M21.372 BILATERAL FOOT-DROP: ICD-10-CM

## 2024-09-10 DIAGNOSIS — G63 NEUROPATHY ASSOCIATED WITH MGUS (HCC): ICD-10-CM

## 2024-09-10 DIAGNOSIS — D47.2 MGUS (MONOCLONAL GAMMOPATHY OF UNKNOWN SIGNIFICANCE): ICD-10-CM

## 2024-09-10 DIAGNOSIS — M21.371 BILATERAL FOOT-DROP: ICD-10-CM

## 2024-09-10 DIAGNOSIS — M54.16 LUMBAR RADICULOPATHY: ICD-10-CM

## 2024-09-10 DIAGNOSIS — R76.8 ELEVATED ANTINUCLEAR ANTIBODY (ANA) LEVEL: ICD-10-CM

## 2024-09-10 PROCEDURE — 99214 OFFICE O/P EST MOD 30 MIN: CPT | Performed by: OTHER

## 2024-09-10 NOTE — PROGRESS NOTES
[facility] in [place]  Neurology     Nils Joseph Patient Status:  No patient class for patient encounter    10/10/1945 MRN [id number]   Location [unfilled] [facility] Alex Case MD          Subjective:  Nils Joseph is a(n) 78 year old male s/p lumbar spine surgery in 2016 who is being followed for polyneuropathy by Dr. Lassiter. He developed radicular pain and right foot drop, after which he had the surgery. Radicular pain resolved. A few months or later, he developed a slowly progressive course of clumsiness of the feet. He started catching his toe when walking, first on the right, and then on the left. Initially had a lot of shooting pain from below his knees down to his toes, bilaterally, it would come and go, worse at night. Has been on gabapentin. Has numbness in his foot that comes and goes. Has had extensive work up including EMG, LP, bone marrow biopsy, nerve biopsy. Labs showed MGUS. He was started on IVIG, and was on it for 2 sets of infusions. Developed a rash.  Gets cramps in hands, and trigger finger. Buttoning shirts can be difficult.   Interim  Since has started wearing bilateral AFO's. Falling less. Saw NSGY and spinal surgery was not recommended. Taking gabapentin 300mg TID. Drops things more from his hands. No tingling or numbness in hands.   Interim  Since last visit, I started patient on prednisone 60mg on 23, and 50mg after two weeks, for the last 2 weeks. Denies further elevated BP's. Sleep is minimally worse.   Interim  Patient reports symptoms are the same. Has not noticed any increase in foot strength. Patient currently on 50 mg prednisone. Has insomnia and weight gain. Was on prednisone 60mg x 2 weeks, and now for past 5 weeks has been on 50mg.  Interim  Patient received Rituxan starting end of November, and has been on prednisone 15mg for several weeks now (tapered from 40mg on 10/17/23, initially 60mg. Does not feel feet are stronger. Has  chronic cramping in the hands, along with numbness, has been going on for a 2 years. Has noted decreased dexterity in the fingers, slowly progressing. He is taking gabapentin 300mg TID. IVIG got 1 infusion over one day, followed by another infusion 1 month later, followed by rash, and stopping the treatment.   Interim  Patient was seen by [facility], had additional testing, genetic testing, negative. Started having left hip pain. Started PT, which helped, but still getting intermittently. Foot drop is about the same.       Rituxan 11/21/23- 12/12/23    Data review:  2024 YUSUF 1: 1280, SSA SSB negative  ACE neg  SPEP as previous  [facility] 2024- negative amyloid TTR testing, negative other hereditary panel (102 genes) ([employer] comprehensive neuropathy panel)      Hip XR 2024  CONCLUSION:  No acute osseous abnormality is seen.  Mild degenerative changes in the hips.     Component      Latest Ref Rng 9/22/2023   Glucose      70 - 99 mg/dL 92    Sodium      136 - 145 mmol/L 138    Potassium      3.5 - 5.1 mmol/L 4.1    Chloride      98 - 112 mmol/L 105    Carbon Dioxide, Total      21.0 - 32.0 mmol/L 30.0    ANION GAP      0 - 18 mmol/L 3    BUN      7 - 18 mg/dL 27 (H)    CREATININE      0.70 - 1.30 mg/dL 1.51 (H)    CALCIUM      8.5 - 10.1 mg/dL 9.7    CALCULATED OSMOLALITY      275 - 295 mOsm/kg 291    EGFR      >=60 mL/min/1.73m2 47 (L)    AST (SGOT)      15 - 37 U/L 23    ALT (SGPT)      16 - 61 U/L 42    ALKALINE PHOSPHATASE      45 - 117 U/L 61    Total Bilirubin      0.1 - 2.0 mg/dL 0.6    PROTEIN, TOTAL      6.4 - 8.2 g/dL 6.4    Albumin      3.4 - 5.0 g/dL 3.2 (L)    Globulin      2.8 - 4.4 g/dL 3.2    A/G Ratio      1.0 - 2.0  1.0    Patient Fasting for CMP? Yes       Component      Latest Ref Rng 4/20/2023 9/22/2023   WBC      4.0 - 11.0 x10(3) uL 6.5  8.6    RBC      3.80 - 5.80 x10(6)uL 4.14  4.13    Hemoglobin      13.0 - 17.5 g/dL 13.1  14.0    Hematocrit      39.0 - 53.0 % 40.7  41.8    Platelet Count      150.0 -  450.0 10(3)uL 199.0  167.0    MCV      80.0 - 100.0 fL 98.3  101.2 (H)    MCH      26.0 - 34.0 pg 31.6  33.9    MCHC      31.0 - 37.0 g/dL 32.2  33.5    RDW      % 13.5  16.6    Prelim Neutrophil Abs      1.50 - 7.70 x10 (3) uL 4.08  5.30    Neutrophils Absolute      1.50 - 7.70 x10(3) uL 4.08  5.30    Lymphocytes Absolute      1.00 - 4.00 x10(3) uL 1.56  2.58           EMG 4/2023  Conclusion:   There is electrophysiologic evidence of a marked sensory axonal polyneuropathy.   In additional, there are superimposed bilateral L4-S1 lumbar radiculopathies, with active denervation changes.      MRI c spine 6/2023  MPRESSION:   1. 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   Recommend ENT consultation and ultrasound for further assessment.   2.  Severe right neural foraminal stenosis at C3-C4 due to stable right uncinate and facet   hypertrophy.   3. Severe bilateral foraminal stenosis at C6-C7 due to uncinate and facet hypertrophy, superimposed   on a mild broad-based posterior disc osteophyte complex and posterior ligamentous and facet   hypertrophy, resulting in mild spinal canal stenosis.   4.  Moderate-to-severe right and mild/moderate left neuroforaminal stenosis at C5-C6 secondary to   uncinate and facet hypertrophy, coupled with a stable mild posterior disc bulge with a prominent   annular disc fissure flattening the anterior thecal sac.   5.  NO moderate-to-severe central canal stenosis. NO pathologic cervical cord signal.   6.  Stable hypoplastic appearance of the RIGHT vertebral artery.         MRI lumbar 3/2023  CONCLUSION:       1. Interval lumbar fusion changes noted spanning L3-L5.  Metallic susceptibility artifact from posterior fusion hardware limits evaluation of surrounding structures.  Interbody fusion devices are seen at L3-4 and L4-5.  There are changes  of right L3   hemilaminotomy and bilateral L4 hemilaminotomy.      2. The interval worsening of degenerative changes at the L2-3 level resulting in new moderate spinal canal stenosis with bilateral subarticular zone stenosis at L2-3.  There is also new mild bilateral neural foraminal stenosis at L2-3.      3. Interval worsening of facet arthropathy at L5-S1 results in worsening of moderate right and mild-to-moderate left neural foraminal stenosis at L5-S1.      4. There is improvement of now mild bilateral neural foraminal stenosis at the fused L4-5 level.       Component      Latest Ref Rng 2/23/2023   FOLATE (FOLIC ACID), SERUM      >=8.7 ng/mL 19.6    Vitamin B12      193 - 986 pg/mL 312    VITAMIN B1 (THIAMINE), WHOLE B      70 - 180 nmol/L 148          1/2021   Monoclonal spike in the gamma region. . . .   IMMUNOFIXATION       Monoclonal IgM kappa. If clinically indicated, 24 hour urine monoclonal . . .   KAPPA FREE LIGHT CHAIN      0.330 - 1.940 mg/dL 2.449 (H)   LAMBDA FREE LIGHT CHAIN      0.571 - 2.630 mg/dL 1.604   KAPPA/LAMBDA FLC RATIO      0.26 - 1.65 1.53   M-Jaden      <=0.00 g/dL 0.36 (H)      Component      Latest Ref Rng & Units 2/22/2019   Total Protein CSF      15.0 - 45.0 mg/dL 34.9   Glucose CSF      40 - 70 mg/dL 56     Component      Latest Ref Rng & Units 12/26/2022 9/23/2019 3/6/2019 2/14/2018   HEMOGLOBIN A1c      4.0 - 6.0 %       Vitamin B12      193 - 986 pg/mL    359   TSH      0.358 - 3.740 mIU/mL 2.210        Component      Latest Ref Rng & Units 7/22/2017   HEMOGLOBIN A1c      4.0 - 6.0 % 6.1 (H)   Vitamin B12      193 - 986 pg/mL    TSH      0.358 - 3.740 mIU/mL        Pathology report:  Per report     Nerve biopsy:  ... The density large myelinated axons reduced with only about 30% of the expected number of large myelinated axons remaining.  At least at the level of the jose sections unmyelinated axons appear relatively preserved.  There is no significant variation in axonal  density within or between fascicles.  The remaining myelin sheaths are appropriate in thickness and the diameter of the accompanying axons.  No distinct populations of thinly myelinated axons are seen.\"     Comment: The peripheral nerve biopsy shows axonal loss as described in detail above.  Beyond that there are not changes that would suggest a specific underlying etiology.  In particular there is no evidence of any inflammatory changes or blood vessel abnormalities.      EMG 2019      EMG       This is an abnormal study.  There is electrophysiologic evidence to suggest a mixed type demyelinating and axonal large fiber polyneuropathy.  In addition, there is evidence for subacute denervation in the left lower extremity, suggestive of, but not diagnostic for a superimposed lower lumbosacral radiculopathy.  There is no evidence to suggest a myopathy.     Clinical comment:   The presence of severely reduced amplitude in the left lower extremity with prolonged F waves, as well as significantly prolonged distal motor latencies and partial conduction block at a non-traditional entrapment sites in the left upper extremity may suggest an acquired primary demyelinating neuropathy; clinical correlation, serologic and CSF studies are recommended.        MRI cervical 2020  FINDINGS:     Alignment:Grade 1 anterolisthesis of C3 on C4 and C4 on C5.   Vertebral body heights:Normal.   Cervical cord:Normal signal and morphology.   Cerebellar tonsils.No tonsillar ectopia.   Bone marrow signal:Overall bone marrow signal is normal except for Modic type II endplate   degenerative changes at C6-C7   Prevertebral soft tissues:Absent right vertebral artery flow void. Left maxillary sinus mucosal   thickening.   C2-C3:  Normal.   C3-C4:  Right uncovertebral osteophytes and severe right facet joint hypertrophy causing severe   right neural foraminal stenosis. Left facet joint hypertrophy causing moderate left neural foraminal   stenosis. No  central canal stenosis.   C4-C5:  Severe bilateral facet joint hypertrophy and bilateral uncovertebral osteophytes causing   severe bilateral neural foraminal stenosis. No central canal stenosis.   C5-C6:  Severe right facet joint hypertrophy and central disc osteophyte complex causing moderate   right neural foraminal stenosis. Patent left neural foramen. No central canal stenosis.   C6-C7:  Central disc osteophyte complex compressing the ventral thecal sac without cord compression   or signal cord abnormality. Mild central canal stenosis. Severe bilateral neural foraminal stenosis.   C7-T1:  Normal.   Impression   IMPRESSION:     1. Severe right neural foraminal stenosis at C3-C4.   2. Severe bilateral neural foraminal stenosis at C4-C5.   3. Moderate right neural foraminal stenosis at C5-C6.   4. Absence right vertebral flow void suggesting occluded or hypoplastic right vertebral artery.   Correlation with duplex imaging of the carotid arteries recommended.       MRI L spine 8/206  L1-L2: Mild facet hypertrophy. No spinal canal or foraminal narrowing.       L2-L3: Mild diffuse disc bulge with a superimposed right foraminal/extraforaminal disc protrusion. Mild facet hypertrophy. Mild ligamentum flavum thickening. No spinal canal stenosis. Mild bilateral foraminal narrowing.       L3-L4: Mild to moderate diffuse disc bulge with a superimposed disc osteophyte complex extending into the far left lateral space. There is a superimposed broad-based central disc protrusion. Moderate facet hypertrophy. Moderate ligamentum flavum   thickening. Moderate to severe spinal canal stenosis. Bilateral subarticular zone narrowing. Moderate to severe right and moderate left foraminal narrowing.       L4-5: There is uncovering of the disc with a mild to moderate diffuse disc bulge. There is severe facet hypertrophy with facet joint effusions bilaterally. Moderate ligamentum flavum thickening. Severe spinal canal stenosis. Bilateral  subarticular zone   narrowing. Severe left and moderate to severe right foraminal narrowing.       L5-S1: Mild diffuse disc bulge with superimposed small to moderate size central disc protrusion. No spinal canal narrowing. Moderate to severe facet hypertrophy. Moderate bilateral foraminal narrowing.       Impression   CONCLUSION:         #1. Moderate to severe multilevel degenerative changes in the lumbar spine most notable at L3-L4, L4-5, and L5-S1. At L3-L4, there is moderate to severe spinal canal stenosis. At L4-5, there is severe spinal canal stenosis. Please see above for further   details.        PAST MEDICAL HISTORY:   Past Medical History:    Asthma (HCC)    Atypical mole    Back problem    low back pain, s/p fusion of L3,4,5.  back still stiff    Belching    Benign thyroid cyst    BPH (benign prostatic hyperplasia)    Cataract    s/p sugery    Constipation    Depression    Disorder of prostate    Flatulence/gas pain/belching    Frequent urination    Frequent use of laxatives    Hearing impairment    Slight    Hemorrhoids    Hemorrhoids, internal    High cholesterol    Elevated     Insomnia    Irregular bowel habits    Itch of skin    Leg swelling    Neuropathy    R leg    Obesity    slightly overweight    Osteoarthritis    Poor balance    uses cane    Rotator cuff sprain    Seborrheic keratosis    Shoulder joint pain    Sinusitis    Unspecified disorder of thyroid    hypothyroid    Visual impairment    glasses reading       PAST SURGICAL HISTORY:   Past Surgical History:   Procedure Laterality Date    Appendectomy      Arthroscopy of joint unlisted Right     knee    Back surgery  9/12/16    triple fusion of L3-L4-L5    Biopsy  07/26/2019    Bone Marrow    Biopsy  06/25/2019    right sural nerve biopsy    Carpal tunnel release Bilateral     Cataract Bilateral August 2015    IOL'S    Colonoscopy      Hemorrhoidectomy  ~ 2005    Orthopedic surg (pbp)      LT ELBOW - ulnar nerve release    Other   02/2019    Lumbar puncture    Other surgical history      Shoulder Joint Replacement 7/21/16, Sinus surgery Feb. '14,    Other surgical history      I&D of abscess in axilla area 7/3    Sigmoidoscopy,diagnostic  1980's    Sinus surgery    2/14/2014    Spine surgery procedure unlisted      L3-4-5    Tonsillectomy         Family history: Reviewed. No changes since last encounter    Social history- Etoh 2 cocktails before dinner, a glass of wine with dinner    ALLERGIES:   Allergies   Allergen Reactions    Immune Globulin RASH       MEDICATIONS: EMR reviewed   Current Outpatient Medications:     ALPRAZolam 0.25 MG Oral Tab, Take 1 tablet (0.25 mg total) by mouth nightly as needed., Disp: 90 tablet, Rfl: 1    fluticasone propionate 50 MCG/ACT Nasal Suspension, 1 spray by Each Nare route 2 (two) times daily as needed for Rhinitis., Disp: 3 each, Rfl: 3    levothyroxine 100 MCG Oral Tab, Take 1 tablet (100 mcg total) by mouth once daily. Overdue for labs, please complete them., Disp: 90 tablet, Rfl: 3    traZODone 100 MG Oral Tab, Take 1 tablet (100 mg total) by mouth nightly as needed for Sleep., Disp: 90 tablet, Rfl: 3    doxycycline 100 MG Oral Cap, Take 1 capsule (100 mg total) by mouth 2 (two) times daily., Disp: , Rfl:     famotidine 40 MG Oral Tab, Take 1 tablet (40 mg total) by mouth daily., Disp: , Rfl:     BRINZOLAMIDE-BRIMONIDINE OP, Apply 1 drop to eye in the morning, at noon, and at bedtime. Right eye, Disp: , Rfl:     gabapentin 300 MG Oral Cap, Take 1 capsule (300 mg total) by mouth 3 (three) times daily., Disp: 270 capsule, Rfl: 3    Glucosamine-Chondroitin (GLUCOSAMINE CHONDR COMPLEX OR), Take by mouth as needed., Disp: , Rfl:     cyanocobalamin 1000 MCG Oral Tab, Take 1 tablet (1,000 mcg total) by mouth daily., Disp: , Rfl:     erythromycin 5 MG/GM Ophthalmic Ointment, Place 1 Application  into both eyes nightly. Uses 3-4 x weekly before bed. States prescribed mainly for moisture, Disp: , Rfl:      tamsulosin (FLOMAX) cap, Take 1 capsule (0.4 mg total) by mouth daily., Disp: 90 capsule, Rfl: 3    finasteride 5 MG Oral Tab, Take 1 tablet (5 mg total) by mouth daily., Disp: 90 tablet, Rfl: 3    atorvastatin 10 MG Oral Tab, Take 1 tablet (10 mg total) by mouth daily., Disp: , Rfl:     Beclomethasone Diprop HFA 40 MCG/ACT Inhalation Aerosol, Breath Activated, Inhale 2 puffs into the lungs 2 (two) times daily as needed. (Patient not taking: Reported on 9/10/2024), Disp: 1 each, Rfl: 3    predniSONE 10 MG Oral Tab, Take 1 tablet (10 mg total) by mouth daily. (Patient not taking: Reported on 9/10/2024), Disp: 30 tablet, Rfl: 0    predniSONE 5 MG Oral Tab, In one month, approximately 4/8/24, start prednisone 5mg daily (Patient not taking: Reported on 9/10/2024), Disp: 30 tablet, Rfl: 2    predniSONE 5 MG Oral Tab, Take 1 tablet (5 mg total) by mouth daily. In addition to 10mg tablet, for total 15mg daily. (Patient not taking: Reported on 9/10/2024), Disp: 30 tablet, Rfl: 0    valACYclovir 1 G Oral Tab, Take 0.5 tablets (500 mg total) by mouth daily. (Patient not taking: Reported on 9/10/2024), Disp: , Rfl:     docusate sodium 100 MG Oral Cap, Take 1 capsule (100 mg total) by mouth 2 (two) times daily. (Patient not taking: Reported on 9/10/2024), Disp: 30 capsule, Rfl: 1    senna-docusate 8.6-50 MG Oral Tab, Take 1 tablet by mouth daily. (Patient not taking: Reported on 9/10/2024), Disp: 30 tablet, Rfl: 0    Albuterol Sulfate  (90 BASE) MCG/ACT Inhalation Aero Soln, Inhale 2 puffs into the lungs every 6 (six) hours as needed for Wheezing. (Patient not taking: Reported on 9/10/2024), Disp: , Rfl:     REVIEW OF SYSTEMS:  General: denies any fever or chills.     Eye: no pain or redness;  Ear, mouth, throat: no hearing change, no throat pain or soreness;  Respiratory: Denies: Difficulty Breathing, Chronic Cough and Wheezing.  Cardiovascular: NO Chest Pain and Palpitations.   GI: no abdominal pain, no nausea or  diarrhea;  : no incontinence;  Neurological:  See history; relevant items discussed in the history.  Psychiatric: no depression, no suicidal attempt,   Musculoskeletal:  NO current complaint,  Endo: no cold intolerance, appetite is normal, no weight change;  Skin: warm, no rash, no allergy , no skin bruise or abnormal bleeding,     Objective:  Blood pressure 124/62, pulse 74, weight 215 lb (97.5 kg), SpO2 94%.  Body mass index is 29.16 kg/m². Vitals reviewed.  Physical Exam:  General Exam:  Appearance: well developed,  nurished , in no acute distress,   Pink Conjunctiva;  Neck: supple, no bruits;  Cardiac: S1/S2 RRR  Lungs: CTA B/L;  Musculoskeletal: no joint tenderness, others see neuro exam;  Extremities:  no pitting edema, no cyanosis or skin rash,  pulse is present,  Neurologic Examination  Mental Status  Is intact for age, and Language is intact, no slurred speech; calm, no acute stress, pleasant,   CN is normal from II to XII, visual field is full, EOMI, pupil symmetrical, light reflexes are present, fundus exam is normal. Face symmetrical with normal sensation, hearing normal, shoulder shrugging normal.   Motor:  NO drift. R DF 1, L DF 3-, R PF 4, L PF 5, bilateral EHL 1, DI bilaterally in hands 4+, o/w 5/5   Sensory:  vibration present at the R ankle, and present on the L toe for 1 sec, position sense needs large excursion in both toes, on L needs ankle moved  DTRs  Unable to obtain   No Babinski sign,   COORDINATION: Normal FTN and HTS tests,   GAIT:  Has steppage gait and slightly wide based  Special tests:     Labs:  Lab Results   Component Value Date     (H) 01/29/2024    BUN 24 (H) 01/29/2024    CREATSERUM 1.43 (H) 01/29/2024    BUNCREA 17.0 02/28/2022    ANIONGAP 6 01/29/2024    GFRAA 70 07/23/2021    GFRNAA 60 07/23/2021    CA 9.6 01/29/2024     01/29/2024    K 3.8 01/29/2024     01/29/2024    CO2 28.0 01/29/2024    OSMOCALC 298 (H) 01/29/2024         Imaging:  See  above    Assessment/Plan:   Encounter Diagnoses   Name Primary?    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  Extensive work up and also referred for second opinion at [facility], agreed with treatment  Repeat EMG between February to April  Elevated YUSUF- Evaluate significance with Rheumatology  Continue gabapentin 300mg TID (taking BID with prn midday doses ok)  Continue AFO and PT exercises      Requested Prescriptions      No prescriptions requested or ordered in this encounter          We discussed in depth regarding the diagnosis, prognosis, treatment. All questions and concerns were addressed. 36 minutes were spent on this encounter, which included obtaining and reviewing history, examining the patient, reviewing and interpreting results, building a treatment plan, discussing treatment options, discussing medication instructions, educating the patient/family/caregiver, and completing documentation.       Kendal Solis,   Neurology and Neuromuscular medicine  [facility]

## 2024-09-10 NOTE — PATIENT INSTRUCTIONS
Refill policies:    Allow 2-3 business days for refills; controlled substances may take longer.  Contact your pharmacy at least 5 days prior to running out of medication and have them send an electronic request or submit request through the “request refill” option in your [employer] account.  Refills are not addressed on weekends; covering physicians do not authorize routine medications on weekends.  No narcotics or controlled substances are refilled after noon on Fridays or by on call physicians.  By law, narcotics must be electronically prescribed.  A 30 day supply with no refills is the maximum allowed.  If your prescription is due for a refill, you may be due for a follow up appointment.  To best provide you care, patients receiving routine medications need to be seen at least once a year.  Patients receiving narcotic/controlled substance medications need to be seen at least once every 3 months.  In the event that your preferred pharmacy does not have the requested medication in stock (e.g. Backordered), it is your responsibility to find another pharmacy that has the requested medication available.  We will gladly send a new prescription to that pharmacy at your request.    Scheduling Tests:    If your physician has ordered radiology tests such as MRI or CT scans, please contact Central Scheduling at [phone] right away to schedule the test.  Once scheduled, the [facility] Centralized Referral Team will work with your insurance carrier to obtain pre-certification or prior authorization.  Depending on your insurance carrier, approval may take 3-10 days.  It is highly recommended patients assure they have received an authorization before having a test performed.  If test is done without insurance authorization, patient may be responsible for the entire amount billed.      Precertification and Prior Authorizations:  If your physician has recommended that you have a procedure or additional testing performed the [facility]  Centralized Referral Team will contact your insurance carrier to obtain pre-certification or prior authorization.    You are strongly encouraged to contact your insurance carrier to verify that your procedure/test has been approved and is a COVERED benefit.  Although the [facility] Centralized Referral Team does its due diligence, the insurance carrier gives the disclaimer that \"Although the procedure is authorized, this does not guarantee payment.\"    Ultimately the patient is responsible for payment.   Thank you for your understanding in this matter.  Paperwork Completion:  If you require FMLA or disability paperwork for your recovery, please make sure to either drop it off or have it faxed to our office at [phone]. Be sure the form has your name and date of birth on it.  The form will be faxed to our Forms Department and they will complete it for you.  There is a 25$ fee for all forms that need to be filled out.  Please be aware there is a 10-14 day turnaround time.  You will need to sign a release of information (KAY) form if your paperwork does not come with one.  You may call the Forms Department with any questions at [phone].  Their fax number is [phone].            Rheumatologists:  Dr. Susie Kelly

## 2024-09-18 ENCOUNTER — OFFICE VISIT (OUTPATIENT)
Dept: PODIATRY CLINIC | Facility: CLINIC | Age: 79
End: 2024-09-18

## 2024-09-18 DIAGNOSIS — M77.42 METATARSALGIA, LEFT FOOT: ICD-10-CM

## 2024-09-18 DIAGNOSIS — D47.2 NEUROPATHY ASSOCIATED WITH MGUS (HCC): ICD-10-CM

## 2024-09-18 DIAGNOSIS — D47.2 MGUS (MONOCLONAL GAMMOPATHY OF UNKNOWN SIGNIFICANCE): ICD-10-CM

## 2024-09-18 DIAGNOSIS — L60.0 INGROWN NAIL: ICD-10-CM

## 2024-09-18 DIAGNOSIS — G63 NEUROPATHY ASSOCIATED WITH MGUS (HCC): ICD-10-CM

## 2024-09-18 DIAGNOSIS — B35.1 ONYCHOMYCOSIS: Primary | ICD-10-CM

## 2024-09-18 DIAGNOSIS — M20.41 HAMMER TOES OF BOTH FEET: ICD-10-CM

## 2024-09-18 DIAGNOSIS — M20.42 HAMMER TOES OF BOTH FEET: ICD-10-CM

## 2024-09-18 PROCEDURE — 99213 OFFICE O/P EST LOW 20 MIN: CPT | Performed by: STUDENT IN AN ORGANIZED HEALTH CARE EDUCATION/TRAINING PROGRAM

## 2024-09-18 NOTE — PROGRESS NOTES
[facility] Podiatry  Progress Note    Nils Joseph is a 78 year old male.   Chief Complaint   Patient presents with    Toenail Care     F/u Nail care and foot check, intermittent pain 0-7/10 worse when walking and wearing closed shoes.         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  He has elongated and thickened nails he has difficulty trimming his own.  His nails do become incurvated by the end of his stretch between visits.  They do cause some pain from rubbing in his shoes.  He denies acute signs of infection or other concerns.  He continues to walk with AFOs and complete physical therapy. He is following with neurology and [facility].  He does seem to have some worsening neuropathic pain to his lower extremities.  His pain can range from a 0-7 out of 10.  No other complaints are mentioned.      Allergies: Immune globulin   Current Outpatient Medications   Medication Sig Dispense Refill    ALPRAZolam 0.25 MG Oral Tab Take 1 tablet (0.25 mg total) by mouth nightly as needed. 90 tablet 1    Beclomethasone Diprop HFA 40 MCG/ACT Inhalation Aerosol, Breath Activated Inhale 2 puffs into the lungs 2 (two) times daily as needed. 1 each 3    fluticasone propionate 50 MCG/ACT Nasal Suspension 1 spray by Each Nare route 2 (two) times daily as needed for Rhinitis. 3 each 3    levothyroxine 100 MCG Oral Tab Take 1 tablet (100 mcg total) by mouth once daily. Overdue for labs, please complete them. 90 tablet 3    traZODone 100 MG Oral Tab Take 1 tablet (100 mg total) by mouth nightly as needed for Sleep. 90 tablet 3    predniSONE 10 MG Oral Tab Take 1 tablet (10 mg total) by mouth daily. 30 tablet 0    predniSONE 5 MG Oral Tab In one month, approximately 4/8/24, start prednisone 5mg daily 30 tablet 2    doxycycline 100 MG Oral Cap Take 1 capsule (100 mg total) by mouth 2 (two) times daily.       famotidine 40 MG Oral Tab Take 1 tablet (40 mg total) by mouth daily.      BRINZOLAMIDE-BRIMONIDINE OP Apply 1 drop to eye in the morning, at noon, and at bedtime. Right eye      predniSONE 5 MG Oral Tab Take 1 tablet (5 mg total) by mouth daily. In addition to 10mg tablet, for total 15mg daily. 30 tablet 0    valACYclovir 1 G Oral Tab Take 0.5 tablets (500 mg total) by mouth daily.      gabapentin 300 MG Oral Cap Take 1 capsule (300 mg total) by mouth 3 (three) times daily. 270 capsule 3    docusate sodium 100 MG Oral Cap Take 1 capsule (100 mg total) by mouth 2 (two) times daily. 30 capsule 1    Glucosamine-Chondroitin (GLUCOSAMINE CHONDR COMPLEX OR) Take by mouth as needed.      cyanocobalamin 1000 MCG Oral Tab Take 1 tablet (1,000 mcg total) by mouth daily.      senna-docusate 8.6-50 MG Oral Tab Take 1 tablet by mouth daily. 30 tablet 0    erythromycin 5 MG/GM Ophthalmic Ointment Place 1 Application  into both eyes nightly. Uses 3-4 x weekly before bed. States prescribed mainly for moisture      tamsulosin (FLOMAX) cap Take 1 capsule (0.4 mg total) by mouth daily. 90 capsule 3    finasteride 5 MG Oral Tab Take 1 tablet (5 mg total) by mouth daily. 90 tablet 3    atorvastatin 10 MG Oral Tab Take 1 tablet (10 mg total) by mouth daily.      Albuterol Sulfate  (90 BASE) MCG/ACT Inhalation Aero Soln Inhale 2 puffs into the lungs every 6 (six) hours as needed for Wheezing.        Past Medical History:    Asthma (HCC)    Atypical mole    Back problem    low back pain, s/p fusion of L3,4,5.  back still stiff    Belching    Benign thyroid cyst    BPH (benign prostatic hyperplasia)    Cataract    s/p sugery    Constipation    Depression    Disorder of prostate    Flatulence/gas pain/belching    Frequent urination    Frequent use of laxatives    Hearing impairment    Slight    Hemorrhoids    Hemorrhoids, internal    High cholesterol    Elevated     Insomnia    Irregular bowel habits    Itch of skin    Leg  swelling    Neuropathy    R leg    Obesity    slightly overweight    Osteoarthritis    Poor balance    uses cane    Rotator cuff sprain    Seborrheic keratosis    Shoulder joint pain    Sinusitis    Unspecified disorder of thyroid    hypothyroid    Visual impairment    glasses reading      Past Surgical History:   Procedure Laterality Date    Appendectomy      Arthroscopy of joint unlisted Right     knee    Back surgery  16    triple fusion of L3-L4-L5    Biopsy  2019    Bone Marrow    Biopsy  2019    right sural nerve biopsy    Carpal tunnel release Bilateral     Cataract Bilateral 2015    IOL'S    Colonoscopy      Hemorrhoidectomy  ~     Orthopedic surg (pbp)      LT ELBOW - ulnar nerve release    Other  2019    Lumbar puncture    Other surgical history      Shoulder Joint Replacement 16, Sinus surgery ,    Other surgical history      I&D of abscess in axilla area 7/3    Sigmoidoscopy,diagnostic      Sinus surgery    2014    Spine surgery procedure unlisted      L3-4-5    Tonsillectomy        Family History   Problem Relation Age of Onset    Breast Cancer Mother     Cancer Mother         breast cancer- late in life    Heart Disorder Mother         cardiac issues related to aging    Heart Disorder Father         partially blocked carotid arteries    Hypertension Father     Cancer Father         ? cancer?    Lipids Father     Hypertension Brother     Hypertension Brother       Social History     Socioeconomic History    Marital status:    Tobacco Use    Smoking status: Former     Current packs/day: 0.00     Average packs/day: 1.5 packs/day for 32.0 years (48.0 ttl pk-yrs)     Types: Cigarettes, Pipe     Start date: 3/1/1966     Quit date: 3/1/1998     Years since quittin.5    Smokeless tobacco: Never    Tobacco comments:     Quit many years ago   Vaping Use    Vaping status: Never Used   Substance and Sexual Activity    Alcohol use: Yes      Alcohol/week: 11.0 - 18.0 standard drinks of alcohol     Types: 6 - 8 Glasses of wine, 1 - 2 Cans of beer, 4 - 8 Shots of liquor per week     Comment: 1-2 drinks daily    Drug use: No   Other Topics Concern    Caffeine Concern Yes     Comment: 2-3 cups a day    Exercise No     Comment: physical therapy           REVIEW OF SYSTEMS:     Today reviewed systems as documented below  GENERAL HEALTH: feels well otherwise  SKIN: denies any unusual skin lesions or rashes  RESPIRATORY: denies shortness of breath with exertion  CARDIOVASCULAR: denies chest pain on exertion  GI: denies abdominal pain and denies heartburn  NEURO: denies headaches  MUSCULO: History of arthritis      EXAM:   There were no vitals taken for this visit.  GENERAL: well developed, well nourished, in no apparent distress  EXTREMITIES:   1. Integument: Normal skin temperature and turgor.  Nails x10 are elongated, thickened, dystrophic, subungual debris.  Lateral borders of hallux nails are incurvated.  2. Vascular: Dorsalis pedis two out of four bilateral and posterior tibial pulses two out of   four bilateral, capillary refill normal.  A little bit of pitting edema around ankle bilaterally.   3. Musculoskeletal: All muscle groups are graded 5 out of 5 in the foot and ankle.  Compartments soft and compressible.   4. Neurological: Normal sharp dull sensation; reflexes normal.  Decreased protective sensation noted to lower extremities.        ASSESSMENT AND PLAN:   Diagnoses and all orders for this visit:    Onychomycosis    Metatarsalgia, left foot  -     [facility] AMB POD XR - LT FOOT 3 VIEWS(AP,LAT,OBLIQUE) WT BEARING    Ingrown nail    MGUS (monoclonal gammopathy of unknown significance)    Neuropathy associated with MGUS (HCC)    Hammer toes of both feet              Plan:     -Patient examined, chart history reviewed.  -Discussed importance of proper pedal hygiene, regular foot checks.  -Sharply debrided nails x10 with a sterile nail nipper achieving a 20%  reduction in thickness and length, without incident. Nails further smoothed with dremel. Slant back performed to ingrown nails.   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  -Prior uric acid WNL--no signs of gout clinically.  -X-rays obtained reviewed.  No acute fractures or osseous abnormalities noted.  Continue to follow-up with neurologist for management of neuropathic pain.  -Educated patient on acute signs of infection advised patient to seek immediate medical attention if symptoms arise.    The patient indicates understanding of these issues and agrees to the plan.    RTC 2 months.    Alli Campbell DPM

## 2024-11-11 DIAGNOSIS — G60.9 IDIOPATHIC PERIPHERAL NEUROPATHY: ICD-10-CM

## 2024-11-11 RX ORDER — GABAPENTIN 300 MG/1
300 CAPSULE ORAL 3 TIMES DAILY
Qty: 270 CAPSULE | Refills: 0 | Status: SHIPPED | OUTPATIENT
Start: 2024-11-11

## 2024-11-11 NOTE — TELEPHONE ENCOUNTER
Medication: GABAPENTIN 300 MG Oral Cap      Date of last refill: 08/07/2023 (#270/3)  [place]     Last office visit: 09/10/2024  Due back to clinic per last office note:  Around 04/10/2025  Date next office visit scheduled:    Future Appointments   Date Time Provider Department Center   11/21/2024 11:00 AM Alli Campbell DPM [id number]   3/11/2025 10:30 AM Alex Case MD [facility] [address]           Last OV note recommendation:    Assessment/Plan:        Encounter Diagnoses   Name Primary?    Neuropathy associated with MGUS (HCC)      Idiopathic peripheral neuropathy Yes    MGUS (monoclonal gammopathy of unknown significance)      Bilateral foot-drop      Lumbar radiculopathy      Elevated antinuclear antibody (YUSUF) level           MGUS associated polyneuropathy IgM, and lumbar radiculopathy, anti-Mag negative  No improvement with IVIG, Rituxan or prednisone  25% patients with IgM gammopathy associated polyneuropathy have disability at 10 years, and 50% disability at 15 years.  Extensive work up and also referred for second opinion at [facility], agreed with treatment  Repeat EMG between February to April  Elevated YUSUF- Evaluate significance with Rheumatology  Continue gabapentin 300mg TID (taking BID with prn midday doses ok)  Continue AFO and PT exercises

## 2024-11-21 ENCOUNTER — OFFICE VISIT (OUTPATIENT)
Dept: PODIATRY CLINIC | Facility: CLINIC | Age: 79
End: 2024-11-21

## 2024-11-21 DIAGNOSIS — M20.41 HAMMER TOES OF BOTH FEET: ICD-10-CM

## 2024-11-21 DIAGNOSIS — B35.1 ONYCHOMYCOSIS: Primary | ICD-10-CM

## 2024-11-21 DIAGNOSIS — G63 NEUROPATHY ASSOCIATED WITH MGUS (HCC): ICD-10-CM

## 2024-11-21 DIAGNOSIS — D47.2 MGUS (MONOCLONAL GAMMOPATHY OF UNKNOWN SIGNIFICANCE): ICD-10-CM

## 2024-11-21 DIAGNOSIS — M20.42 HAMMER TOES OF BOTH FEET: ICD-10-CM

## 2024-11-21 DIAGNOSIS — L60.0 INGROWN NAIL: ICD-10-CM

## 2024-11-21 DIAGNOSIS — D47.2 NEUROPATHY ASSOCIATED WITH MGUS (HCC): ICD-10-CM

## 2024-11-21 PROCEDURE — 99213 OFFICE O/P EST LOW 20 MIN: CPT | Performed by: STUDENT IN AN ORGANIZED HEALTH CARE EDUCATION/TRAINING PROGRAM

## 2024-11-21 NOTE — PROGRESS NOTES
[facility]iatry  Progress Note    Nils Joseph is a 79 year old male.   No chief complaint on file.        HPI:     Patient is a pleasant 79-year-old male with past medical history of neuropathy secondary to MGUS presents to clinic for routine foot care.  He has elongated and thickened nails he has difficulty trimming his own.  His nails do become incurvated by the end of his stretch between visits.  They do cause some pain from rubbing in his shoes.  He denies acute signs of infection or other concerns.  He continues to walk with AFOs and complete physical therapy. He is following with neurology and [facility].  He does seem to have some worsening neuropathic pain to his lower extremities. No other complaints are mentioned.      Allergies: Immune globulin   Current Outpatient Medications   Medication Sig Dispense Refill    GABAPENTIN 300 MG Oral Cap TAKE 1 CAPSULE BY MOUTH 3 TIMES DAILY 270 capsule 0    ALPRAZolam 0.25 MG Oral Tab Take 1 tablet (0.25 mg total) by mouth nightly as needed. 90 tablet 1    Beclomethasone Diprop HFA 40 MCG/ACT Inhalation Aerosol, Breath Activated Inhale 2 puffs into the lungs 2 (two) times daily as needed. 1 each 3    fluticasone propionate 50 MCG/ACT Nasal Suspension 1 spray by Each Nare route 2 (two) times daily as needed for Rhinitis. 3 each 3    levothyroxine 100 MCG Oral Tab Take 1 tablet (100 mcg total) by mouth once daily. Overdue for labs, please complete them. 90 tablet 3    traZODone 100 MG Oral Tab Take 1 tablet (100 mg total) by mouth nightly as needed for Sleep. 90 tablet 3    predniSONE 10 MG Oral Tab Take 1 tablet (10 mg total) by mouth daily. 30 tablet 0    predniSONE 5 MG Oral Tab In one month, approximately 4/8/24, start prednisone 5mg daily 30 tablet 2    doxycycline 100 MG Oral Cap Take 1 capsule (100 mg total) by mouth 2 (two) times daily.      famotidine 40 MG Oral Tab Take 1 tablet (40 mg total) by mouth daily.       BRINZOLAMIDE-BRIMONIDINE OP Apply 1 drop to eye in the morning, at noon, and at bedtime. Right eye      predniSONE 5 MG Oral Tab Take 1 tablet (5 mg total) by mouth daily. In addition to 10mg tablet, for total 15mg daily. 30 tablet 0    valACYclovir 1 G Oral Tab Take 0.5 tablets (500 mg total) by mouth daily.      docusate sodium 100 MG Oral Cap Take 1 capsule (100 mg total) by mouth 2 (two) times daily. 30 capsule 1    Glucosamine-Chondroitin (GLUCOSAMINE CHONDR COMPLEX OR) Take by mouth as needed.      cyanocobalamin 1000 MCG Oral Tab Take 1 tablet (1,000 mcg total) by mouth daily.      senna-docusate 8.6-50 MG Oral Tab Take 1 tablet by mouth daily. 30 tablet 0    erythromycin 5 MG/GM Ophthalmic Ointment Place 1 Application  into both eyes nightly. Uses 3-4 x weekly before bed. States prescribed mainly for moisture      tamsulosin (FLOMAX) cap Take 1 capsule (0.4 mg total) by mouth daily. 90 capsule 3    finasteride 5 MG Oral Tab Take 1 tablet (5 mg total) by mouth daily. 90 tablet 3    atorvastatin 10 MG Oral Tab Take 1 tablet (10 mg total) by mouth daily.      Albuterol Sulfate  (90 BASE) MCG/ACT Inhalation Aero Soln Inhale 2 puffs into the lungs every 6 (six) hours as needed for Wheezing.        Past Medical History:    Asthma (HCC)    Atypical mole    Back problem    low back pain, s/p fusion of L3,4,5.  back still stiff    Belching    Benign thyroid cyst    BPH (benign prostatic hyperplasia)    Cataract    s/p sugery    Constipation    Depression    Disorder of prostate    Flatulence/gas pain/belching    Frequent urination    Frequent use of laxatives    Hearing impairment    Slight    Hemorrhoids    Hemorrhoids, internal    High cholesterol    Elevated     Insomnia    Irregular bowel habits    Itch of skin    Leg swelling    Neuropathy    R leg    Obesity    slightly overweight    Osteoarthritis    Poor balance    uses cane    Rotator cuff sprain    Seborrheic keratosis    Shoulder joint pain     Sinusitis    Unspecified disorder of thyroid    hypothyroid    Visual impairment    glasses reading      Past Surgical History:   Procedure Laterality Date    Appendectomy      Arthroscopy of joint unlisted Right     knee    Back surgery  16    triple fusion of L3-L4-L5    Biopsy  2019    Bone Marrow    Biopsy  2019    right sural nerve biopsy    Carpal tunnel release Bilateral     Cataract Bilateral 2015    IOL'S    Colonoscopy      Hemorrhoidectomy  ~ 2005    Orthopedic surg (pbp)      LT ELBOW - ulnar nerve release    Other  2019    Lumbar puncture    Other surgical history      Shoulder Joint Replacement 16, Sinus surgery ,    Other surgical history      I&D of abscess in axilla area 7/3    Sigmoidoscopy,diagnostic      Sinus surgery    2014    Spine surgery procedure unlisted      L3-4-5    Tonsillectomy        Family History   Problem Relation Age of Onset    Breast Cancer Mother     Cancer Mother         breast cancer- late in life    Heart Disorder Mother         cardiac issues related to aging    Heart Disorder Father         partially blocked carotid arteries    Hypertension Father     Cancer Father         ? cancer?    Lipids Father     Hypertension Brother     Hypertension Brother       Social History     Socioeconomic History    Marital status:    Tobacco Use    Smoking status: Former     Current packs/day: 0.00     Average packs/day: 1.5 packs/day for 32.0 years (48.0 ttl pk-yrs)     Types: Cigarettes, Pipe     Start date: 3/1/1966     Quit date: 3/1/1998     Years since quittin.7    Smokeless tobacco: Never    Tobacco comments:     Quit many years ago   Vaping Use    Vaping status: Never Used   Substance and Sexual Activity    Alcohol use: Yes     Alcohol/week: 11.0 - 18.0 standard drinks of alcohol     Types: 6 - 8 Glasses of wine, 1 - 2 Cans of beer, 4 - 8 Shots of liquor per week     Comment: 1-2 drinks daily    Drug use: No    Other Topics Concern    Caffeine Concern Yes     Comment: 2-3 cups a day    Exercise No     Comment: physical therapy           REVIEW OF SYSTEMS:     Today reviewed systems as documented below  GENERAL HEALTH: feels well otherwise  SKIN: denies any unusual skin lesions or rashes  RESPIRATORY: denies shortness of breath with exertion  CARDIOVASCULAR: denies chest pain on exertion  GI: denies abdominal pain and denies heartburn  NEURO: denies headaches  MUSCULO: History of arthritis      EXAM:   There were no vitals taken for this visit.  GENERAL: well developed, well nourished, in no apparent distress  EXTREMITIES:   1. Integument: Normal skin temperature and turgor.  Nails x10 are elongated, thickened, dystrophic, subungual debris.  Lateral borders of hallux nails are incurvated.  2. Vascular: Dorsalis pedis two out of four bilateral and posterior tibial pulses two out of   four bilateral, capillary refill normal.  A little bit of pitting edema around ankle bilaterally.   3. Musculoskeletal: All muscle groups are graded 5 out of 5 in the foot and ankle.  Compartments soft and compressible.   4. Neurological: Normal sharp dull sensation; reflexes normal.  Decreased protective sensation noted to lower extremities.        ASSESSMENT AND PLAN:   There are no diagnoses linked to this encounter.            Plan:     -Patient examined, chart history reviewed.  -Discussed importance of proper pedal hygiene, regular foot checks.  -Sharply debrided nails x10 with a sterile nail nipper achieving a 20% reduction in thickness and length, without incident. Nails further smoothed with dremel. Slant back performed to ingrown nails.   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  -Educated patient on acute signs of infection advised patient to seek immediate medical attention if symptoms arise.    The patient indicates understanding of these issues and agrees to the  plan.    RTC 2 months.    Alli Campbell DPM

## 2024-12-31 ENCOUNTER — TELEPHONE (OUTPATIENT)
Dept: NEUROLOGY | Facility: CLINIC | Age: 79
End: 2024-12-31

## 2024-12-31 NOTE — TELEPHONE ENCOUNTER
Received rheumatology consult from Dr Florentino Goddard date of service 12/31/24. Placed in provider folder for review.

## 2025-01-02 NOTE — TELEPHONE ENCOUNTER
Received rheumatology consult from Dr Florentino Goddard date of service 12/31/24 back from provider. Sent to scanning.

## 2025-01-10 LAB
APPEARANCE UR: CLEAR
BILIRUB UR QL STRIP: NEGATIVE
COLOR UR: YELLOW
GLUCOSE UR QL STRIP: NEGATIVE
HGB UR QL STRIP: NEGATIVE
KETONES UR QL STRIP: NEGATIVE
LEUKOCYTE ESTERASE UR QL STRIP: NEGATIVE
NITRITE UR QL STRIP: NEGATIVE
PH UR STRIP: 5.5 [PH] (ref 5–8)
PROT UR QL STRIP: NEGATIVE
SP GR UR STRIP: 1.02 (ref 1–1.03)

## 2025-01-11 LAB
ANA PAT SER IF-IMP: ABNORMAL
ANA SER QL IF: POSITIVE
ANA TITR SER IF: ABNORMAL TITER

## 2025-01-28 ENCOUNTER — OFFICE VISIT (OUTPATIENT)
Dept: PODIATRY CLINIC | Facility: CLINIC | Age: 80
End: 2025-01-28
Payer: MEDICARE

## 2025-01-28 DIAGNOSIS — D47.2 MGUS (MONOCLONAL GAMMOPATHY OF UNKNOWN SIGNIFICANCE): ICD-10-CM

## 2025-01-28 DIAGNOSIS — G89.29 CHRONIC HEEL PAIN, RIGHT: ICD-10-CM

## 2025-01-28 DIAGNOSIS — M20.41 HAMMER TOES OF BOTH FEET: ICD-10-CM

## 2025-01-28 DIAGNOSIS — M79.671 CHRONIC HEEL PAIN, RIGHT: ICD-10-CM

## 2025-01-28 DIAGNOSIS — G63 NEUROPATHY ASSOCIATED WITH MGUS (HCC): ICD-10-CM

## 2025-01-28 DIAGNOSIS — M20.42 HAMMER TOES OF BOTH FEET: ICD-10-CM

## 2025-01-28 DIAGNOSIS — D47.2 NEUROPATHY ASSOCIATED WITH MGUS (HCC): ICD-10-CM

## 2025-01-28 DIAGNOSIS — M77.42 METATARSALGIA, LEFT FOOT: ICD-10-CM

## 2025-01-28 DIAGNOSIS — B35.1 ONYCHOMYCOSIS: Primary | ICD-10-CM

## 2025-01-28 DIAGNOSIS — L60.0 INGROWN NAIL: ICD-10-CM

## 2025-01-28 PROCEDURE — 99213 OFFICE O/P EST LOW 20 MIN: CPT | Performed by: STUDENT IN AN ORGANIZED HEALTH CARE EDUCATION/TRAINING PROGRAM

## 2025-02-03 NOTE — PROGRESS NOTES
[facility] Podiatry  Progress Note    Nils Joseph is a 79 year old male.   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  He has elongated and thickened nails he has difficulty trimming his own.  His nails do become incurvated by the end of his stretch between visits.  They do cause some pain from rubbing in his shoes.   He continues to walk with AFOs and complete physical therapy. He does get some pain to his right posterior heel at the end of the day. He is following with neurology and [facility].  He does seem to have some worsening neuropathic pain to his lower extremities. No other complaints are mentioned.      Allergies: Immune globulin   Current Outpatient Medications   Medication Sig Dispense Refill    GABAPENTIN 300 MG Oral Cap TAKE 1 CAPSULE BY MOUTH 3 TIMES DAILY 270 capsule 0    ALPRAZolam 0.25 MG Oral Tab Take 1 tablet (0.25 mg total) by mouth nightly as needed. 90 tablet 1    Beclomethasone Diprop HFA 40 MCG/ACT Inhalation Aerosol, Breath Activated Inhale 2 puffs into the lungs 2 (two) times daily as needed. 1 each 3    fluticasone propionate 50 MCG/ACT Nasal Suspension 1 spray by Each Nare route 2 (two) times daily as needed for Rhinitis. 3 each 3    levothyroxine 100 MCG Oral Tab Take 1 tablet (100 mcg total) by mouth once daily. Overdue for labs, please complete them. 90 tablet 3    traZODone 100 MG Oral Tab Take 1 tablet (100 mg total) by mouth nightly as needed for Sleep. 90 tablet 3    doxycycline 100 MG Oral Cap Take 1 capsule (100 mg total) by mouth 2 (two) times daily.      famotidine 40 MG Oral Tab Take 1 tablet (40 mg total) by mouth daily.      BRINZOLAMIDE-BRIMONIDINE OP Apply 1 drop to eye in the morning, at noon, and at bedtime. Right eye      valACYclovir 1 G Oral Tab  Take 0.5 tablets (500 mg total) by mouth daily.      docusate sodium 100 MG Oral Cap Take 1 capsule (100 mg total) by mouth 2 (two) times daily. 30 capsule 1    Glucosamine-Chondroitin (GLUCOSAMINE CHONDR COMPLEX OR) Take by mouth as needed.      cyanocobalamin 1000 MCG Oral Tab Take 1 tablet (1,000 mcg total) by mouth daily.      senna-docusate 8.6-50 MG Oral Tab Take 1 tablet by mouth daily. 30 tablet 0    erythromycin 5 MG/GM Ophthalmic Ointment Place 1 Application  into both eyes nightly. Uses 3-4 x weekly before bed. States prescribed mainly for moisture      tamsulosin (FLOMAX) cap Take 1 capsule (0.4 mg total) by mouth daily. 90 capsule 3    finasteride 5 MG Oral Tab Take 1 tablet (5 mg total) by mouth daily. 90 tablet 3    atorvastatin 10 MG Oral Tab Take 1 tablet (10 mg total) by mouth daily.      Albuterol Sulfate  (90 BASE) MCG/ACT Inhalation Aero Soln Inhale 2 puffs into the lungs every 6 (six) hours as needed for Wheezing.      predniSONE 10 MG Oral Tab Take 1 tablet (10 mg total) by mouth daily. 30 tablet 0    predniSONE 5 MG Oral Tab In one month, approximately 4/8/24, start prednisone 5mg daily 30 tablet 2    predniSONE 5 MG Oral Tab Take 1 tablet (5 mg total) by mouth daily. In addition to 10mg tablet, for total 15mg daily. 30 tablet 0      Past Medical History:    Asthma (HCC)    Atypical mole    Back problem    low back pain, s/p fusion of L3,4,5.  back still stiff    Belching    Benign thyroid cyst    BPH (benign prostatic hyperplasia)    Cataract    s/p sugery    Constipation    Depression    Disorder of prostate    Flatulence/gas pain/belching    Frequent urination    Frequent use of laxatives    Hearing impairment    Slight    Hemorrhoids    Hemorrhoids, internal    High cholesterol    Elevated     Insomnia    Irregular bowel habits    Itch of skin    Leg swelling    Neuropathy    R leg    Obesity    slightly overweight    Osteoarthritis    Poor balance    uses cane    Rotator  cuff sprain    Seborrheic keratosis    Shoulder joint pain    Sinusitis    Unspecified disorder of thyroid    hypothyroid    Visual impairment    glasses reading      Past Surgical History:   Procedure Laterality Date    Appendectomy      Arthroscopy of joint unlisted Right     knee    Back surgery  16    triple fusion of L3-L4-L5    Biopsy  2019    Bone Marrow    Biopsy  2019    right sural nerve biopsy    Carpal tunnel release Bilateral     Cataract Bilateral 2015    IOL'S    Colonoscopy      Hemorrhoidectomy  ~     Orthopedic surg (pbp)      LT ELBOW - ulnar nerve release    Other  2019    Lumbar puncture    Other surgical history      Shoulder Joint Replacement 16, Sinus surgery ,    Other surgical history      I&D of abscess in axilla area 7/3    Sigmoidoscopy,diagnostic      Sinus surgery    2014    Spine surgery procedure unlisted      L3-4-5    Tonsillectomy        Family History   Problem Relation Age of Onset    Breast Cancer Mother     Cancer Mother         breast cancer- late in life    Heart Disorder Mother         cardiac issues related to aging    Heart Disorder Father         partially blocked carotid arteries    Hypertension Father     Cancer Father         ? cancer?    Lipids Father     Hypertension Brother     Hypertension Brother       Social History     Socioeconomic History    Marital status:    Tobacco Use    Smoking status: Former     Current packs/day: 0.00     Average packs/day: 1.5 packs/day for 32.0 years (48.0 ttl pk-yrs)     Types: Cigarettes, Pipe     Start date: 3/1/1966     Quit date: 3/1/1998     Years since quittin.9    Smokeless tobacco: Never    Tobacco comments:     Quit many years ago   Vaping Use    Vaping status: Never Used   Substance and Sexual Activity    Alcohol use: Yes     Alcohol/week: 11.0 - 18.0 standard drinks of alcohol     Types: 6 - 8 Glasses of wine, 1 - 2 Cans of beer, 4 - 8 Shots of liquor  per week     Comment: 1-2 drinks daily    Drug use: No   Other Topics Concern    Caffeine Concern Yes     Comment: 2-3 cups a day    Exercise No     Comment: physical therapy           REVIEW OF SYSTEMS:     Today reviewed systems as documented below  GENERAL HEALTH: feels well otherwise  SKIN: denies any unusual skin lesions or rashes  RESPIRATORY: denies shortness of breath with exertion  CARDIOVASCULAR: denies chest pain on exertion  GI: denies abdominal pain and denies heartburn  NEURO: denies headaches  MUSCULO: History of arthritis      EXAM:   There were no vitals taken for this visit.  GENERAL: well developed, well nourished, in no apparent distress  EXTREMITIES:   1. Integument: Normal skin temperature and turgor.  Nails x10 are elongated, thickened, dystrophic, subungual debris.  Lateral borders of hallux nails are incurvated.  2. Vascular: Dorsalis pedis two out of four bilateral and posterior tibial pulses two out of   four bilateral, capillary refill normal.  A little bit of pitting edema around ankle bilaterally.   3. Musculoskeletal: All muscle groups are graded 5 out of 5 in the foot and ankle.  Compartments soft and compressible.   4. Neurological: Normal sharp dull sensation; reflexes normal.  Decreased protective sensation noted to lower extremities.        ASSESSMENT AND PLAN:   Diagnoses and all orders for this visit:    Onychomycosis    Ingrown nail    MGUS (monoclonal gammopathy of unknown significance)    Neuropathy associated with MGUS ([facility])    Hammer toes of both feet    Metatarsalgia, left foot    Chronic heel pain, right  -     [facility] AMB POD XR - RT FOOT 2 VIEWS(AP, LATERAL)WT BEARING                Plan:     -Patient examined, chart history reviewed.  -Discussed importance of proper pedal hygiene, regular foot checks.  -Sharply debrided nails x10 with a sterile nail nipper achieving a 20% reduction in thickness and length, without incident. Nails further smoothed with dremel. Slant back  performed to ingrown nails.   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  -X-rays obtained and reviewed--small prominence to posterior heel at region of pain. May be rubbing on AFO. Can try felt offloading pad to site. These were dispensed in clinic.  -Educated patient on acute signs of infection advised patient to seek immediate medical attention if symptoms arise.    The patient indicates understanding of these issues and agrees to the plan.    RTC 2 months.    Alli Campbell DPM

## 2025-03-10 PROBLEM — F33.1 MODERATE RECURRENT MAJOR DEPRESSION (HCC): Status: ACTIVE | Noted: 2025-03-10

## 2025-03-10 PROBLEM — F33.0 MAJOR DEPRESSIVE DISORDER, RECURRENT, MILD: Status: RESOLVED | Noted: 2022-01-20 | Resolved: 2025-03-10

## 2025-03-10 NOTE — PROGRESS NOTES
Subjective:   Nils Joseph is a 79 year old male who presents for a Subsequent Annual Wellness visit (Pt already had Initial Annual Wellness) and scheduled follow up of multiple significant but stable problems.     HPI  Feels fine   No worsening sxs of neuropathy  Denies wt loss  Denies bone pain or fatigue    PAST MEDICAL, SOCIAL, FAMILY HISTORIES REVIEWED WITH PT    History/Other:   Fall Risk Assessment:   He has been screened for Falls and is High Risk. Fall Prevention information provided to patient in After Visit Summary.    Do you feel unsteady when standing or walking?: Yes  Do you worry about falling?: Yes  Have you fallen in the past year?: Yes  How many times have you fallen?: 4  Were you injured?: No     Cognitive Assessment:   He had a completely normal cognitive assessment - see flowsheet entries       Functional Ability/Status:   Nils Joseph has some abnormal functions as listed below:  He has Walking problems based on screening of functional status.       Depression Screening (PHQ):  PHQ-2 SCORE: 0  , done 3/11/2025            Advanced Directives:   He has a Living Will on file in [employer]; reviewed and discussed documents with patient (and family/surrogate if present).  He has a Power of  for Health Care on file in [employer].  Discussed Advance Care Planning with patient (and family/surrogate if present). Standard forms made available to patient in After Visit Summary.      Patient Active Problem List   Diagnosis    Mild persistent asthma without complication (HCC)    Hypothyroidism due to acquired atrophy of thyroid    Idiopathic peripheral neuropathy    MGUS (monoclonal gammopathy of unknown significance)    Neuropathy associated with MGUS (HCC)    Vertebral artery disease    Stenosis of carotid artery    Personal history of colonic polyps    Moderate recurrent major depression (HCC)     Allergies:  He is allergic to immune globulin.    Current Medications:  Outpatient Medications  Marked as Taking for the 3/11/25 encounter (Office Visit) with Alex Case MD   Medication Sig    GABAPENTIN 300 MG Oral Cap TAKE 1 CAPSULE BY MOUTH 3 TIMES DAILY    ALPRAZolam 0.25 MG Oral Tab Take 1 tablet (0.25 mg total) by mouth nightly as needed.    Beclomethasone Diprop HFA 40 MCG/ACT Inhalation Aerosol, Breath Activated Inhale 2 puffs into the lungs 2 (two) times daily as needed.    fluticasone propionate 50 MCG/ACT Nasal Suspension 1 spray by Each Nare route 2 (two) times daily as needed for Rhinitis.    levothyroxine 100 MCG Oral Tab Take 1 tablet (100 mcg total) by mouth once daily. Overdue for labs, please complete them.    traZODone 100 MG Oral Tab Take 1 tablet (100 mg total) by mouth nightly as needed for Sleep.    doxycycline 100 MG Oral Cap Take 1 capsule (100 mg total) by mouth 2 (two) times daily.    famotidine 40 MG Oral Tab Take 1 tablet (40 mg total) by mouth daily.    valACYclovir 1 G Oral Tab Take 0.5 tablets (500 mg total) by mouth daily.    docusate sodium 100 MG Oral Cap Take 1 capsule (100 mg total) by mouth 2 (two) times daily.    Glucosamine-Chondroitin (GLUCOSAMINE CHONDR COMPLEX OR) Take by mouth as needed.    cyanocobalamin 1000 MCG Oral Tab Take 1 tablet (1,000 mcg total) by mouth daily.    senna-docusate 8.6-50 MG Oral Tab Take 1 tablet by mouth daily.    tamsulosin (FLOMAX) cap Take 1 capsule (0.4 mg total) by mouth daily.    finasteride 5 MG Oral Tab Take 1 tablet (5 mg total) by mouth daily.    atorvastatin 10 MG Oral Tab Take 1 tablet (10 mg total) by mouth daily.    Albuterol Sulfate  (90 BASE) MCG/ACT Inhalation Aero Soln Inhale 2 puffs into the lungs every 6 (six) hours as needed for Wheezing.       Medical History:  He  has a past medical history of Asthma (HCC), Atypical mole (frequent body Nevi), Back problem, Belching, Benign thyroid cyst, BPH (benign prostatic hyperplasia), Cataract (2015), Constipation, Depression, Disorder of prostate (BPH (I'm a 76 year old  male)), Flatulence/gas pain/belching, Frequent urination (2 x's / night), Frequent use of laxatives, Hearing impairment, Hemorrhoids (very slight), Hemorrhoids, internal (02/29/2012), High cholesterol, Insomnia (10/22/2010), Irregular bowel habits, Itch of skin (unknown cause), Leg swelling (ankles at PM), Neuropathy, Obesity, Osteoarthritis (2010), Poor balance, Rotator cuff sprain (10/22/2010), Seborrheic keratosis (10/22/2010), Shoulder joint pain (10/22/2010), Sinusitis, Unspecified disorder of thyroid, and Visual impairment.  Surgical History:  He  has a past surgical history that includes appendectomy; orthopedic surg (pbp); tonsillectomy; cataract (Bilateral, August 2015); arthroscopy of joint unlisted (Right); carpal tunnel release (Bilateral); sinus surgery   (2/14/2014); hemorrhoidectomy (~ 2005); spine surgery procedure unlisted; biopsy (07/26/2019); biopsy (06/25/2019); other (02/2019); back surgery (9/12/16); sigmoidoscopy,diagnostic (1980's); other surgical history; other surgical history; and colonoscopy.   Family History:  His family history includes Breast Cancer in his mother; Cancer in his father and mother; Heart Disorder in his father and mother; Hypertension in his brother, brother, and father; Lipids in his father.  Social History:  He  reports that he quit smoking about 27 years ago. His smoking use included cigarettes and pipe. He started smoking about 59 years ago. He has a 48 pack-year smoking history. He has never used smokeless tobacco. He reports current alcohol use of about 11.0 - 18.0 standard drinks of alcohol per week. He reports that he does not use drugs.    Tobacco:  He smoked tobacco in the past but quit greater than 12 months ago.  Social History     Tobacco Use   Smoking Status Former    Current packs/day: 0.00    Average packs/day: 1.5 packs/day for 32.0 years (48.0 ttl pk-yrs)    Types: Cigarettes, Pipe    Start date: 3/1/1966    Quit date: 3/1/1998    Years since quitting:  27.0   Smokeless Tobacco Never   Tobacco Comments    Quit many years ago        CAGE Alcohol Screen:   CAGE screening score of 0 on 3/11/2025, showing low risk of alcohol abuse.      Patient Care Team:  Alex Case MD as PCP - General (Internal Medicine)  Tao Moore MD (UROLOGY)  Ruddy Davis MD (UROLOGY)  Gen Gutierrez MD (ONCOLOGY)  Chon Lassiter MD as Neurologist (NEUROLOGY)  Eliu Hickey MD as Consulting Physician (NEUROSURGERY)  Ana Maria Solis DO as Consulting Physician (NEUROLOGY)  Farooq Farah MD (CARDIOLOGY)  Alli Campbell DPM (Surgery, Foot & Ankle)  Juanita Koehler PT as Physical Therapist  Dolores Koroma APN (Nurse Practitioner Family)  Aleisha Lozoya PT as Physical Therapist    Review of Systems  A comprehensive 10 point review of systems was completed.     Pertinent positives and negatives noted in the HPI.      Objective:   Physical Exam  General: No acute distress. Alert and oriented x 3.  HEENT: Normocephalic atraumatic. Moist mucous membranes. EOM-I. PERRLA. Anicteric.  Neck: No lymphadenopathy.   Respiratory: Clear to auscultation bilaterally. No wheezes. No rhonchi.  Cardiovascular: S1, S2. Regular rate and rhythm. No murmurs, rubs or gallops. Equal pulses.   Abdomen: Soft,  no pain.  nontender, nondistended.  Positive bowel sounds. .  Neurologic: No focal neurological deficits.   Extremities: No edema or cyanosis.  Integument: No rashes or lesions.    Psychiatric: Appropriate mood and affect.    /78   Pulse 61   Temp 98.5 °F (36.9 °C)   Resp 16   Ht 6' 1\" (1.854 m)   Wt 213 lb (96.6 kg)   SpO2 98%   BMI 28.10 kg/m²  Estimated body mass index is 28.1 kg/m² as calculated from the following:    Height as of this encounter: 6' 1\" (1.854 m).    Weight as of this encounter: 213 lb (96.6 kg).    Medicare Hearing Assessment:   Hearing Screening    Screening Method: Whisper Test  Whisper Test Result: Pass               Assessment & Plan:   Nils Altamirano  Jake is a 79 year old male who presents for a Medicare Assessment.     1. Annual physical exam (Primary)  2. Hypothyroidism due to acquired atrophy of thyroid  3. Idiopathic peripheral neuropathy  4. Vertebral artery disease  5. Bilateral carotid artery stenosis  6. Neuropathy associated with MGUS (HCC)  7. Mild persistent asthma without complication (HCC)  8. MGUS (monoclonal gammopathy of unknown significance)  9. Encounter for annual health examination  10. Colon cancer screening  -     Gastro Referral - In Network    Mild persistent asthma without complication (HCC) - controlled. Cont current med therapy     Vertebral artery disease -stable. continue control of cad risk factors      Bilateral carotid artery stenosis -stable. continue control of cad risk factors. Cont statin     Hypothyroidism due to acquired atrophy of thyroid     Elevated PSA- sees urology. Now normal     Benign prostatic hyperplasia without lower urinary tract symptoms- stable sxs. Sees urology     Idiopathic peripheral neuropathy- - defer to neuroogy     MGUS (monoclonal gammopathy of unknown significance)-stable sxs on current gabapentin dose.  defer to oncology     Neuropathy associated with MGUS (HCC)- stable sxs. Sees neurology. Defer med titration  to neuro. Consider eval at [facility]     Cervical stenosis of spinal canal- stable sxs. Monitor     The patient indicates understanding of these issues and agrees to the plan.  Continue with current treatment plan.  Lab work ordered.  Reinforced healthy diet, lifestyle, and exercise.      Return in about 1 year (around 3/11/2026) for AWV.     Alex Case MD, 3/10/2025     Supplementary Documentation:   General Health:  In the past six months, have you lost more than 10 pounds without trying?: 2 - No  Has your appetite been poor?: No  Type of Diet: Balanced  How does the patient maintain a good energy level?: Other  How would you describe your daily physical activity?: Light  How would you  describe your current health state?: Good  How do you maintain positive mental well-being?: Social Interaction, Visiting Family  On a scale of 0 to 10, with 0 being no pain and 10 being severe pain, what is your pain level?: 0 - (None)  In the past six months, have you experienced urine leakage?: 0-No  At any time do you feel concerned for the safety/well-being of yourself and/or your children, in your home or elsewhere?: No  Have you had any immunizations at another office such as Influenza, Hepatitis B, Tetanus, or Pneumococcal?: No    Health Maintenance   Topic Date Due    Annual Depression Screening  01/01/2025    Fall Risk Screening (Annual)  01/01/2025    Annual Physical  01/30/2025    Colorectal Cancer Screening  04/25/2025    COVID-19 Vaccine (8 - 2024-25 season) 07/15/2025    Influenza Vaccine  Completed    Pneumococcal Vaccine: 50+ Years  Completed    Zoster Vaccines  Completed    Meningococcal B Vaccine  Aged Out

## 2025-03-11 ENCOUNTER — OFFICE VISIT (OUTPATIENT)
Dept: INTERNAL MEDICINE CLINIC | Facility: CLINIC | Age: 80
End: 2025-03-11
Payer: MEDICARE

## 2025-03-11 VITALS
SYSTOLIC BLOOD PRESSURE: 126 MMHG | BODY MASS INDEX: 28.23 KG/M2 | DIASTOLIC BLOOD PRESSURE: 78 MMHG | HEIGHT: 73 IN | HEART RATE: 61 BPM | RESPIRATION RATE: 16 BRPM | WEIGHT: 213 LBS | TEMPERATURE: 99 F | OXYGEN SATURATION: 98 %

## 2025-03-11 DIAGNOSIS — Z12.11 COLON CANCER SCREENING: ICD-10-CM

## 2025-03-11 DIAGNOSIS — I77.9 VERTEBRAL ARTERY DISEASE: ICD-10-CM

## 2025-03-11 DIAGNOSIS — D47.2 NEUROPATHY ASSOCIATED WITH MGUS (HCC): ICD-10-CM

## 2025-03-11 DIAGNOSIS — Z00.00 ENCOUNTER FOR ANNUAL HEALTH EXAMINATION: ICD-10-CM

## 2025-03-11 DIAGNOSIS — G63 NEUROPATHY ASSOCIATED WITH MGUS (HCC): ICD-10-CM

## 2025-03-11 DIAGNOSIS — E03.4 HYPOTHYROIDISM DUE TO ACQUIRED ATROPHY OF THYROID: ICD-10-CM

## 2025-03-11 DIAGNOSIS — D47.2 MGUS (MONOCLONAL GAMMOPATHY OF UNKNOWN SIGNIFICANCE): ICD-10-CM

## 2025-03-11 DIAGNOSIS — I65.23 BILATERAL CAROTID ARTERY STENOSIS: ICD-10-CM

## 2025-03-11 DIAGNOSIS — G60.9 IDIOPATHIC PERIPHERAL NEUROPATHY: ICD-10-CM

## 2025-03-11 DIAGNOSIS — J45.30 MILD PERSISTENT ASTHMA WITHOUT COMPLICATION (HCC): ICD-10-CM

## 2025-03-11 DIAGNOSIS — Z00.00 ANNUAL PHYSICAL EXAM: Primary | ICD-10-CM

## 2025-03-11 PROCEDURE — G0439 PPPS, SUBSEQ VISIT: HCPCS | Performed by: INTERNAL MEDICINE

## 2025-03-11 RX ORDER — FAMOTIDINE 40 MG/1
40 TABLET, FILM COATED ORAL DAILY
Qty: 90 TABLET | Refills: 3 | Status: SHIPPED | OUTPATIENT
Start: 2025-03-11

## 2025-03-11 RX ORDER — TRAZODONE HYDROCHLORIDE 100 MG/1
100 TABLET ORAL NIGHTLY PRN
Qty: 90 TABLET | Refills: 3 | Status: SHIPPED | OUTPATIENT
Start: 2025-03-11

## 2025-03-11 RX ORDER — FLUTICASONE PROPIONATE 50 MCG
1 SPRAY, SUSPENSION (ML) NASAL 2 TIMES DAILY PRN
Qty: 3 EACH | Refills: 3 | Status: SHIPPED | OUTPATIENT
Start: 2025-03-11

## 2025-03-11 RX ORDER — LEVOTHYROXINE SODIUM 100 UG/1
100 TABLET ORAL
Qty: 90 TABLET | Refills: 3 | Status: SHIPPED | OUTPATIENT
Start: 2025-03-11

## 2025-04-08 DIAGNOSIS — G60.9 IDIOPATHIC PERIPHERAL NEUROPATHY: ICD-10-CM

## 2025-04-08 NOTE — TELEPHONE ENCOUNTER
Medication: GABAPENTIN 300 MG Oral Cap      Date of last refill: 11/11/2024 (#270/0)  [place]     Last office visit: 09/10/2024  Due back to clinic per last office note:  7 Months   Date next office visit scheduled:    Future Appointments   Date Time Provider Department Center   4/16/2025 10:15 AM Alli Campbell DPM [id number]   4/24/2025  9:40 AM Reji Casiano, APRN [facility] SUB GI   4/7/2026 10:30 AM Alex Case MD [facility] [address]           Last OV note recommendation:    Assessment/Plan:        Encounter Diagnoses   Name Primary?    Neuropathy associated with MGUS (HCC)      Idiopathic peripheral neuropathy Yes    MGUS (monoclonal gammopathy of unknown significance)      Bilateral foot-drop      Lumbar radiculopathy      Elevated antinuclear antibody (YUSUF) level           MGUS associated polyneuropathy IgM, and lumbar radiculopathy, anti-Mag negative  No improvement with IVIG, Rituxan or prednisone  25% patients with IgM gammopathy associated polyneuropathy have disability at 10 years, and 50% disability at 15 years.  Extensive work up and also referred for second opinion at [facility], agreed with treatment  Repeat EMG between February to April  Elevated YUSUF- Evaluate significance with Rheumatology  Continue gabapentin 300mg TID (taking BID with prn midday doses ok)  Continue AFO and PT exercises        Requested Prescriptions        No prescriptions requested or ordered in this encounter               We discussed in depth regarding the diagnosis, prognosis, treatment. All questions and concerns were addressed. 36 minutes were spent on this encounter, which included obtaining and reviewing history, examining the patient, reviewing and interpreting results, building a treatment plan, discussing treatment options, discussing medication instructions, educating the patient/family/caregiver, and completing documentation.         Kendal  DO Irma  Neurology and Neuromuscular medicine  [facility]

## 2025-04-10 RX ORDER — GABAPENTIN 300 MG/1
300 CAPSULE ORAL 3 TIMES DAILY
Qty: 270 CAPSULE | Refills: 0 | Status: SHIPPED | OUTPATIENT
Start: 2025-04-10

## 2025-04-16 ENCOUNTER — OFFICE VISIT (OUTPATIENT)
Dept: PODIATRY CLINIC | Facility: CLINIC | Age: 80
End: 2025-04-16

## 2025-04-16 DIAGNOSIS — B35.1 ONYCHOMYCOSIS: Primary | ICD-10-CM

## 2025-04-16 DIAGNOSIS — L60.0 INGROWN NAIL: ICD-10-CM

## 2025-04-16 DIAGNOSIS — M20.41 HAMMER TOES OF BOTH FEET: ICD-10-CM

## 2025-04-16 DIAGNOSIS — D47.2 NEUROPATHY ASSOCIATED WITH MGUS (HCC): ICD-10-CM

## 2025-04-16 DIAGNOSIS — M79.671 CHRONIC HEEL PAIN, RIGHT: ICD-10-CM

## 2025-04-16 DIAGNOSIS — G89.29 CHRONIC HEEL PAIN, RIGHT: ICD-10-CM

## 2025-04-16 DIAGNOSIS — G63 NEUROPATHY ASSOCIATED WITH MGUS (HCC): ICD-10-CM

## 2025-04-16 DIAGNOSIS — D47.2 MGUS (MONOCLONAL GAMMOPATHY OF UNKNOWN SIGNIFICANCE): ICD-10-CM

## 2025-04-16 DIAGNOSIS — M77.42 METATARSALGIA, LEFT FOOT: ICD-10-CM

## 2025-04-16 DIAGNOSIS — M20.42 HAMMER TOES OF BOTH FEET: ICD-10-CM

## 2025-04-16 PROCEDURE — 99213 OFFICE O/P EST LOW 20 MIN: CPT | Performed by: STUDENT IN AN ORGANIZED HEALTH CARE EDUCATION/TRAINING PROGRAM

## 2025-04-16 NOTE — PROGRESS NOTES
[facility] Podiatry  Progress Note    Nils Joseph is a 79 year old male.   Chief Complaint   Patient presents with    Toenail Care     Nail trim and foot check f/u- LOV w/ PCP Dr. 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  He has elongated and thickened nails he has difficulty trimming his own.  His nails do become incurvated by the end of his stretch between visits.  They do cause some pain from rubbing in his shoes.   He continues to walk with AFOs and complete physical therapy. His right heel pain is doing better after using felt offloading pad. He is following with neurology and [facility] and at [facility].  No other complaints are mentioned.      Allergies: Immune globulin   Current Outpatient Medications   Medication Sig Dispense Refill    GABAPENTIN 300 MG Oral Cap TAKE 1 CAPSULE BY MOUTH 3 TIMES DAILY 270 capsule 0    famotidine 40 MG Oral Tab Take 1 tablet (40 mg total) by mouth daily. 90 tablet 3    fluticasone propionate 50 MCG/ACT Nasal Suspension 1 spray by Each Nare route 2 (two) times daily as needed for Rhinitis. 3 each 3    levothyroxine 100 MCG Oral Tab Take 1 tablet (100 mcg total) by mouth once daily. Overdue for labs, please complete them. 90 tablet 3    Beclomethasone Diprop HFA 40 MCG/ACT Inhalation Aerosol, Breath Activated Inhale 2 puffs into the lungs 2 (two) times daily as needed. 1 each 3    traZODone 100 MG Oral Tab Take 1 tablet (100 mg total) by mouth nightly as needed for Sleep. 90 tablet 3    ALPRAZolam 0.25 MG Oral Tab Take 1 tablet (0.25 mg total) by mouth nightly as needed. 90 tablet 1    doxycycline 100 MG Oral Cap Take 1 capsule (100 mg total) by mouth 2 (two) times daily.      valACYclovir 1 G Oral Tab Take 0.5 tablets (500 mg total) by mouth daily.      docusate sodium 100 MG Oral Cap Take 1 capsule (100 mg total) by  mouth 2 (two) times daily. 30 capsule 1    Glucosamine-Chondroitin (GLUCOSAMINE CHONDR COMPLEX OR) Take by mouth as needed.      cyanocobalamin 1000 MCG Oral Tab Take 1 tablet (1,000 mcg total) by mouth daily.      senna-docusate 8.6-50 MG Oral Tab Take 1 tablet by mouth daily. 30 tablet 0    tamsulosin (FLOMAX) cap Take 1 capsule (0.4 mg total) by mouth daily. 90 capsule 3    finasteride 5 MG Oral Tab Take 1 tablet (5 mg total) by mouth daily. 90 tablet 3    atorvastatin 10 MG Oral Tab Take 1 tablet (10 mg total) by mouth daily.      Albuterol Sulfate  (90 BASE) MCG/ACT Inhalation Aero Soln Inhale 2 puffs into the lungs every 6 (six) hours as needed for Wheezing.        Past Medical History:    Asthma (HCC)    Atypical mole    Back problem    low back pain, s/p fusion of L3,4,5.  back still stiff    Belching    Benign thyroid cyst    BPH (benign prostatic hyperplasia)    Cataract    s/p sugery    Constipation    Depression    Disorder of prostate    Flatulence/gas pain/belching    Frequent urination    Frequent use of laxatives    Hearing impairment    Slight    Hemorrhoids    Hemorrhoids, internal    High cholesterol    Elevated     Insomnia    Irregular bowel habits    Itch of skin    Leg swelling    Neuropathy    R leg    Obesity    slightly overweight    Osteoarthritis    Poor balance    uses cane    Rotator cuff sprain    Seborrheic keratosis    Shoulder joint pain    Sinusitis    Unspecified disorder of thyroid    hypothyroid    Visual impairment    glasses reading      Past Surgical History:   Procedure Laterality Date    Appendectomy      Arthroscopy of joint unlisted Right     knee    Back surgery  9/12/16    triple fusion of L3-L4-L5    Biopsy  07/26/2019    Bone Marrow    Biopsy  06/25/2019    right sural nerve biopsy    Carpal tunnel release Bilateral     Cataract Bilateral August 2015    IOL'S    Colonoscopy      Hemorrhoidectomy  ~ 2005    Orthopedic surg (pbp)      LT ELBOW - ulnar  nerve release    Other  2019    Lumbar puncture    Other surgical history      Shoulder Joint Replacement 16, Sinus surgery ,    Other surgical history      I&D of abscess in axilla area 7/3    Sigmoidoscopy,diagnostic      Sinus surgery    2014    Spine surgery procedure unlisted      L3-4-5    Tonsillectomy        Family History   Problem Relation Age of Onset    Breast Cancer Mother     Cancer Mother         breast cancer- late in life    Heart Disorder Mother         cardiac issues related to aging    Heart Disorder Father         partially blocked carotid arteries    Hypertension Father     Cancer Father         ? cancer?    Lipids Father     Hypertension Brother     Hypertension Brother       Social History     Socioeconomic History    Marital status:    Tobacco Use    Smoking status: Former     Current packs/day: 0.00     Average packs/day: 1.5 packs/day for 32.0 years (48.0 ttl pk-yrs)     Types: Cigarettes, Pipe     Start date: 3/1/1966     Quit date: 3/1/1998     Years since quittin.1    Smokeless tobacco: Never    Tobacco comments:     Quit many years ago   Vaping Use    Vaping status: Never Used   Substance and Sexual Activity    Alcohol use: Yes     Alcohol/week: 11.0 - 18.0 standard drinks of alcohol     Types: 6 - 8 Glasses of wine, 1 - 2 Cans of beer, 4 - 8 Shots of liquor per week     Comment: 1-2 drinks daily    Drug use: No   Other Topics Concern    Caffeine Concern Yes     Comment: 2-3 cups a day    Exercise No     Comment: physical therapy           REVIEW OF SYSTEMS:     Today reviewed systems as documented below  GENERAL HEALTH: feels well otherwise  SKIN: denies any unusual skin lesions or rashes  RESPIRATORY: denies shortness of breath with exertion  CARDIOVASCULAR: denies chest pain on exertion  GI: denies abdominal pain and denies heartburn  NEURO: denies headaches  MUSCULO: History of arthritis      EXAM:   There were no vitals taken for this  visit.  GENERAL: well developed, well nourished, in no apparent distress  EXTREMITIES:   1. Integument: Normal skin temperature and turgor.  Nails x10 are elongated, thickened, dystrophic, subungual debris.  Lateral borders of hallux nails are incurvated.  2. Vascular: Dorsalis pedis two out of four bilateral and posterior tibial pulses two out of   four bilateral, capillary refill normal.  A little bit of pitting edema around ankle bilaterally.   3. Musculoskeletal: All muscle groups are graded 5 out of 5 in the foot and ankle.  Compartments soft and compressible.   4. Neurological: Normal sharp dull sensation; reflexes normal.  Decreased protective sensation noted to lower extremities.        ASSESSMENT AND PLAN:   Diagnoses and all orders for this visit:    Onychomycosis    Ingrown nail    MGUS (monoclonal gammopathy of unknown significance)    Neuropathy associated with MGUS (HCC)    Hammer toes of both feet    Metatarsalgia, left foot    Chronic heel pain, right                  Plan:     -Patient examined, chart history reviewed.  -Discussed importance of proper pedal hygiene, regular foot checks.  -Sharply debrided nails x10 with a sterile nail nipper achieving a 20% reduction in thickness and length, without incident. Nails further smoothed with dremel. Slant back performed to ingrown nails.   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  -Prior x-rays reviewed--small prominence to posterior heel at region of pain. May be rubbing on AFO. Can try felt offloading pad to site as needed.   -Educated patient on acute signs of infection advised patient to seek immediate medical attention if symptoms arise.    The patient indicates understanding of these issues and agrees to the plan.    RTC 2 months.    Alli Campbell DPM

## 2025-04-24 ENCOUNTER — LAB ENCOUNTER (OUTPATIENT)
Dept: LAB | Age: 80
End: 2025-04-24
Payer: MEDICARE

## 2025-04-24 DIAGNOSIS — D64.9 ANEMIA, UNSPECIFIED TYPE: ICD-10-CM

## 2025-04-24 LAB
BASOPHILS # BLD AUTO: 0.02 X10(3) UL (ref 0–0.2)
BASOPHILS NFR BLD AUTO: 0.3 %
DEPRECATED HBV CORE AB SER IA-ACNC: 145 NG/ML (ref 50–336)
EOSINOPHIL # BLD AUTO: 0.13 X10(3) UL (ref 0–0.7)
EOSINOPHIL NFR BLD AUTO: 2.3 %
ERYTHROCYTE [DISTWIDTH] IN BLOOD BY AUTOMATED COUNT: 14.2 %
FOLATE SERPL-MCNC: 10.2 NG/ML (ref 5.4–?)
HCT VFR BLD AUTO: 36.5 % (ref 39–53)
HGB BLD-MCNC: 12.2 G/DL (ref 13–17.5)
IMM GRANULOCYTES # BLD AUTO: 0.02 X10(3) UL (ref 0–1)
IMM GRANULOCYTES NFR BLD: 0.3 %
IRON SATN MFR SERPL: 19 % (ref 20–50)
IRON SERPL-MCNC: 36 UG/DL (ref 65–175)
LYMPHOCYTES # BLD AUTO: 1.29 X10(3) UL (ref 1–4)
LYMPHOCYTES NFR BLD AUTO: 22.4 %
MCH RBC QN AUTO: 32.7 PG (ref 26–34)
MCHC RBC AUTO-ENTMCNC: 33.4 G/DL (ref 31–37)
MCV RBC AUTO: 97.9 FL (ref 80–100)
MONOCYTES # BLD AUTO: 0.71 X10(3) UL (ref 0.1–1)
MONOCYTES NFR BLD AUTO: 12.3 %
NEUTROPHILS # BLD AUTO: 3.6 X10 (3) UL (ref 1.5–7.7)
NEUTROPHILS # BLD AUTO: 3.6 X10(3) UL (ref 1.5–7.7)
NEUTROPHILS NFR BLD AUTO: 62.4 %
PLATELET # BLD AUTO: 240 10(3)UL (ref 150–450)
RBC # BLD AUTO: 3.73 X10(6)UL (ref 3.8–5.8)
TOTAL IRON BINDING CAPACITY: 193 UG/DL (ref 250–425)
TRANSFERRIN SERPL-MCNC: 150 MG/DL (ref 215–365)
VIT B12 SERPL-MCNC: 813 PG/ML (ref 211–911)
WBC # BLD AUTO: 5.8 X10(3) UL (ref 4–11)

## 2025-04-24 PROCEDURE — 82607 VITAMIN B-12: CPT

## 2025-04-24 PROCEDURE — 82728 ASSAY OF FERRITIN: CPT

## 2025-04-24 PROCEDURE — 36415 COLL VENOUS BLD VENIPUNCTURE: CPT

## 2025-04-24 PROCEDURE — 83550 IRON BINDING TEST: CPT

## 2025-04-24 PROCEDURE — 82746 ASSAY OF FOLIC ACID SERUM: CPT

## 2025-04-24 PROCEDURE — 85025 COMPLETE CBC W/AUTO DIFF WBC: CPT

## 2025-04-24 PROCEDURE — 83540 ASSAY OF IRON: CPT

## 2025-04-29 DIAGNOSIS — D50.8 OTHER IRON DEFICIENCY ANEMIA: Primary | ICD-10-CM

## 2025-04-29 RX ORDER — FERROUS SULFATE 325(65) MG
325 TABLET, DELAYED RELEASE (ENTERIC COATED) ORAL
Qty: 90 TABLET | Refills: 1 | Status: SHIPPED | OUTPATIENT
Start: 2025-04-29

## 2025-06-09 ENCOUNTER — OFFICE VISIT (OUTPATIENT)
Dept: NEUROLOGY | Facility: CLINIC | Age: 80
End: 2025-06-09
Payer: MEDICARE

## 2025-06-09 ENCOUNTER — TELEPHONE (OUTPATIENT)
Dept: NEUROLOGY | Facility: CLINIC | Age: 80
End: 2025-06-09

## 2025-06-09 VITALS
DIASTOLIC BLOOD PRESSURE: 60 MMHG | WEIGHT: 209 LBS | BODY MASS INDEX: 28 KG/M2 | SYSTOLIC BLOOD PRESSURE: 122 MMHG | RESPIRATION RATE: 16 BRPM | HEART RATE: 56 BPM

## 2025-06-09 DIAGNOSIS — D47.2 NEUROPATHY ASSOCIATED WITH MGUS (HCC): Primary | ICD-10-CM

## 2025-06-09 DIAGNOSIS — M54.16 LUMBAR RADICULOPATHY: ICD-10-CM

## 2025-06-09 DIAGNOSIS — M21.372 BILATERAL FOOT-DROP: ICD-10-CM

## 2025-06-09 DIAGNOSIS — M21.371 BILATERAL FOOT-DROP: ICD-10-CM

## 2025-06-09 DIAGNOSIS — R20.2 PARESTHESIA OF HAND, BILATERAL: ICD-10-CM

## 2025-06-09 DIAGNOSIS — R76.8 ELEVATED ANTINUCLEAR ANTIBODY (ANA) LEVEL: ICD-10-CM

## 2025-06-09 DIAGNOSIS — G60.9 IDIOPATHIC PERIPHERAL NEUROPATHY: ICD-10-CM

## 2025-06-09 DIAGNOSIS — G63 NEUROPATHY ASSOCIATED WITH MGUS (HCC): Primary | ICD-10-CM

## 2025-06-09 PROCEDURE — 99214 OFFICE O/P EST MOD 30 MIN: CPT | Performed by: OTHER

## 2025-06-09 RX ORDER — VALACYCLOVIR HYDROCHLORIDE 500 MG/1
500 TABLET, FILM COATED ORAL DAILY
COMMUNITY
Start: 2025-05-06

## 2025-06-09 RX ORDER — GABAPENTIN 300 MG/1
300 CAPSULE ORAL 3 TIMES DAILY
Qty: 270 CAPSULE | Refills: 3 | Status: SHIPPED | OUTPATIENT
Start: 2025-06-09

## 2025-06-09 NOTE — PROGRESS NOTES
Patient states no changes in neuropathy. Patient states he falls frequently however he has safety measures at home.

## 2025-06-09 NOTE — PROGRESS NOTES
[facility] in [place]  Neurology     Nils Joseph Patient Status:  No patient class for patient encounter    10/10/1945 MRN [id number]   Location [unfilled] [facility] Alex Case MD          Subjective:  Nils Joseph is a(n) 79 year old male s/p lumbar spine surgery in 2016 who is being followed for polyneuropathy by Dr. Lassiter. He developed radicular pain and right foot drop, after which he had the surgery. Radicular pain resolved. A few months or later, he developed a slowly progressive course of clumsiness of the feet. He started catching his toe when walking, first on the right, and then on the left. Initially had a lot of shooting pain from below his knees down to his toes, bilaterally, it would come and go, worse at night. Has been on gabapentin. Has numbness in his foot that comes and goes. Has had extensive work up including EMG, LP, bone marrow biopsy, nerve biopsy. Labs showed MGUS. He was started on IVIG, and was on it for 2 sets of infusions. Developed a rash.  Gets cramps in hands, and trigger finger. Buttoning shirts can be difficult.   Interim  Since has started wearing bilateral AFO's. Falling less. Saw NSGY and spinal surgery was not recommended. Taking gabapentin 300mg TID. Drops things more from his hands. No tingling or numbness in hands.   Interim  Since last visit, I started patient on prednisone 60mg on 23, and 50mg after two weeks, for the last 2 weeks. Denies further elevated BP's. Sleep is minimally worse.   Interim  Patient reports symptoms are the same. Has not noticed any increase in foot strength. Patient currently on 50 mg prednisone. Has insomnia and weight gain. Was on prednisone 60mg x 2 weeks, and now for past 5 weeks has been on 50mg.  Interim  Patient received Rituxan starting end of November, and has been on prednisone 15mg for several weeks now (tapered from 40mg on 10/17/23, initially 60mg. Does not feel feet are stronger. Has  chronic cramping in the hands, along with numbness, has been going on for a 2 years. Has noted decreased dexterity in the fingers, slowly progressing. He is taking gabapentin 300mg TID. IVIG got 1 infusion over one day, followed by another infusion 1 month later, followed by rash, and stopping the treatment.   Interim  Patient was seen by [facility], had additional testing, genetic testing, negative. Started having left hip pain. Started PT, which helped, but still getting intermittently. Foot drop is about the same.   Interim  Patient reports that since LOV, reports balance is minimally worse. Feels that each year is slightly worse. No additional testing was recommended by [facility].  Tingling hasn't gotten worse. Saw Rheum thought YUSUF elevation was a false positive. Hand weakness is slightly worse.      Rituxan 11/21/23- 12/12/23    Data review:  2024 YUSUF 1: 1280, SSA SSB negative  ACE neg  SPEP as previous  [facility] 2024- negative amyloid TTR testing, negative other hereditary panel (102 genes) ([employer] comprehensive neuropathy panel)      Hip XR 2024  CONCLUSION:  No acute osseous abnormality is seen.  Mild degenerative changes in the hips.     Component      Latest Ref Rng 9/22/2023   Glucose      70 - 99 mg/dL 92    Sodium      136 - 145 mmol/L 138    Potassium      3.5 - 5.1 mmol/L 4.1    Chloride      98 - 112 mmol/L 105    Carbon Dioxide, Total      21.0 - 32.0 mmol/L 30.0    ANION GAP      0 - 18 mmol/L 3    BUN      7 - 18 mg/dL 27 (H)    CREATININE      0.70 - 1.30 mg/dL 1.51 (H)    CALCIUM      8.5 - 10.1 mg/dL 9.7    CALCULATED OSMOLALITY      275 - 295 mOsm/kg 291    EGFR      >=60 mL/min/1.73m2 47 (L)    AST (SGOT)      15 - 37 U/L 23    ALT (SGPT)      16 - 61 U/L 42    ALKALINE PHOSPHATASE      45 - 117 U/L 61    Total Bilirubin      0.1 - 2.0 mg/dL 0.6    PROTEIN, TOTAL      6.4 - 8.2 g/dL 6.4    Albumin      3.4 - 5.0 g/dL 3.2 (L)    Globulin      2.8 - 4.4 g/dL 3.2    A/G Ratio      1.0 - 2.0  1.0    Patient  Fasting for CMP? Yes       Component      Latest Ref Rng 4/20/2023 9/22/2023   WBC      4.0 - 11.0 x10(3) uL 6.5  8.6    RBC      3.80 - 5.80 x10(6)uL 4.14  4.13    Hemoglobin      13.0 - 17.5 g/dL 13.1  14.0    Hematocrit      39.0 - 53.0 % 40.7  41.8    Platelet Count      150.0 - 450.0 10(3)uL 199.0  167.0    MCV      80.0 - 100.0 fL 98.3  101.2 (H)    MCH      26.0 - 34.0 pg 31.6  33.9    MCHC      31.0 - 37.0 g/dL 32.2  33.5    RDW      % 13.5  16.6    Prelim Neutrophil Abs      1.50 - 7.70 x10 (3) uL 4.08  5.30    Neutrophils Absolute      1.50 - 7.70 x10(3) uL 4.08  5.30    Lymphocytes Absolute      1.00 - 4.00 x10(3) uL 1.56  2.58           EMG 4/2023  Conclusion:   There is electrophysiologic evidence of a marked sensory axonal polyneuropathy.   In additional, there are superimposed bilateral L4-S1 lumbar radiculopathies, with active denervation changes.      MRI c spine 6/2023  MPRESSION:   1. 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   Recommend ENT consultation and ultrasound for further assessment.   2.  Severe right neural foraminal stenosis at C3-C4 due to stable right uncinate and facet   hypertrophy.   3. Severe bilateral foraminal stenosis at C6-C7 due to uncinate and facet hypertrophy, superimposed   on a mild broad-based posterior disc osteophyte complex and posterior ligamentous and facet   hypertrophy, resulting in mild spinal canal stenosis.   4.  Moderate-to-severe right and mild/moderate left neuroforaminal stenosis at C5-C6 secondary to   uncinate and facet hypertrophy, coupled with a stable mild posterior disc bulge with a prominent   annular disc fissure flattening the anterior thecal sac.   5.  NO moderate-to-severe central canal stenosis. NO pathologic cervical cord signal.   6.  Stable hypoplastic appearance of the RIGHT vertebral artery.          MRI lumbar 3/2023  CONCLUSION:       1. Interval lumbar fusion changes noted spanning L3-L5.  Metallic susceptibility artifact from posterior fusion hardware limits evaluation of surrounding structures.  Interbody fusion devices are seen at L3-4 and L4-5.  There are changes of right L3   hemilaminotomy and bilateral L4 hemilaminotomy.      2. The interval worsening of degenerative changes at the L2-3 level resulting in new moderate spinal canal stenosis with bilateral subarticular zone stenosis at L2-3.  There is also new mild bilateral neural foraminal stenosis at L2-3.      3. Interval worsening of facet arthropathy at L5-S1 results in worsening of moderate right and mild-to-moderate left neural foraminal stenosis at L5-S1.      4. There is improvement of now mild bilateral neural foraminal stenosis at the fused L4-5 level.       Component      Latest Ref Rng 2/23/2023   FOLATE (FOLIC ACID), SERUM      >=8.7 ng/mL 19.6    Vitamin B12      193 - 986 pg/mL 312    VITAMIN B1 (THIAMINE), WHOLE B      70 - 180 nmol/L 148          1/2021   Monoclonal spike in the gamma region. . . .   IMMUNOFIXATION       Monoclonal IgM kappa. If clinically indicated, 24 hour urine monoclonal . . .   KAPPA FREE LIGHT CHAIN      0.330 - 1.940 mg/dL 2.449 (H)   LAMBDA FREE LIGHT CHAIN      0.571 - 2.630 mg/dL 1.604   KAPPA/LAMBDA FLC RATIO      0.26 - 1.65 1.53   M-Jaden      <=0.00 g/dL 0.36 (H)      Component      Latest Ref Rng & Units 2/22/2019   Total Protein CSF      15.0 - 45.0 mg/dL 34.9   Glucose CSF      40 - 70 mg/dL 56     Component      Latest Ref Rng & Units 12/26/2022 9/23/2019 3/6/2019 2/14/2018   HEMOGLOBIN A1c      4.0 - 6.0 %       Vitamin B12      193 - 986 pg/mL    359   TSH      0.358 - 3.740 mIU/mL 2.210        Component      Latest Ref Rng & Units 7/22/2017   HEMOGLOBIN A1c      4.0 - 6.0 % 6.1 (H)   Vitamin B12      193 - 986 pg/mL    TSH      0.358 - 3.740 mIU/mL        Pathology report:  Per report      Nerve biopsy:  ... The density large myelinated axons reduced with only about 30% of the expected number of large myelinated axons remaining.  At least at the level of the jose sections unmyelinated axons appear relatively preserved.  There is no significant variation in axonal density within or between fascicles.  The remaining myelin sheaths are appropriate in thickness and the diameter of the accompanying axons.  No distinct populations of thinly myelinated axons are seen.\"     Comment: The peripheral nerve biopsy shows axonal loss as described in detail above.  Beyond that there are not changes that would suggest a specific underlying etiology.  In particular there is no evidence of any inflammatory changes or blood vessel abnormalities.      EMG 2019      EMG       This is an abnormal study.  There is electrophysiologic evidence to suggest a mixed type demyelinating and axonal large fiber polyneuropathy.  In addition, there is evidence for subacute denervation in the left lower extremity, suggestive of, but not diagnostic for a superimposed lower lumbosacral radiculopathy.  There is no evidence to suggest a myopathy.     Clinical comment:   The presence of severely reduced amplitude in the left lower extremity with prolonged F waves, as well as significantly prolonged distal motor latencies and partial conduction block at a non-traditional entrapment sites in the left upper extremity may suggest an acquired primary demyelinating neuropathy; clinical correlation, serologic and CSF studies are recommended.        MRI cervical 2020  FINDINGS:     Alignment:Grade 1 anterolisthesis of C3 on C4 and C4 on C5.   Vertebral body heights:Normal.   Cervical cord:Normal signal and morphology.   Cerebellar tonsils.No tonsillar ectopia.   Bone marrow signal:Overall bone marrow signal is normal except for Modic type II endplate   degenerative changes at C6-C7   Prevertebral soft tissues:Absent right vertebral artery flow  void. Left maxillary sinus mucosal   thickening.   C2-C3:  Normal.   C3-C4:  Right uncovertebral osteophytes and severe right facet joint hypertrophy causing severe   right neural foraminal stenosis. Left facet joint hypertrophy causing moderate left neural foraminal   stenosis. No central canal stenosis.   C4-C5:  Severe bilateral facet joint hypertrophy and bilateral uncovertebral osteophytes causing   severe bilateral neural foraminal stenosis. No central canal stenosis.   C5-C6:  Severe right facet joint hypertrophy and central disc osteophyte complex causing moderate   right neural foraminal stenosis. Patent left neural foramen. No central canal stenosis.   C6-C7:  Central disc osteophyte complex compressing the ventral thecal sac without cord compression   or signal cord abnormality. Mild central canal stenosis. Severe bilateral neural foraminal stenosis.   C7-T1:  Normal.   Impression   IMPRESSION:     1. Severe right neural foraminal stenosis at C3-C4.   2. Severe bilateral neural foraminal stenosis at C4-C5.   3. Moderate right neural foraminal stenosis at C5-C6.   4. Absence right vertebral flow void suggesting occluded or hypoplastic right vertebral artery.   Correlation with duplex imaging of the carotid arteries recommended.       MRI L spine 8/206  L1-L2: Mild facet hypertrophy. No spinal canal or foraminal narrowing.       L2-L3: Mild diffuse disc bulge with a superimposed right foraminal/extraforaminal disc protrusion. Mild facet hypertrophy. Mild ligamentum flavum thickening. No spinal canal stenosis. Mild bilateral foraminal narrowing.       L3-L4: Mild to moderate diffuse disc bulge with a superimposed disc osteophyte complex extending into the far left lateral space. There is a superimposed broad-based central disc protrusion. Moderate facet hypertrophy. Moderate ligamentum flavum   thickening. Moderate to severe spinal canal stenosis. Bilateral subarticular zone narrowing. Moderate to severe  right and moderate left foraminal narrowing.       L4-5: There is uncovering of the disc with a mild to moderate diffuse disc bulge. There is severe facet hypertrophy with facet joint effusions bilaterally. Moderate ligamentum flavum thickening. Severe spinal canal stenosis. Bilateral subarticular zone   narrowing. Severe left and moderate to severe right foraminal narrowing.       L5-S1: Mild diffuse disc bulge with superimposed small to moderate size central disc protrusion. No spinal canal narrowing. Moderate to severe facet hypertrophy. Moderate bilateral foraminal narrowing.       Impression   CONCLUSION:         #1. Moderate to severe multilevel degenerative changes in the lumbar spine most notable at L3-L4, L4-5, and L5-S1. At L3-L4, there is moderate to severe spinal canal stenosis. At L4-5, there is severe spinal canal stenosis. Please see above for further   details.        PAST MEDICAL HISTORY:   Past Medical History:    Anemia    Arthritis    Asthma (HCC)    Atypical mole    Back pain    Back problem    low back pain, s/p fusion of L3,4,5.  back still stiff    Belching    Benign thyroid cyst    BPH (benign prostatic hyperplasia)    Carotid stenosis    Cataract    s/p sugery    Constipation    Depression    Disorder of prostate    Esophageal reflux    maybe?    Eye disease    Fatigue    Flatulence/gas pain/belching    Food intolerance    Frequent urination    Frequent use of laxatives    Glaucoma    Hearing impairment    Slight    Hearing loss    Hemorrhoids    Hemorrhoids, internal    High cholesterol    Elevated     Hypothyroidism    Levothyroxine    Insomnia    Irregular bowel habits    Itch of skin    Leg swelling    Neuropathy    R leg    Obesity    slightly overweight    Osteoarthritis    OTHER DISEASES    Hypothyroidism and BPH both controlled by medication    Pain in joints    Poor balance    uses cane    Rotator cuff sprain    Seborrheic keratosis    Shoulder joint pain    Sinusitis     Thyroid disease    Unspecified disorder of thyroid    hypothyroid    Visual impairment    glasses reading    Wears glasses       PAST SURGICAL HISTORY:   Past Surgical History:   Procedure Laterality Date    Abdominal surgery  approx. 1983    appendectomy    Adenoidectomy  probably w. tonsils?    Anesth,shoulder replacement  July 2016    reverse joint replacement    Appendectomy      Arthroscopy of joint unlisted Right     knee    Back surgery  09/12/2016    triple fusion of L3-L4-L5    Biopsy  07/26/2019    Bone Marrow    Biopsy  06/25/2019    right sural nerve biopsy    Carpal tunnel release Bilateral     Cataract Bilateral 08/2015    IOL'S    Colonoscopy      Colonoscopy  4/25/2022    Hemorrhoidectomy  ~ 2005    Hemorrhoidectomy,int/ext,simple      Joint replacement  July 2016    see above    Knee arthroscopy  1993?    routine knee scope    Orthopedic surg (pbp)      LT ELBOW - ulnar nerve release    Other  02/2019    Lumbar puncture    Other surgical history      Shoulder Joint Replacement 7/21/16, Sinus surgery Feb. '14,    Other surgical history      I&D of abscess in axilla area 7/3    Shoulder arthroscopy  6/23/17    Aspiration under Xray technique    Sigmoidoscopy,diagnostic  1980's    Sinus surgery    02/14/2014    Spinal fusion  Sept. 2016    Fusion of L 3,4,5    Spine surgery procedure unlisted      L3-4-5    Thyroidectomy      Tonsillectomy         Family history: Reviewed. No changes since last encounter    Social history- Etoh 2 cocktails before dinner, a glass of wine with dinner    ALLERGIES:   Allergies   Allergen Reactions    Immune Globulin RASH       MEDICATIONS: EMR reviewed   Current Outpatient Medications:     valACYclovir 500 MG Oral Tab, Take 1 tablet (500 mg total) by mouth daily., Disp: , Rfl:     ferrous sulfate 325 (65 FE) MG Oral Tab EC, Take 1 tablet (325 mg total) by mouth daily with breakfast., Disp: 90 tablet, Rfl: 1    GABAPENTIN 300 MG Oral Cap, TAKE 1 CAPSULE BY MOUTH 3 TIMES  DAILY, Disp: 270 capsule, Rfl: 0    famotidine 40 MG Oral Tab, Take 1 tablet (40 mg total) by mouth daily., Disp: 90 tablet, Rfl: 3    fluticasone propionate 50 MCG/ACT Nasal Suspension, 1 spray by Each Nare route 2 (two) times daily as needed for Rhinitis., Disp: 3 each, Rfl: 3    levothyroxine 100 MCG Oral Tab, Take 1 tablet (100 mcg total) by mouth once daily. Overdue for labs, please complete them., Disp: 90 tablet, Rfl: 3    Beclomethasone Diprop HFA 40 MCG/ACT Inhalation Aerosol, Breath Activated, Inhale 2 puffs into the lungs 2 (two) times daily as needed., Disp: 1 each, Rfl: 3    traZODone 100 MG Oral Tab, Take 1 tablet (100 mg total) by mouth nightly as needed for Sleep., Disp: 90 tablet, Rfl: 3    ALPRAZolam 0.25 MG Oral Tab, Take 1 tablet (0.25 mg total) by mouth nightly as needed., Disp: 90 tablet, Rfl: 1    doxycycline 100 MG Oral Cap, Take 1 capsule (100 mg total) by mouth 2 (two) times daily., Disp: , Rfl:     docusate sodium 100 MG Oral Cap, Take 1 capsule (100 mg total) by mouth 2 (two) times daily., Disp: 30 capsule, Rfl: 1    Glucosamine-Chondroitin (GLUCOSAMINE CHONDR COMPLEX OR), Take by mouth as needed., Disp: , Rfl:     cyanocobalamin 1000 MCG Oral Tab, Take 1 tablet (1,000 mcg total) by mouth daily., Disp: , Rfl:     senna-docusate 8.6-50 MG Oral Tab, Take 1 tablet by mouth daily., Disp: 30 tablet, Rfl: 0    tamsulosin (FLOMAX) cap, Take 1 capsule (0.4 mg total) by mouth daily., Disp: 90 capsule, Rfl: 3    finasteride 5 MG Oral Tab, Take 1 tablet (5 mg total) by mouth daily., Disp: 90 tablet, Rfl: 3    atorvastatin 10 MG Oral Tab, Take 1 tablet (10 mg total) by mouth daily., Disp: , Rfl:     Albuterol Sulfate  (90 BASE) MCG/ACT Inhalation Aero Soln, Inhale 2 puffs into the lungs every 6 (six) hours as needed for Wheezing., Disp: , Rfl:     REVIEW OF SYSTEMS:  General: denies any fever or chills.     Eye: no pain or redness;  Ear, mouth, throat: no hearing change, no throat pain or  soreness;  Respiratory: Denies: Difficulty Breathing, Chronic Cough and Wheezing.  Cardiovascular: NO Chest Pain and Palpitations.   GI: no abdominal pain, no nausea or diarrhea;  : no incontinence;  Neurological:  See history; relevant items discussed in the history.  Psychiatric: no depression, no suicidal attempt,   Musculoskeletal:  NO current complaint,  Endo: no cold intolerance, appetite is normal, no weight change;  Skin: warm, no rash, no allergy , no skin bruise or abnormal bleeding,     Objective:  Blood pressure 122/60, pulse 56, resp. rate 16, weight 209 lb (94.8 kg).  Body mass index is 27.57 kg/m². Vitals reviewed.  Physical Exam:  General Exam:  Appearance: well developed,  nurished , in no acute distress,   Pink Conjunctiva;  Neck: supple, no bruits;  Cardiac: S1/S2 RRR  Lungs: CTA B/L;  Musculoskeletal: no joint tenderness, others see neuro exam;  Extremities:  no pitting edema, no cyanosis or skin rash,  pulse is present,  Neurologic Examination  Mental Status  Is intact for age, and Language is intact, no slurred speech; calm, no acute stress, pleasant,   CN is normal from II to XII, visual field is full, EOMI, pupil symmetrical, light reflexes are present, fundus exam is normal. Face symmetrical with normal sensation, hearing normal, shoulder shrugging normal.   Motor:  NO drift. R DF 1, L DF 3-, R PF 4-, L PF 4, bilateral EHL 1, DI 4- on the R and 4 on L, FE 5, L APB 5, R APB 4, o/w 5/5   Sensory:  vibration present at the R ankle, and present on the L toe for 1 sec, position sense is at ankles  DTRs  Unable to obtain   No Babinski sign,   COORDINATION: Normal FTN and HTS tests,   GAIT:  Has steppage gait and slightly wide based  Special tests:     Labs:  Lab Results   Component Value Date     (H) 01/29/2024    BUN 24 (H) 01/29/2024    CREATSERUM 1.43 (H) 01/29/2024    BUNCREA 17.0 02/28/2022    ANIONGAP 6 01/29/2024    GFRAA 70 07/23/2021    GFRNAA 60 07/23/2021    CA 9.6 01/29/2024      01/29/2024    K 3.8 01/29/2024     01/29/2024    CO2 28.0 01/29/2024    OSMOCALC 298 (H) 01/29/2024         Imaging:  See above    Assessment/Plan:   Encounter Diagnoses   Name Primary?    Neuropathy associated with MGUS (HCC) Yes    Bilateral foot-drop     Lumbar radiculopathy     Elevated antinuclear antibody (YUSUF) level     Paresthesia of hand, bilateral         MGUS associated polyneuropathy IgM, and lumbar radiculopathy, anti-Mag negative  No improvement with IVIG, Rituxan or prednisone  Now has worsening DI weakness, R> L, and now R APB weakness  Extensive work up and also referred for second opinion at [facility], agreed with treatment  Elevated YUSUF- Has seen Rheumatology, likely false positive  Obtain EMG to evaluate for superimposed CTS and cubital tunnel  Also do EMG of LE   Start wearing a wrist brace at night, every night  Continue B12 1000mcg   Do evaluation for hand controlled vehicle  Continue gabapentin 300mg TID (taking BID with prn midday doses ok)  Continue AFO and PT exercises    Paresthesias in hands, worsening weakness  Eval for superimposed R CTS, and bilateral cubital tunnel   Start wearing a wrist brace on R    Lumbar DJD and radiculopathy  S/p L spine surgery 2016  Still has intermittent right radicular LE pain  MRI 2023 showed worsened right foraminal stenosis L5-S1   Likely contributing to R foot drop      Requested Prescriptions      No prescriptions requested or ordered in this encounter          We discussed in depth regarding the diagnosis, prognosis, treatment. All questions and concerns were addressed. 30 minutes were spent on this encounter, which included obtaining and reviewing history, examining the patient, reviewing and interpreting results, building a treatment plan, discussing treatment options, discussing medication instructions, educating the patient/family/caregiver, and completing documentation.       Kendal Solis, DO  Neurology and  Neuromuscular medicine  [facility]

## 2025-06-09 NOTE — PATIENT INSTRUCTIONS
Refill policies:    Allow 2-3 business days for refills; controlled substances may take longer.  Contact your pharmacy at least 5 days prior to running out of medication and have them send an electronic request or submit request through the “request refill” option in your [employer] account.  Refills are not addressed on weekends; covering physicians do not authorize routine medications on weekends.  No narcotics or controlled substances are refilled after noon on Fridays or by on call physicians.  By law, narcotics must be electronically prescribed.  A 30 day supply with no refills is the maximum allowed.  If your prescription is due for a refill, you may be due for a follow up appointment.  To best provide you care, patients receiving routine medications need to be seen at least once a year.  Patients receiving narcotic/controlled substance medications need to be seen at least once every 3 months.  In the event that your preferred pharmacy does not have the requested medication in stock (e.g. Backordered), it is your responsibility to find another pharmacy that has the requested medication available.  We will gladly send a new prescription to that pharmacy at your request.    Scheduling Tests:    If your physician has ordered radiology tests such as MRI or CT scans, please contact Central Scheduling at [phone] right away to schedule the test.  Once scheduled, the [facility] Centralized Referral Team will work with your insurance carrier to obtain pre-certification or prior authorization.  Depending on your insurance carrier, approval may take 3-10 days.  It is highly recommended patients assure they have received an authorization before having a test performed.  If test is done without insurance authorization, patient may be responsible for the entire amount billed.      Precertification and Prior Authorizations:  If your physician has recommended that you have a procedure or additional testing performed the [facility]  Centralized Referral Team will contact your insurance carrier to obtain pre-certification or prior authorization.    You are strongly encouraged to contact your insurance carrier to verify that your procedure/test has been approved and is a COVERED benefit.  Although the [facility] Centralized Referral Team does its due diligence, the insurance carrier gives the disclaimer that \"Although the procedure is authorized, this does not guarantee payment.\"    Ultimately the patient is responsible for payment.   Thank you for your understanding in this matter.  Paperwork Completion:  If you require FMLA or disability paperwork for your recovery, please make sure to either drop it off or have it faxed to our office at [phone]. Be sure the form has your name and date of birth on it.  The form will be faxed to our Forms Department and they will complete it for you.  There is a 25$ fee for all forms that need to be filled out.  Please be aware there is a 10-14 day turnaround time.  You will need to sign a release of information (KAY) form if your paperwork does not come with one.  You may call the Forms Department with any questions at [phone].  Their fax number is [phone].          Start wearing a wrist brace at night, every night  Schedule EMG

## 2025-06-12 ENCOUNTER — PROCEDURE VISIT (OUTPATIENT)
Dept: NEUROLOGY | Facility: CLINIC | Age: 80
End: 2025-06-12
Payer: MEDICARE

## 2025-06-12 DIAGNOSIS — R20.2 PARESTHESIA OF HAND, BILATERAL: ICD-10-CM

## 2025-06-12 DIAGNOSIS — R29.818 FINE MOTOR SKILL LOSS: Primary | ICD-10-CM

## 2025-06-12 DIAGNOSIS — D47.2 NEUROPATHY ASSOCIATED WITH MGUS (HCC): ICD-10-CM

## 2025-06-12 DIAGNOSIS — G63 NEUROPATHY ASSOCIATED WITH MGUS (HCC): ICD-10-CM

## 2025-06-12 DIAGNOSIS — R29.898 FINE MOTOR SKILL LOSS: Primary | ICD-10-CM

## 2025-06-12 NOTE — PROGRESS NOTES
[facility] in [place]  Neurology     Nils Joseph Patient Status:  No patient class for patient encounter    10/10/1945 MRN [id number]   Location [unfilled] [facility] Alex Case MD          Subjective:  Nils Joseph is a(n) 79 year old male s/p lumbar spine surgery in 2016 who is being followed for polyneuropathy by Dr. Lassiter. He developed radicular pain and right foot drop, after which he had the surgery. Radicular pain resolved. A few months or later, he developed a slowly progressive course of clumsiness of the feet. He started catching his toe when walking, first on the right, and then on the left. Initially had a lot of shooting pain from below his knees down to his toes, bilaterally, it would come and go, worse at night. Has been on gabapentin. Has numbness in his foot that comes and goes. Has had extensive work up including EMG, LP, bone marrow biopsy, nerve biopsy. Labs showed MGUS. He was started on IVIG, and was on it for 2 sets of infusions. Developed a rash.  Gets cramps in hands, and trigger finger. Buttoning shirts can be difficult.   Interim  Since has started wearing bilateral AFO's. Falling less. Saw NSGY and spinal surgery was not recommended. Taking gabapentin 300mg TID. Drops things more from his hands. No tingling or numbness in hands.   Interim  Since last visit, I started patient on prednisone 60mg on 23, and 50mg after two weeks, for the last 2 weeks. Denies further elevated BP's. Sleep is minimally worse.   Interim  Patient reports symptoms are the same. Has not noticed any increase in foot strength. Patient currently on 50 mg prednisone. Has insomnia and weight gain. Was on prednisone 60mg x 2 weeks, and now for past 5 weeks has been on 50mg.  Interim  Patient received Rituxan starting end of November, and has been on prednisone 15mg for several weeks now (tapered from 40mg on 10/17/23, initially 60mg. Does not feel feet are stronger. Has  chronic cramping in the hands, along with numbness, has been going on for a 2 years. Has noted decreased dexterity in the fingers, slowly progressing. He is taking gabapentin 300mg TID. IVIG got 1 infusion over one day, followed by another infusion 1 month later, followed by rash, and stopping the treatment.   Interim  Patient was seen by [facility], had additional testing, genetic testing, negative. Started having left hip pain. Started PT, which helped, but still getting intermittently. Foot drop is about the same.   Interim  Patient reports that since LOV, reports balance is minimally worse. Feels that each year is slightly worse. No additional testing was recommended by [facility].  Tingling hasn't gotten worse. Saw Rheum thought YUSUF elevation was a false positive. Hand weakness is slightly worse.  Interim  Denies radicular pain or paresthesias in either upper extremity.      Rituxan 11/21/23- 12/12/23    Data review:  Repeat EMG 6/12/2025   1.  There is electrophysiologic evidence of a severe sensor-motor axonal polyneuropathy in the upper extremities.  2.  There is no clear evidence of a superimposed median compressive mononeuropathy at the wrist.  However, it cannot be ruled out.  3.  There was motor unit remodeling seen in the right biceps.  In the appropriate clinical setting, this can be associated with a C5-C6 radiculopathy.  Clinical correlation is recommended.  No active denervation was seen.      2024 YUSUF 1: 1280, SSA SSB negative  ACE neg  SPEP as previous  [facility] 2024- negative amyloid TTR testing, negative other hereditary panel (102 genes) ([employer] comprehensive neuropathy panel)      Hip XR 2024  CONCLUSION:  No acute osseous abnormality is seen.  Mild degenerative changes in the hips.     Component      Latest Ref Rng 9/22/2023   Glucose      70 - 99 mg/dL 92    Sodium      136 - 145 mmol/L 138    Potassium      3.5 - 5.1 mmol/L 4.1    Chloride      98 - 112 mmol/L 105    Carbon Dioxide, Total      21.0 -  32.0 mmol/L 30.0    ANION GAP      0 - 18 mmol/L 3    BUN      7 - 18 mg/dL 27 (H)    CREATININE      0.70 - 1.30 mg/dL 1.51 (H)    CALCIUM      8.5 - 10.1 mg/dL 9.7    CALCULATED OSMOLALITY      275 - 295 mOsm/kg 291    EGFR      >=60 mL/min/1.73m2 47 (L)    AST (SGOT)      15 - 37 U/L 23    ALT (SGPT)      16 - 61 U/L 42    ALKALINE PHOSPHATASE      45 - 117 U/L 61    Total Bilirubin      0.1 - 2.0 mg/dL 0.6    PROTEIN, TOTAL      6.4 - 8.2 g/dL 6.4    Albumin      3.4 - 5.0 g/dL 3.2 (L)    Globulin      2.8 - 4.4 g/dL 3.2    A/G Ratio      1.0 - 2.0  1.0    Patient Fasting for CMP? Yes       Component      Latest Ref Rng 4/20/2023 9/22/2023   WBC      4.0 - 11.0 x10(3) uL 6.5  8.6    RBC      3.80 - 5.80 x10(6)uL 4.14  4.13    Hemoglobin      13.0 - 17.5 g/dL 13.1  14.0    Hematocrit      39.0 - 53.0 % 40.7  41.8    Platelet Count      150.0 - 450.0 10(3)uL 199.0  167.0    MCV      80.0 - 100.0 fL 98.3  101.2 (H)    MCH      26.0 - 34.0 pg 31.6  33.9    MCHC      31.0 - 37.0 g/dL 32.2  33.5    RDW      % 13.5  16.6    Prelim Neutrophil Abs      1.50 - 7.70 x10 (3) uL 4.08  5.30    Neutrophils Absolute      1.50 - 7.70 x10(3) uL 4.08  5.30    Lymphocytes Absolute      1.00 - 4.00 x10(3) uL 1.56  2.58           EMG 4/2023  Conclusion:   There is electrophysiologic evidence of a marked sensory axonal polyneuropathy.   In additional, there are superimposed bilateral L4-S1 lumbar radiculopathies, with active denervation changes.      MRI c spine 6/2023  MPRESSION:   1. 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   Recommend ENT consultation and ultrasound for further assessment.   2.  Severe right neural foraminal stenosis at C3-C4 due to stable right uncinate and facet   hypertrophy.   3. Severe bilateral foraminal stenosis at C6-C7 due to uncinate and facet hypertrophy,  superimposed   on a mild broad-based posterior disc osteophyte complex and posterior ligamentous and facet   hypertrophy, resulting in mild spinal canal stenosis.   4.  Moderate-to-severe right and mild/moderate left neuroforaminal stenosis at C5-C6 secondary to   uncinate and facet hypertrophy, coupled with a stable mild posterior disc bulge with a prominent   annular disc fissure flattening the anterior thecal sac.   5.  NO moderate-to-severe central canal stenosis. NO pathologic cervical cord signal.   6.  Stable hypoplastic appearance of the RIGHT vertebral artery.         MRI lumbar 3/2023  CONCLUSION:       1. Interval lumbar fusion changes noted spanning L3-L5.  Metallic susceptibility artifact from posterior fusion hardware limits evaluation of surrounding structures.  Interbody fusion devices are seen at L3-4 and L4-5.  There are changes of right L3   hemilaminotomy and bilateral L4 hemilaminotomy.      2. The interval worsening of degenerative changes at the L2-3 level resulting in new moderate spinal canal stenosis with bilateral subarticular zone stenosis at L2-3.  There is also new mild bilateral neural foraminal stenosis at L2-3.      3. Interval worsening of facet arthropathy at L5-S1 results in worsening of moderate right and mild-to-moderate left neural foraminal stenosis at L5-S1.      4. There is improvement of now mild bilateral neural foraminal stenosis at the fused L4-5 level.       Component      Latest Ref Rng 2/23/2023   FOLATE (FOLIC ACID), SERUM      >=8.7 ng/mL 19.6    Vitamin B12      193 - 986 pg/mL 312    VITAMIN B1 (THIAMINE), WHOLE B      70 - 180 nmol/L 148          1/2021   Monoclonal spike in the gamma region. . . .   IMMUNOFIXATION       Monoclonal IgM kappa. If clinically indicated, 24 hour urine monoclonal . . .   KAPPA FREE LIGHT CHAIN      0.330 - 1.940 mg/dL 2.449 (H)   LAMBDA FREE LIGHT CHAIN      0.571 - 2.630 mg/dL 1.604   KAPPA/LAMBDA FLC RATIO      0.26 - 1.65 1.53    M-Jaden      <=0.00 g/dL 0.36 (H)      Component      Latest Ref Rng & Units 2/22/2019   Total Protein CSF      15.0 - 45.0 mg/dL 34.9   Glucose CSF      40 - 70 mg/dL 56     Component      Latest Ref Rng & Units 12/26/2022 9/23/2019 3/6/2019 2/14/2018   HEMOGLOBIN A1c      4.0 - 6.0 %       Vitamin B12      193 - 986 pg/mL    359   TSH      0.358 - 3.740 mIU/mL 2.210        Component      Latest Ref Rng & Units 7/22/2017   HEMOGLOBIN A1c      4.0 - 6.0 % 6.1 (H)   Vitamin B12      193 - 986 pg/mL    TSH      0.358 - 3.740 mIU/mL        Pathology report:  Per report     Nerve biopsy:  ... The density large myelinated axons reduced with only about 30% of the expected number of large myelinated axons remaining.  At least at the level of the jose sections unmyelinated axons appear relatively preserved.  There is no significant variation in axonal density within or between fascicles.  The remaining myelin sheaths are appropriate in thickness and the diameter of the accompanying axons.  No distinct populations of thinly myelinated axons are seen.\"     Comment: The peripheral nerve biopsy shows axonal loss as described in detail above.  Beyond that there are not changes that would suggest a specific underlying etiology.  In particular there is no evidence of any inflammatory changes or blood vessel abnormalities.      EMG 2019      EMG       This is an abnormal study.  There is electrophysiologic evidence to suggest a mixed type demyelinating and axonal large fiber polyneuropathy.  In addition, there is evidence for subacute denervation in the left lower extremity, suggestive of, but not diagnostic for a superimposed lower lumbosacral radiculopathy.  There is no evidence to suggest a myopathy.     Clinical comment:   The presence of severely reduced amplitude in the left lower extremity with prolonged F waves, as well as significantly prolonged distal motor latencies and partial conduction block at a non-traditional  entrapment sites in the left upper extremity may suggest an acquired primary demyelinating neuropathy; clinical correlation, serologic and CSF studies are recommended.        MRI cervical 2020  FINDINGS:     Alignment:Grade 1 anterolisthesis of C3 on C4 and C4 on C5.   Vertebral body heights:Normal.   Cervical cord:Normal signal and morphology.   Cerebellar tonsils.No tonsillar ectopia.   Bone marrow signal:Overall bone marrow signal is normal except for Modic type II endplate   degenerative changes at C6-C7   Prevertebral soft tissues:Absent right vertebral artery flow void. Left maxillary sinus mucosal   thickening.   C2-C3:  Normal.   C3-C4:  Right uncovertebral osteophytes and severe right facet joint hypertrophy causing severe   right neural foraminal stenosis. Left facet joint hypertrophy causing moderate left neural foraminal   stenosis. No central canal stenosis.   C4-C5:  Severe bilateral facet joint hypertrophy and bilateral uncovertebral osteophytes causing   severe bilateral neural foraminal stenosis. No central canal stenosis.   C5-C6:  Severe right facet joint hypertrophy and central disc osteophyte complex causing moderate   right neural foraminal stenosis. Patent left neural foramen. No central canal stenosis.   C6-C7:  Central disc osteophyte complex compressing the ventral thecal sac without cord compression   or signal cord abnormality. Mild central canal stenosis. Severe bilateral neural foraminal stenosis.   C7-T1:  Normal.   Impression   IMPRESSION:     1. Severe right neural foraminal stenosis at C3-C4.   2. Severe bilateral neural foraminal stenosis at C4-C5.   3. Moderate right neural foraminal stenosis at C5-C6.   4. Absence right vertebral flow void suggesting occluded or hypoplastic right vertebral artery.   Correlation with duplex imaging of the carotid arteries recommended.       MRI L spine 8/206  L1-L2: Mild facet hypertrophy. No spinal canal or foraminal narrowing.       L2-L3: Mild  diffuse disc bulge with a superimposed right foraminal/extraforaminal disc protrusion. Mild facet hypertrophy. Mild ligamentum flavum thickening. No spinal canal stenosis. Mild bilateral foraminal narrowing.       L3-L4: Mild to moderate diffuse disc bulge with a superimposed disc osteophyte complex extending into the far left lateral space. There is a superimposed broad-based central disc protrusion. Moderate facet hypertrophy. Moderate ligamentum flavum   thickening. Moderate to severe spinal canal stenosis. Bilateral subarticular zone narrowing. Moderate to severe right and moderate left foraminal narrowing.       L4-5: There is uncovering of the disc with a mild to moderate diffuse disc bulge. There is severe facet hypertrophy with facet joint effusions bilaterally. Moderate ligamentum flavum thickening. Severe spinal canal stenosis. Bilateral subarticular zone   narrowing. Severe left and moderate to severe right foraminal narrowing.       L5-S1: Mild diffuse disc bulge with superimposed small to moderate size central disc protrusion. No spinal canal narrowing. Moderate to severe facet hypertrophy. Moderate bilateral foraminal narrowing.       Impression   CONCLUSION:         #1. Moderate to severe multilevel degenerative changes in the lumbar spine most notable at L3-L4, L4-5, and L5-S1. At L3-L4, there is moderate to severe spinal canal stenosis. At L4-5, there is severe spinal canal stenosis. Please see above for further   details.        PAST MEDICAL HISTORY:   Past Medical History:    Anemia    Arthritis    Asthma (HCC)    Atypical mole    Back pain    Back problem    low back pain, s/p fusion of L3,4,5.  back still stiff    Belching    Benign thyroid cyst    BPH (benign prostatic hyperplasia)    Carotid stenosis    Cataract    s/p sugery    Constipation    Depression    Disorder of prostate    Esophageal reflux    maybe?    Eye disease    Fatigue    Flatulence/gas pain/belching    Food intolerance     Frequent urination    Frequent use of laxatives    Glaucoma    Hearing impairment    Slight    Hearing loss    Hemorrhoids    Hemorrhoids, internal    High cholesterol    Elevated     Hypothyroidism    Levothyroxine    Insomnia    Irregular bowel habits    Itch of skin    Leg swelling    Neuropathy    R leg    Obesity    slightly overweight    Osteoarthritis    OTHER DISEASES    Hypothyroidism and BPH both controlled by medication    Pain in joints    Poor balance    uses cane    Rotator cuff sprain    Seborrheic keratosis    Shoulder joint pain    Sinusitis    Thyroid disease    Unspecified disorder of thyroid    hypothyroid    Visual impairment    glasses reading    Wears glasses       PAST SURGICAL HISTORY:   Past Surgical History:   Procedure Laterality Date    Abdominal surgery  approx. 1983    appendectomy    Adenoidectomy  probably w. tonsils?    Anesth,shoulder replacement  July 2016    reverse joint replacement    Appendectomy      Arthroscopy of joint unlisted Right     knee    Back surgery  09/12/2016    triple fusion of L3-L4-L5    Biopsy  07/26/2019    Bone Marrow    Biopsy  06/25/2019    right sural nerve biopsy    Carpal tunnel release Bilateral     Cataract Bilateral 08/2015    IOL'S    Colonoscopy      Colonoscopy  4/25/2022    Hemorrhoidectomy  ~ 2005    Hemorrhoidectomy,int/ext,simple      Joint replacement  July 2016    see above    Knee arthroscopy  1993?    routine knee scope    Orthopedic surg (pbp)      LT ELBOW - ulnar nerve release    Other  02/2019    Lumbar puncture    Other surgical history      Shoulder Joint Replacement 7/21/16, Sinus surgery Feb. '14,    Other surgical history      I&D of abscess in axilla area 7/3    Shoulder arthroscopy  6/23/17    Aspiration under Xray technique    Sigmoidoscopy,diagnostic  1980's    Sinus surgery    02/14/2014    Spinal fusion  Sept. 2016    Fusion of L 3,4,5    Spine surgery procedure unlisted      L3-4-5    Thyroidectomy       Tonsillectomy         Family history: Reviewed. No changes since last encounter    Social history- Etoh 2 cocktails before dinner, a glass of wine with dinner    ALLERGIES:   Allergies   Allergen Reactions    Immune Globulin RASH       MEDICATIONS: EMR reviewed   Current Outpatient Medications:     valACYclovir 500 MG Oral Tab, Take 1 tablet (500 mg total) by mouth daily., Disp: , Rfl:     gabapentin 300 MG Oral Cap, Take 1 capsule (300 mg total) by mouth 3 (three) times daily., Disp: 270 capsule, Rfl: 3    ferrous sulfate 325 (65 FE) MG Oral Tab EC, Take 1 tablet (325 mg total) by mouth daily with breakfast., Disp: 90 tablet, Rfl: 1    famotidine 40 MG Oral Tab, Take 1 tablet (40 mg total) by mouth daily., Disp: 90 tablet, Rfl: 3    fluticasone propionate 50 MCG/ACT Nasal Suspension, 1 spray by Each Nare route 2 (two) times daily as needed for Rhinitis., Disp: 3 each, Rfl: 3    levothyroxine 100 MCG Oral Tab, Take 1 tablet (100 mcg total) by mouth once daily. Overdue for labs, please complete them., Disp: 90 tablet, Rfl: 3    Beclomethasone Diprop HFA 40 MCG/ACT Inhalation Aerosol, Breath Activated, Inhale 2 puffs into the lungs 2 (two) times daily as needed., Disp: 1 each, Rfl: 3    traZODone 100 MG Oral Tab, Take 1 tablet (100 mg total) by mouth nightly as needed for Sleep., Disp: 90 tablet, Rfl: 3    ALPRAZolam 0.25 MG Oral Tab, Take 1 tablet (0.25 mg total) by mouth nightly as needed., Disp: 90 tablet, Rfl: 1    doxycycline 100 MG Oral Cap, Take 1 capsule (100 mg total) by mouth 2 (two) times daily., Disp: , Rfl:     docusate sodium 100 MG Oral Cap, Take 1 capsule (100 mg total) by mouth 2 (two) times daily., Disp: 30 capsule, Rfl: 1    Glucosamine-Chondroitin (GLUCOSAMINE CHONDR COMPLEX OR), Take by mouth as needed., Disp: , Rfl:     cyanocobalamin 1000 MCG Oral Tab, Take 1 tablet (1,000 mcg total) by mouth daily., Disp: , Rfl:     senna-docusate 8.6-50 MG Oral Tab, Take 1 tablet by mouth daily., Disp: 30  tablet, Rfl: 0    tamsulosin (FLOMAX) cap, Take 1 capsule (0.4 mg total) by mouth daily., Disp: 90 capsule, Rfl: 3    finasteride 5 MG Oral Tab, Take 1 tablet (5 mg total) by mouth daily., Disp: 90 tablet, Rfl: 3    atorvastatin 10 MG Oral Tab, Take 1 tablet (10 mg total) by mouth daily., Disp: , Rfl:     Albuterol Sulfate  (90 BASE) MCG/ACT Inhalation Aero Soln, Inhale 2 puffs into the lungs every 6 (six) hours as needed for Wheezing., Disp: , Rfl:     REVIEW OF SYSTEMS:  General: denies any fever or chills.     Eye: no pain or redness;  Ear, mouth, throat: no hearing change, no throat pain or soreness;  Respiratory: Denies: Difficulty Breathing, Chronic Cough and Wheezing.  Cardiovascular: NO Chest Pain and Palpitations.   GI: no abdominal pain, no nausea or diarrhea;  : no incontinence;  Neurological:  See history; relevant items discussed in the history.  Psychiatric: no depression, no suicidal attempt,   Musculoskeletal:  NO current complaint,  Endo: no cold intolerance, appetite is normal, no weight change;  Skin: warm, no rash, no allergy , no skin bruise or abnormal bleeding,     Objective:  There were no vitals taken for this visit.  There is no height or weight on file to calculate BMI. Vitals reviewed.  Physical Exam:  General Exam:  Appearance: well developed,  nurished , in no acute distress,   Pink Conjunctiva;  Neck: supple, no bruits;  Cardiac: S1/S2 RRR  Lungs: CTA B/L;  Musculoskeletal: no joint tenderness, others see neuro exam;  Extremities:  no pitting edema, no cyanosis or skin rash,  pulse is present,  Neurologic Examination  Mental Status  Is intact for age, and Language is intact, no slurred speech; calm, no acute stress, pleasant,   CN is normal from II to XII, visual field is full, EOMI, pupil symmetrical, light reflexes are present, fundus exam is normal. Face symmetrical with normal sensation, hearing normal, shoulder shrugging normal.   Motor:  NO drift. R DF 1, L DF 3-, R PF  4-, L PF 4, bilateral EHL 1, DI 4- on the R and 4 on L, FE 5, L APB 5, R APB 4, o/w 5/5   Sensory:  vibration present at the R ankle, and present on the L toe for 1 sec, position sense is at ankles  DTRs  Unable to obtain   No Babinski sign,   COORDINATION: Normal FTN and HTS tests,   GAIT:  Has steppage gait and slightly wide based  Special tests:     Labs:  Lab Results   Component Value Date     (H) 01/29/2024    BUN 24 (H) 01/29/2024    CREATSERUM 1.43 (H) 01/29/2024    BUNCREA 17.0 02/28/2022    ANIONGAP 6 01/29/2024    GFRAA 70 07/23/2021    GFRNAA 60 07/23/2021    CA 9.6 01/29/2024     01/29/2024    K 3.8 01/29/2024     01/29/2024    CO2 28.0 01/29/2024    OSMOCALC 298 (H) 01/29/2024         Imaging:  See above    Assessment/Plan:   Encounter Diagnoses   Name Primary?    Neuropathy associated with MGUS (HCC)     Paresthesia of hand, bilateral     Fine motor skill loss Yes        MGUS associated polyneuropathy IgM, and lumbar radiculopathy, anti-Mag negative  No improvement with IVIG, Rituxan or prednisone  Extensive work up and also referred for second opinion at [facility], agreed with treatment  Elevated YUSUF- Has seen Rheumatology, likely false positive    DI weakness, R> L, and now R APB weakness-  EMG done today does not suggest any definite superimposed carpal tunnel.  Motor responses are fairly robust, however there were chronic remodeling changes seen in the right FDI.  There is mild motor unit remodeling seen also in the right biceps, which could be due to an atypical C5-C6 cervical radiculopathy.  Will await cervical MRI ordered by neurosurgery.  Do a trial of a nightly wrist splint on the right x 3 months, as superimposed carpal tunnel cannot be completely ruled out  Start OT    Continue B12 1000mcg   Do evaluation for hand controlled vehicle  Continue gabapentin 300mg TID (taking BID with prn midday doses ok)  Continue AFO and PT exercises      Lumbar DJD and  radiculopathy  S/p L spine surgery 2016  Still has intermittent right radicular LE pain  MRI 2023 showed worsened right foraminal stenosis L5-S1   Likely contributing to R foot drop      Requested Prescriptions      No prescriptions requested or ordered in this encounter          We discussed in depth regarding the diagnosis, prognosis, treatment. All questions and concerns were addressed.       Kendal Solsi DO  Neurology and Neuromuscular medicine  [facility]

## 2025-06-12 NOTE — PROCEDURES
[address], [address]  [place] [address]      Full Name: Nils Joseph Gender: Male  Patient ID: [id number] YOB: 1945      Visit Date: 6/12/2025 3:16 PM  Age: 79 Years  Examining Physician: Kendal Solis DO  Referring Physician: Kendal Solis DO  History: History of MGUS associated polyneuropathy, with several month worsening of hand numbness.      Sensory NCS      Nerve / Sites Rec. Site Onset Lat Peak Lat NP Amp PP Amp Segments Distance Velocity Comment     ms ms µV µV  cm m/s    L Hand - Sensory      Median Wrist D2 2.71 3.59 4.9 8.9 Median Wrist - D2 14 52       Ulnar Wrist  D5 NR NR NR NR Ulnar Wrist  - D5 14 NR       Radial Forearm Wrist NR NR NR NR Radial Forearm - Wrist 10 NR    R Hand - Sensory      Median Wrist D2 NR NR NR NR Median Wrist - D2 14 NR       Ulnar Wrist  D5 3.59 4.48 13.0 48.9 Ulnar Wrist  - D5 14 39       Radial Forearm Wrist NR NR NR NR Radial Forearm - Wrist 10 NR    R Leg - Sensory      Sural Calf Ankle NR NR NR NR Sural Calf - Ankle 14 NR        Motor NCS      Nerve / Sites Muscle Latency Amplitude Segments Dist. Lat Diff Velocity Comments     ms mV  cm ms m/s    L Median, Ulnar - (APB, ADM)      Median Wrist APB 2.83 9.7 Median Wrist - APB 8         Median Elbow APB 8.48 8.0 Median Elbow - Wrist 26 5.65 46.1       Ulnar Wrist ADM 2.38 8.2 Ulnar Wrist - ADM 8         Ulnar B.Elbow ADM 7.13 7.0 Ulnar B.Elbow - Wrist 25 4.75 52.6       Ulnar A.Elbow ADM 8.17 6.5 Ulnar A.Elbow - B.Elbow 6.5 1.04 62.4        Ulnar A.Elbow - Wrist  5.79     R Median, Ulnar - (APB, ADM)      Median Wrist APB 3.06 8.2 Median Wrist - APB 8         Median Elbow APB 8.46 7.2 Median Elbow - Wrist 27 5.40 50.0       Ulnar Wrist ADM 2.27 7.7 Ulnar Wrist - ADM 8         Ulnar B.Elbow ADM 7.40 5.4 Ulnar B.Elbow - Wrist 26.5 5.12 51.7       Ulnar A.Elbow ADM 8.21 7.0 Ulnar A.Elbow - B.Elbow 8 0.81 98.5        Ulnar A.Elbow - Wrist  5.94     R Peroneal,  Tibial - (EDB, AH)      Peroneal Ankle EDB NR NR Peroneal Ankle - EDB 8         Tibial Ankle AH NR NR Tibial Ankle - AH 8          EMG Summary Table     Spontaneous MUAP Recruitment   Muscle IA Fib PSW Fasc H.F. Amp Dur. PPP Pattern   R. Deltoid N None None None None N N N N   R. Biceps brachii N None None None None N 1+ N N   R. Triceps brachii N None None None None N N N N   R. Flexor carpi radialis N None None None None N N N N   R. First dorsal interosseous N None None None None N 1+ N N         Nils Joseph [id number] 6/12/2025 3:16 PM     2 of 3    Summary:    1.  The right median sensory response was absent.  The left median sensory response had decreased amplitude and borderline prolonged distal latency.  2.  The bilateral radial sensory responses were absent, as was the right sural sensory response.  3.  The right ulnar sensory response had prolonged distal latency.  Amplitude was normal.  The left ulnar sensory response was absent.  4.  The left median motor response had slightly slowed conduction velocity but was otherwise normal.  The right median motor response was normal.  5.  The left ulnar motor response was normal.  The right ulnar motor response was also normal.  6.  The right peroneal and tibial motor responses were absent.  7.  Needle EMG using a concentric needle was performed on selected muscles of the right upper extremity.  There was motor unit remodeling seen in the right FDI and the right biceps brachii.    Conclusion:   1.  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  2.  There is no clear evidence of a superimposed median compressive mononeuropathy at the wrist.  However, it cannot be ruled out.  3.  There was motor unit remodeling seen in the right biceps.  In the appropriate clinical setting, this can be associated with a C5-C6 radiculopathy.  Clinical correlation is recommended.  No active denervation was  seen.        ________________________  Kendal Solis DO  Neuromuscular and General Neurology  [facility]              Nils Joseph [id number] 6/12/2025 3:16 PM     2 of 3

## 2025-06-18 ENCOUNTER — OFFICE VISIT (OUTPATIENT)
Dept: PODIATRY CLINIC | Facility: CLINIC | Age: 80
End: 2025-06-18

## 2025-06-18 DIAGNOSIS — L60.0 INGROWN NAIL: ICD-10-CM

## 2025-06-18 DIAGNOSIS — B35.1 ONYCHOMYCOSIS: Primary | ICD-10-CM

## 2025-06-18 DIAGNOSIS — G89.29 CHRONIC HEEL PAIN, RIGHT: ICD-10-CM

## 2025-06-18 DIAGNOSIS — D47.2 MGUS (MONOCLONAL GAMMOPATHY OF UNKNOWN SIGNIFICANCE): ICD-10-CM

## 2025-06-18 DIAGNOSIS — D47.2 NEUROPATHY ASSOCIATED WITH MGUS (HCC): ICD-10-CM

## 2025-06-18 DIAGNOSIS — M20.42 HAMMER TOES OF BOTH FEET: ICD-10-CM

## 2025-06-18 DIAGNOSIS — M77.42 METATARSALGIA, LEFT FOOT: ICD-10-CM

## 2025-06-18 DIAGNOSIS — G63 NEUROPATHY ASSOCIATED WITH MGUS (HCC): ICD-10-CM

## 2025-06-18 DIAGNOSIS — M20.41 HAMMER TOES OF BOTH FEET: ICD-10-CM

## 2025-06-18 DIAGNOSIS — M79.671 CHRONIC HEEL PAIN, RIGHT: ICD-10-CM

## 2025-06-18 PROCEDURE — 99213 OFFICE O/P EST LOW 20 MIN: CPT | Performed by: STUDENT IN AN ORGANIZED HEALTH CARE EDUCATION/TRAINING PROGRAM

## 2025-06-18 NOTE — PROGRESS NOTES
[facility]  Progress Note    Nils Joseph is a 79 year old male.   Chief Complaint   Patient presents with    Toenail Care     Nail care and foot check - pcp lov 03/11/25 dr denice mixon          HPI:     Patient is a pleasant 79-year-old male with past medical history of neuropathy secondary to MGUS presents to clinic for routine foot care.  He has elongated and thickened nails he has difficulty trimming his own.  His nails do become incurvated by the end of his stretch between visits.  They do cause some pain from rubbing in his shoes.   He continues to walk with AFOs and complete physical therapy. His right heel pain is doing better after using felt offloading pad. He is following with neurology and [facility] and at [facility].  No other complaints are mentioned.      Allergies: Immune globulin   Current Outpatient Medications   Medication Sig Dispense Refill    valACYclovir 500 MG Oral Tab Take 1 tablet (500 mg total) by mouth daily.      gabapentin 300 MG Oral Cap Take 1 capsule (300 mg total) by mouth 3 (three) times daily. 270 capsule 3    ferrous sulfate 325 (65 FE) MG Oral Tab EC Take 1 tablet (325 mg total) by mouth daily with breakfast. 90 tablet 1    famotidine 40 MG Oral Tab Take 1 tablet (40 mg total) by mouth daily. 90 tablet 3    fluticasone propionate 50 MCG/ACT Nasal Suspension 1 spray by Each Nare route 2 (two) times daily as needed for Rhinitis. 3 each 3    levothyroxine 100 MCG Oral Tab Take 1 tablet (100 mcg total) by mouth once daily. Overdue for labs, please complete them. 90 tablet 3    Beclomethasone Diprop HFA 40 MCG/ACT Inhalation Aerosol, Breath Activated Inhale 2 puffs into the lungs 2 (two) times daily as needed. 1 each 3    traZODone 100 MG Oral Tab Take 1 tablet (100 mg total) by mouth nightly as needed for Sleep. 90 tablet 3    ALPRAZolam 0.25 MG Oral Tab Take 1 tablet (0.25 mg total) by mouth nightly as needed. 90 tablet 1    doxycycline 100  MG Oral Cap Take 1 capsule (100 mg total) by mouth 2 (two) times daily.      docusate sodium 100 MG Oral Cap Take 1 capsule (100 mg total) by mouth 2 (two) times daily. 30 capsule 1    Glucosamine-Chondroitin (GLUCOSAMINE CHONDR COMPLEX OR) Take by mouth as needed.      cyanocobalamin 1000 MCG Oral Tab Take 1 tablet (1,000 mcg total) by mouth daily.      senna-docusate 8.6-50 MG Oral Tab Take 1 tablet by mouth daily. 30 tablet 0    tamsulosin (FLOMAX) cap Take 1 capsule (0.4 mg total) by mouth daily. 90 capsule 3    finasteride 5 MG Oral Tab Take 1 tablet (5 mg total) by mouth daily. 90 tablet 3    atorvastatin 10 MG Oral Tab Take 1 tablet (10 mg total) by mouth daily.      Albuterol Sulfate  (90 BASE) MCG/ACT Inhalation Aero Soln Inhale 2 puffs into the lungs every 6 (six) hours as needed for Wheezing.        Past Medical History:    Anemia    Arthritis    Asthma (HCC)    Atypical mole    Back pain    Back problem    low back pain, s/p fusion of L3,4,5.  back still stiff    Belching    Benign thyroid cyst    BPH (benign prostatic hyperplasia)    Carotid stenosis    Cataract    s/p sugery    Constipation    Depression    Disorder of prostate    Esophageal reflux    maybe?    Eye disease    Fatigue    Flatulence/gas pain/belching    Food intolerance    Frequent urination    Frequent use of laxatives    Glaucoma    Hearing impairment    Slight    Hearing loss    Hemorrhoids    Hemorrhoids, internal    High cholesterol    Elevated     Hypothyroidism    Levothyroxine    Insomnia    Irregular bowel habits    Itch of skin    Leg swelling    Neuropathy    R leg    Obesity    slightly overweight    Osteoarthritis    OTHER DISEASES    Hypothyroidism and BPH both controlled by medication    Pain in joints    Poor balance    uses cane    Rotator cuff sprain    Seborrheic keratosis    Shoulder joint pain    Sinusitis    Thyroid disease    Unspecified disorder of thyroid    hypothyroid    Visual impairment     glasses reading    Wears glasses      Past Surgical History:   Procedure Laterality Date    Abdominal surgery  approx. 1983    appendectomy    Adenoidectomy  probably w. tonsils?    Anesth,shoulder replacement  July 2016    reverse joint replacement    Appendectomy      Arthroscopy of joint unlisted Right     knee    Back surgery  09/12/2016    triple fusion of L3-L4-L5    Biopsy  07/26/2019    Bone Marrow    Biopsy  06/25/2019    right sural nerve biopsy    Carpal tunnel release Bilateral     Cataract Bilateral 08/2015    IOL'S    Colonoscopy      Colonoscopy  4/25/2022    Hemorrhoidectomy  ~ 2005    Hemorrhoidectomy,int/ext,simple      Joint replacement  July 2016    see above    Knee arthroscopy  1993?    routine knee scope    Orthopedic surg (pbp)      LT ELBOW - ulnar nerve release    Other  02/2019    Lumbar puncture    Other surgical history      Shoulder Joint Replacement 7/21/16, Sinus surgery Feb. '14,    Other surgical history      I&D of abscess in axilla area 7/3    Shoulder arthroscopy  6/23/17    Aspiration under Xray technique    Sigmoidoscopy,diagnostic  1980's    Sinus surgery    02/14/2014    Spinal fusion  Sept. 2016    Fusion of L 3,4,5    Spine surgery procedure unlisted      L3-4-5    Thyroidectomy      Tonsillectomy        Family History   Problem Relation Age of Onset    Heart Attack Father     Heart Disorder Father         partially blocked carotid arteries    Hypertension Father     Cancer Father         bladder cancer    Lipids Father         blocked carotid artery    Circulatory Problems Father         blocked carotid arteries    Heart Disease Father         had some cardiac issues but lived to 85    Heart Attack Mother     Breast Cancer Mother     Cancer Mother         breast cancer    Heart Disorder Mother         cardiac issues related to aging    Arthritis Mother     Heart Disease Mother         had some cardiac issues but lived to 93    Hypertension Brother     Hypertension Brother      Hypertension Brother       Social History     Socioeconomic History    Marital status:    Tobacco Use    Smoking status: Former     Current packs/day: 0.00     Average packs/day: 1.5 packs/day for 32.0 years (48.0 ttl pk-yrs)     Types: Cigarettes, Pipe     Start date: 3/1/1966     Quit date: 3/1/1998     Years since quittin.3    Smokeless tobacco: Never    Tobacco comments:     Quit many years ago   Vaping Use    Vaping status: Never Used   Substance and Sexual Activity    Alcohol use: Yes     Alcohol/week: 13.0 - 18.0 standard drinks of alcohol     Types: 5 - 7 Glasses of wine, 1 Cans of beer, 7 - 10 Shots of liquor per week     Comment: 1-2 drinks daily    Drug use: No   Other Topics Concern    Caffeine Concern Yes     Comment: 2-3 cups a day    Exercise No     Comment: physical therapy           REVIEW OF SYSTEMS:     Today reviewed systems as documented below  GENERAL HEALTH: feels well otherwise  SKIN: denies any unusual skin lesions or rashes  RESPIRATORY: denies shortness of breath with exertion  CARDIOVASCULAR: denies chest pain on exertion  GI: denies abdominal pain and denies heartburn  NEURO: denies headaches  MUSCULO: History of arthritis      EXAM:   There were no vitals taken for this visit.  GENERAL: well developed, well nourished, in no apparent distress  EXTREMITIES:   1. Integument: Normal skin temperature and turgor.  Nails x10 are elongated, thickened, dystrophic, subungual debris.  Lateral borders of hallux nails are incurvated.  2. Vascular: Dorsalis pedis two out of four bilateral and posterior tibial pulses two out of   four bilateral, capillary refill normal.  A little bit of pitting edema around ankle bilaterally.   3. Musculoskeletal: All muscle groups are graded 5 out of 5 in the foot and ankle.  Compartments soft and compressible.   4. Neurological: Normal sharp dull sensation; reflexes normal.  Decreased protective sensation noted to lower extremities.        ASSESSMENT  AND PLAN:   Diagnoses and all orders for this visit:    Onychomycosis    Ingrown nail    MGUS (monoclonal gammopathy of unknown significance)    Neuropathy associated with MGUS (HCC)    Hammer toes of both feet    Metatarsalgia, left foot    Chronic heel pain, right          Plan:     -Patient examined, chart history reviewed.  -Discussed importance of proper pedal hygiene, regular foot checks.  -Sharply debrided nails x10 with a sterile nail nipper achieving a 20% reduction in thickness and length, without incident. Nails further smoothed with dremel. Slant back performed to ingrown nails.   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  -Can use felt offloading pads to heels as needed.  -Educated patient on acute signs of infection advised patient to seek immediate medical attention if symptoms arise.    The patient indicates understanding of these issues and agrees to the plan.    RTC 2 months.    Alli Campbell DPM

## 2025-06-23 ENCOUNTER — OFFICE VISIT (OUTPATIENT)
Dept: OCCUPATIONAL MEDICINE | Facility: HOSPITAL | Age: 80
End: 2025-06-23
Attending: Other
Payer: MEDICARE

## 2025-06-23 DIAGNOSIS — R29.818 FINE MOTOR SKILL LOSS: ICD-10-CM

## 2025-06-23 DIAGNOSIS — D47.2 NEUROPATHY ASSOCIATED WITH MGUS (HCC): Primary | ICD-10-CM

## 2025-06-23 DIAGNOSIS — R20.2 PARESTHESIA OF HAND, BILATERAL: ICD-10-CM

## 2025-06-23 DIAGNOSIS — R29.898 FINE MOTOR SKILL LOSS: ICD-10-CM

## 2025-06-23 DIAGNOSIS — G63 NEUROPATHY ASSOCIATED WITH MGUS (HCC): Primary | ICD-10-CM

## 2025-06-23 PROCEDURE — 97165 OT EVAL LOW COMPLEX 30 MIN: CPT

## 2025-06-23 PROCEDURE — 97110 THERAPEUTIC EXERCISES: CPT

## 2025-06-23 NOTE — PROGRESS NOTES
OT NEURO EVALUATION:     Patient:  Nils Joseph (79 year old, male)   Diagnosis:   Neuropathy associated with MGUS (HCC) (D47.2,G63)  Paresthesia of hand, bilateral (R20.2)  Fine motor skill loss (R29.818,R29.898)     Referring Provider: Ana Maria Solis  Today's Date   6/23/2025    Precautions:  -- (MGUS)   Date of Evaluation: 06/23/25  Next MD visit: n/a  Date of Injury: >10 years  Date of Surgery: No data recorded     PATIENT SUMMARY     Summary of chief complaints: numbness in bilateral UE  History of current condition: Patient reports a 10-year history of peripheral neuropathy in the feet. He has a diagnosis of MGUS and is currently under medical care for this condition. He recently underwent an EMG, which confirmed continued neuropathy. Despite extensive medical intervention, there has been no significant improvement. He also reports bilateral neuropathy in the hands, with symptoms being relatively equal on both sides.   Pain level: current 0 /10, at best 0 /10, at worst 3 /10  Description of symptoms: numbness and reduced sensation in BUE   Occupation: n/a   Current limitations: opening a jars and other functional grasp  Pt goals: improve dexterity and ability to complete funcitonal acitivities with reduced ddifficulty and fatigue  Hand Dominance: right  Living Situation: family    Past medical history was reviewed with Nils.  Significant findings include:    Imaging/Tests: n/a   Nils  has a past medical history of Anemia, Arthritis, Asthma (HCC), Atypical mole (frequent body Nevi), Back pain, Back problem, Belching, Benign thyroid cyst, BPH (benign prostatic hyperplasia), Carotid stenosis (Aprx 3 yrs), Cataract (2015), Constipation, Depression, Disorder of prostate (BPH (I'm a 76 year old male)), Esophageal reflux (Sept. '23), Eye disease, Fatigue, Flatulence/gas pain/belching, Food intolerance, Frequent urination (2 x's / night), Frequent use of laxatives, Glaucoma, Hearing impairment,  Hearing loss, Hemorrhoids (very slight), Hemorrhoids, internal (02/29/2012), High cholesterol, Hypothyroidism, Insomnia (10/22/2010), Irregular bowel habits, Itch of skin (unknown cause), Leg swelling (ankles at PM), Neuropathy, Obesity, Osteoarthritis (2010), OTHER DISEASES, Pain in joints, Poor balance, Rotator cuff sprain (10/22/2010), Seborrheic keratosis (10/22/2010), Shoulder joint pain (10/22/2010), Sinusitis, Thyroid disease (use Levothyroxine 100 mcg), Unspecified disorder of thyroid, Visual impairment, and Wears glasses.  He  has a past surgical history that includes appendectomy; orthopedic surg (pbp); tonsillectomy; cataract (Bilateral, 08/2015); arthroscopy of joint unlisted (Right); carpal tunnel release (Bilateral); sinus surgery   (02/14/2014); hemorrhoidectomy (~ 2005); spine surgery procedure unlisted; biopsy (07/26/2019); biopsy (06/25/2019); other (02/2019); back surgery (09/12/2016); sigmoidoscopy,diagnostic (1980's); other surgical history; other surgical history; colonoscopy; colonoscopy (4/25/2022); adenoidectomy (probably w. tonsils?); anesth,shoulder replacement (July 2016); spinal fusion (Sept. 2016); shoulder arthroscopy (6/23/17); knee arthroscopy (1993?); Abdominal Surgery (approx. 1983); Joint Replacement (July 2016); hemorrhoidectomy,int/ext,simple; and thyroidectomy.    ASSESSMENT  Nils presents to occupational therapy evaluation with primary c/o numbness in bilateral UE. The results of the objective tests and measures show limited strength and reduced sensation in bilateral UE. Signs and symptoms are consistent with diagnosis of Neuropathy associated with MGUS (HCC) (D47.2,G63)  Paresthesia of hand, bilateral (R20.2)  Fine motor skill loss (R29.818,R29.898). Pt and OT discussed evaluation findings, pathology, POC and HEP.  Pt voiced understanding and performs HEP correctly without reported pain. Skilled Occupational Therapy is medically necessary to address the above impairments  and reach functional goals.    OBJECTIVE:      Musculoskeletal  Orthotics: wrist support for positioning during sleep     ROM and Strength:  (* denotes performed with pain)  Hand Strength (lbs) R L      59.6 51.4     2 pt Pinch 8 8     3 pt Pinch 6 9     Lateral pinch 14 16       Neurological:  Cognition:   Overall Cognitive Status: within functional limits      ,   Vision:   Current Vision: no visual deficits     ,   Perception:   Overall Perception Status: within functional limits       Sensory: numbness; tingling  Spasticity: n/a    Coordination:   9 Hole Peg Test: R: 31.17 sesconds L: 45,81 seconds    ADLs/IADLs:  ADL's    Bathing: INDEP     Dressing: INDEP     Feeding: INDEP     Grooming: INDEP  IADL's     Homemaking: INDEP     Food Prep: INDEP     Driving: INDEP   Other Functional Mobility/ADL Comments:      Today's Treatment and Response:   Pt education was provided on exam findings, treatment diagnosis, treatment plan, expectations, and prognosis.    Today's Treatment       6/23/2025   OT Treatment   Therapeutic Exercise HEP Review    Therapeutic Exercise Min 10   Eval Min 35   Total Timed Procedures 10   Total Service Procedures 45   Total Time 45         HEP:  Energy Conservation strategies   Adaptive Equipment      Charges:  OT EVAL Low Complexity, x1, TEx1   Based on analysis of data from a problem-focused assessment from a brief chart review, clinical presentation of physical, cognitive and psychosocial skills, as well as review of patient rated outcome measures, this evaluation involved Low complexity decision making, with 1-3 occupational performance component deficits, no comorbidities, and no need for modification of tasks or assistance with assessment.                                                                PLAN OF CARE:      Goals: (to be met in 8 visits)   Pt will enhance fine motor skills for improved hand-eye coordination and object manipulation to support self-care tasks, household  chores, writing and typing.  Pt will improve safety awareness and judgement to promote independence in the home and community.    Pt will increase endurance and activity tolerance to 35 minutes with no breaks to support participation in ADL/IADL.   Pt will be independent and compliant with comprehensive HEP to maintain progress achieved in OT.      Frequency / Duration: Patient will be seen 1-2 x/week or a total of 8 visits over a 90 day period. Treatment will include: Manual Therapy; Neuromuscular Re-education; Self-Care Home Management; Therapeutic Activities; Therapeutic Exercise; Home Exercise Program instruction; Ultrasound; Electrical stimulation (attended)    Education or treatment limitation: None   Rehab Potential: good     QuickDASH Outcome Score  Score: (Patient-Rptd) 47.73 % (6/23/2025 12:28 PM)      Patient/Family/Caregiver was advised of these findings, precautions, and treatment options and has agreed to actively participate in planning and for this course of care.    Thank you for your referral. Please co-sign or sign and return this letter via fax as soon as possible to [phone]. If you have any questions, please contact me at Dept: [phone]    Sincerely,  Electronically signed by therapist: Radha Byrd, OT  Physician's certification required: Yes  I certify the need for these services furnished under this plan of treatment and while under my care.    X___________________________________________________ Date____________________    Certification From: 6/23/2025  To: 9/21/2025

## 2025-06-30 PROBLEM — K21.9 GERD (GASTROESOPHAGEAL REFLUX DISEASE): Status: ACTIVE | Noted: 2025-06-30

## 2025-06-30 PROBLEM — D50.9 IRON DEFICIENCY ANEMIA: Status: ACTIVE | Noted: 2025-06-30

## 2025-06-30 PROBLEM — R14.2 ERUCTATION: Status: ACTIVE | Noted: 2025-06-30

## 2025-07-01 ENCOUNTER — OFFICE VISIT (OUTPATIENT)
Dept: OCCUPATIONAL MEDICINE | Facility: HOSPITAL | Age: 80
End: 2025-07-01
Attending: Other
Payer: MEDICARE

## 2025-07-01 PROCEDURE — 97110 THERAPEUTIC EXERCISES: CPT

## 2025-07-01 NOTE — PROGRESS NOTES
Patient: Nils Joseph (79 year old, male) Referring Provider:  Insurance:   Diagnosis: Neuropathy associated with MGUS (HCC) (D47.2,G63)  Paresthesia of hand, bilateral (R20.2)  Fine motor skill loss (R29.818,R29.898) Ana Maria Solis  MEDICARE   Date of Surgery: No data recorded Next MD visit:  [employer] [place] INDEMNITY   Precautions:  -- (MGUS) n/a Referral Information:   Date of Injury: >10 years Date of Evaluation: Req: 0, Auth: 0, Exp:     06/23/25 POC Auth Visits:          Today's Date   7/1/2025    Subjective  Patient presents to OT reporting that there has been no significant change in symptoms since the previous visit.       Pain: 0/10     Objective  Patient engaged in upper extremity strengthening exercises without reports of pain. Pink Theraputty exercises were introduced to target fine motor control and hand strength, and were added to the HEP.    ROM and Strength:  (* denotes performed with pain)  Hand Strength (lbs) R L      59.6 51.4     2 pt Pinch 8 8     3 pt Pinch 6 9     Lateral pinch 14 16       Neurological:  Cognition:   Overall Cognitive Status: within functional limits      ,   Vision:   Current Vision: no visual deficits     ,   Perception:   Overall Perception Status: within functional limits            Assessment  Patient is progressing well overall and demonstrates good insight into the purpose of therapeutic activities and how they relate to his functional goals. His engagement and understanding of how the exercises support functional recovery suggest strong carryover potential and continued motivation.    Goals (to be met in 8 visits)   Pt will enhance fine motor skills for improved hand-eye coordination and object manipulation to support self-care tasks, household chores, writing and typing.  Pt will improve safety awareness and judgement to promote independence in the home and community.    Pt will increase endurance and activity tolerance to 35 minutes with no breaks to support  participation in ADL/IADL.   Pt will be independent and compliant with comprehensive HEP to maintain progress achieved in OT.        Plan  Patient will be seen 1-2 x/week or a total of 8 visits over a 90 day period. Treatment will include: Manual Therapy; Neuromuscular Re-education; Self-Care Home Management; Therapeutic Activities; Therapeutic Exercise; Home Exercise Program instruction; Ultrasound; Electrical stimulation (attended)    Treatment Last 4 Visits        6/23/2025 7/1/2025   OT Treatment   Therapeutic Exercise HEP Review  Wrist AROM/PROM   Tan flexbar   Red theraputty   Disk maze   Therapeutic Exercise Min 10 45   Eval Min 35    Total Timed Procedures 10 45   Total Service Procedures 45 45   Total Time 45 45         HEP  Energy Conservation strategies   Adaptive Equipment     Charges     TEx3

## 2025-07-10 ENCOUNTER — OFFICE VISIT (OUTPATIENT)
Dept: OCCUPATIONAL MEDICINE | Facility: HOSPITAL | Age: 80
End: 2025-07-10
Attending: Other
Payer: MEDICARE

## 2025-07-10 PROCEDURE — 97110 THERAPEUTIC EXERCISES: CPT

## 2025-07-10 NOTE — PROGRESS NOTES
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 He inquired about the effectiveness of the interventions, sharing that he previously participated in physical therapy for neuropathy in his leg, which resulted in only minimal progress.       Pain: 0/10     Objective  Patient engaged in neuro-reeducation activities with bilateral upper extremities, including sensory discrimination and stereognosis tasks.    ROM and Strength:  (* denotes performed with pain)  Hand Strength (lbs) R L      59.6 51.4     2 pt Pinch 8 8     3 pt Pinch 6 9     Lateral pinch 14 16       Neurological:  Cognition:   Overall Cognitive Status: within functional limits      ,   Vision:   Current Vision: no visual deficits     ,   Perception:   Overall Perception Status: within functional limits            Assessment  Sensory recovery is influenced by both the severity and chronicity of symptoms. Incorporating a combination of sensory re-education strategies (e.g., stereognosis, discrimination) alongside UE strengthening exercises, and maintaining consistency with the HEP, increases the likelihood of functional gains. Patient demonstrates good motivation and engagement in the therapy process, which is a strong predictor for continued progress.    Goals (to be met in 8 visits)   Pt will enhance fine motor skills for improved hand-eye coordination and  object manipulation to support self-care tasks, household chores, writing and typing.  Pt will improve safety awareness and judgement to promote independence in the home and community.    Pt will increase endurance and activity tolerance to 35 minutes with no breaks to support participation in ADL/IADL.   Pt will be independent and compliant with comprehensive HEP to maintain progress achieved in OT.          Plan  Patient will be seen 1-2 x/week or a total of 8 visits over a 90 day period. Treatment will include: Manual Therapy; Neuromuscular Re-education; Self-Care Home Management; Therapeutic Activities; Therapeutic Exercise; Home Exercise Program instruction; Ultrasound; Electrical stimulation (attended)    Treatment Last 4 Visits        6/23/2025 7/1/2025 7/10/2025   OT Treatment   Treatment Day   3   Therapeutic Exercise HEP Review  Wrist AROM/PROM   Tan flexbar   Red theraputty   Disk maze Pop it   Red theraputty (3 point pinch   bead search with each digit)   Sponge  (finger adduction  fist)   Finger adduction (yellow sponge)  Finger abduction (yellow theraband)      Therapeutic Exercise Min 10 45 45   Eval Min 35     Total Timed Procedures 10 45 45   Total Service Procedures 45 45 45   Total Time 45 45 45         HEP  Energy Conservation strategies   Adaptive Equipment   Finger adduction (yellow sponge)  Finger abduction (yellow theraband)   Tendon Glides     Charges     TEx3

## 2025-07-17 ENCOUNTER — OFFICE VISIT (OUTPATIENT)
Dept: OCCUPATIONAL MEDICINE | Facility: HOSPITAL | Age: 80
End: 2025-07-17
Attending: Other
Payer: MEDICARE

## 2025-07-17 PROCEDURE — 97110 THERAPEUTIC EXERCISES: CPT

## 2025-07-17 PROCEDURE — 97112 NEUROMUSCULAR REEDUCATION: CPT

## 2025-07-17 NOTE — PROGRESS NOTES
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 He performed some exercises from a prior physical therapy program which provided some relief. He also reports occasional cramping in both upper and lower extremities and suspects it may be related to decreased water intake.       Pain: 7/10     Objective  Patient engaged in stereognosis and strengthening exercises with bilateral upper extremities. Demonstrated moderate difficulty with stereognosis tasks but was able to identify items without visual input.      ROM and Strength:  (* denotes performed with pain)  Hand Strength (lbs) R L      59.6 51.4     2 pt Pinch 8 8     3 pt Pinch 6 9     Lateral pinch 14 16       Neurological:  Cognition:   Overall Cognitive Status: within functional limits      ,   Vision:   Current Vision: no visual deficits     ,   Perception:   Overall Perception Status: within functional limits            Assessment  Patient demonstrated functional performance during stereognosis tasks with moderate challenge. Reports suggest that cramping may be linked to hydration status. OT provided education regarding potential poor fluid absorption despite water intake and encouraged the patient to discuss options with his physician, such as incorporating electrolyte-rich beverages to improve nutrient and fluid retention. Continued engagement in therapeutic activity is  supported.    Goals (to be met in 8 visits)   Pt will enhance fine motor skills for improved hand-eye coordination and object manipulation to support self-care tasks, household chores, writing and typing.  Pt will improve safety awareness and judgement to promote independence in the home and community.    Pt will increase endurance and activity tolerance to 35 minutes with no breaks to support participation in ADL/IADL.   Pt will be independent and compliant with comprehensive HEP to maintain progress achieved in OT.            Plan  Patient will be seen 1-2 x/week or a total of 8 visits over a 90 day period. Treatment will include: Manual Therapy; Neuromuscular Re-education; Self-Care Home Management; Therapeutic Activities; Therapeutic Exercise; Home Exercise Program instruction; Ultrasound; Electrical stimulation (attended)    Treatment Last 4 Visits        6/23/2025 7/1/2025 7/10/2025 7/17/2025   OT Treatment   Treatment Day   3 4   Therapeutic Exercise HEP Review  Wrist AROM/PROM   Tan flexbar   Red theraputty   Disk maze Pop it   Red theraputty (3 point pinch   bead search with each digit)   Sponge  (finger adduction  fist)   Finger adduction (yellow sponge)  Finger abduction (yellow theraband)    Tan flexbar  [place] sorting (red clip finger opposition)    Neuro Re-Educ    Bead search - stereognosis    Neuro Re-Ed Min    25   Therapeutic Exercise Min 10 45 45 20   Eval Min 35      Total Timed Procedures 10 45 45 45   Total Service Procedures 45 45 45 45   Total Time 45 45 45 45         HEP  Energy Conservation strategies   Adaptive Equipment   Finger adduction (yellow sponge)  Finger abduction (yellow theraband)   Tendon Glides     Charges     NMREx2, TEx1

## 2025-07-23 ENCOUNTER — OFFICE VISIT (OUTPATIENT)
Dept: OCCUPATIONAL MEDICINE | Facility: HOSPITAL | Age: 80
End: 2025-07-23
Attending: Other
Payer: MEDICARE

## 2025-07-23 PROCEDURE — 97110 THERAPEUTIC EXERCISES: CPT

## 2025-07-23 PROCEDURE — 97140 MANUAL THERAPY 1/> REGIONS: CPT

## 2025-07-23 NOTE — PROGRESS NOTES
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       Pain: 4/10     Objective  Patient engaged in ROM and strengthening exercises with both hands.      ROM and Strength:  (* denotes performed with pain)  Hand Strength (lbs) R L      59.6 51.4     2 pt Pinch 8 8     3 pt Pinch 6 9     Lateral pinch 14 16       Neurological:  Cognition:   Overall Cognitive Status: within functional limits      ,   Vision:   Current Vision: no visual deficits     ,   Perception:   Overall Perception Status: within functional limits            Assessment  Patient is progressing well. He was able to correctly identify items during the stereognosis task, suggesting a possible improvement in tactile discrimination and overall hand sensation. Mild achiness is expected with new movements and may indicate active muscle engagement and adaptation.    Goals (to be met in 8 visits)   Pt will enhance fine motor skills for improved hand-eye coordination and object manipulation to support self-care tasks, household chores, writing and typing.  Pt will improve safety awareness and judgement to promote independence in the home and community.    Pt will increase endurance and activity tolerance to 35 minutes with no breaks to support participation in ADL/IADL.   Pt will be independent and compliant with comprehensive HEP to maintain  progress achieved in OT.              Plan  Patient will be seen 1-2 x/week or a total of 8 visits over a 90 day period. Treatment will include: Manual Therapy; Neuromuscular Re-education; Self-Care Home Management; Therapeutic Activities; Therapeutic Exercise; Home Exercise Program instruction; Ultrasound; Electrical stimulation (attended)    Treatment Last 4 Visits        7/1/2025 7/10/2025 7/17/2025 7/23/2025   OT Treatment   Treatment Day  3 4 5   Therapeutic Exercise Wrist AROM/PROM   Tan flexbar   Red theraputty   Disk maze Pop it   Red theraputty (3 point pinch   bead search with each digit)   Sponge  (finger adduction  fist)   Finger adduction (yellow sponge)  Finger abduction (yellow theraband)    Tan flexbar  [place] sorting (red clip finger opposition)  Tan flexbar (roll out)   Tan flexbar (flex ext  pro sup)   Sponge  (finger adduction + fist)   [place] sorting (red clip finger opposition)    Neuro Re-Educ   Bead search - stereognosis     Manual Therapy    Soft Tissue Mobilization   Neuro Re-Ed Min   25    Therapeutic Exercise Min 45 45 20 35   Manual Therapy Min    10   Total Timed Procedures 45 45 45 45   Total Service Procedures 45 45 45 45   Total Time 45 45 45 45         HEP  Energy Conservation strategies   Adaptive Equipment   Finger adduction (yellow sponge)  Finger abduction (yellow theraband)   Tendon Glides       Charges     TEx2, MTx1

## 2025-07-25 ENCOUNTER — APPOINTMENT (OUTPATIENT)
Dept: OCCUPATIONAL MEDICINE | Facility: HOSPITAL | Age: 80
End: 2025-07-25
Attending: Other
Payer: MEDICARE

## 2025-07-30 ENCOUNTER — OFFICE VISIT (OUTPATIENT)
Dept: OCCUPATIONAL MEDICINE | Facility: HOSPITAL | Age: 80
End: 2025-07-30
Attending: Other
Payer: MEDICARE

## 2025-07-30 PROCEDURE — 97110 THERAPEUTIC EXERCISES: CPT

## 2025-07-30 PROCEDURE — 97530 THERAPEUTIC ACTIVITIES: CPT

## 2025-08-04 ENCOUNTER — OFFICE VISIT (OUTPATIENT)
Dept: OCCUPATIONAL MEDICINE | Facility: HOSPITAL | Age: 80
End: 2025-08-04
Attending: Other

## 2025-08-04 PROCEDURE — 97110 THERAPEUTIC EXERCISES: CPT

## 2025-08-19 ENCOUNTER — OFFICE VISIT (OUTPATIENT)
Dept: INTERNAL MEDICINE CLINIC | Facility: CLINIC | Age: 80
End: 2025-08-19

## 2025-08-19 VITALS
HEART RATE: 66 BPM | TEMPERATURE: 98 F | WEIGHT: 207 LBS | SYSTOLIC BLOOD PRESSURE: 126 MMHG | OXYGEN SATURATION: 97 % | RESPIRATION RATE: 16 BRPM | DIASTOLIC BLOOD PRESSURE: 64 MMHG | HEIGHT: 73 IN | BODY MASS INDEX: 27.43 KG/M2

## 2025-08-19 DIAGNOSIS — J45.30 MILD PERSISTENT ASTHMA WITHOUT COMPLICATION (HCC): ICD-10-CM

## 2025-08-19 DIAGNOSIS — E03.4 HYPOTHYROIDISM DUE TO ACQUIRED ATROPHY OF THYROID: ICD-10-CM

## 2025-08-19 DIAGNOSIS — D49.4 BLADDER TUMOR: ICD-10-CM

## 2025-08-19 DIAGNOSIS — Z01.818 PREOP EXAM FOR INTERNAL MEDICINE: Primary | ICD-10-CM

## 2025-08-19 DIAGNOSIS — D50.9 IRON DEFICIENCY ANEMIA, UNSPECIFIED IRON DEFICIENCY ANEMIA TYPE: ICD-10-CM

## 2025-08-19 DIAGNOSIS — I65.23 BILATERAL CAROTID ARTERY STENOSIS: ICD-10-CM

## 2025-08-19 PROCEDURE — 99213 OFFICE O/P EST LOW 20 MIN: CPT

## 2025-08-19 RX ORDER — ERYTHROMYCIN 5 MG/G
1 OINTMENT OPHTHALMIC NIGHTLY
COMMUNITY
Start: 2025-07-14

## (undated) DIAGNOSIS — J32.4 CHRONIC PANSINUSITIS: ICD-10-CM

## (undated) DIAGNOSIS — E03.4 HYPOTHYROIDISM DUE TO ACQUIRED ATROPHY OF THYROID: ICD-10-CM

## (undated) DIAGNOSIS — J33.9 NASAL POLYPOSIS: ICD-10-CM

## (undated) DIAGNOSIS — F51.04 PSYCHOPHYSIOLOGICAL INSOMNIA: ICD-10-CM

## (undated) DEVICE — SLEEVE KENDALL SCD EXPRESS MED

## (undated) DEVICE — SYSTEM BN CMNT OPTVC 2 MX KT

## (undated) DEVICE — ELECTRODE ESURG 2.75IN EZ CLN

## (undated) DEVICE — SUTURE PROLENE 3-0 8687H

## (undated) DEVICE — SUTURE VICRYL 2-0 CT-2

## (undated) DEVICE — VIOLET BRAIDED (POLYGLACTIN 910), SYNTHETIC ABSORBABLE SUTURE: Brand: COATED VICRYL

## (undated) DEVICE — 3M™ TEGADERM™ TRANSPARENT FILM DRESSING, 1626W, 4 IN X 4-3/4 IN (10 CM X 12 CM), 50 EACH/CARTON, 4 CARTON/CASE: Brand: 3M™ TEGADERM™

## (undated) DEVICE — GUIDE WIRE

## (undated) DEVICE — FAN SPRAY KIT: Brand: PULSAVAC®

## (undated) DEVICE — HOOK RETRCT BLDE L5MM E STAY BLNT LONE STAR

## (undated) DEVICE — FEMORAL CANAL BRUSH, IRRIGATION/SUCTION

## (undated) DEVICE — FOAM ARMBOARD POSITION 3X5X24

## (undated) DEVICE — APPLICATOR PREP 10.5ML ORNG CHG 2% ISO ALC

## (undated) DEVICE — SOL  .9 1000ML BTL

## (undated) DEVICE — ALCOHOL 70% 4 OZ

## (undated) DEVICE — GAUZE SPONGES,USP TYPE VII GAUZE, 12 PLY: Brand: CURITY

## (undated) DEVICE — REM POLYHESIVE ADULT PATIENT RETURN ELECTRODE: Brand: VALLEYLAB

## (undated) DEVICE — #15 STERILE STAINLESS BLADE: Brand: STERILE STAINLESS BLADES

## (undated) DEVICE — DRAPE SRG 90X60IN BCK TBL CVR

## (undated) DEVICE — BATTERY

## (undated) DEVICE — TRANSPOSAL ULTRAFLEX DUO/QUAD ULTRA CART MANIFOLD

## (undated) DEVICE — STERILE POLYISOPRENE POWDER-FREE SURGICAL GLOVES: Brand: PROTEXIS

## (undated) DEVICE — ABSORBABLE HEMOSTAT (OXIDIZED REGENERATED CELLULOSE): Brand: SURGICEL

## (undated) DEVICE — SUTURE MONOCRYL 4-0 PS-2

## (undated) DEVICE — SUTURE SILK 2-0

## (undated) DEVICE — PLASTC TOOMEY SYRNG DISP

## (undated) DEVICE — CHLORAPREP 26ML APPLICATOR

## (undated) DEVICE — SOLUTION IRRIG 1000ML 0.9% NACL USP BTL

## (undated) DEVICE — KENDALL SCD EXPRESS SLEEVES, THIGH LENGTH, MEDIUM: Brand: KENDALL SCD

## (undated) DEVICE — MEGADYNE ELECTRODE ADULT PT RT

## (undated) DEVICE — STERILE LATEX POWDER-FREE SURGICAL GLOVESWITH NITRILE COATING: Brand: PROTEXIS

## (undated) DEVICE — PEN SKIN MARKING REG TIP VIOLT

## (undated) DEVICE — UNDYED BRAIDED (POLYGLACTIN 910), SYNTHETIC ABSORBABLE SUTURE: Brand: COATED VICRYL

## (undated) DEVICE — SHEET,DRAPE,40X58,STERILE: Brand: MEDLINE

## (undated) DEVICE — SUTURE FIBERWIRE S AR-7200

## (undated) DEVICE — MEDI-VAC SUCTION HANDLE REGULAR CAPACITY: Brand: CARDINAL HEALTH

## (undated) DEVICE — SUT MONOCRYL 4-0 PS-2 Y496G

## (undated) DEVICE — COVER SLV UNV DISP NTR STRL LF

## (undated) DEVICE — SUTURE ETHIBOND 1 CT-1

## (undated) DEVICE — LIGHT HANDLE

## (undated) DEVICE — FEMORAL SPONGE WITH SUCTION ATTACHMENT

## (undated) DEVICE — SOL  .9 3000ML

## (undated) DEVICE — 3M™ MEDITPORE™ SOFT CLOTH TAPE 6 IN X 10 YD 12 ROLLS/CASE 2966: Brand: 3M™ MEDIPORE™

## (undated) DEVICE — STANDARD HYPODERMIC NEEDLE,POLYPROPYLENE HUB: Brand: MONOJECT

## (undated) DEVICE — 20 ML SYRINGE LUER-LOCK TIP: Brand: MONOJECT

## (undated) DEVICE — SUTURE VICRYL 2-0 FS-1

## (undated) DEVICE — KIT DRN 1/8IN PVC 3 SPRG EVAC

## (undated) DEVICE — MINI LAP PACK-LF: Brand: MEDLINE INDUSTRIES, INC.

## (undated) DEVICE — SOLUTION  .9 1000ML BTL

## (undated) DEVICE — CASED DISP BIPOLAR CORD

## (undated) DEVICE — CABLE BPLR L12FT FLYING LD DISP

## (undated) DEVICE — PROXIMATE SKIN STAPLERS (35 WIDE) CONTAINS 35 STAINLESS STEEL STAPLES (FIXED HEAD): Brand: PROXIMATE

## (undated) DEVICE — DERMABOND LIQUID ADHESIVE

## (undated) DEVICE — SUTURE VICRYL 0 CP-1

## (undated) DEVICE — STERILE SYNTHETIC POLYISOPRENE POWDER-FREE SURGICAL GLOVES WITH HYDROGEL COATING, SMOOTH FINISH, STRAIGHT FINGER: Brand: PROTEXIS

## (undated) DEVICE — SOL NACL IRRIG 0.9% 1000ML BTL

## (undated) DEVICE — SUTURE VICRYL 3-0 RB-1

## (undated) DEVICE — SPONGE: SPECIALTY PEANUT XR 100/CS: Brand: MEDICAL ACTION INDUSTRIES

## (undated) DEVICE — SPONGE LAP 18X18 XRAY STRL

## (undated) DEVICE — SUTURE PROLENE 0 CT-1

## (undated) DEVICE — CONTAINER SPEC STR 4OZ GRY LID

## (undated) DEVICE — LAPAROTOMY SPONGE - RF AND X-RAY DETECTABLE PRE-WASHED: Brand: SITUATE

## (undated) DEVICE — HEAD AND NECK CDS-LF: Brand: MEDLINE INDUSTRIES, INC.

## (undated) DEVICE — SHOULDER: Brand: MEDLINE INDUSTRIES, INC.

## (undated) DEVICE — PERIPHERAL SCREW DRILL BIT

## (undated) DEVICE — SLEEVE COMPR MD KNEE LEN SGL USE KENDALL SCD

## (undated) DEVICE — SUT VICRYL 2-0 J111T

## (undated) DEVICE — SPECIMEN CONTAINER,POSITIVE SEAL INDICATOR, OR PACKAGED: Brand: PRECISION

## (undated) DEVICE — SOLUTION SURG DURA PREP HAZMAT

## (undated) DEVICE — 3M™ COBAN™ NL STERILE NON-LATEX SELF-ADHERENT WRAP, 2084S, 4 IN X 5 YD (10 CM X 4,5 M), 18 ROLLS/CASE: Brand: 3M™ COBAN™

## (undated) DEVICE — SUT MONOCRYL 4-0 PS-2 Y426H

## (undated) DEVICE — ELECTRODE ES 2.75IN PTFE BLDE MOD E-Z CLN

## (undated) DEVICE — APPLICATOR CHLORAPREP 26ML

## (undated) DEVICE — BLADE SAW SAGITTAL 19.5

## (undated) DEVICE — MARKER SKIN 2 TIP

## (undated) DEVICE — COVER LT HNDL RIG FOR SUR CAM DISP

## (undated) DEVICE — SUTURE MONOCRYL 3-0 Y936H

## (undated) DEVICE — LIGASURE EXACT DISSECTOR: Brand: LIGASURE

## (undated) DEVICE — ADHESIVE SKIN TOP FOR WND CLSR DERMBND ADV

## (undated) DEVICE — GOWN,PREVENTION PLUS,LARGE,STERILE: Brand: MEDLINE

## (undated) DEVICE — PENCIL TELESCOPE MEGADYNE SE

## (undated) DEVICE — Device

## (undated) DEVICE — PREMIUM WET SKIN PREP TRAY: Brand: MEDLINE INDUSTRIES, INC.

## (undated) DEVICE — SUT COAT VCRL 3-0 27IN SH ABSRB UD 26MM 1/2

## (undated) DEVICE — SUT ETHILON 4-0 PS-2 1667H

## (undated) DEVICE — SHOULDER P.A.D II: Brand: DEROYAL

## (undated) DEVICE — SUT VICRYL 3-0 SH J416H

## (undated) DEVICE — ST. TONGUE BLADES: Brand: DEROYAL

## (undated) DEVICE — 3M™ IOBAN™ 2 ANTIMICROBIAL INCISE DRAPE 6650EZ: Brand: IOBAN™ 2

## (undated) NOTE — Clinical Note
FYI, TCM call made, see notes. JESSICA attempted to schedule TCM HFU appointment patient does not feel it is necessary to follow up with Dr. Dalton Louise at this time.  JESSICA sent message to MD's office

## (undated) NOTE — Clinical Note
Kendal,     I wanted you to see if you had any other thoughts for management of chronic neuropathy for this patient - just want another set of eyes and maybe repeat EMG - did fairly extensive workup and has MGUS but nothing else I can find and wanted you to take a look.   Thanks Harpreet Mitchell

## (undated) NOTE — LETTER
ASTHMA ACTION PLAN for Angel Luis Enciso     : 10/10/1945     Date: 2019  Provider:  August Rizzo MD  Phone for doctor or clinic: [facility], [facility], [address] [place] [phone] N

## (undated) NOTE — LETTER
01/16/19    Dear Dr. Ochoa Last      Thank you for referring your patient, Saida Scott to me for an evaluation. Please see my initial consult note enclosed below. Let me know if you have any questions.     Thank you  Kimber Orr MD, Neurology house at night. He also has muscle spasms in both thighs,  R more than L. He has done physical therapy as well but has not noted improvement in R drop foot.           Otherwise, patient denies any recent weight change, fevers, chills, nausea, d • LUMBAR LAMINECTOMY POST LAT INTERBODY FUS 2 LEVEL N/A 9/12/2016    Performed by Eliane Franklin MD at [facility] MAIN OR   • NASAL SEPTOPLASTY BILAT SINUS ENDOSCOPY ETHM MAX Bilateral 1/28/2014    Performed by Flo Bernal MD at [facility] MAIN OR   • ORTHOPEDIC SURG (P gabapentin 300 MG Oral Cap Take 300 mg by mouth 3 (three) times daily. Disp:  Rfl:    Mometasone Furoate (ASMANEX HFA) 100 MCG/ACT Inhalation Aerosol Inhale 1 puff into the lungs 2 (two) times daily.  **NEED OFFICE VISIT FOR FURTHER REFILLS** Disp: 3 Inhale EYES: sclera anicteric, conjunctiva normal  HEENT: normocephalic  CARDIOVASCULAR: S1, S2 normal, RRR  LUNGS: clear to auscultation bilaterally  EXTREMITIES: no cyanosis, peripheral pulses intact    Neck: Supple; full range of motion; no carotid bruits    M Ben Cartwright is a 75year old male with past medical history significant for thyroid disorder, as well as neuropathy, as well as lumbar radiculopathy, lumbar spinal stenosis status post lumbar spinal surgery, who presents for evaluation of continued r As noted above     (D47.2) MGUS (monoclonal gammopathy of unknown significance)  Plan: as noted above     (M21.371,  M21.372) Foot drop, bilateral  Plan: EMG ([address])        As noted above     No Follow-up on file.

## (undated) NOTE — LETTER
ASTHMA ACTION PLAN for Diana De Jesus     :      Date: 2023  Provider:  Olesya Fontana MD  Phone for doctor or clinic: [facility], [place] [address], [address] [address]  [place]  [phone]           You can use the colors of a traffic light to help learn about your asthma medicines. 1. Green - Go! % of Personal Best Peak Flow Use controller medicine. Breathing is good  No cough or wheeze  Can work and play Medicine How much to take When to take it          2. Yellow - Caution. 50-79% Personal Best Peak  Flow. Use reliever medicine to keep an asthma attack from getting bad. Cough  Wheezing  Tight Chest  Wake up at night Medicine How much to take When to take it    Albuterol Inhaler           2 puffs every 6 hours as needed       Additional instructions         3. Red - Stop! Danger!  <50% Personal Best Peak  Flow. Take these medications until  Get help from a doctor   Medicine not helping  Breathing is hard and fast  Nose opens wide  Can't walk  Ribs show  Can't talk well Medicine How much to take When to take it    Albuterol Inhaler        2 puffs now     Additional Instructions If your symptoms do not improve and you cannot contact your doctor, [facility] or call 911 immediately! [x] Asthma Action Plan reviewed with patient (and caregiver if necessary) and a copy of the plan was given to the patient/caregiver. [] Asthma Action Plan reviewed with patient (and caregiver if necessary) on the phone and mailed copy to patient or submitted via [address]      Signatures:  Provider  Olesya Fontana MD   Patient Caretaker

## (undated) NOTE — Clinical Note
Hello,    Please see my enclosed letter.   I have been seeing the patient for a second opinion for neuropathy - he has evidence for some atypical features of neuropathy and I am questioning if this is related to his MGUS and/or amyloid versus another immune

## (undated) NOTE — LETTER
2019        RE: Ben Cartwright     :     Dear Dr. Rebekah Olivo,    This letter is to inform you that your patient is being scheduled for surgery with Dr. Garcia Alert on 19 at [facility]. We have asked the patient to contact your offi

## (undated) NOTE — Clinical Note
ASTHMA ACTION PLAN for Nika Coleman     : 10/10/1945     Date: 2017  Provider:  aMme Escobar MD  Phone for doctor or clinic: [address] [place] [address] 2, [address] [place] [address] [place] [phone] J the phone and mailed copy to patient or submitted via [address]      Signatures:  Provider  Mary Pearson MD   Patient Caretaker

## (undated) NOTE — LETTER
22    Patient: Adeline Escobar  :  Visit date: 2022    Dear  Tanja Bull MD    Thank you for referring Adeline Escobar to my practice. Please find my assessment and plan below. History of Present Illness     Stephanie Hernandez is a 69-year-old male who presents to clinic for evaluation after immediate care visit and drainage of left axillary wound on 7/3/2022. The patient was given Keflex and a instructed to follow-up with general surgery if the wound does not improve. The patient states he has continued to have wound drainage and tenderness over the past several weeks. He reports twice daily dressing changes secondary to drainage. The patient states his pain is overall improving. He denies erythema of the surrounding skin. He denies fevers or chills. The patient states he tolerated his oral antibiotics and completed his course as prescribed. The patient reports a past medical history of colon polyps, asthma, neuropathy, and BPH. The patient also reports a history of left shoulder surgery complicated by infection requiring 2 subsequent surgeries in 2017. He denies a history of skin infections. He denies a family history of skin diseases. The patient is a daily alcohol user. He is a former smoker. He denies drug use    On physical examination there is protuberant granulation tissue at the edge of the incision. There is no cellulitis or purulent drainage noted    Treatment options were reviewed in detail with the patient. At this time silver nitrate was applied in an effort to cause regression of this access granulation tissue      Assessment   No diagnosis found.       Plan     Follow-up with me in 1 to 2 weeks for repeat wound check        Sincerely,       Holly Valdez MD   CC: No Recipients

## (undated) NOTE — LETTER
03/08/19    Dear Dr. Larios Erps       I saw our mutual patient, Nehemiah Burgos for a follow up visit. Please see my progress note enclosed below. Let me know if you have any questions.     Thank you  Noah Cardenas MD, Neurology  [facility] He has done physical therapy as well but has not noted improvement in R drop foot.           Otherwise, patient denies any recent weight change, fevers, chills, nausea, double vision/ blurry vision / loss of vision, chest pain, palpitations, shortness of repeat in 10 years   • HEMORRHOIDECTOMY  ~ 2005   • INTRAOPERATIVE NEURO MONITORING N/A 9/12/2016    [place] [facility] [place]   • LUMBAR LAMINECTOMY POST LAT INTERBODY FUS 2 LEVEL N/A 9/12/2016    [place] [facility] [place]      Smokeless tobacco: Never Used    Substance and Sexual Activity      Alcohol use:  Yes        Alcohol/week: 8.4 - 21.0 oz        Types: 7 - 14 Glasses of wine, 7 - 21 Standard drinks or equivalent per week        Comment •  Multiple Vitamin (MULTI-VITAMIN OR), Take 1 tablet by mouth daily. , Disp: , Rfl:     Review of Systems:  No chest pain or palpitations; otherwise as noted in HPI.     Exam:  /74   Pulse 84   Resp 16   Ht 73\"   Wt 228 lb   BMI 30.08 kg/m²   Estim Monoclonal spike in the gamma region. . . . IMMUNOFIXATION       Monoclonal IgM kappa. If clinically indicated, 24 hour urine monoclonal . . .    KAPPA FREE LIGHT CHAIN      0.330 - 1.940 mg/dL 2.499 (H)   LAMBDA FREE LIGHT CHAIN      0.571 - 2.630 m Other testing:   New since last visit:       EMG:       Sensory NCS      Nerve / Sites Rec. Site Onset Lat Peak Lat NP Amp PP Amp Segments Distance Peak Diff Velocity     ms ms µV µV  cm ms m/s   L Median - Digit II (Antidromic)      Wrist Dig II 2.97 3. L. Flexor carpi radialis Median C6-C7 N None None None None N N N N   L. First dorsal interosseous Ulnar C8-T1 N None None None None 1+ 1+ N Reduced   L. Vastus medialis Femoral L2-L4 N None None None None 1+ 1+ N Reduced   L.  Tibialis anterior Deep perone 8. Left ulnar motor response was normal; F wave was prolonged. EMG (needle exam):   Concentric needle EMG was performed on the selected muscles listed above in the table with the following findings:   1.  Increased insertional activity, consisting of fi significant for thyroid disorder, as well as neuropathy, as well as lumbar radiculopathy, lumbar spinal stenosis status post lumbar spinal surgery, who originally presented 1/2019, for evaluation of continued right foot drop, as well as mild left foot drop regarding utility of further investigation from hematology standpoint and await workup for ganglioside Ab panel; if there is evidence for elevated ganglioside Ab, may benefit from trial of immunotherapy; if negative, may need to have nerve biopsy - will aw

## (undated) NOTE — LETTER
Patient Name: Nils Joseph  YOB: 1945          MRN number:  [id number]  Date:  5/29/2024  Referring Physician:  Dr. Solis,     Discharge Summary  Number of Visits Attended 38 in Physical Therapy    Dear Dr. Solis,    Discharge Summary  Pt has attended 38 visits in Physical Therapy.     Subjective: Stretches in home exercise program consistently help to alleviate soreness in the lower back and hips. Scheduled to see a neuropathy specialist in late June and follows up with Dr. Solis in July. Feels ready to discontinue physical therapy for now and to continue with HEP.  Does not see any changes with his balance: reports frequent stumbling a few times per week; usually able to recover his balance. Falls about once per month. His falls usually happen with gait initiation, especially if needs to walk while turning or if carries something while walking. He falls to the back or sides. Re-started [facility] and stays consistent with HEP.    Assessment: Patient stays consistent with regular stretching and strengthening home exercise program for his lower back and hips as it helps him to minimize stiffness and soreness in those regions; he required additional manual therapy interventions for lower back and hips pain twice over the past few 4 weeks but that happened following prolonged travel and/or sitting. Nils demonstrates good improvement with postural control as measured with double leg stance time, feet together stance time and modified tandem stance. He is well aware of the strategies to minimize his fall risk and developed better ability to correct for swayback positioning that contributes to loss of balance posterior. Nils is discharged from physical therapy to continue with home exercise program (lower back, hips and balance) independently    Objective:   Physical Exam:  Posture/Observation: Wearing B AFO's; flexed slightly at the knees; swayed back with shoulders posterior to the hips  contributing to posterior lean            Postural Control:  Double leg stance: 2 min 45 sec  4-Stage Balance Test:   - Feet together: 1 min 15 sec   - Modified Tandem:  45 sec   - Tandem Stance: unable sec Fall Risk: yes   - SLS: R 0 sec, L 0 sec Fall Risk: yes  [Full tandem stance <10 sec indicates increased risk of falling]  Age appropriate norms for SLS: 60-68 y/o mean = 27.0 sec      70-78 y/o mean = 17.2 sec      80-98 y/o mean = 8.5 sec     Functional Mobility:  30 sec sit<>stand: 1.  Able to complete x 5 with 2HHA to get up and  stabilize his position independently    Fall Risk: yes  [Below average score indicates high risk for falls; norms by age > or = to...   60-63 y/o Men: 14, Women: 12   65-68 y/o Men: 12, Women: 11   70-75 y/o Men: 12, Women: 10   75-78 y/o Men: 11, Women: 10   80-85 y/o Men: 10, Women: 9   85-88 y/o Men: 8, Women: 8   90-95 y/o Men 7, Women: 4]    Gait: pt ambulates on level ground with assistive device of SPC and B AFO's, decreased foot clearance B, decreased arm swing, difficulty turning, and steppage gait    TUG (AD, time): 13.5 sec, SPC  Fall Risk: yes  [Performance exceeding the upper limit of confidence intervals are considered increased risk for falls; Edward, 2006...   60-68 y/o mean 8.1s (7.1-9.0s)   70-78 y/o mean 9.2s (8.2-10.2s)   80-98 y/o mean 11.3s (10.0-12.7s)]    4 Item Dynamic Gait Index Score: 5/12 Fall Risk: yes  [Scores of 10 or less indicate increased risk for falls]  Gait level surface: 2  Grading: Dameon the lowest category that applies.  (3)  Normal: Walks 20’, no assistive devices, good speed, no evidence of imbalance, normal gait pattern.  (2)  Mild Impairment: Walks 20’, uses assistive devices, slower speed, mild gait deviations.  (1)  Moderate Impairment: Walks 20’, slow speed, abnormal gait pattern, evidence of imbalance.  (0)  Severe Impairment: Cannot walk 20’ without assistance, severe gait deviations or imbalance.     Change in gait speed: 1  Grading:  Dameon the lowest category that applies.  (3)  Normal: Able to smoothly change walking speed without loss of balance or gait deviation. Shows a significant difference in walking speed between normal, fast and slow speeds.   (2)  Mild Impairment: Is able to change speed but demonstrates mild gait deviations, or no gait deviations but unable to achieve a significant change in velocity, or uses an assistive device.   (1)  Moderate Impairment: Makes only minor adjustments to walking speed, or accomplishes a change in speed with significant gait deviations, or changes speed but has significant gait deviations, or changes speed but loses balance but is able to recover and continue walking.  (0)  Severe Impairment: Cannot change speeds, or loses balance and has to reach for wall or be caught.    Gait with horizontal head turns: 1  (3)  Normal: Performs head turns smoothly with no change in gait.  (2)  Mild Impairment: Preforms head turns smoothly with slight change in gait velocity, i.e., minor disruption to smooth gait path or uses walking aid.  (1) Moderate Impairment: Preforms head turns with moderate change in gait velocity, slows down, staggers but recovers, can continue to walk.  (0)  Severe Impairment: Preform task with severe disruption of gait, i.e., staggers outside 15” path, loses balance, stops, reaches for wall.    Gait with vertical head turns: 1  Grading: Dameon the lowest category that applies.  (3) Normal: Preforms head turns smoothly with no change in gait.  (2) Mild Impairment: Preforms head turns smoothly with slight change in gait velocity, i.e., minor disruption to smooth gait path or uses walking aid.  (1) Moderate Impairment: Preforms head turns with moderate change in gait velocity, slows down, staggers but recovers, can continue to walk.  (0) Severe Impairment: Preforms task with severe disruption of gait, i.e., staggers outside 15” path, loses balance, stops, reaches for wall.        Goals: (to be  met in 10+ 10 + 10 + 6/8visits)   Pt will demonstrate improved SLS to >5 seconds CALOS to promote safety and decrease risk of falls on uneven surfaces such as grass. Not met  Pt will report reduced frequency of loss of balance posterior/to the side by 30%. Not met  Pt will demonstrate improved postural control to allow for improved body centering with gait and daily activities. Met  Pt will perform TUG in <15 seconds with least restrictive AD, demonstrating improved gait speed for improved participation in ADL such as community ambulation. Met  Pt will perform 4-Item DGI with score of 9/12 or greater with least restrictive AD to demonstrate ability to ambulate safely in crowded and busy environments. Not met  Pt will be able to squat to  light objects around the house without loss of stability. Met  Pt will improve functional hip strength to demonstrate ability to ascend/descend 1 flight of stairs reciprocally with the use of handrail. Goal met.   Pt will be independent and compliant with comprehensive HEP to maintain progress achieved in PT Goal met.       Patient/Family/Caregiver was advised of these findings, precautions, and treatment options and has agreed to actively participate in planning and for this course of care.    Thank you for your referral. If you have any questions, please contact me at Dept: [phone].    Sincerely,  Electronically signed by therapist: Juanita Koehler, PT           21st Century Cures Act Notice to Patient: Medical documents like this are made available to patients in the interest of transparency. However, be advised this is a medical document and it is intended as [id number] communication between your medical providers. This medical document may contain abbreviations, assessments, medical data, and results or other terms that are unfamiliar. Medical documents are intended to carry relevant information, facts as evident, and the clinical opinion of the practitioner. As  such, this medical document may be written in language that appears blunt or direct. You are encouraged to contact your medical provider and/or [facility] if you have any questions about this medical document.

## (undated) NOTE — LETTER
ASTHMA ACTION PLAN for Debbie Lujan     : 10/10/1945     Date: 2018  Provider:  Lacey Fleming MD  Phone for doctor or clinic: [address] [place] [address] [address] [phone]

## (undated) NOTE — Clinical Note
BUSHRA, TCM call made, see notes. NCM confirmed patient has a AWV scheduled on 11/30/17, NCM attempted to schedule TCM HFU appointment, patient states he will see Dr. Edgar Malloy on 11/30/17 for both. Message sent to MD's office.